# Patient Record
Sex: MALE | Race: BLACK OR AFRICAN AMERICAN | NOT HISPANIC OR LATINO | Employment: OTHER | URBAN - METROPOLITAN AREA
[De-identification: names, ages, dates, MRNs, and addresses within clinical notes are randomized per-mention and may not be internally consistent; named-entity substitution may affect disease eponyms.]

---

## 2017-01-03 DIAGNOSIS — N39.0 URINARY TRACT INFECTION, SITE UNSPECIFIED: Primary | ICD-10-CM

## 2017-01-03 RX ORDER — CIPROFLOXACIN 500 MG/1
500 TABLET ORAL 2 TIMES DAILY
Qty: 28 TABLET | Refills: 0 | Status: SHIPPED | OUTPATIENT
Start: 2017-01-03 | End: 2017-01-17

## 2017-01-16 RX ORDER — ALFUZOSIN HYDROCHLORIDE 10 MG/1
TABLET, EXTENDED RELEASE ORAL
Qty: 30 TABLET | Refills: 0 | Status: SHIPPED | OUTPATIENT
Start: 2017-01-16 | End: 2017-02-13 | Stop reason: SDUPTHER

## 2017-02-13 RX ORDER — ALFUZOSIN HYDROCHLORIDE 10 MG/1
TABLET, EXTENDED RELEASE ORAL
Qty: 30 TABLET | Refills: 0 | Status: SHIPPED | OUTPATIENT
Start: 2017-02-13 | End: 2017-03-14 | Stop reason: SDUPTHER

## 2017-03-14 RX ORDER — ALFUZOSIN HYDROCHLORIDE 10 MG/1
TABLET, EXTENDED RELEASE ORAL
Qty: 30 TABLET | Refills: 0 | Status: SHIPPED | OUTPATIENT
Start: 2017-03-14 | End: 2017-04-17 | Stop reason: SDUPTHER

## 2017-04-17 RX ORDER — ALFUZOSIN HYDROCHLORIDE 10 MG/1
TABLET, EXTENDED RELEASE ORAL
Qty: 30 TABLET | Refills: 0 | Status: SHIPPED | OUTPATIENT
Start: 2017-04-17 | End: 2017-05-17 | Stop reason: SDUPTHER

## 2017-05-17 RX ORDER — ALFUZOSIN HYDROCHLORIDE 10 MG/1
TABLET, EXTENDED RELEASE ORAL
Qty: 30 TABLET | Refills: 0 | Status: SHIPPED | OUTPATIENT
Start: 2017-05-17 | End: 2017-06-18 | Stop reason: SDUPTHER

## 2017-05-22 RX ORDER — HYDROXYCHLOROQUINE SULFATE 200 MG/1
TABLET, FILM COATED ORAL
Qty: 60 TABLET | Refills: 0 | Status: SHIPPED | OUTPATIENT
Start: 2017-05-22 | End: 2017-06-19 | Stop reason: SDUPTHER

## 2017-06-19 RX ORDER — HYDROXYCHLOROQUINE SULFATE 200 MG/1
TABLET, FILM COATED ORAL
Qty: 60 TABLET | Refills: 0 | Status: SHIPPED | OUTPATIENT
Start: 2017-06-19 | End: 2017-07-20 | Stop reason: SDUPTHER

## 2017-06-19 RX ORDER — ALFUZOSIN HYDROCHLORIDE 10 MG/1
TABLET, EXTENDED RELEASE ORAL
Qty: 30 TABLET | Refills: 0 | Status: SHIPPED | OUTPATIENT
Start: 2017-06-19 | End: 2017-07-20 | Stop reason: SDUPTHER

## 2017-07-20 ENCOUNTER — OFFICE VISIT (OUTPATIENT)
Dept: UROLOGY | Facility: CLINIC | Age: 82
End: 2017-07-20
Payer: MEDICARE

## 2017-07-20 ENCOUNTER — TELEPHONE (OUTPATIENT)
Dept: UROLOGY | Facility: CLINIC | Age: 82
End: 2017-07-20

## 2017-07-20 VITALS
TEMPERATURE: 99 F | SYSTOLIC BLOOD PRESSURE: 141 MMHG | HEART RATE: 64 BPM | RESPIRATION RATE: 18 BRPM | WEIGHT: 143.94 LBS | BODY MASS INDEX: 22.59 KG/M2 | HEIGHT: 67 IN | DIASTOLIC BLOOD PRESSURE: 63 MMHG

## 2017-07-20 DIAGNOSIS — R35.0 URINARY FREQUENCY: ICD-10-CM

## 2017-07-20 DIAGNOSIS — N41.9 PROSTATITIS, UNSPECIFIED PROSTATITIS TYPE: ICD-10-CM

## 2017-07-20 DIAGNOSIS — N39.0 URINARY TRACT INFECTION WITHOUT HEMATURIA, SITE UNSPECIFIED: Primary | ICD-10-CM

## 2017-07-20 DIAGNOSIS — R35.1 NOCTURIA: ICD-10-CM

## 2017-07-20 LAB
BILIRUB SERPL-MCNC: ABNORMAL MG/DL
BLOOD URINE, POC: ABNORMAL
COLOR, POC UA: YELLOW
GLUCOSE UR QL STRIP: ABNORMAL
KETONES UR QL STRIP: ABNORMAL
LEUKOCYTE ESTERASE URINE, POC: ABNORMAL
NITRITE, POC UA: ABNORMAL
PH, POC UA: 5
PROTEIN, POC: ABNORMAL
SPECIFIC GRAVITY, POC UA: 1.01
UROBILINOGEN, POC UA: ABNORMAL

## 2017-07-20 PROCEDURE — 99214 OFFICE O/P EST MOD 30 MIN: CPT | Mod: 25,S$GLB,, | Performed by: NURSE PRACTITIONER

## 2017-07-20 PROCEDURE — 87088 URINE BACTERIA CULTURE: CPT

## 2017-07-20 PROCEDURE — 1126F AMNT PAIN NOTED NONE PRSNT: CPT | Mod: S$GLB,,, | Performed by: NURSE PRACTITIONER

## 2017-07-20 PROCEDURE — 87086 URINE CULTURE/COLONY COUNT: CPT

## 2017-07-20 PROCEDURE — 81001 URINALYSIS AUTO W/SCOPE: CPT | Mod: S$GLB,,, | Performed by: NURSE PRACTITIONER

## 2017-07-20 PROCEDURE — 1159F MED LIST DOCD IN RCRD: CPT | Mod: S$GLB,,, | Performed by: NURSE PRACTITIONER

## 2017-07-20 PROCEDURE — 99999 PR PBB SHADOW E&M-EST. PATIENT-LVL IV: CPT | Mod: PBBFAC,,, | Performed by: NURSE PRACTITIONER

## 2017-07-20 RX ORDER — ALFUZOSIN HYDROCHLORIDE 10 MG/1
10 TABLET, EXTENDED RELEASE ORAL DAILY
Qty: 30 TABLET | Refills: 11 | Status: SHIPPED | OUTPATIENT
Start: 2017-07-20 | End: 2018-08-02 | Stop reason: SDUPTHER

## 2017-07-20 RX ORDER — ALFUZOSIN HYDROCHLORIDE 10 MG/1
TABLET, EXTENDED RELEASE ORAL
Qty: 30 TABLET | Refills: 0 | Status: SHIPPED | OUTPATIENT
Start: 2017-07-20 | End: 2017-07-20 | Stop reason: SDUPTHER

## 2017-07-20 RX ORDER — DOXYCYCLINE HYCLATE 100 MG
100 TABLET ORAL 2 TIMES DAILY
Qty: 42 TABLET | Refills: 0 | Status: SHIPPED | OUTPATIENT
Start: 2017-07-20 | End: 2017-08-10

## 2017-07-20 RX ORDER — HYDROXYCHLOROQUINE SULFATE 200 MG/1
TABLET, FILM COATED ORAL
Qty: 60 TABLET | Refills: 0 | Status: SHIPPED | OUTPATIENT
Start: 2017-07-20 | End: 2017-08-28 | Stop reason: SDUPTHER

## 2017-07-20 NOTE — PATIENT INSTRUCTIONS
Bladder Infection, Male (Adult)    You have a bladder infection.  Urine is normally free of bacteria. But bacteria can get into the urinary tract from the skin around the rectum or it may travel in the blood from elsewhere in the body.  This is called a urinary tract infection (UTI). An infection can occur anywhere in the urinary tract. It could be in a kidney (pyelonephritis)or in the bladder (cystitis) and urethra (urethritis). The urethra is the tube that drains the urine from the bladder through the tip of the penis.  The most common place for a UTI is in the bladder. This is called a bladder infection. Most bladder infections are easily treated. They are not serious unless the infection spreads up to the kidney.  The terms bladder infection, UTI, and cystitis are often used to describe the same thing, but they arent always the same. Cystitis is an inflammation of the bladder. The most common cause of cystitis is an infection.   Keep in mind:  · Infections in the urine are called UTIs.  · Cystitis is usually caused by a UTI.  · Not all UTIs and cases of cystitis are bladder infections.  · Bladder infections are the most common type of cystitis.  Symptoms of a bladder infection  The infection causes inflammation in the urethra and bladder. This inflammation causes many of the symptoms. The most common symptoms of a bladder infection are:  · Pain or burning when urinating  · Having to go more often than usual  · Feeling like you need to go right away  · Only a small amount comes out  · Blood in urine  · Discomfort in your belly (abdomen), usually in the lower abdomen, above the pubic bone  · Cloudy, strong, or bad smelling urine  · Unable to urinate (retention)  · Urinary incontinence  · Fever  · Loss of appetite  Older adults may also feel confused.  Causes of a bladder infection  Bladder infections are not contagious. You can't get one from someone else, from a toilet seat, or from sharing a bath.  The most  common cause of bladder infections is bacteria from the bowels. The bacteria get onto the skin around the opening of the urethra. From there they can get into the urine and travel up to the bladder. This causes inflammation and an infection. This usually happens because of:  · An enlarged prostate  · Poor cleaning of the genitals  · Procedures that put a tube in your bladder, like a Neumann catheter  · Bowel incontinence  · Older age  · Not emptying your bladder (The urine stays there, giving the bacteria a chance to grow.)  · Dehydration (This allows urine to stay in the bladder longer.)  · Constipation (This can cause the bowels to push on the bladder or urethra and keep the bladder from emptying.)  Treatment  Bladder infections are treated with antibiotics. They usually clear up quickly without complications. Treatment helps prevent a more serious kidney infection.  Medicines  Medicines can help in the treatment of a bladder infection:  · You may have been given phenazopyridine to ease burning when you urinate. It will cause your urine to be bright orange. It can stain clothing.  · You may have been prescribed antibiotics. Take this medicine until you have finished it, even if you feel better. Taking all of the medicine will make sure the infection has cleared.  You can use acetaminophen or ibuprofen for pain, fever, or discomfort, unless another medicine was prescribed. You can also alternate them, or use both together. They work differently and are a different class of medicines, so taking them together is not an overdose. If you have chronic liver or kidney disease, talk with your healthcare provider before using these medicines. Also talk with your provider if youve had a stomach ulcer or GI bleeding or are taking blood thinner medicines.  Home care  Here are some guidelines to help you care for yourself at home:  · Drink plenty of fluids, unless your healthcare provider told you not to. Fluids will prevent  dehydration and flush out your bladder.  · Use good personal hygiene. Wipe from front to back after using the toilet, and clean your penis regularly. If you arent circumcised, retract the foreskin when cleaning.  · Urinate more frequently, and dont try to hold it in for long periods of time, if possible.  · Wear loose-fitting clothes and cotton underwear. Avoid tight-fitting pants. This helps keep you clean and dry.  · Change your diet to prevent constipation. This means eating more fresh foods and more fiber, and less junk and fatty foods.  · Avoid sex until your symptoms are gone.  · Avoid caffeine, alcohol, and spicy foods. These can irritate the bladder.  Follow-up care  Follow up with your healthcare provider, or as advised if all symptoms have not cleared up within 5 days. It is important to keep your follow-up appointment. You can talk with your provider to see if you need more tests of the urinary tract. This is especially important if you have infections that keep coming back.  If a culture was done, you will be told if your treatment needs to be changed. If directed, you can call to find out the results.  If X-rays were taken, you will be told of any findings that may affect your care.  Call 911  Call 911 if any of these occur:  · Trouble breathing  · Difficulty waking up  · Feeling confused  · Fainting or loss of consciousness  · Rapid heart rate  When to seek medical advice  Call your healthcare provider right away if any of these occur:  · Fever of 100.4ºF (38ºC) or higher, or as directed by your healthcare provider  · Your symptoms dont improve after 2 days of treatment  · Back or abdominal pain that gets worse  · Repeated vomiting, or you arent able to keep medicine down  · Weakness or dizziness  Date Last Reviewed: 10/1/2016  © 6726-1611 [employer]. [address] [place] [address], [place] [address]. All rights reserved. This information is not intended as a substitute for professional  medical care. Always follow your healthcare professional's instructions.

## 2017-07-20 NOTE — TELEPHONE ENCOUNTER
----- Message from Julia Pineda sent at 7/20/2017  8:40 AM CDT -----  Contact: daughter  Daughter - Lakia Yuan - [phone] is requesting an appt with Dr Holloway as she feels patient has another urinary tract infection/please advise

## 2017-07-20 NOTE — PROGRESS NOTES
[facility] Note  Staff: CHRISTY Arceo      Chief Complaint: Increased urinary frequency and nocturia    Subjective:        HPI: Troy Yuan Sr. is a 88 y.o. male presents with complaints of increased urinary frequency and nocturia for several months.  Pt denies dysuria, hematuria, or problems with urination at this time.  He currently is taking Uroxatral 10 mg 1 po daily and is doing well with this medication.  Today the pt is accompanied by his daughter-Lakia which states that her father does drink multiple sodas on a daily basis and it is difficult for him to drink water.  He lives with his wife and daughter and he is fully capable of doing daily activities at this time.    The patient was last seen by Dr. Holloway on 12/01/2016 and has a hx of adenocarcinoma of the prostate for which he underwent external   beam radiation therapy over 20 years ago, and has showed no evidence of   recurrence since then.    Last PSA Screening:   Lab Results   Component Value Date    PSADIAG 0.70 03/24/2016    PSADIAG 0.77 03/17/2015    PSADIAG 0.65 09/26/2014     REVIEW OF SYSTEMS:  Review of Systems   Constitutional: Negative for chills, diaphoresis, fever and weight loss.   HENT: Negative for congestion, hearing loss, nosebleeds and sore throat.    Eyes: Negative for blurred vision and pain.   Respiratory: Negative for cough and wheezing.    Cardiovascular: Negative for chest pain, palpitations and leg swelling.   Gastrointestinal: Negative for abdominal pain, heartburn, nausea and vomiting.   Genitourinary: Positive for frequency. Negative for dysuria, flank pain, hematuria and urgency.        +Increased nocturia   Musculoskeletal: Negative for back pain, joint pain, myalgias and neck pain.   Skin: Negative for itching and rash.   Neurological: Negative for dizziness, tremors, sensory change, seizures, loss of consciousness, weakness and headaches.   Endo/Heme/Allergies: Does not bruise/bleed  easily.   Psychiatric/Behavioral: Negative for depression and suicidal ideas. The patient is not nervous/anxious.      Physical Exam    PMHx:  Past Medical History:   Diagnosis Date    Bladder stones     Cancer     Encounter for blood transfusion     Hypertension     Kidney stone     Prostate cancer 2004    Radiation 2005    prostate     PSHx:  Past Surgical History:   Procedure Laterality Date    CYSTOSCOPY      KIDNEY STONE SURGERY  May 2014     Allergies:  Soma [carisoprodol]; Aspirin; and Sulfa (sulfonamide antibiotics)    Medications: reviewed   Anticoagulation: No    Objective:     Vitals:    07/20/17 1327   BP: (!) 141/63   Pulse: 64   Resp: 18   Temp: 98.6 °F (37 °C)     General:WDWN in NAD  Eyes: PERRLA, normal conjunctiva  Respiratory: no increased work on breathing, clear to auscultation  Cardiovascular: regular rate and rhythm. No obvious extremity edema.  GI: palpation of masses. No tenderness. No hepatosplenomegaly to palpation.  Musculoskeletal: normal range of motion of bilateral upper extremities. Normal muscle strength and tone.  Skin: no obvious rashes or lesions. No tightening of skin noted.  Neurologic: CN grossly normal. Normal sensation.   Psychiatric: awake, alert and oriented x 3. Mood and affect normal. Cooperative.    LABS REVIEW:  UA today:  Color, UA yellow   Spec Grav UA 1.015   pH, UA 5.0   WBC, UA trace   Nitrite, UA neg   Protein trace   Glucose, UA norm   Ketones, UA neg   Urobilinogen, UA neg   Bilirubin neg   Blood, UA trace     Cr:   Lab Results   Component Value Date    CREATININE 1.0 09/28/2015     Assessment:       1. Urinary tract infection without hematuria, site unspecified    2. Prostatitis, unspecified prostatitis type    3. Urinary frequency    4. Nocturia          Plan:     1.  We will send urine for culture testing.  2.  In the meantime, I will prescribe Doxycycline 100 mg 1 po BID x 3 weeks.  3.  Uroxatral refilled for the patient today.    F/u with me in 4-6  weeks to recheck urine and do pvr.    [facility]: Active    Silvia Owens, FRANCESCAP-C

## 2017-07-22 LAB — BACTERIA UR CULT: NORMAL

## 2017-08-24 ENCOUNTER — OFFICE VISIT (OUTPATIENT)
Dept: UROLOGY | Facility: CLINIC | Age: 82
End: 2017-08-24
Payer: MEDICARE

## 2017-08-24 VITALS
HEIGHT: 67 IN | WEIGHT: 147.5 LBS | SYSTOLIC BLOOD PRESSURE: 160 MMHG | HEART RATE: 63 BPM | BODY MASS INDEX: 23.15 KG/M2 | DIASTOLIC BLOOD PRESSURE: 62 MMHG | TEMPERATURE: 98 F

## 2017-08-24 DIAGNOSIS — N40.1 BENIGN PROSTATIC HYPERPLASIA WITH NOCTURIA: ICD-10-CM

## 2017-08-24 DIAGNOSIS — N39.0 URINARY TRACT INFECTION WITHOUT HEMATURIA, SITE UNSPECIFIED: Primary | ICD-10-CM

## 2017-08-24 DIAGNOSIS — R35.1 BENIGN PROSTATIC HYPERPLASIA WITH NOCTURIA: ICD-10-CM

## 2017-08-24 PROCEDURE — 81001 URINALYSIS AUTO W/SCOPE: CPT | Mod: S$GLB,,, | Performed by: NURSE PRACTITIONER

## 2017-08-24 PROCEDURE — 1126F AMNT PAIN NOTED NONE PRSNT: CPT | Mod: S$GLB,,, | Performed by: NURSE PRACTITIONER

## 2017-08-24 PROCEDURE — 99213 OFFICE O/P EST LOW 20 MIN: CPT | Mod: 25,S$GLB,, | Performed by: NURSE PRACTITIONER

## 2017-08-24 PROCEDURE — 1159F MED LIST DOCD IN RCRD: CPT | Mod: S$GLB,,, | Performed by: NURSE PRACTITIONER

## 2017-08-24 PROCEDURE — 99999 PR PBB SHADOW E&M-EST. PATIENT-LVL III: CPT | Mod: PBBFAC,,, | Performed by: NURSE PRACTITIONER

## 2017-08-24 PROCEDURE — 3008F BODY MASS INDEX DOCD: CPT | Mod: S$GLB,,, | Performed by: NURSE PRACTITIONER

## 2017-08-24 PROCEDURE — 51798 US URINE CAPACITY MEASURE: CPT | Mod: S$GLB,,, | Performed by: NURSE PRACTITIONER

## 2017-08-24 NOTE — PROGRESS NOTES
[facility] Note  Staff: CHRISTY Arceo      Chief Complaint: Routine Recheck-Urinary frequency and nocturia symptoms.    Subjective:        HPI: Troy Yuan Sr. is a 88 y.o. male presents today for routine recheck.  I last saw this patient on 07/20/17 with c/o complaints of increased urinary frequency and nocturia for several months.  Pt denies dysuria, hematuria, or problems with urination at this time.  He currently is taking Uroxatral 10 mg 1 po daily and is doing well with this medication.     The patient was last seen by Dr. Holloway on 12/01/2016 and has a hx of adenocarcinoma of the prostate for which he underwent external   beam radiation therapy over 20 years ago, and has showed no evidence of   recurrence since then.    Last PSA Screening:   Lab Results   Component Value Date    PSADIAG 0.70 03/24/2016    PSADIAG 0.77 03/17/2015    PSADIAG 0.65 09/26/2014     REVIEW OF SYSTEMS:  Review of Systems   Constitutional: Negative for chills, diaphoresis, fever and weight loss.   HENT: Negative for congestion, hearing loss, nosebleeds and sore throat.    Eyes: Negative for blurred vision and pain.   Respiratory: Negative for cough and wheezing.    Cardiovascular: Negative for chest pain, palpitations and leg swelling.   Gastrointestinal: Negative for abdominal pain, heartburn, nausea and vomiting.   Genitourinary: Positive for frequency. Negative for dysuria, flank pain, hematuria and urgency.   Musculoskeletal: Negative for back pain, joint pain, myalgias and neck pain.   Skin: Negative for itching and rash.   Neurological: Negative for dizziness, tremors, sensory change, seizures, loss of consciousness, weakness and headaches.   Endo/Heme/Allergies: Does not bruise/bleed easily.   Psychiatric/Behavioral: Negative for depression and suicidal ideas. The patient is not nervous/anxious.      Physical Exam    PMHx:  Past Medical History:   Diagnosis Date    Bladder stones      Cancer     Encounter for blood transfusion     Hypertension     Kidney stone     Prostate cancer 2004    Radiation 2005    prostate     PSHx:  Past Surgical History:   Procedure Laterality Date    CYSTOSCOPY      KIDNEY STONE SURGERY  May 2014     Allergies:  Soma [carisoprodol]; Aspirin; and Sulfa (sulfonamide antibiotics)    Medications: reviewed   Objective:     Vitals:    08/24/17 1331   BP: (!) 160/62   Pulse: 63   Temp: 97.9 °F (36.6 °C)     General:WDWN in NAD  Eyes: PERRLA, normal conjunctiva  Respiratory: no increased work on breathing, clear to auscultation  Cardiovascular: regular rate and rhythm. No obvious extremity edema.  GI: palpation of masses. No tenderness. No hepatosplenomegaly to palpation.  Musculoskeletal: normal range of motion of bilateral upper extremities. Normal muscle strength and tone.  Skin: no obvious rashes or lesions. No tightening of skin noted.  Neurologic: CN grossly normal. Normal sensation.   Psychiatric: awake, alert and oriented x 3. Mood and affect normal. Cooperative.    PVR by bladder scan performed by me today: 14 mL*    LABS REVIEW:  UA today:  Color, UA Yellow   Spec Grav UA 1.015   pH, UA 5   WBC, UA Neg   Nitrite, UA Neg   Protein Trace   Glucose, UA Neg   Ketones, UA Neg   Urobilinogen, UA Neg   Bilirubin Neg   Blood, UA Neg     Cr:   Lab Results   Component Value Date    CREATININE 1.0 09/28/2015     Assessment:       1. Urinary tract infection without hematuria, site unspecified    2. Benign prostatic hyperplasia with nocturia          Plan:     Continue Uroxatral.  Decrease the soda intake-daughter states today he drinks several a day.    F/u with Dr. Holloway in two months for recheck on his hx of prostate cancer and nocturia symptoms with pvr.    [facility]: Active    Silvia Owens, FRANCESCAP-C

## 2017-08-28 RX ORDER — HYDROXYCHLOROQUINE SULFATE 200 MG/1
TABLET, FILM COATED ORAL
Qty: 60 TABLET | Refills: 0 | Status: SHIPPED | OUTPATIENT
Start: 2017-08-28 | End: 2017-08-29 | Stop reason: SDUPTHER

## 2017-08-29 ENCOUNTER — OFFICE VISIT (OUTPATIENT)
Dept: RHEUMATOLOGY | Facility: CLINIC | Age: 82
End: 2017-08-29
Payer: MEDICARE

## 2017-08-29 VITALS
BODY MASS INDEX: 22.9 KG/M2 | WEIGHT: 145.88 LBS | DIASTOLIC BLOOD PRESSURE: 64 MMHG | SYSTOLIC BLOOD PRESSURE: 156 MMHG | HEIGHT: 67 IN

## 2017-08-29 DIAGNOSIS — M05.79 RHEUMATOID ARTHRITIS INVOLVING MULTIPLE SITES WITH POSITIVE RHEUMATOID FACTOR: Primary | ICD-10-CM

## 2017-08-29 PROCEDURE — 1126F AMNT PAIN NOTED NONE PRSNT: CPT | Mod: ,,, | Performed by: INTERNAL MEDICINE

## 2017-08-29 PROCEDURE — 3008F BODY MASS INDEX DOCD: CPT | Mod: ,,, | Performed by: INTERNAL MEDICINE

## 2017-08-29 PROCEDURE — 99213 OFFICE O/P EST LOW 20 MIN: CPT | Mod: ,,, | Performed by: INTERNAL MEDICINE

## 2017-08-29 PROCEDURE — 1159F MED LIST DOCD IN RCRD: CPT | Mod: ,,, | Performed by: INTERNAL MEDICINE

## 2017-08-29 RX ORDER — HYDROXYCHLOROQUINE SULFATE 200 MG/1
TABLET, FILM COATED ORAL
Qty: 60 TABLET | Refills: 3 | Status: SHIPPED | OUTPATIENT
Start: 2017-08-29 | End: 2017-11-01 | Stop reason: SDUPTHER

## 2017-08-29 NOTE — PROGRESS NOTES
[facility] RHEUMATOLOGY            PROGRESS NOTE      Subjective:       Patient ID:   NAME: Troy Yuan Sr. : 1929     88 y.o. male    Referring Doc: No ref. provider found  Other Physicians:    Chief Complaint:  Rheumatoid Arthritis      History of Present Illness:     Patient returns today for a regularly scheduled follow-up visit for   Rheumatoid arthritis    The patient is doing well. No fatigue no prolonged morning stiffness or joint swelling. No significant arthralgias. No chest pains cough or shortness of breath.            ROS:   GEN:  No  fever, night sweats . weight is stable   No fatigue  SKIN: no rashes, no bruising, no ulcerations, no Raynaud's  HEENT: no HA's, No visual changes, no mucosal ulcers, no sicca symptoms,  CV:   no CP, SOB, PND, [place], no orthopnea, no palpitations  PULM: normal with no SOB, cough, hemoptysis, sputum or pleuritic pain  GI:  no abdominal pain, nausea, vomiting, constipation, diarrhea, melanotic stools, BRBPR, hematemesis, no dysphagia  :   no dysuria  NEURO: no paresthesias, headaches, visual disturbances, muscle weakness  MUSCULOSKELETAL:no joint swelling, prolonged AM stiffness, no back pain, no muscle pain  Allergies:  Review of patient's allergies indicates:   Allergen Reactions    Soma [carisoprodol] Rash    Aspirin     Sulfa (sulfonamide antibiotics) Rash       Medications:    Current Outpatient Prescriptions:     alfuzosin (UROXATRAL) 10 mg Tb24, Take 1 tablet (10 mg total) by mouth once daily., Disp: 30 tablet, Rfl: 11    ferrous sulfate 325 mg (65 mg iron) Tab tablet, Take 325 mg by mouth once daily., Disp: , Rfl:     hydroxychloroquine (PLAQUENIL) 200 mg tablet, TAKE 1 TABLET BY MOUTH TWICE DAILY( EVERY TWELVE HOURS), Disp: 60 tablet, Rfl: 3    metoprolol tartrate (LOPRESSOR) 25 MG tablet, Take 1 tablet (25 mg total) by mouth every 12 (twelve) hours as needed (SBP > 160)., Disp: 60 tablet, Rfl: 0    amlodipine (NORVASC) 10 MG tablet, Take 10 mg  "by mouth once daily.  , Disp: , Rfl:     PMHx/PSHx Updates:          Last Eye Exam 2016      Objective:     Vitals:  Blood pressure (!) 156/64, height 5' 7" (1.702 m), weight 66.2 kg (145 lb 14.4 oz).    Physical Examination:   GEN: no apparent distress, comfortable; AAOx3  SKIN: no rashes,no ulceration, no Raynaud's, no petechiae, no SQ nodules,  HEAD: normal  EYES: no pallor, no icterus,  ENT:  ,no mucosal dryness or ulcerations  NECK: no masses, thyroid normal, trachea midline, no LAD/LN's, supple  CV: RRR with no murmur; l S1 and S2 reg. ,no gallop no rubs,   CHEST: Normal respiratory effort; CTAB; normal breath sounds; no wheeze or crackles  ABDOM: nontender and nondistended; soft; no masses; no rebound/guarding  MUSC/Skeletal: ROM normal; no crepitus; joints without synovitis,  no deformities  No joint swelling or tenderness of PIP, MCP, wrist, elbow, shoulder, or knee joints  EXTREM: no clubbing, cyanosis, no edema,normal  pulses   NEURO: grossly intact; motor WNL; AAOx3; ,  PSYCH: normal mood, affect and behavior  LYMPH: normal cervical, supraclavicular          Labs:   Lab Results   Component Value Date    WBC 5.90 09/28/2015    HGB 8.9 (L) 09/28/2015    HCT 29.2 (L) 09/28/2015    MCV 87 09/28/2015     09/28/2015    CMP  @LASTLAB(NA,K,CL,CO2,GLU,BUN,Creatinine,Calcium,PROT,Albumin,Bilitot,Alkphos,AST,ALT,CRP,ESR,RF,CCP,IRIS,SSA,CPK,uric acid) )@  I have reviewed all available lab results and radiology reports.    Radiology/Diagnostic Studies:        Assessment/Plan:   (1) 88 y.o. male with diagnosis of Rheumatoid arthritis. Patient is doing well without any complaints.  PLAN: CBC CMP CRP. Eye exam                Discussion:     I have explained all of the above in detail and the patient understands all of the current recommendation(s). I have answered all questions to the best of my ability and to their complete satisfaction.       The patient is to continue with the current management plan "         RTC in four months        Electronically signed by Ignacia Seay MD

## 2017-10-26 ENCOUNTER — LAB VISIT (OUTPATIENT)
Dept: LAB | Facility: HOSPITAL | Age: 82
End: 2017-10-26
Attending: UROLOGY
Payer: MEDICARE

## 2017-10-26 ENCOUNTER — OFFICE VISIT (OUTPATIENT)
Dept: UROLOGY | Facility: CLINIC | Age: 82
End: 2017-10-26
Payer: MEDICARE

## 2017-10-26 VITALS
HEART RATE: 69 BPM | DIASTOLIC BLOOD PRESSURE: 66 MMHG | TEMPERATURE: 98 F | SYSTOLIC BLOOD PRESSURE: 145 MMHG | WEIGHT: 140 LBS | HEIGHT: 67 IN | RESPIRATION RATE: 18 BRPM | BODY MASS INDEX: 21.97 KG/M2

## 2017-10-26 DIAGNOSIS — R35.1 NOCTURIA: ICD-10-CM

## 2017-10-26 DIAGNOSIS — N40.1 BENIGN LOCALIZED PROSTATIC HYPERPLASIA WITH LOWER URINARY TRACT SYMPTOMS (LUTS): ICD-10-CM

## 2017-10-26 DIAGNOSIS — C61 MALIGNANT NEOPLASM OF PROSTATE: Primary | ICD-10-CM

## 2017-10-26 DIAGNOSIS — R35.1 BPH ASSOCIATED WITH NOCTURIA: ICD-10-CM

## 2017-10-26 DIAGNOSIS — N40.1 BPH ASSOCIATED WITH NOCTURIA: ICD-10-CM

## 2017-10-26 DIAGNOSIS — C61 MALIGNANT NEOPLASM OF PROSTATE: ICD-10-CM

## 2017-10-26 LAB — COMPLEXED PSA SERPL-MCNC: 0.84 NG/ML

## 2017-10-26 PROCEDURE — 99214 OFFICE O/P EST MOD 30 MIN: CPT | Mod: 25,S$GLB,, | Performed by: UROLOGY

## 2017-10-26 PROCEDURE — 51798 US URINE CAPACITY MEASURE: CPT | Mod: S$GLB,,, | Performed by: UROLOGY

## 2017-10-26 PROCEDURE — 84153 ASSAY OF PSA TOTAL: CPT

## 2017-10-26 PROCEDURE — 99999 PR PBB SHADOW E&M-EST. PATIENT-LVL III: CPT | Mod: PBBFAC,,, | Performed by: UROLOGY

## 2017-10-26 PROCEDURE — 36415 COLL VENOUS BLD VENIPUNCTURE: CPT

## 2017-10-26 NOTE — PROGRESS NOTES
CHIEF COMPLAINT:  Adenocarcinoma of the prostate, BPH with lower urinary tract   symptoms, and nocturia.    HISTORY OF PRESENT ILLNESS:  This 88-year-old male returns for routine recheck.    He has a history of adenocarcinoma of the prostate for which he underwent   external beam radiation therapy over 20 years ago and has shown no evidence of   recurrence since then.  His last PSA was 0.70 on 03/24/2016.  The patient also   has BPH with lower urinary tract symptoms for which he continues to take   Uroxatral 10 mg p.o. daily and overall his daytime voiding is quite   satisfactory.  He does have problems with nocturia up to x4 to 5.  This has   persisted in spite of reducing his p.m. fluid intake.    MEDICAL HISTORY UPDATE:  Reveals no other change in general health since his   last visit.    PHYSICAL EXAMINATION:  ABDOMEN:  Soft, benign, and nontender.  No masses.  No hernias or organomegaly.  EXTERNAL GENITAL:  Normal phallus with adequate meatus.  Testes descended and   feel normal.  No scrotal masses.  RECTAL:  Reveals a firm, flattened prostatic area.  No nodules.  Normal   sphincter tone.    Bladder scan revealed 22 mL of residual urine.  The patient was unable give a   urine specimen beforehand since he had voided earlier.    His last PSA was 0.70 on 03/24/2016.    FINAL IMPRESSION:  Adenocarcinoma of prostate, BPH with lower urinary tract   symptoms, nocturia.    RECOMMENDATIONS:  Continue with Uroxatral 10 mg p.o. daily.  We will also add   Myrbetriq 25 mg p.o. daily.  The patient is instructed to decrease his p.m.   fluid intake and we will also obtain a PSA for which he is due a recheck with   his history of prostate cancer.  We will contact him with the results.      TIFFANIE  dd: 10/26/2017 12:58:59 (CDT)  td: 10/26/2017 23:39:20 (CDT)  Doc ID   #[id number]  Job ID #[id number]    CC:

## 2017-11-01 RX ORDER — HYDROXYCHLOROQUINE SULFATE 200 MG/1
TABLET, FILM COATED ORAL
Qty: 60 TABLET | Refills: 0 | Status: SHIPPED | OUTPATIENT
Start: 2017-11-01 | End: 2017-12-18 | Stop reason: SDUPTHER

## 2017-11-07 ENCOUNTER — TELEPHONE (OUTPATIENT)
Dept: UROLOGY | Facility: CLINIC | Age: 82
End: 2017-11-07

## 2017-11-07 NOTE — TELEPHONE ENCOUNTER
----- Message from Elida Waite sent at 11/7/2017 11:22 AM CST -----  Contact: daughter Lakia  Pt daughter Lakia states returning a phone call from the office..[phone] (home)

## 2017-12-18 ENCOUNTER — OFFICE VISIT (OUTPATIENT)
Dept: RHEUMATOLOGY | Facility: CLINIC | Age: 82
End: 2017-12-18
Payer: MEDICARE

## 2017-12-18 VITALS
SYSTOLIC BLOOD PRESSURE: 172 MMHG | BODY MASS INDEX: 22.6 KG/M2 | WEIGHT: 144 LBS | HEIGHT: 67 IN | DIASTOLIC BLOOD PRESSURE: 60 MMHG

## 2017-12-18 DIAGNOSIS — M05.79 RHEUMATOID ARTHRITIS INVOLVING MULTIPLE SITES WITH POSITIVE RHEUMATOID FACTOR: Primary | ICD-10-CM

## 2017-12-18 PROCEDURE — 99212 OFFICE O/P EST SF 10 MIN: CPT | Mod: ,,, | Performed by: INTERNAL MEDICINE

## 2017-12-18 RX ORDER — ZINC GLUCONATE 13.3 MG
200 LOZENGE ORAL 2 TIMES DAILY
COMMUNITY
End: 2020-10-16 | Stop reason: ALTCHOICE

## 2017-12-18 RX ORDER — HYDROXYCHLOROQUINE SULFATE 200 MG/1
200 TABLET, FILM COATED ORAL 2 TIMES DAILY
Qty: 60 TABLET | Refills: 3 | Status: SHIPPED | OUTPATIENT
Start: 2017-12-18 | End: 2018-04-23 | Stop reason: SDUPTHER

## 2017-12-18 NOTE — PROGRESS NOTES
[facility] RHEUMATOLOGY            PROGRESS NOTE      Subjective:       Patient ID:   NAME: Troy Yuan Sr. : 1929     88 y.o. male    Referring Doc: No ref. provider found  Other Physicians:    Chief Complaint:  No chief complaint on file.      History of Present Illness:     Patient returns today for a regularly scheduled follow-up visit for   Rheumatoid arthritis    The patient is doing well. No prolonged morning stiffness joint pains or swelling. No chest pains cough or shortness of breath. No gastrointestinal complaints            ROS:   GEN:  No  fever, night sweats . weight is stable   Some fatigue  SKIN: no rashes, no bruising, no ulcerations, no Raynaud's  HEENT: no HA's, No visual changes, no mucosal ulcers, no sicca symptoms,  CV:   no CP, SOB, PND, MCLAUGHLIN, no orthopnea, no palpitations  PULM: normal with no SOB, cough, hemoptysis, sputum or pleuritic pain  GI:  no abdominal pain, nausea, vomiting, constipation, diarrhea, melanotic stools, BRBPR, hematemesis, no dysphagia  :   no dysuria  NEURO: no paresthesias, headaches, visual disturbances, muscle weakness  MUSCULOSKELETAL:no joint swelling, prolonged AM stiffness, no back pain, no muscle pain  Allergies:  Review of patient's allergies indicates:   Allergen Reactions    Soma [carisoprodol] Rash    Aspirin     Sulfa (sulfonamide antibiotics) Rash       Medications:    Current Outpatient Prescriptions:     alfuzosin (UROXATRAL) 10 mg Tb24, Take 1 tablet (10 mg total) by mouth once daily., Disp: 30 tablet, Rfl: 11    amlodipine (NORVASC) 10 MG tablet, Take 10 mg by mouth once daily.  , Disp: , Rfl:     cimetidine (TAGAMET) 200 MG tablet, Take 200 mg by mouth., Disp: , Rfl:     dextromethorphan-guaifenesin  mg Cap, Take by mouth., Disp: , Rfl:     ferrous sulfate 325 mg (65 mg iron) Tab tablet, Take 325 mg by mouth once daily., Disp: , Rfl:     hydroxychloroquine (PLAQUENIL) 200 mg tablet, TAKE 1 TABLET BY MOUTH TWICE DAILY, Disp:  "60 tablet, Rfl: 0    mirabegron (MYRBETRIQ) 25 mg Tb24 ER tablet, Take 25 mg by mouth., Disp: , Rfl:     metoprolol tartrate (LOPRESSOR) 25 MG tablet, Take 1 tablet (25 mg total) by mouth every 12 (twelve) hours as needed (SBP > 160)., Disp: 60 tablet, Rfl: 0    PMHx/PSHx Updates    Objective:     Vitals:  Blood pressure (!) 172/60, height 5' 7" (1.702 m), weight 65.3 kg (144 lb).    Physical Examination:   GEN: no apparent distress, comfortable; AAOx3  SKIN: no rashes,no ulceration, no Raynaud's, no petechiae, no SQ nodules,  HEAD: normal  EYES: no pallor, no icterus,  NECK: no masses, thyroid normal, trachea midline, no LAD/LN's, supple  CV: RRR with no murmur; l S1 and S2 reg. ,no gallop no rubs,   CHEST: Normal respiratory effort; CTAB; normal breath sounds; no wheeze or crackles  ABDOM: nontender and nondistended; soft; no masses; no rebound/guarding  MUSC/Skeletal: ROM normal; no crepitus; joints without synovitis,  no deformities  No joint swelling or tenderness of PIP, MCP, wrist, elbow, shoulder, or knee joints  EXTREM: no clubbing, cyanosis, no edema,normal  pulses   NEURO: grossly intact; motor WNL; AAOx3;   PSYCH: normal mood, affect and behavior  LYMPH: normal cervical, supraclavicular          Labs:   Lab Results   Component Value Date    WBC 5.90 09/28/2015    HGB 8.9 (L) 09/28/2015    HCT 29.2 (L) 09/28/2015    MCV 87 09/28/2015     09/28/2015    CMP  @LASTLAB(NA,K,CL,CO2,GLU,BUN,Creatinine,Calcium,PROT,Albumin,Bilitot,Alkphos,AST,ALT,CRP,ESR,RF,CCP,IRIS,SSA,CPK,uric acid) )@  I have reviewed all available lab results and radiology reports.    Radiology/Diagnostic Studies:  Eye exam is up to date      Assessment/Plan:   (1) 88 y.o. male with diagnosis of Rheumatoid arthritis. He is stable.    CBC CMP CRP            Discussion:     I have explained all of the above in detail and the patient understands all of the current recommendation(s). I have answered all questions to the best of my " ability and to their complete satisfaction.       The patient is to continue with the current management plan         RTC in  Or months or before when necessary       Electronically signed by Ignacia Seay MD

## 2018-02-08 RX ORDER — HYDROXYCHLOROQUINE SULFATE 200 MG/1
TABLET, FILM COATED ORAL
Qty: 60 TABLET | Refills: 0 | Status: SHIPPED | OUTPATIENT
Start: 2018-02-08 | End: 2018-03-16 | Stop reason: SDUPTHER

## 2018-03-16 RX ORDER — HYDROXYCHLOROQUINE SULFATE 200 MG/1
TABLET, FILM COATED ORAL
Qty: 60 TABLET | Refills: 0 | Status: SHIPPED | OUTPATIENT
Start: 2018-03-16 | End: 2018-07-23 | Stop reason: SDUPTHER

## 2018-04-23 ENCOUNTER — OFFICE VISIT (OUTPATIENT)
Dept: RHEUMATOLOGY | Facility: CLINIC | Age: 83
End: 2018-04-23
Payer: MEDICARE

## 2018-04-23 VITALS — BODY MASS INDEX: 23.42 KG/M2 | SYSTOLIC BLOOD PRESSURE: 158 MMHG | DIASTOLIC BLOOD PRESSURE: 72 MMHG | WEIGHT: 149.5 LBS

## 2018-04-23 DIAGNOSIS — M05.79 RHEUMATOID ARTHRITIS INVOLVING MULTIPLE SITES WITH POSITIVE RHEUMATOID FACTOR: Primary | ICD-10-CM

## 2018-04-23 PROCEDURE — 99213 OFFICE O/P EST LOW 20 MIN: CPT | Mod: ,,, | Performed by: INTERNAL MEDICINE

## 2018-04-23 RX ORDER — HYDROXYCHLOROQUINE SULFATE 200 MG/1
200 TABLET, FILM COATED ORAL 2 TIMES DAILY
Qty: 60 TABLET | Refills: 3 | Status: SHIPPED | OUTPATIENT
Start: 2018-04-23 | End: 2019-05-06 | Stop reason: SDUPTHER

## 2018-04-23 NOTE — PROGRESS NOTES
[facility] RHEUMATOLOGY            PROGRESS NOTE      Subjective:       Patient ID:   NAME: Troy Yuan Sr. : 1929     89 y.o. male    Referring Doc: No ref. provider found  Other Physicians:    Chief Complaint:  Rheumatoid Arthritis      History of Present Illness:     Patient returns today for a regularly scheduled follow-up visit for  Rheumatoid arthritis.     The patient is doing well. No prolonged morning stiffness or joint swelling. No significant arthralgias. No chest pains, cough or shortness of breath. No gastrointestinal complaints            ROS:   GEN:  No  fever, night sweats . weight is stable   + fatigue  SKIN: no rashes, no bruising, no ulcerations, no Raynaud's  HEENT: no HA's, No visual changes, no mucosal ulcers, no sicca symptoms,  CV:   no CP, SOB, PND, MCLAUGHLIN, no orthopnea, no palpitations  PULM: normal with no SOB, cough, hemoptysis, sputum or pleuritic pain  GI:  no abdominal pain, nausea, vomiting, constipation, diarrhea, melanotic stools, BRBPR, hematemesis, no dysphagia  :   no dysuria  NEURO: no paresthesias, headaches, visual disturbances, muscle weakness  MUSCULOSKELETAL:no joint swelling, prolonged AM stiffness, no back pain, no muscle pain  Allergies:  Review of patient's allergies indicates:   Allergen Reactions    Soma [carisoprodol] Rash    Aspirin     Sulfa (sulfonamide antibiotics) Rash       Medications:    Current Outpatient Prescriptions:     alfuzosin (UROXATRAL) 10 mg Tb24, Take 1 tablet (10 mg total) by mouth once daily., Disp: 30 tablet, Rfl: 11    amlodipine (NORVASC) 10 MG tablet, Take 10 mg by mouth once daily.  , Disp: , Rfl:     cimetidine (TAGAMET) 200 MG tablet, Take 200 mg by mouth., Disp: , Rfl:     dextromethorphan-guaifenesin  mg Cap, Take by mouth., Disp: , Rfl:     ferrous sulfate 325 mg (65 mg iron) Tab tablet, Take 325 mg by mouth once daily., Disp: , Rfl:     hydroxychloroquine (PLAQUENIL) 200 mg tablet, TAKE 1 TABLET BY MOUTH  EVERY 12 HOURS, Disp: 60 tablet, Rfl: 0    hydroxychloroquine (PLAQUENIL) 200 mg tablet, Take 1 tablet (200 mg total) by mouth 2 (two) times daily., Disp: 60 tablet, Rfl: 3    mirabegron (MYRBETRIQ) 25 mg Tb24 ER tablet, Take 25 mg by mouth., Disp: , Rfl:     metoprolol tartrate (LOPRESSOR) 25 MG tablet, Take 1 tablet (25 mg total) by mouth every 12 (twelve) hours as needed (SBP > 160)., Disp: 60 tablet, Rfl: 0    PMHx/PSHx Updates:        Objective:     Vitals:  Blood pressure (!) 158/72, weight 67.8 kg (149 lb 8 oz).    Physical Examination:   GEN: no apparent distress, comfortable; AAOx3  SKIN: no rashes,no ulceration, no Raynaud's, no petechiae, no SQ nodules,  HEAD: normal  EYES: no pallor, no icterus,  NECK: no masses, thyroid normal, trachea midline, no LAD/LN's, supple  CV: RRR with no murmur; l S1 and S2 reg. ,no gallop no rubs,   CHEST: Normal respiratory effort; CTAB; normal breath sounds; no wheeze or crackles  MUSC/Skeletal: ROM normal; no crepitus; joints without synovitis,  no deformities  No joint swelling or tenderness of PIP, MCP, wrist, elbow, shoulder, or knee joints  EXTREM: no clubbing, cyanosis, no edema,normal  pulses   NEURO: grossly intact; motor WNL; AAOx3; ,   PSYCH: normal mood, affect and behavior  LYMPH: normal cervical, supraclavicular          Labs:   Lab Results   Component Value Date    WBC 5.90 09/28/2015    HGB 8.9 (L) 09/28/2015    HCT 29.2 (L) 09/28/2015    MCV 87 09/28/2015     09/28/2015    CMP  @LASTLAB(NA,K,CL,CO2,GLU,BUN,Creatinine,Calcium,PROT,Albumin,Bilitot,Alkphos,AST,ALT,CRP,ESR,RF,CCP,IRIS,SSA,CPK,uric acid) )@  I have reviewed all available lab results and radiology reports.    Radiology/Diagnostic Studies:        Assessment/Plan:   (1) 89 y.o. male with diagnosis of Rheumatoid  arthritis. This appears stable.  Last eye exam was more than a year ago. He is to hold plaquenil  until his eye exam.Restart if normal eye exam                Discussion:     I have  explained all of the above in detail and the patient understands all of the current recommendation(s). I have answered all questions to the best of my ability and to their complete satisfaction.       The patient is to continue with the current management plan         RTC in   4 months or before if need      Electronically signed by Ignacia Seay MD

## 2018-04-26 LAB
ALBUMIN SERPL-MCNC: 3.9 G/DL (ref 3.6–5.1)
ALBUMIN/GLOB SERPL: 1.3 (CALC) (ref 1–2.5)
ALP SERPL-CCNC: 73 U/L (ref 40–115)
ALT SERPL-CCNC: 9 U/L (ref 9–46)
AST SERPL-CCNC: 17 U/L (ref 10–35)
BASOPHILS # BLD AUTO: 19 CELLS/UL (ref 0–200)
BASOPHILS NFR BLD AUTO: 0.4 %
BILIRUB SERPL-MCNC: 0.2 MG/DL (ref 0.2–1.2)
BUN SERPL-MCNC: 26 MG/DL (ref 7–25)
BUN/CREAT SERPL: 14 (CALC) (ref 6–22)
CALCIUM SERPL-MCNC: 9.1 MG/DL (ref 8.6–10.3)
CHLORIDE SERPL-SCNC: 105 MMOL/L (ref 98–110)
CO2 SERPL-SCNC: 28 MMOL/L (ref 20–31)
CREAT SERPL-MCNC: 1.87 MG/DL (ref 0.7–1.11)
CRP SERPL-MCNC: 15 MG/L
EOSINOPHIL # BLD AUTO: 282 CELLS/UL (ref 15–500)
EOSINOPHIL NFR BLD AUTO: 6 %
ERYTHROCYTE [DISTWIDTH] IN BLOOD BY AUTOMATED COUNT: 13.4 % (ref 11–15)
GFR SERPL CREATININE-BSD FRML MDRD: 31 ML/MIN/1.73M2
GLOBULIN SER CALC-MCNC: 2.9 G/DL (CALC) (ref 1.9–3.7)
GLUCOSE SERPL-MCNC: 72 MG/DL (ref 65–99)
HCT VFR BLD AUTO: 28.7 % (ref 38.5–50)
HGB BLD-MCNC: 9.2 G/DL (ref 13.2–17.1)
LYMPHOCYTES # BLD AUTO: 1081 CELLS/UL (ref 850–3900)
LYMPHOCYTES NFR BLD AUTO: 23 %
MCH RBC QN AUTO: 28.7 PG (ref 27–33)
MCHC RBC AUTO-ENTMCNC: 32.1 G/DL (ref 32–36)
MCV RBC AUTO: 89.4 FL (ref 80–100)
MONOCYTES # BLD AUTO: 526 CELLS/UL (ref 200–950)
MONOCYTES NFR BLD AUTO: 11.2 %
NEUTROPHILS # BLD AUTO: 2792 CELLS/UL (ref 1500–7800)
NEUTROPHILS NFR BLD AUTO: 59.4 %
PLATELET # BLD AUTO: 237 THOUSAND/UL (ref 140–400)
PMV BLD REES-ECKER: 10 FL (ref 7.5–12.5)
POTASSIUM SERPL-SCNC: 4.2 MMOL/L (ref 3.5–5.3)
PROT SERPL-MCNC: 6.8 G/DL (ref 6.1–8.1)
RBC # BLD AUTO: 3.21 MILLION/UL (ref 4.2–5.8)
SODIUM SERPL-SCNC: 143 MMOL/L (ref 135–146)
WBC # BLD AUTO: 4.7 THOUSAND/UL (ref 3.8–10.8)

## 2018-04-26 NOTE — PROGRESS NOTES
Pts daughter notified of results. Instructed to see nephrologist and he is not to take any Nsaids. Patient referred to [facility]. Results faxed to Dr. Encinas

## 2018-05-08 ENCOUNTER — OFFICE VISIT (OUTPATIENT)
Dept: UROLOGY | Facility: CLINIC | Age: 83
End: 2018-05-08
Payer: MEDICARE

## 2018-05-08 ENCOUNTER — APPOINTMENT (OUTPATIENT)
Dept: LAB | Facility: HOSPITAL | Age: 83
End: 2018-05-08
Attending: UROLOGY
Payer: MEDICARE

## 2018-05-08 DIAGNOSIS — C61 PROSTATE CANCER: ICD-10-CM

## 2018-05-08 DIAGNOSIS — R35.1 NOCTURIA: ICD-10-CM

## 2018-05-08 DIAGNOSIS — R31.29 MICROSCOPIC HEMATURIA: ICD-10-CM

## 2018-05-08 DIAGNOSIS — N40.1 BENIGN LOCALIZED PROSTATIC HYPERPLASIA WITH LOWER URINARY TRACT SYMPTOMS (LUTS): ICD-10-CM

## 2018-05-08 DIAGNOSIS — N28.9 RENAL INSUFFICIENCY: Primary | ICD-10-CM

## 2018-05-08 LAB
BILIRUB SERPL-MCNC: NEGATIVE MG/DL
BLOOD URINE, POC: ABNORMAL
COLOR, POC UA: YELLOW
GLUCOSE UR QL STRIP: NORMAL
KETONES UR QL STRIP: NEGATIVE
LEUKOCYTE ESTERASE URINE, POC: ABNORMAL
NITRITE, POC UA: NEGATIVE
PH, POC UA: 5
PROTEIN, POC: ABNORMAL
SPECIFIC GRAVITY, POC UA: 1.01
UROBILINOGEN, POC UA: NORMAL

## 2018-05-08 PROCEDURE — 99999 PR PBB SHADOW E&M-EST. PATIENT-LVL II: CPT | Mod: PBBFAC,,, | Performed by: UROLOGY

## 2018-05-08 PROCEDURE — 88112 CYTOPATH CELL ENHANCE TECH: CPT | Performed by: PATHOLOGY

## 2018-05-08 PROCEDURE — 99214 OFFICE O/P EST MOD 30 MIN: CPT | Mod: 25,S$GLB,, | Performed by: UROLOGY

## 2018-05-08 PROCEDURE — 88112 CYTOPATH CELL ENHANCE TECH: CPT | Mod: 26,,, | Performed by: PATHOLOGY

## 2018-05-08 PROCEDURE — 87088 URINE BACTERIA CULTURE: CPT

## 2018-05-08 PROCEDURE — 87086 URINE CULTURE/COLONY COUNT: CPT

## 2018-05-08 PROCEDURE — 81002 URINALYSIS NONAUTO W/O SCOPE: CPT | Mod: S$GLB,,, | Performed by: UROLOGY

## 2018-05-08 NOTE — PROGRESS NOTES
OFFICE NOTE     CHIEF COMPLAINT:  Adenocarcinoma of the prostate, BPH with lower urinary tract   symptoms, and nocturia.    HISTORY OF PRESENT ILLNESS:  This 89-year-old male returns for routine recheck.    He has a history of adenocarcinoma of the prostate for which he underwent   external beam radiation therapy over 20 years ago and so far has shown no   evidence of any recurrences.  His most recent PSA was 0.84 on 10/26/2017.  The   patient also has BPH with lower urinary tract symptoms, for which he is taking   Uroxatral, and he states he has a good flow and is very happy with his voiding   status.  He also has a history of nocturia for which he was placed on Myrbetriq   25 mg p.o. daily and he states the nocturia has significantly improved and there   are multiple nights when he does not have any, and he is very happy with his   voiding status at this time.    PAST MEDICAL HISTORY UPDATE:  Reveals that he is scheduled to see a nephrologist   as he was found to have a rise in his BUN and creatinine up to 26 and   1.87 recently on 04/25/2018.  He is yet to see the nephrologist.    PHYSICAL EXAMINATION:  ABDOMEN:  Soft, benign.  No masses, although there is a bulging in the left   inguinal area consistent with left inguinal hernia that is quite stable.  EXTERNAL GENITAL:  Normal phallus with adequate meatus.  Testes descended and   feel normal.  No scrotal masses.  RECTAL:  Reveals a firm, flattened prostatic area with no nodule.  Normal   sphincter tone.    UA did reveal 2+ blood, pH 5.0.  There were also many wbc's.    FINAL IMPRESSION:  Adenocarcinoma of prostate, microscopic hematuria, pyuria,   BPH, nocturia, elevated BUN and creatinine.    RECOMMENDATIONS:  Urine for C and S and cytology, PSA, renal ultrasound for   upper tract evaluation.  Otherwise, continue Uroxatral 10 mg p.o. daily and   Myrbetriq 25 mg p.o. daily and we will contact him with the test results.      MD/BUDDY  dd: 05/08/2018 17:47:05 (CDT)   td: 05/09/2018 08:42:21 (CDT)  Doc ID   #[id number]  Job ID #[id number]    CC:

## 2018-05-09 ENCOUNTER — HOSPITAL ENCOUNTER (OUTPATIENT)
Dept: RADIOLOGY | Facility: HOSPITAL | Age: 83
Discharge: HOME | End: 2018-05-09
Attending: UROLOGY
Payer: MEDICARE

## 2018-05-09 DIAGNOSIS — N28.9 RENAL INSUFFICIENCY: ICD-10-CM

## 2018-05-09 PROCEDURE — 76770 US EXAM ABDO BACK WALL COMP: CPT | Mod: 26,,, | Performed by: RADIOLOGY

## 2018-05-09 PROCEDURE — 76770 US EXAM ABDO BACK WALL COMP: CPT | Mod: TC

## 2018-05-11 LAB — BACTERIA UR CULT: NORMAL

## 2018-05-15 ENCOUNTER — TELEPHONE (OUTPATIENT)
Dept: UROLOGY | Facility: CLINIC | Age: 83
End: 2018-05-15

## 2018-05-15 NOTE — TELEPHONE ENCOUNTER
----- Message from Miko Holloway MD sent at 5/10/2018  9:24 PM CDT -----  Renal U/S - intrinsic renal disease, renal cysts, no hydronephrosis, no obstruction  C&S - neg  PSA - 0.65    Awaiting cytology

## 2018-05-15 NOTE — TELEPHONE ENCOUNTER
----- Message from Miko Holloway MD sent at 5/14/2018  9:16 PM CDT -----  Cytology - neg  C&S - neg  Renal U/S - cysts, otherwise neg  PSA - 0.65    Rec: Recheck U/A

## 2018-05-15 NOTE — TELEPHONE ENCOUNTER
Call placed, no answer, unable to leave a message as the mailbox is full, will give message on patient portal.

## 2018-07-20 DIAGNOSIS — M25.572 ACUTE LEFT ANKLE PAIN: Primary | ICD-10-CM

## 2018-07-23 ENCOUNTER — OFFICE VISIT (OUTPATIENT)
Dept: ORTHOPEDICS | Facility: CLINIC | Age: 83
End: 2018-07-23
Payer: MEDICARE

## 2018-07-23 ENCOUNTER — HOSPITAL ENCOUNTER (OUTPATIENT)
Dept: RADIOLOGY | Facility: HOSPITAL | Age: 83
Discharge: HOME | End: 2018-07-23
Attending: ORTHOPAEDIC SURGERY
Payer: MEDICARE

## 2018-07-23 VITALS — HEIGHT: 67 IN | WEIGHT: 145 LBS | BODY MASS INDEX: 22.76 KG/M2

## 2018-07-23 DIAGNOSIS — M25.572 ACUTE LEFT ANKLE PAIN: ICD-10-CM

## 2018-07-23 DIAGNOSIS — S93.492A SPRAIN OF ANTERIOR TALOFIBULAR LIGAMENT OF LEFT ANKLE, INITIAL ENCOUNTER: Primary | ICD-10-CM

## 2018-07-23 PROCEDURE — 73610 X-RAY EXAM OF ANKLE: CPT | Mod: 26,LT,, | Performed by: RADIOLOGY

## 2018-07-23 PROCEDURE — 73610 X-RAY EXAM OF ANKLE: CPT | Mod: TC,PO,LT

## 2018-07-23 PROCEDURE — 99203 OFFICE O/P NEW LOW 30 MIN: CPT | Mod: S$GLB,,, | Performed by: ORTHOPAEDIC SURGERY

## 2018-07-23 PROCEDURE — 99999 PR PBB SHADOW E&M-EST. PATIENT-LVL II: CPT | Mod: PBBFAC,,, | Performed by: ORTHOPAEDIC SURGERY

## 2018-07-23 NOTE — PROGRESS NOTES
DATE: 7/23/2018  PATIENT: Troy Yuan Sr.  REFERRING MD:  CHIEF COMPLAINT:   Chief Complaint   Patient presents with    Left Ankle - Pain       HISTORY:  Troy Yuan Sr. is a 89 y.o. male  who presents for initial evaluation of left ankle pain.  States she is well until 2-3 weeks ago when he twisted his ankle is a got caught on a [place].  He had moderate swelling.  He has been able to weight-bear.  He does note pain is 0/10 at rest but increases with weightbearing.  He denies any previous ankle injuries.  He now presents for evaluation      PAST MEDICAL/SURGICAL HISTORY:  Past Medical History:   Diagnosis Date    Bladder stones     Cancer     Encounter for blood transfusion     Hypertension     Kidney stone     Prostate cancer 2004    Radiation 2005    prostate     Past Surgical History:   Procedure Laterality Date    CYSTOSCOPY      KIDNEY STONE SURGERY  May 2014       Current Medications:   Current Outpatient Prescriptions:     alfuzosin (UROXATRAL) 10 mg Tb24, Take 1 tablet (10 mg total) by mouth once daily., Disp: 30 tablet, Rfl: 11    amlodipine (NORVASC) 10 MG tablet, Take 10 mg by mouth once daily.  , Disp: , Rfl:     cimetidine (TAGAMET) 200 MG tablet, Take 200 mg by mouth., Disp: , Rfl:     dextromethorphan-guaifenesin  mg Cap, Take by mouth., Disp: , Rfl:     ferrous sulfate 325 mg (65 mg iron) Tab tablet, Take 325 mg by mouth once daily., Disp: , Rfl:     mirabegron (MYRBETRIQ) 25 mg Tb24 ER tablet, Take 25 mg by mouth., Disp: , Rfl:     hydroxychloroquine (PLAQUENIL) 200 mg tablet, Take 1 tablet (200 mg total) by mouth 2 (two) times daily., Disp: 60 tablet, Rfl: 3    metoprolol tartrate (LOPRESSOR) 25 MG tablet, Take 1 tablet (25 mg total) by mouth every 12 (twelve) hours as needed (SBP > 160)., Disp: 60 tablet, Rfl: 0    Family History: family history was reviewed and is noncontributory  Social History:   Social History     Social History    Marital status:  "     Spouse name: N/A    Number of children: N/A    Years of education: N/A     Occupational History    Not on file.     Social History Main Topics    Smoking status: Former Smoker     Years: 40.00     Types: Cigarettes     Start date: 9/26/1985    Smokeless tobacco: Never Used    Alcohol use No    Drug use: No    Sexual activity: No     Other Topics Concern    Not on file     Social History Narrative    No narrative on file       ROS:  Constitution: Negative for chills, fever, and sweats. Negative for unexplained weight loss.  HENT: Negative for headaches and blurry vision.   Cardiovascular: Negative for chest pain, irregular heartbeat, leg swelling and palpitations.   Respiratory: Negative for cough and shortness of breath.   Gastrointestinal: Negative for abdominal pain, heartburn, nausea and vomiting.   Genitourinary: Negative for bladder incontinence and dysuria.   Musculoskeletal: Negative for systemic arthritis, muscle weakness and myalgias.   Neurological: Negative for numbness.   Psychiatric/Behavioral: Negative for depression.  Endocrine: Negative for polyuria.   Hematologic/Lymphatic: Negative for bleeding disorders.   Skin: Negative for poor wound healing.        PHYSICAL EXAM:  Ht 5' 7" (1.702 m)   Wt 65.8 kg (145 lb)   BMI 22.71 kg/m²   Troy Yuan Sr. is a well developed, well nourished male in no acute distress. Physical examination of the left foot and ankle evaluated the following:    Gait and Alignment  Inspection for ecchymosis, swelling, atrophy, or deformity  Inspection for intra-articular and/or bursal effusions  Tenderness to palpation over the bony and soft tissue structures around the ankle/foot  Range of Motion and presence of contractures  Sensation and motor strength  Anterior drawer and varus/valgus instability  Vascular exam to include skin temperature/color/capillary refill    Remarkable findings included:  Mild swelling laterally.  Tenderness over the distal " fibula.  Range of motion the ankle within normal limits.  No instability on exam.  Sensation intact to the foot.  No tenderness medially noted.          IMAGING:   X-rays a left ankle are performed and personally reviewed.  Osteopenia noted.  There is irregularity of the distal fibula although I do not see any cici fracture line.  Mild degenerative changes.  Mortise is reduced.     ASSESSMENT:   Left ankle contusion/sprain, DOI approximately 2-3 weeks ago    PLAN:  The nature of the diagnosis, using models and diagrams when appropriate, was explained to the patient in detail.  As he does have some discomfort, I recommended a lace up ankle brace for support.  He is able to weight-bear with only mild discomfort.  He'll monitor his activities over the next 2 weeks.  Should he still remain symptomatic in 3 weeks' time, I will see him back for reexam.      This note was dictated using voice recognition software. Please excuse any grammatical or typographical errors.

## 2018-08-02 RX ORDER — ALFUZOSIN HYDROCHLORIDE 10 MG/1
10 TABLET, EXTENDED RELEASE ORAL DAILY
Qty: 90 TABLET | Refills: 3 | Status: SHIPPED | OUTPATIENT
Start: 2018-08-02 | End: 2018-11-26 | Stop reason: SDUPTHER

## 2018-08-20 ENCOUNTER — OFFICE VISIT (OUTPATIENT)
Dept: RHEUMATOLOGY | Facility: CLINIC | Age: 83
End: 2018-08-20
Payer: MEDICARE

## 2018-08-20 VITALS — BODY MASS INDEX: 21.61 KG/M2 | SYSTOLIC BLOOD PRESSURE: 136 MMHG | WEIGHT: 138 LBS | DIASTOLIC BLOOD PRESSURE: 62 MMHG

## 2018-08-20 DIAGNOSIS — M06.9 RHEUMATOID ARTHRITIS INVOLVING MULTIPLE SITES, UNSPECIFIED RHEUMATOID FACTOR PRESENCE: Primary | ICD-10-CM

## 2018-08-20 PROCEDURE — 99213 OFFICE O/P EST LOW 20 MIN: CPT | Mod: ,,, | Performed by: INTERNAL MEDICINE

## 2018-08-20 RX ORDER — HYDROXYCHLOROQUINE SULFATE 200 MG/1
200 TABLET, FILM COATED ORAL 2 TIMES DAILY
Qty: 60 TABLET | Refills: 3 | Status: SHIPPED | OUTPATIENT
Start: 2018-08-20 | End: 2018-12-05 | Stop reason: SDUPTHER

## 2018-08-20 NOTE — PROGRESS NOTES
[facility] RHEUMATOLOGY            PROGRESS NOTE      Subjective:       Patient ID:   NAME: Troy Yuan Sr. : 1929     89 y.o. male    Referring Doc: No ref. provider found  Other Physicians:    Chief Complaint:  Rheumatoid Arthritis      History of Present Illness:     Patient returns today for a regularly scheduled follow-up visit for   Rheumatoid arthritis    The patient is doing well except for some pain in the left ankle Patient sustained a fall since his last visit. He was placed on a boot  For a  possible hairline fracture. He is doing better but still mild pain when he walks. He was off Plaquenil for a few months until he had eye exam.            ROS:   GEN:  No  fever, night sweats . weight is stable    mild fatigue  SKIN: no rashes, no bruising, no ulcerations, no Raynaud's  HEENT: no HA's, No visual changes, no mucosal ulcers, no sicca symptoms,  CV:   no CP, SOB, PND, MCLAUGHLIN, no orthopnea, no palpitations  PULM: normal with no SOB, cough, hemoptysis, sputum or pleuritic pain  GI:  no abdominal pain, nausea, vomiting, constipation, diarrhea, melanotic stools, BRBPR, hematemesis, no dysphagia  :   no dysuria  NEURO: no paresthesias, headaches, visual disturbances, muscle weakness  MUSCULOSKELETAL:no joint swelling, prolonged AM stiffness, no back pain, + occ muscle pain  Allergies:  Review of patient's allergies indicates:   Allergen Reactions    Soma [carisoprodol] Rash    Aspirin     Sulfa (sulfonamide antibiotics) Rash       Medications:    Current Outpatient Medications:     alfuzosin (UROXATRAL) 10 mg Tb24, Take 1 tablet (10 mg total) by mouth once daily., Disp: 90 tablet, Rfl: 3    amlodipine (NORVASC) 10 MG tablet, Take 10 mg by mouth once daily.  , Disp: , Rfl:     cimetidine (TAGAMET) 200 MG tablet, Take 200 mg by mouth., Disp: , Rfl:     ferrous sulfate 325 mg (65 mg iron) Tab tablet, Take 325 mg by mouth once daily., Disp: , Rfl:     hydroxychloroquine (PLAQUENIL) 200 mg  tablet, Take 1 tablet (200 mg total) by mouth 2 (two) times daily., Disp: 60 tablet, Rfl: 3    metoprolol tartrate (LOPRESSOR) 25 MG tablet, Take 1 tablet (25 mg total) by mouth every 12 (twelve) hours as needed (SBP > 160)., Disp: 60 tablet, Rfl: 0    mirabegron (MYRBETRIQ) 25 mg Tb24 ER tablet, Take 25 mg by mouth., Disp: , Rfl:     PMHx/PSHx Updates:        Last Eye Exam UTD      Objective:     Vitals:  Blood pressure 136/62, weight 62.6 kg (138 lb).    Physical Examination:   GEN: no apparent distress, comfortable; AAOx3  SKIN: no rashes,no ulceration, no Raynaud's, no petechiae, no SQ nodules,  HEAD: normal  EYES: no pallor, no icterus,  NECK: no masses, thyroid normal, trachea midline, no LAD/LN's, supple  CV: RRR with no murmur; l S1 and S2 reg. ,no gallop no rubs,   CHEST: Normal respiratory effort; CTAB; normal breath sounds; no wheeze or crackles  MUSC/Skeletal: ROM normal; no crepitus; joints without synovitis,  no deformities  No joint swelling or tenderness of PIP, MCP, wrist, elbow, shoulder, or knee joints.left ankle with slight swelling.  EXTREM: no clubbing, cyanosis, + trace  edema,normal  pulses   NEURO: grossly intact; motor WNL; AAOx3; ,   PSYCH: normal mood, affect and behavior  LYMPH: normal cervical, supraclavicular          Labs:   Lab Results   Component Value Date    WBC 4.7 04/25/2018    HGB 9.2 (L) 04/25/2018    HCT 28.7 (L) 04/25/2018    MCV 89.4 04/25/2018     04/25/2018    CMP  @LASTLAB(NA,K,CL,CO2,GLU,BUN,Creatinine,Calcium,PROT,Albumin,Bilitot,Alkphos,AST,ALT,CRP,ESR,RF,CCP,IRIS,SSA,CPK,uric acid) )@  I have reviewed all available lab results and radiology reports.    Radiology/Diagnostic Studies:        Assessment/Plan:   (1) 89 y.o. male with diagnosis of rheumatoid arthritis. He is stable.    CBC CMP and CRP        Discussion:     I have explained all of the above in detail and the patient understands all of the current recommendation(s). I have answered all questions to  the best of my ability and to their complete satisfaction.       The patient is to continue with the current management plan         RTC 4  Months or before if needed      Electronically signed by Ignacia Seay MD

## 2018-09-11 LAB
ALBUMIN SERPL-MCNC: 4.1 G/DL (ref 3.6–5.1)
ALBUMIN/GLOB SERPL: 1.7 (CALC) (ref 1–2.5)
ALP SERPL-CCNC: 61 U/L (ref 40–115)
ALT SERPL-CCNC: 5 U/L (ref 9–46)
AST SERPL-CCNC: 12 U/L (ref 10–35)
BASOPHILS # BLD AUTO: 20 CELLS/UL (ref 0–200)
BASOPHILS NFR BLD AUTO: 0.4 %
BILIRUB SERPL-MCNC: 0.4 MG/DL (ref 0.2–1.2)
BUN SERPL-MCNC: 31 MG/DL (ref 7–25)
BUN/CREAT SERPL: 11 (CALC) (ref 6–22)
CALCIUM SERPL-MCNC: 8.8 MG/DL (ref 8.6–10.3)
CHLORIDE SERPL-SCNC: 102 MMOL/L (ref 98–110)
CO2 SERPL-SCNC: 29 MMOL/L (ref 20–32)
CREAT SERPL-MCNC: 2.8 MG/DL (ref 0.7–1.11)
CRP SERPL-MCNC: 7 MG/L
EOSINOPHIL # BLD AUTO: 140 CELLS/UL (ref 15–500)
EOSINOPHIL NFR BLD AUTO: 2.8 %
ERYTHROCYTE [DISTWIDTH] IN BLOOD BY AUTOMATED COUNT: 13.9 % (ref 11–15)
GFR SERPL CREATININE-BSD FRML MDRD: 19 ML/MIN/1.73M2
GLOBULIN SER CALC-MCNC: 2.4 G/DL (CALC) (ref 1.9–3.7)
GLUCOSE SERPL-MCNC: 100 MG/DL (ref 65–139)
HCT VFR BLD AUTO: 28.4 % (ref 38.5–50)
HGB BLD-MCNC: 9.3 G/DL (ref 13.2–17.1)
LYMPHOCYTES # BLD AUTO: 1065 CELLS/UL (ref 850–3900)
LYMPHOCYTES NFR BLD AUTO: 21.3 %
MCH RBC QN AUTO: 29.5 PG (ref 27–33)
MCHC RBC AUTO-ENTMCNC: 32.7 G/DL (ref 32–36)
MCV RBC AUTO: 90.2 FL (ref 80–100)
MONOCYTES # BLD AUTO: 580 CELLS/UL (ref 200–950)
MONOCYTES NFR BLD AUTO: 11.6 %
NEUTROPHILS # BLD AUTO: 3195 CELLS/UL (ref 1500–7800)
NEUTROPHILS NFR BLD AUTO: 63.9 %
PLATELET # BLD AUTO: 219 THOUSAND/UL (ref 140–400)
PMV BLD REES-ECKER: 10.9 FL (ref 7.5–12.5)
POTASSIUM SERPL-SCNC: 4.2 MMOL/L (ref 3.5–5.3)
PROT SERPL-MCNC: 6.5 G/DL (ref 6.1–8.1)
RBC # BLD AUTO: 3.15 MILLION/UL (ref 4.2–5.8)
SODIUM SERPL-SCNC: 138 MMOL/L (ref 135–146)
WBC # BLD AUTO: 5 THOUSAND/UL (ref 3.8–10.8)

## 2018-09-12 NOTE — PROGRESS NOTES
Spoke to Mr. Wylie daughter Lakia.  Informed her patients kidney function is worse. He is not to take any NSAIDS otc or other wise and take only one plaquenil a day. Patient has an appointment on Friday with Dr. Mckenzie Nephrologist). Lab results faxed to his office.

## 2018-09-17 ENCOUNTER — TELEPHONE (OUTPATIENT)
Dept: UROLOGY | Facility: CLINIC | Age: 83
End: 2018-09-17

## 2018-09-17 ENCOUNTER — HOSPITAL ENCOUNTER (EMERGENCY)
Facility: HOSPITAL | Age: 83
Discharge: HOME | End: 2018-09-17
Attending: EMERGENCY MEDICINE
Payer: MEDICARE

## 2018-09-17 VITALS
TEMPERATURE: 98 F | HEART RATE: 50 BPM | OXYGEN SATURATION: 100 % | SYSTOLIC BLOOD PRESSURE: 224 MMHG | BODY MASS INDEX: 22.4 KG/M2 | DIASTOLIC BLOOD PRESSURE: 92 MMHG | RESPIRATION RATE: 16 BRPM | WEIGHT: 143 LBS

## 2018-09-17 DIAGNOSIS — N28.9 RENAL INSUFFICIENCY: Primary | ICD-10-CM

## 2018-09-17 DIAGNOSIS — N30.00 ACUTE CYSTITIS WITHOUT HEMATURIA: ICD-10-CM

## 2018-09-17 LAB
ANION GAP SERPL CALC-SCNC: 12 MMOL/L
BACTERIA #/AREA URNS HPF: ABNORMAL /HPF
BASOPHILS # BLD AUTO: 0 K/UL
BASOPHILS NFR BLD: 0.2 %
BILIRUB UR QL STRIP: NEGATIVE
BUN SERPL-MCNC: 36 MG/DL
CALCIUM SERPL-MCNC: 9.4 MG/DL
CHLORIDE SERPL-SCNC: 107 MMOL/L
CLARITY UR: CLEAR
CO2 SERPL-SCNC: 24 MMOL/L
COLOR UR: YELLOW
CREAT SERPL-MCNC: 3.2 MG/DL
DIFFERENTIAL METHOD: ABNORMAL
EOSINOPHIL # BLD AUTO: 0.1 K/UL
EOSINOPHIL NFR BLD: 3.1 %
ERYTHROCYTE [DISTWIDTH] IN BLOOD BY AUTOMATED COUNT: 15 %
EST. GFR  (AFRICAN AMERICAN): 19 ML/MIN/1.73 M^2
EST. GFR  (NON AFRICAN AMERICAN): 16 ML/MIN/1.73 M^2
GLUCOSE SERPL-MCNC: 98 MG/DL
GLUCOSE UR QL STRIP: NEGATIVE
HCT VFR BLD AUTO: 28.4 %
HGB BLD-MCNC: 9.3 G/DL
HGB UR QL STRIP: ABNORMAL
KETONES UR QL STRIP: NEGATIVE
LEUKOCYTE ESTERASE UR QL STRIP: ABNORMAL
LYMPHOCYTES # BLD AUTO: 0.8 K/UL
LYMPHOCYTES NFR BLD: 17.2 %
MCH RBC QN AUTO: 29.5 PG
MCHC RBC AUTO-ENTMCNC: 32.6 G/DL
MCV RBC AUTO: 91 FL
MICROSCOPIC COMMENT: ABNORMAL
MONOCYTES # BLD AUTO: 0.6 K/UL
MONOCYTES NFR BLD: 11.9 %
NEUTROPHILS # BLD AUTO: 3.2 K/UL
NEUTROPHILS NFR BLD: 67.6 %
NITRITE UR QL STRIP: NEGATIVE
PH UR STRIP: 6 [PH] (ref 5–8)
PLATELET # BLD AUTO: 214 K/UL
PMV BLD AUTO: 7.8 FL
POTASSIUM SERPL-SCNC: 4.4 MMOL/L
PROT UR QL STRIP: ABNORMAL
RBC # BLD AUTO: 3.13 M/UL
RBC #/AREA URNS HPF: 3 /HPF (ref 0–4)
SODIUM SERPL-SCNC: 143 MMOL/L
SP GR UR STRIP: >=1.03 (ref 1–1.03)
SQUAMOUS #/AREA URNS HPF: 1 /HPF
URN SPEC COLLECT METH UR: ABNORMAL
UROBILINOGEN UR STRIP-ACNC: NEGATIVE EU/DL
WBC # BLD AUTO: 4.7 K/UL
WBC #/AREA URNS HPF: 50 /HPF (ref 0–5)

## 2018-09-17 PROCEDURE — 99283 EMERGENCY DEPT VISIT LOW MDM: CPT

## 2018-09-17 PROCEDURE — 80048 BASIC METABOLIC PNL TOTAL CA: CPT

## 2018-09-17 PROCEDURE — 36415 COLL VENOUS BLD VENIPUNCTURE: CPT

## 2018-09-17 PROCEDURE — 87086 URINE CULTURE/COLONY COUNT: CPT

## 2018-09-17 PROCEDURE — 85025 COMPLETE CBC W/AUTO DIFF WBC: CPT

## 2018-09-17 PROCEDURE — 81000 URINALYSIS NONAUTO W/SCOPE: CPT

## 2018-09-17 PROCEDURE — 25000003 PHARM REV CODE 250: Performed by: EMERGENCY MEDICINE

## 2018-09-17 RX ORDER — CEPHALEXIN 250 MG/1
CAPSULE ORAL
Status: DISCONTINUED
Start: 2018-09-17 | End: 2018-09-17 | Stop reason: HOSPADM

## 2018-09-17 RX ORDER — CEPHALEXIN 250 MG/1
500 CAPSULE ORAL
Status: COMPLETED | OUTPATIENT
Start: 2018-09-17 | End: 2018-09-17

## 2018-09-17 RX ORDER — CEPHALEXIN 250 MG/1
500 CAPSULE ORAL 4 TIMES DAILY
Qty: 78 CAPSULE | Refills: 0 | Status: SHIPPED | OUTPATIENT
Start: 2018-09-17 | End: 2018-09-27

## 2018-09-17 RX ADMIN — CEPHALEXIN 500 MG: 250 CAPSULE ORAL at 05:09

## 2018-09-17 NOTE — ED NOTES
Ambulated to exam room in [facility] [employer], unaided, accompanied by family. Awaiting MD for eval

## 2018-09-17 NOTE — ED PROVIDER NOTES
"Encounter Date: 9/17/2018    SCRIBE #1 NOTE: IDelmi, am scribing for, and in the presence of, Sadi Chapa MD.       History     Chief Complaint   Patient presents with    Sent here for eval of elevated creatinine and abnormal U/A       Time seen by provider: 4:27 PM on 09/17/2018    Troy Yuan Sr. is a 89 y.o. male with pmhx of RA, HTN, & Prostate Cancer with prior radiation (2005) who presents to the ED for further evaluation of abnormal labs. This morning Dr. Mckenzie (Nephrology) called Dr. Holloway (Urology) reporting that the his Creatinine had increased from 2.90 to 4.12 along with an abnormal UA, so he decided to refer pt to the ED for further evaluation  Upon arrival to the ED, the patient reports that he feels "perfectly normal." He has not started any new meds or taken any NSAIDs recently.   The patient denies burning with urination, difficulty urinating, fever, vomiting, abdominal distention, back pain, flank pain, appetite change, urinary frequency, or any other symptoms at this time. SHx: Cystoscopy. Former Smoker Allergies: Soma, Aspirin, Sulfa Abx.       The history is provided by the patient and a relative.     Review of patient's allergies indicates:   Allergen Reactions    Soma [carisoprodol] Rash    Aspirin     Sulfa (sulfonamide antibiotics) Rash     Past Medical History:   Diagnosis Date    Bladder stones     Cancer     Encounter for blood transfusion     Hypertension     Kidney stone     Prostate cancer 2004    Radiation 2005    prostate     Past Surgical History:   Procedure Laterality Date    COLONOSCOPY N/A 7/4/2015    [facility] ENDO    CYSTOSCOPY      CYSTOSCOPY N/A 7/13/2014    [facility] by BG Ackerman II, [facility] at [facility] OR    CYSTOSCOPY WITH BLADDER STONE EXTRACTION N/A 7/14/2015    [facility] at [facility] OR    CYSTOSCOPY, WITH RETROGRADE PYELOGRAM AND URETERAL STENT INSERTION Left 3/18/2014    " [facility]    ESOPHAGOGASTRODUODENOSCOPY (EGD) N/A 7/3/2015    [facility] by [facility] at [facility] ENDO    EVACUATION-CLOT N/A 7/13/2014    [facility] BG [facility] II, [facility] at [facility] OR    FULGURATION-BLADDER N/A 7/13/2014    [facility], [facility] at [facility] OR    KIDNEY STONE SURGERY  May 2014    MANIPULATION, CALCULUS Left 3/18/2014    [facility] at [facility] OR     Family History   Problem Relation Age of Onset    Hypertension Daughter     Diabetes Daughter     Hypertension Son     Asthma Son      Social History     Tobacco Use    Smoking status: Former Smoker     Years: 40.00     Types: Cigarettes     Start date: 9/26/1985    Smokeless tobacco: Never Used   Substance Use Topics    Alcohol use: No    Drug use: No     Review of Systems   Constitutional: Negative for appetite change and fever.   HENT: Negative for sore throat.    Respiratory: Negative for shortness of breath.    Cardiovascular: Negative for chest pain.   Gastrointestinal: Negative for abdominal distention, nausea and vomiting.   Genitourinary: Negative for difficulty urinating, dysuria, flank pain and frequency.   Musculoskeletal: Negative for back pain.   Skin: Negative for rash.   Neurological: Negative for weakness.   Hematological: Does not bruise/bleed easily.       Physical Exam     Initial Vitals [09/17/18 1435]   BP Pulse Resp Temp SpO2   (!) 185/80 (!) 58 16 98.2 °F (36.8 °C) 98 %      MAP       --         Physical Exam    Nursing note and vitals reviewed.  Constitutional: He appears well-developed and well-nourished. He is not diaphoretic. No distress.   Emaciated appearing.    HENT:   Head: Normocephalic and atraumatic.   Mouth/Throat: Oropharynx is clear and moist.   Eyes: Conjunctivae are normal.   Neck: Neck supple.   Cardiovascular: Normal rate, regular rhythm, normal heart sounds and intact distal pulses. Exam reveals no gallop and no friction rub.    No  murmur heard.  Tracing pedal edema noted bilaterally.    Pulmonary/Chest: Breath sounds normal. He has no wheezes. He has no rhonchi. He has no rales.   Abdominal: Soft. He exhibits no distension and no mass. There is no tenderness. There is no rigidity and no guarding.   Genitourinary:   Genitourinary Comments: Denies flank pain or tenderness.   Musculoskeletal: Normal range of motion.   Neurological: He is alert and oriented to person, place, and time.   Skin: No rash noted. No erythema.         ED Course   Procedures  Labs Reviewed   CBC W/ AUTO DIFFERENTIAL - Abnormal; Notable for the following components:       Result Value    RBC 3.13 (*)     Hemoglobin 9.3 (*)     Hematocrit 28.4 (*)     RDW 15.0 (*)     MPV 7.8 (*)     Lymph # 0.8 (*)     Lymph% 17.2 (*)     All other components within normal limits   BASIC METABOLIC PANEL - Abnormal; Notable for the following components:    BUN, Bld 36 (*)     Creatinine 3.2 (*)     eGFR if  19 (*)     eGFR if non  16 (*)     All other components within normal limits   URINALYSIS - Abnormal; Notable for the following components:    Specific Gravity, UA >=1.030 (*)     Protein, UA Trace (*)     Occult Blood UA Trace (*)     Leukocytes, UA 2+ (*)     All other components within normal limits   URINALYSIS MICROSCOPIC - Abnormal; Notable for the following components:    WBC, UA 50 (*)     Bacteria, UA Many (*)     All other components within normal limits   CULTURE, URINE          Imaging Results    None          Medical Decision Making:   History:   Old Medical Records: I decided to obtain old medical records.  Clinical Tests:   Lab Tests: Ordered and Reviewed            Scribe Attestation:   Scribe #1: I performed the above scribed service and the documentation accurately describes the services I performed. I attest to the accuracy of the note.    I, Dr. Sadi Chapa, personally performed the services described in this documentation. All  medical record entries made by the scribe were at my direction and in my presence.  I have reviewed the chart and agree that the record reflects my personal performance and is accurate and complete. Sadi Chapa MD.  8:34 PM 09/17/2018    Troy Yuan Sr. is a 89 y.o. male presenting with recent known pyuria and renal insufficiency.  Patient was referred to the emergency department due to increase of renal insufficiency largely resolved on recheck today.  I did contact Nephrology who recommended continuing workup as outpatient but no necessity for inpatient admission.  I do agree.  Patient is otherwise asymptomatic.  He does have positive urinalysis with UTI considered and empiric treatment given with limitation of renal dysfunction and age as well as allergies.  Keflex initiated with urine culture sent.  Follow up with Urology and Nephrology as planned this week.  Very low suspicion for pyelonephritis or sepsis.  Detailed return precautions reviewed.        ED Course as of Sep 17 2035   Mon Sep 17, 2018   1719 Awaiting return call nephrology for [facility] for improved Cr to baseline at 3.2.  [MR]      ED Course User Index  [MR] Sadi Chapa MD     Clinical Impression:     1. Renal insufficiency    2. Acute cystitis without hematuria                                 Sadi Chapa MD  09/17/18 2035

## 2018-09-17 NOTE — TELEPHONE ENCOUNTER
----- Message from Alexismarnie Ivan sent at 9/17/2018 10:13 AM CDT -----  Contact: Roxanna with [facility]   Hope with [facility] called, she need to speak with a nurse regarding patient labs. Please call back at [phone]

## 2018-09-17 NOTE — TELEPHONE ENCOUNTER
Returned call, from Dr Mckenzie's office, nephrology, she stated that the patient's cr went from 2.90 to 4.12 and had abnormal UA, so the MD was sending the patient to the ER for evaluation and treatment. Wanted our office MD to be aware, informed that out MD is on call and can be contacted for the consult. She will give message to her MD, she verbally understood.

## 2018-09-18 ENCOUNTER — TELEPHONE (OUTPATIENT)
Dept: UROLOGY | Facility: CLINIC | Age: 83
End: 2018-09-18

## 2018-09-18 NOTE — TELEPHONE ENCOUNTER
Returned call and spoke to patient's daughter, she states the patient went to the ER for abnormal creatinine, and was also found to have a UTI. Was  Told to consult with us after ER visit for treatment of the UTI, was given Keflex in the meantime while awaiting the culture results. Informed I will monitor the urine culture results and once done will give to MD to advise as to the treatment, she verbally understood.

## 2018-09-18 NOTE — TELEPHONE ENCOUNTER
----- Message from Jerri Fisher sent at 9/18/2018  8:06 AM CDT -----  Contact: Lakia Yuan (Daughter)  Lakia Yuan (Daughter) calling to speak to the Nurse regarding patient. Please advise. This message is in regards to test and medication given. She was told to consult with the office.    Call back   Thanks!

## 2018-09-19 LAB
BACTERIA UR CULT: NORMAL
BACTERIA UR CULT: NORMAL

## 2018-09-20 ENCOUNTER — TELEPHONE (OUTPATIENT)
Dept: UROLOGY | Facility: CLINIC | Age: 83
End: 2018-09-20

## 2018-09-20 DIAGNOSIS — C61 MALIGNANT NEOPLASM OF PROSTATE: Primary | ICD-10-CM

## 2018-09-20 NOTE — TELEPHONE ENCOUNTER
Daughter returned call results given and informed the MD wants him to continue with the antibiotic and have a renal US done. US scheduled, she verbally understood.

## 2018-09-20 NOTE — TELEPHONE ENCOUNTER
Call placed to inform the MD reviewed the urine culture results and gave new orders, no answer, message left with call back number.

## 2018-09-24 ENCOUNTER — HOSPITAL ENCOUNTER (OUTPATIENT)
Dept: RADIOLOGY | Facility: HOSPITAL | Age: 83
Discharge: HOME | End: 2018-09-24
Attending: UROLOGY
Payer: MEDICARE

## 2018-09-24 DIAGNOSIS — C61 MALIGNANT NEOPLASM OF PROSTATE: ICD-10-CM

## 2018-09-24 PROCEDURE — 76770 US EXAM ABDO BACK WALL COMP: CPT | Mod: TC

## 2018-09-24 PROCEDURE — 76770 US EXAM ABDO BACK WALL COMP: CPT | Mod: 26,,, | Performed by: RADIOLOGY

## 2018-11-20 ENCOUNTER — TELEPHONE (OUTPATIENT)
Dept: UROLOGY | Facility: CLINIC | Age: 83
End: 2018-11-20

## 2018-11-20 NOTE — TELEPHONE ENCOUNTER
----- Message from Alana Bautista sent at 11/20/2018  9:20 AM CST -----  Type:  Sooner Apoointment Request    Caller is requesting a sooner appointment.  Caller declined first available appointment listed below.  Caller will not accept being placed on the waitlist and is requesting a message be sent to doctor.    Name of Caller: Daughter- Lakia Yuan  When is the first available appointment?  01/20/19  Symptoms:  Follow up  Best Call Back Number:  [phone] (home)     Additional Information: Missed appt yesterday and would like to reschedule sooner than available

## 2018-11-26 ENCOUNTER — OFFICE VISIT (OUTPATIENT)
Dept: UROLOGY | Facility: CLINIC | Age: 83
End: 2018-11-26
Payer: MEDICARE

## 2018-11-26 VITALS
TEMPERATURE: 99 F | WEIGHT: 145.31 LBS | HEART RATE: 58 BPM | BODY MASS INDEX: 22.81 KG/M2 | RESPIRATION RATE: 18 BRPM | HEIGHT: 67 IN | DIASTOLIC BLOOD PRESSURE: 60 MMHG | SYSTOLIC BLOOD PRESSURE: 152 MMHG

## 2018-11-26 DIAGNOSIS — N40.1 BENIGN PROSTATIC HYPERPLASIA WITH LOWER URINARY TRACT SYMPTOMS, SYMPTOM DETAILS UNSPECIFIED: Primary | ICD-10-CM

## 2018-11-26 DIAGNOSIS — C61 PROSTATE CANCER: ICD-10-CM

## 2018-11-26 LAB
BILIRUB SERPL-MCNC: ABNORMAL MG/DL
BLOOD URINE, POC: 250
COLOR, POC UA: YELLOW
GLUCOSE UR QL STRIP: ABNORMAL
KETONES UR QL STRIP: ABNORMAL
LEUKOCYTE ESTERASE URINE, POC: ABNORMAL
NITRITE, POC UA: ABNORMAL
PH, POC UA: 5
POC RESIDUAL URINE VOLUME: 39 ML (ref 0–100)
PROTEIN, POC: ABNORMAL
SPECIFIC GRAVITY, POC UA: 1.01
UROBILINOGEN, POC UA: ABNORMAL

## 2018-11-26 PROCEDURE — 99999 PR PBB SHADOW E&M-EST. PATIENT-LVL IV: CPT | Mod: PBBFAC,,, | Performed by: NURSE PRACTITIONER

## 2018-11-26 PROCEDURE — 51798 US URINE CAPACITY MEASURE: CPT | Mod: S$GLB,,, | Performed by: NURSE PRACTITIONER

## 2018-11-26 PROCEDURE — 87086 URINE CULTURE/COLONY COUNT: CPT

## 2018-11-26 PROCEDURE — 87088 URINE BACTERIA CULTURE: CPT

## 2018-11-26 PROCEDURE — 1101F PT FALLS ASSESS-DOCD LE1/YR: CPT | Mod: CPTII,S$GLB,, | Performed by: NURSE PRACTITIONER

## 2018-11-26 PROCEDURE — 99214 OFFICE O/P EST MOD 30 MIN: CPT | Mod: 25,S$GLB,, | Performed by: NURSE PRACTITIONER

## 2018-11-26 PROCEDURE — 81002 URINALYSIS NONAUTO W/O SCOPE: CPT | Mod: S$GLB,,, | Performed by: NURSE PRACTITIONER

## 2018-11-26 RX ORDER — ALFUZOSIN HYDROCHLORIDE 10 MG/1
10 TABLET, EXTENDED RELEASE ORAL DAILY
Qty: 90 TABLET | Refills: 3 | Status: SHIPPED | OUTPATIENT
Start: 2018-11-26 | End: 2019-12-01 | Stop reason: SDUPTHER

## 2018-11-26 NOTE — PROGRESS NOTES
[facility] Note  Staff: CHRISTY Arceo    PCP: Dr. Gentry Encinas    Chief Complaint: Adenocarcinoma of the prostate, BPH with lower urinary tract   symptoms, and nocturia.  Subjective:        HPI: Troy Yuan Sr. is a 89 y.o. male presents today for routine recheck.  Pt missed his previous appt. With Dr. Holloway and is now here today with his daughter for rescheduled appt.    HISTORY OF PRESENT ILLNESS:  This 89-year-old male returns for routine recheck.    He has a history of adenocarcinoma of the prostate for which he underwent   external beam radiation therapy over 20 years ago and so far has shown no   evidence of any recurrences.  His most recent PSA was 0.65 on 05/09/2018.  The   patient also has BPH with lower urinary tract symptoms, for which he is taking   Uroxatral, and he states he has a good flow and is very happy with his voiding   status.       PAST MEDICAL HISTORY UPDATE:  Since last ov with Dr. Hollwoay I do not see where pt has gotten established with a Nephrologist due to hx of found to have a rise in his BUN and creatinine up to 36 and 3.2 recently on 09/17/18 but pt was sent to ER on 09/17/18 for further workup.    RENAL US DONE 09/24/18:  IMPRESSION:  Features of medical renal disease.     Small bilateral renal cysts, including a mildly complex cyst of the right   kidney.  No complicating features were evident on prior examination.    Attention on 1 year follow-up suggested.     Distended bladder with diverticula formation, suggesting bladder outlet   obstruction or neurogenic bladder.  Prostate gland is not significantly   enlarged.  Moderate postvoid residual observed.    Last PSA Screening:   Lab Results   Component Value Date    PSADIAG 0.65 05/09/2018    PSADIAG 0.84 10/26/2017    PSADIAG 0.70 03/24/2016     REVIEW OF SYSTEMS:  A comprehensive 10 system review was performed and is negative except as noted above in HPI    PMHx:  Past Medical History:    Diagnosis Date    Bladder stones     Cancer     Encounter for blood transfusion     Hypertension     Kidney stone     Prostate cancer 2004    Radiation 2005    prostate     PSHx:  Past Surgical History:   Procedure Laterality Date    COLONOSCOPY N/A 7/4/2015    Performed by [facility] at [facility] ENDO    CYSTOSCOPY      CYSTOSCOPY N/A 7/13/2014    Performed by BG Ackerman II, MD at [facility] OR    CYSTOSCOPY WITH BLADDER STONE EXTRACTION N/A 7/14/2015    Performed by [facility]iko [facility] MD at [facility] OR    CYSTOSCOPY, WITH RETROGRADE PYELOGRAM AND URETERAL STENT INSERTION Left 3/18/2014    [facility]formed by [facility] at [facility] OR    ESOPHAGOGASTRODUODENOSCOPY (EGD) N/A 7/3/2015    Performed by [facility] Crain [facility] at [facility] ENDO    EVACUATION-CLOT N/A 7/13/2014    Performed by [facility]erman II, [facility] at [facility] OR    FULGURATION-BLADDER N/A 7/13/2014    Performed by BG [facility]erman II, MD at [facility] OR    KIDNEY STONE SURGERY  May 2014    MANIPULATION, CALCULUS Left 3/18/2014    Performed by Miko Holloway MD at [facility] OR     Allergies:  Soma [carisoprodol]; Aspirin; and Sulfa (sulfonamide antibiotics)    Medications: reviewed   Anticoagulation: No  Objective:     Vitals:    11/26/18 1404   BP: (!) 152/60   Pulse: (!) 58   Resp: 18   Temp: 98.7 °F (37.1 °C)     General:WDWN in NAD  Eyes: PERRLA, normal conjunctiva  Respiratory: no increased work on breathing, clear to auscultation  Cardiovascular: regular rate and rhythm. No obvious extremity edema.  GI: palpation of masses. No tenderness. No hepatosplenomegaly to palpation.  Musculoskeletal: normal range of motion of bilateral upper extremities. Normal muscle strength and tone.  Skin: no obvious rashes or lesions. No tightening of skin noted.  Neurologic: CN grossly normal. Normal sensation.   Psychiatric: awake, alert and oriented x 3. Mood and affect normal. Cooperative.    PVR by bladder scan performed by me today: 39 mL    LABS REVIEW:   today:   ABNORMAL    Cr:   Lab Results   Component Value Date    CREATININE 3.2 (H) 09/17/2018     Assessment:       1. Benign prostatic hyperplasia with lower urinary tract symptoms, symptom details unspecified    2. Prostate cancer          Plan:   BPH with LUTS, hx of prostate cancer:    1.  Urine culture to be performed.  2.  Repeat US in one year or sooner if symptoms do not improve.  3.  Uroxatral 10 mg one tablet daily refilled for pt.    F/u with Dr. Holloway in six months.    [facility]: ACTIVE    Silvia Owens, FRANCESCAP-C

## 2018-11-28 ENCOUNTER — TELEPHONE (OUTPATIENT)
Dept: UROLOGY | Facility: CLINIC | Age: 83
End: 2018-11-28

## 2018-11-28 LAB — BACTERIA UR CULT: NORMAL

## 2018-11-28 RX ORDER — AMOXICILLIN AND CLAVULANATE POTASSIUM 875; 125 MG/1; MG/1
1 TABLET, FILM COATED ORAL 2 TIMES DAILY
Qty: 28 TABLET | Refills: 0 | Status: SHIPPED | OUTPATIENT
Start: 2018-11-28 | End: 2018-12-12

## 2018-11-28 NOTE — TELEPHONE ENCOUNTER
Called patient's daughter, left a voice message of urine culture results and informed that augmentin was called into his pharmacy for treatment.

## 2018-11-28 NOTE — TELEPHONE ENCOUNTER
Called patient and left a voice message of urine culture results and informed that augmentin was called into his pharmacy for treatment.

## 2018-12-05 ENCOUNTER — OFFICE VISIT (OUTPATIENT)
Dept: RHEUMATOLOGY | Facility: CLINIC | Age: 83
End: 2018-12-05
Payer: MEDICARE

## 2018-12-05 VITALS
DIASTOLIC BLOOD PRESSURE: 66 MMHG | SYSTOLIC BLOOD PRESSURE: 165 MMHG | WEIGHT: 146.38 LBS | BODY MASS INDEX: 22.93 KG/M2

## 2018-12-05 DIAGNOSIS — M05.9 RHEUMATOID ARTHRITIS WITH POSITIVE RHEUMATOID FACTOR, INVOLVING UNSPECIFIED SITE: Primary | ICD-10-CM

## 2018-12-05 PROCEDURE — 1101F PT FALLS ASSESS-DOCD LE1/YR: CPT | Mod: ,,, | Performed by: INTERNAL MEDICINE

## 2018-12-05 PROCEDURE — 99213 OFFICE O/P EST LOW 20 MIN: CPT | Mod: ,,, | Performed by: INTERNAL MEDICINE

## 2018-12-05 RX ORDER — HYDROXYCHLOROQUINE SULFATE 200 MG/1
200 TABLET, FILM COATED ORAL 2 TIMES DAILY
Qty: 60 TABLET | Refills: 3 | Status: SHIPPED | OUTPATIENT
Start: 2018-12-05 | End: 2019-01-10

## 2018-12-05 NOTE — PROGRESS NOTES
[facility] RHEUMATOLOGY            PROGRESS NOTE      Subjective:       Patient ID:   NAME: Troy Yuan Sr. : 1929     89 y.o. male    Referring Doc: No ref. provider found  Other Physicians:    Chief Complaint:  Rheumatoid Arthritis      History of Present Illness:     Patient returns today for a regularly scheduled follow-up visit for  Rheumatoid arthritis     The patient office no complaints. No prolonged morning stiffness or joint swelling. No significant arthralgias. Some fatigue No chest pains, cough or  shortness of breath.            ROS:   GEN:  No  fever, night sweats . weight is stable   + fatigue  SKIN: no rashes, no bruising, no ulcerations, no Raynaud's  HEENT: no HA's, No visual changes, no mucosal ulcers, no sicca symptoms,  CV:   no CP, SOB, PND, [place], no orthopnea, no palpitations  PULM: normal with no SOB, cough, hemoptysis, sputum or pleuritic pain  GI:  no abdominal pain, nausea, vomiting, constipation, diarrhea, melanotic stools, BRBPR, hematemesis, no dysphagia  :   no dysuria  NEURO: no paresthesias, headaches, visual disturbances, muscle weakness  MUSCULOSKELETAL:no joint swelling, prolonged AM stiffness, no back pain, no muscle pain  Allergies:  Review of patient's allergies indicates:   Allergen Reactions    Soma [carisoprodol] Rash    Aspirin     Sulfa (sulfonamide antibiotics) Rash       Medications:    Current Outpatient Medications:     alfuzosin (UROXATRAL) 10 mg Tb24, Take 1 tablet (10 mg total) by mouth once daily., Disp: 90 tablet, Rfl: 3    amlodipine (NORVASC) 10 MG tablet, Take 10 mg by mouth once daily.  , Disp: , Rfl:     amoxicillin-clavulanate 875-125mg (AUGMENTIN) 875-125 mg per tablet, Take 1 tablet by mouth 2 (two) times daily. for 14 days, Disp: 28 tablet, Rfl: 0    cimetidine (TAGAMET) 200 MG tablet, Take 200 mg by mouth 4 (four) times daily. , Disp: , Rfl:     ferrous sulfate 325 mg (65 mg iron) Tab tablet, Take 325 mg by mouth once daily., Disp: ,  Rfl:     hydroxychloroquine (PLAQUENIL) 200 mg tablet, Take 1 tablet (200 mg total) by mouth 2 (two) times daily., Disp: 60 tablet, Rfl: 3    PMHx/PSHx Updates:      Last Eye Exam UTD      Objective:     Vitals:  Blood pressure (!) 165/66, weight 66.4 kg (146 lb 6.4 oz).    Physical Examination:   GEN: no apparent distress, comfortable; AAOx3  SKIN: no rashes,no ulceration, no Raynaud's, no petechiae, no SQ nodules,  HEAD: normal  EYES: no pallor, no icterus  NECK: no masses, thyroid normal, trachea midline, no LAD/LN's, supple  CV: RRR with no murmur; l S1 and S2 reg. ,no gallop no rubs,   CHEST: Normal respiratory effort; CTAB; normal breath sounds; no wheeze or crackles  MUSC/Skeletal: ROM normal; no crepitus; joints without synovitis,    No joint swelling or tenderness of PIP, MCP, wrist, elbow, shoulder, or knee joints  EXTREM: no clubbing, cyanosis, no edema,normal  pulses   NEURO: grossly intact; motor WNL; AAOx3; ,   PSYCH: normal mood, affect and behavior  LYMPH: normal cervical, supraclavicular          Labs:   Lab Results   Component Value Date    WBC 4.70 09/17/2018    HGB 9.3 (L) 09/17/2018    HCT 28.4 (L) 09/17/2018    MCV 91 09/17/2018     09/17/2018    CMP  @LASTLAB(NA,K,CL,CO2,GLU,BUN,Creatinine,Calcium,PROT,Albumin,Bilitot,Alkphos,AST,ALT,CRP,ESR,RF,CCP,IRIS,SSA,CPK,uric acid) )@  I have reviewed all available lab results and radiology reports.    Radiology/Diagnostic Studies:        Assessment/Plan:   (1) 89 y.o. male with diagnosis of rheumatoid arthritis. This is stable. He was instructed to lower Plaquenil to 1  once a day back in sept but not clear if he did or not  Reiterated to decrease dose due to renal insufficiency;he will have follow-up appointment with his nephrologist in a few weeks.  Hx Prostate CA  Elevated BP: will see nephro soon      CBC CMP        Discussion:     I have explained all of the above in detail and the patient understands all of the current recommendation(s). I  have answered all questions to the best of my ability and to their complete satisfaction.       The patient is to continue with the current management plan         RTC in   4 months or before if needed      Electronically signed by Ignacia Seay MD

## 2019-01-10 RX ORDER — HYDROXYCHLOROQUINE SULFATE 200 MG/1
200 TABLET, FILM COATED ORAL DAILY
Qty: 60 TABLET | Refills: 0 | Status: SHIPPED | OUTPATIENT
Start: 2019-01-10 | End: 2022-04-18

## 2019-01-11 ENCOUNTER — HOSPITAL ENCOUNTER (OUTPATIENT)
Facility: HOSPITAL | Age: 84
Discharge: HOME | End: 2019-01-13
Attending: EMERGENCY MEDICINE | Admitting: INTERNAL MEDICINE
Payer: MEDICARE

## 2019-01-11 DIAGNOSIS — E87.70 HYPERVOLEMIA, UNSPECIFIED HYPERVOLEMIA TYPE: Primary | ICD-10-CM

## 2019-01-11 DIAGNOSIS — R55 VASOVAGAL NEAR SYNCOPE: ICD-10-CM

## 2019-01-11 DIAGNOSIS — R07.9 CHEST PAIN: ICD-10-CM

## 2019-01-11 DIAGNOSIS — R55 SYNCOPE, UNSPECIFIED SYNCOPE TYPE: ICD-10-CM

## 2019-01-11 DIAGNOSIS — I63.9 STROKE: ICD-10-CM

## 2019-01-11 DIAGNOSIS — R55 SYNCOPE: ICD-10-CM

## 2019-01-11 PROBLEM — N18.4 CKD (CHRONIC KIDNEY DISEASE), STAGE IV: Status: ACTIVE | Noted: 2019-01-11

## 2019-01-11 PROBLEM — Z85.46 HISTORY OF PROSTATE CANCER: Status: ACTIVE | Noted: 2019-01-11

## 2019-01-11 PROBLEM — I10 ESSENTIAL HYPERTENSION: Status: ACTIVE | Noted: 2019-01-11

## 2019-01-11 LAB
ALBUMIN SERPL BCP-MCNC: 3.4 G/DL
ALP SERPL-CCNC: 79 U/L
ALT SERPL W/O P-5'-P-CCNC: 9 U/L
ANION GAP SERPL CALC-SCNC: 10 MMOL/L
AST SERPL-CCNC: 14 U/L
BASOPHILS # BLD AUTO: 0 K/UL
BASOPHILS NFR BLD: 0.2 %
BILIRUB SERPL-MCNC: 0.2 MG/DL
BNP SERPL-MCNC: 628 PG/ML
BUN SERPL-MCNC: 25 MG/DL
CALCIUM SERPL-MCNC: 9.2 MG/DL
CHLORIDE SERPL-SCNC: 107 MMOL/L
CHOLEST SERPL-MCNC: 200 MG/DL
CHOLEST/HDLC SERPL: 3.1 {RATIO}
CK MB SERPL-MCNC: 2.6 NG/ML
CK MB SERPL-MCNC: 2.9 NG/ML
CK MB SERPL-RTO: 2 %
CK MB SERPL-RTO: 2.1 %
CK SERPL-CCNC: 132 U/L
CK SERPL-CCNC: 140 U/L
CO2 SERPL-SCNC: 25 MMOL/L
CREAT SERPL-MCNC: 2.4 MG/DL
DIFFERENTIAL METHOD: ABNORMAL
EOSINOPHIL # BLD AUTO: 0.2 K/UL
EOSINOPHIL NFR BLD: 3.8 %
ERYTHROCYTE [DISTWIDTH] IN BLOOD BY AUTOMATED COUNT: 14.2 %
EST. GFR  (AFRICAN AMERICAN): 26 ML/MIN/1.73 M^2
EST. GFR  (NON AFRICAN AMERICAN): 23 ML/MIN/1.73 M^2
GLUCOSE SERPL-MCNC: 117 MG/DL
HCT VFR BLD AUTO: 29.3 %
HDLC SERPL-MCNC: 65 MG/DL
HDLC SERPL: 32.5 %
HGB BLD-MCNC: 9.6 G/DL
INR PPP: 1
LDLC SERPL CALC-MCNC: 122.2 MG/DL
LYMPHOCYTES # BLD AUTO: 0.6 K/UL
LYMPHOCYTES NFR BLD: 9.5 %
MCH RBC QN AUTO: 30.1 PG
MCHC RBC AUTO-ENTMCNC: 32.5 G/DL
MCV RBC AUTO: 93 FL
MONOCYTES # BLD AUTO: 0.4 K/UL
MONOCYTES NFR BLD: 5.7 %
NEUTROPHILS # BLD AUTO: 5.3 K/UL
NEUTROPHILS NFR BLD: 80.8 %
NONHDLC SERPL-MCNC: 135 MG/DL
PLATELET # BLD AUTO: 302 K/UL
PMV BLD AUTO: 7.5 FL
POCT GLUCOSE: 115 MG/DL (ref 70–110)
POTASSIUM SERPL-SCNC: 4.2 MMOL/L
PROT SERPL-MCNC: 6.8 G/DL
PROTHROMBIN TIME: 10.5 SEC
RBC # BLD AUTO: 3.17 M/UL
SODIUM SERPL-SCNC: 142 MMOL/L
TRIGL SERPL-MCNC: 64 MG/DL
TROPONIN I SERPL DL<=0.01 NG/ML-MCNC: 0.02 NG/ML
TROPONIN I SERPL DL<=0.01 NG/ML-MCNC: 0.03 NG/ML
TSH SERPL DL<=0.005 MIU/L-ACNC: 2.71 UIU/ML
WBC # BLD AUTO: 6.5 K/UL

## 2019-01-11 PROCEDURE — 96361 HYDRATE IV INFUSION ADD-ON: CPT

## 2019-01-11 PROCEDURE — 85610 PROTHROMBIN TIME: CPT

## 2019-01-11 PROCEDURE — 82962 GLUCOSE BLOOD TEST: CPT

## 2019-01-11 PROCEDURE — 84484 ASSAY OF TROPONIN QUANT: CPT | Mod: 91

## 2019-01-11 PROCEDURE — 85025 COMPLETE CBC W/AUTO DIFF WBC: CPT

## 2019-01-11 PROCEDURE — 36415 COLL VENOUS BLD VENIPUNCTURE: CPT

## 2019-01-11 PROCEDURE — 93005 ELECTROCARDIOGRAM TRACING: CPT

## 2019-01-11 PROCEDURE — 84443 ASSAY THYROID STIM HORMONE: CPT

## 2019-01-11 PROCEDURE — 25000003 PHARM REV CODE 250: Performed by: EMERGENCY MEDICINE

## 2019-01-11 PROCEDURE — 80061 LIPID PANEL: CPT

## 2019-01-11 PROCEDURE — G0378 HOSPITAL OBSERVATION PER HR: HCPCS

## 2019-01-11 PROCEDURE — 25000003 PHARM REV CODE 250: Performed by: INTERNAL MEDICINE

## 2019-01-11 PROCEDURE — 94761 N-INVAS EAR/PLS OXIMETRY MLT: CPT

## 2019-01-11 PROCEDURE — 83880 ASSAY OF NATRIURETIC PEPTIDE: CPT

## 2019-01-11 PROCEDURE — 96372 THER/PROPH/DIAG INJ SC/IM: CPT | Mod: 59 | Performed by: EMERGENCY MEDICINE

## 2019-01-11 PROCEDURE — 99285 EMERGENCY DEPT VISIT HI MDM: CPT | Mod: 25

## 2019-01-11 PROCEDURE — 96361 HYDRATE IV INFUSION ADD-ON: CPT | Performed by: EMERGENCY MEDICINE

## 2019-01-11 PROCEDURE — 80053 COMPREHEN METABOLIC PANEL: CPT

## 2019-01-11 PROCEDURE — 63600175 PHARM REV CODE 636 W HCPCS: Performed by: INTERNAL MEDICINE

## 2019-01-11 PROCEDURE — 96374 THER/PROPH/DIAG INJ IV PUSH: CPT

## 2019-01-11 PROCEDURE — 63600175 PHARM REV CODE 636 W HCPCS: Performed by: EMERGENCY MEDICINE

## 2019-01-11 PROCEDURE — 82553 CREATINE MB FRACTION: CPT | Mod: 91

## 2019-01-11 RX ORDER — ONDANSETRON 2 MG/ML
4 INJECTION INTRAMUSCULAR; INTRAVENOUS EVERY 8 HOURS PRN
Status: DISCONTINUED | OUTPATIENT
Start: 2019-01-11 | End: 2019-01-13 | Stop reason: HOSPADM

## 2019-01-11 RX ORDER — ENOXAPARIN SODIUM 100 MG/ML
30 INJECTION SUBCUTANEOUS EVERY 24 HOURS
Status: DISCONTINUED | OUTPATIENT
Start: 2019-01-11 | End: 2019-01-13 | Stop reason: HOSPADM

## 2019-01-11 RX ORDER — SODIUM CHLORIDE 0.9 % (FLUSH) 0.9 %
5 SYRINGE (ML) INJECTION
Status: DISCONTINUED | OUTPATIENT
Start: 2019-01-11 | End: 2019-01-13 | Stop reason: HOSPADM

## 2019-01-11 RX ORDER — ENOXAPARIN SODIUM 100 MG/ML
40 INJECTION SUBCUTANEOUS
Status: DISCONTINUED | OUTPATIENT
Start: 2019-01-11 | End: 2019-01-11 | Stop reason: DRUGHIGH

## 2019-01-11 RX ORDER — ALFUZOSIN HYDROCHLORIDE 10 MG/1
10 TABLET, EXTENDED RELEASE ORAL DAILY
Status: DISCONTINUED | OUTPATIENT
Start: 2019-01-12 | End: 2019-01-13 | Stop reason: HOSPADM

## 2019-01-11 RX ORDER — SODIUM CHLORIDE 9 MG/ML
INJECTION, SOLUTION INTRAVENOUS CONTINUOUS
Status: DISCONTINUED | OUTPATIENT
Start: 2019-01-11 | End: 2019-01-12

## 2019-01-11 RX ORDER — ONDANSETRON 2 MG/ML
4 INJECTION INTRAMUSCULAR; INTRAVENOUS
Status: COMPLETED | OUTPATIENT
Start: 2019-01-11 | End: 2019-01-11

## 2019-01-11 RX ORDER — ACETAMINOPHEN 325 MG/1
650 TABLET ORAL EVERY 8 HOURS PRN
Status: DISCONTINUED | OUTPATIENT
Start: 2019-01-11 | End: 2019-01-13 | Stop reason: HOSPADM

## 2019-01-11 RX ORDER — AMLODIPINE BESYLATE 5 MG/1
10 TABLET ORAL DAILY
Status: DISCONTINUED | OUTPATIENT
Start: 2019-01-12 | End: 2019-01-13 | Stop reason: HOSPADM

## 2019-01-11 RX ADMIN — SODIUM CHLORIDE 500 ML: 0.9 INJECTION, SOLUTION INTRAVENOUS at 03:01

## 2019-01-11 RX ADMIN — ENOXAPARIN SODIUM 30 MG: 100 INJECTION SUBCUTANEOUS at 06:01

## 2019-01-11 RX ADMIN — SODIUM CHLORIDE: 0.9 INJECTION, SOLUTION INTRAVENOUS at 05:01

## 2019-01-11 RX ADMIN — ONDANSETRON 4 MG: 2 INJECTION INTRAMUSCULAR; INTRAVENOUS at 03:01

## 2019-01-11 NOTE — PROGRESS NOTES
The enoxaparin dose has been adjusted for Troy Landor Sr. [id number] according to the pharmacy practice protocol.      Based on the patient's Estimated Creatinine Clearance: 19.1 mL/min (A) (based on SCr of 2.4 mg/dL (H))., Body mass index is 22.93 kg/m²., and weight= 66.4 kg (146 lb 6.2 oz), the enoxaparin dose has been adjusted 30 mg every 24 hours.    Thank you,  Rafael Salazar, PharmD

## 2019-01-11 NOTE — ED PROVIDER NOTES
"Encounter Date: 1/11/2019    SCRIBE #1 NOTE: IPorsha, am scribing for, and in the presence of, Dr. Oconnell.       History     Chief Complaint   Patient presents with    near syncope     and vomiting while working in yard today.       Time seen by provider: 2:03 PM on 01/11/2019    Troy Yuan Sr. is a 89 y.o. male with HTN, hx prostate CA, rheumatoid arthritis, who presents to the ED via EMS with an episode of vomiting/dizziness. Patient was mowing the lawn this morning when he had an episode of dizziness and vomiting x2. His daughter came outside and she called the ambulance; she states she saw him and he appeared to be "out of it" and slumped over on the steps. He denies any chest pain, numbness, weakness, rash, shortness of breath, diarrhea, abdominal pain, changes in vision, changes in speech, or injury/pain.       The history is provided by the patient, the EMS personnel and a relative. No  was used.     Review of patient's allergies indicates:   Allergen Reactions    Soma [carisoprodol] Rash    Aspirin     Sulfa (sulfonamide antibiotics) Rash     Past Medical History:   Diagnosis Date    Bladder stones     Cancer     Encounter for blood transfusion     Hypertension     Kidney stone     Prostate cancer 2004    Radiation 2005    prostate     Past Surgical History:   Procedure Laterality Date    COLONOSCOPY N/A 7/4/2015    [facility] ENDO    CYSTOSCOPY      CYSTOSCOPY N/A 7/13/2014    Performed by BG Ackerman II, MD at [facility] OR    CYSTOSCOPY WITH BLADDER STONE EXTRACTION N/A 7/14/2015    Performed by Miko Holloway [facility] at [facility] OR    CYSTOSCOPY, WITH RETROGRADE PYELOGRAM AND URETERAL STENT INSERTION Left 3/18/2014    Performed by [facility] at [facility] OR    ESOPHAGOGASTRODUODENOSCOPY (EGD) N/A 7/3/2015    Performed by [facility] at [facility] ENDO    EVACUATION-CLOT N/A 7/13/2014    Performed by [facility] A[facility]erman II, MD at " [facility] OR    FULGURATION-BLADDER N/A 7/13/2014    [facility] by [facility], [facility] at [facility] OR    KIDNEY STONE SURGERY  May 2014    MANIPULATION, CALCULUS Left 3/18/2014    Performed by [facility] at [facility] OR     Family History   Problem Relation Age of Onset    Hypertension Daughter     Diabetes Daughter     Hypertension Son     Asthma Son      Social History     Tobacco Use    Smoking status: Former Smoker     Years: 40.00     Types: Cigarettes     Start date: 9/26/1985    Smokeless tobacco: Never Used   Substance Use Topics    Alcohol use: No    Drug use: No     Review of Systems   Constitutional: Negative for activity change, appetite change, chills, diaphoresis, fatigue and fever.   HENT: Negative for congestion, drooling, rhinorrhea, sore throat, trouble swallowing and voice change.    Eyes: Negative for photophobia and visual disturbance.   Respiratory: Negative for cough, choking, chest tightness, shortness of breath, wheezing and stridor.    Cardiovascular: Negative for chest pain, palpitations and leg swelling.   Gastrointestinal: Positive for nausea and vomiting. Negative for abdominal distention, abdominal pain, blood in stool, constipation and diarrhea.   Genitourinary: Negative for difficulty urinating, dysuria, flank pain, frequency and hematuria.   Musculoskeletal: Negative for arthralgias, back pain, joint swelling, myalgias, neck pain and neck stiffness.   Skin: Negative for color change and rash.   Neurological: Positive for dizziness and syncope (possible). Negative for seizures, speech difficulty, weakness, numbness and headaches.   Hematological: Negative for adenopathy. Does not bruise/bleed easily.   Psychiatric/Behavioral: Negative for confusion.       Physical Exam     Initial Vitals [01/11/19 1415]   BP Pulse Resp Temp SpO2   (!) 194/81 65 14 98.4 °F (36.9 °C) 100 %      MAP       --         Physical Exam    Nursing note and vitals reviewed.  Constitutional: He appears  well-developed and well-nourished. He is not diaphoretic. No distress.   HENT:   Head: Normocephalic and atraumatic.   Mouth/Throat: Uvula is midline and oropharynx is clear and moist. Mucous membranes are dry (mildly).   Eyes: EOM are normal. Pupils are equal, round, and reactive to light.   Pupils 3mm and reactive to light.    Neck: Normal range of motion. Neck supple. No tracheal deviation present.   Cardiovascular: Normal rate, regular rhythm, normal heart sounds and intact distal pulses. Exam reveals no gallop and no friction rub.    No murmur heard.  Pulses:       Radial pulses are 2+ on the right side, and 2+ on the left side.        Dorsalis pedis pulses are 2+ on the right side, and 2+ on the left side.   Pulmonary/Chest: Breath sounds normal. No respiratory distress. He has no wheezes. He has no rhonchi. He has no rales.   Abdominal: Soft. Bowel sounds are normal. He exhibits no distension. There is no hepatosplenomegaly. There is no tenderness. There is no rebound, no guarding and no CVA tenderness.   Musculoskeletal: He exhibits no edema.   No step-off, deformity, or bony tenderness of the spine. Full ROM in LE's. Negative straight leg raise, bilaterally.    Lymphadenopathy:     He has no cervical adenopathy.   Neurological: He is alert and oriented to person, place, and time. He has normal strength. No cranial nerve deficit or sensory deficit. GCS score is 15. GCS eye subscore is 4. GCS verbal subscore is 5. GCS motor subscore is 6.   Normal neuro exam.    Skin: Skin is warm and dry. Capillary refill takes less than 2 seconds.         ED Course   Procedures  Labs Reviewed   CBC W/ AUTO DIFFERENTIAL - Abnormal; Notable for the following components:       Result Value    RBC 3.17 (*)     Hemoglobin 9.6 (*)     Hematocrit 29.3 (*)     MPV 7.5 (*)     Lymph # 0.6 (*)     Gran% 80.8 (*)     Lymph% 9.5 (*)     All other components within normal limits   COMPREHENSIVE METABOLIC PANEL - Abnormal; Notable for  the following components:    Glucose 117 (*)     BUN, Bld 25 (*)     Creatinine 2.4 (*)     Albumin 3.4 (*)     ALT 9 (*)     eGFR if  26 (*)     eGFR if non  23 (*)     All other components within normal limits   LIPID PANEL - Abnormal; Notable for the following components:    Cholesterol 200 (*)     All other components within normal limits   B-TYPE NATRIURETIC PEPTIDE - Abnormal; Notable for the following components:     (*)     All other components within normal limits   POCT GLUCOSE - Abnormal; Notable for the following components:    POCT Glucose 115 (*)     All other components within normal limits   PROTIME-INR   TSH   TROPONIN I   POCT GLUCOSE, HAND-HELD DEVICE     EKG Readings: (Independently Interpreted)   Heart Rate: 60 .   NSR. Normal axis, normal intervals, No acute abnormality.        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      Electronically signed by: Sarah Byers MD  Date:    01/11/2019  Time:    14:56             Narrative:    EXAMINATION:  CT HEAD WITHOUT CONTRAST    CLINICAL HISTORY:  Syncope/fainting;    TECHNIQUE:  Low dose axial images were obtained through the head.  Coronal and sagittal reformations were also performed. Contrast was not administered.    COMPARISON:  9/27/2015    FINDINGS:  there is mild to moderate dilatation of ventricles, sulci, fissures. There is decreased density in white matter consistent microvascular ischemic change. There are small foci of low density consistent with old lacunar infarcts left cerebellum small area of low-density right thalamus likely consistent old lacunar infarct better visualized today but also thought to be seen on the  prior exam..  There is no acute intracranial hemorrhage. There is no intracranial mass effect. There is no acute major vascular territory infarct. There is no acute intracranial hemorrhage. There is no intracranial mass effect. There is no acute major vascular territory infarct. Note is made that MRI is more sensitive than CT particularly for detection of small or early nonhemorrhagic infarcts. The calvarium appears intact, mastoids well pneumatized, visualized paranasal sinuses essentially clear. There are calcifications in parasellar portions of internal carotid arteries and in vertebral arteries.                               X-Ray Chest AP Portable (Final result)  Result time 01/11/19 14:33:53    Final result by Sarah Byers MD (01/11/19 14:33:53)                 Impression:      Appearance suggesting right-sided aortic knob as seen on multiple prior exams.  No acute cardiopulmonary disease.      Electronically signed by: Sarah Byers MD  Date:    01/11/2019  Time:    14:33             Narrative:    EXAMINATION:  XR CHEST AP PORTABLE    CLINICAL HISTORY:  Stroke;    TECHNIQUE:  Single frontal view of the chest was performed.    COMPARISON:  9/26/2015    FINDINGS:  The heart is not enlarged.  Aortic knob appears right-sided.  Lungs are clear of infiltrate.  There is no pleural effusion.  There is no significant change.                                 Medical Decision Making:   History:   Old Medical Records: I decided to obtain old medical records.  Initial Assessment:   Emergent evaluation of a 89 year old male presenting with nausea, vomiting, and possible syncopal episode.  Differential Diagnosis:   Dehydration, electrolyte abnormality, endocrine dysfunction, and ACS.   Clinical Tests:   Lab Tests: Ordered and Reviewed  Radiological Study: Ordered and Reviewed  Medical Tests: Ordered and Reviewed            Scribe Attestation:   Scribe #1: I performed the above scribed service and the documentation  accurately describes the services I performed. I attest to the accuracy of the note.    Attending Attestation:             Attending ED Notes:   Patient has findings consistent with chronic kidney disease and fluid overload given syncopal episode and nausea and vomiting and sudden onset of symptoms while cutting his grass patient will be admitted to Internal Medicine for further evaluation and management   I, Dr. Edin Oconnell, personally performed the services described in this documentation. All medical record entries made by the scribe were at my direction and in my presence.  I have reviewed the chart and agree that the record reflects my personal performance and is accurate and complete. Edin Mack MD.                 Clinical Impression:   The primary encounter diagnosis was Hypervolemia, unspecified hypervolemia type. Diagnoses of Stroke, Syncope, unspecified syncope type, Chest pain, and Syncope were also pertinent to this visit.      Disposition:   Disposition: Admitted  Condition: Stable                        Edin Oconnell MD  01/11/19 0798

## 2019-01-11 NOTE — NURSING
Pt is a/a/o x4.  Bedside swallow test done.  Able to swallow applesauce, jello, water and crackers without difficulty.   No coughing noted.

## 2019-01-11 NOTE — H&P
"PCP: Gentry Encinas MD    History & Physical    Chief Complaint: Near syncope    History of Present Illness:  Patient is a 89 y.o. male admitted to Hospitalist Service from [facility] Emergency Room with complaint of near syncope. Patient has PMH significant for hypertension, nephrolithiasis, CKD-4 and history of prostate cancer. Patient reported, he was mowing lawn this morning when he felt dizziness and then vomited twice. His daughter came outside and she called the ambulance; she states she saw him and he appeared to be "out of it" and slumped over on the steps. Patient denied chest pain, shortness of breath, abdominal pain, headache, vision changes, focal neuro-deficits, cough or fever. Patient denied any changes in medications.    Past Medical History:   Diagnosis Date    Bladder stones     Cancer     Encounter for blood transfusion     Hypertension     Kidney stone     Prostate cancer 2004    Radiation 2005    prostate     Past Surgical History:   Procedure Laterality Date    COLONOSCOPY N/A 7/4/2015    [facility]formed [facility] at [facility] ENDO    CYSTOSCOPY      CYSTOSCOPY N/A 7/13/2014    Performed by BG Ackerman II, MD at [facility] OR    CYSTOSCOPY WITH BLADDER STONE EXTRACTION N/A 7/14/2015    Performed by Miko Holloway MD at [facility] OR    CYSTOSCOPY, WITH RETROGRADE PYELOGRAM AND URETERAL STENT INSERTION Left 3/18/2014    Performed by Miko Holloway MD at [facility] OR    ESOPHAGOGASTRODUODENOSCOPY (EGD) N/A 7/3/2015    Performed by John Crain MD at [facility] ENDO    EVACUATION-CLOT N/A 7/13/2014    Performed by BG Ackerman II, MD at [facility] OR    FULGURATION-BLADDER N/A 7/13/2014    Performed by BG Ackerman II, MD at [facility] OR    KIDNEY STONE SURGERY  May 2014    MANIPULATION, CALCULUS Left 3/18/2014    Performed by Miko Holloway MD at [facility] OR     Family History   Problem Relation Age of Onset    Hypertension Daughter     Diabetes Daughter     Hypertension Son     " Asthma Son      Social History     Tobacco Use    Smoking status: Former Smoker     Years: 40.00     Types: Cigarettes     Start date: 9/26/1985    Smokeless tobacco: Never Used   Substance Use Topics    Alcohol use: No    Drug use: No      Review of patient's allergies indicates:   Allergen Reactions    Soma [carisoprodol] Rash    Aspirin     Sulfa (sulfonamide antibiotics) Rash       (Not in a hospital admission)  Review of Systems:  Constitutional: no fever or chills. + Dizziness and syncope  Eyes: no visual changes  Ears, nose, mouth, throat, and face: no nasal congestion or sore throat  Respiratory: no cough or shorness of breath  Cardiovascular: no chest pain or palpitations  Gastrointestinal: + nausea + vomiting, but no abdominal pain or change in bowel habits  Genitourinary: no hematuria or dysuria  Integument/breast: no rash or pruritis  Hematologic/lymphatic: no easy bruising or lymphadenopathy  Musculoskeletal: no arthralgias or myalgias  Neurological: no seizures or tremors.  Behavioral/Psych: no auditory or visual hallucinations  Endocrine: no heat or cold intolerance     OBJECTIVE:     Vital Signs (Most Recent)  Temp: 98.4 °F (36.9 °C) (01/11/19 1415)  Pulse: 60 (01/11/19 1503)  Resp: 14 (01/11/19 1415)  BP: (!) 198/86 (01/11/19 1503)  SpO2: 99 % (01/11/19 1503)    Physical Exam:  General appearance: well developed, appears stated age  Head: normocephalic, atraumatic  Eyes:  conjunctivae/corneas clear. PERRL.  Nose: Nares normal. Septum midline.  Throat: lips, dry mucosa, and tongue normal; teeth and gums normal, no throat erythema.  Neck: supple, symmetrical, trachea midline, no JVD and thyroid not enlarged, symmetric, no tenderness/mass/nodules  Lungs:  clear to auscultation bilaterally and normal respiratory effort  Chest wall: no tenderness  Heart: regular rate and rhythm, S1, S2 normal, no murmur, click, rub or gallop  Abdomen: soft, non-tender non-distented; bowel sounds normal; no masses,   no organomegaly  Extremities: no cyanosis, clubbing or edema.   Pulses: 2+ and symmetric  Skin: Skin color, texture, turgor normal. No rashes or lesions.  Lymph nodes: Cervical, supraclavicular, and axillary nodes normal.  Neurologic: Normal strength and tone. No focal numbness or weakness. CNII-XII intact.      Laboratory:   CBC:   Recent Labs   Lab 01/11/19  1437   WBC 6.50   RBC 3.17*   HGB 9.6*   HCT 29.3*      MCV 93   MCH 30.1   MCHC 32.5     CMP:   Recent Labs   Lab 01/11/19  1434   *   CALCIUM 9.2   ALBUMIN 3.4*   PROT 6.8      K 4.2   CO2 25      BUN 25*   CREATININE 2.4*   ALKPHOS 79   ALT 9*   AST 14   BILITOT 0.2     Coagulation:   Recent Labs   Lab 01/11/19  1434   LABPROT 10.5   INR 1.0     Cardiac markers:   Recent Labs   Lab 01/11/19  1434   TROPONINI 0.023     Diagnostic Results:  Chest X-Ray: Appearance suggesting right-sided aortic knob as seen on multiple prior exams.  No acute cardiopulmonary disease.    CT head: Mild involutional changes and findings consistent microvascular ischemic change and old lacunar infarcts with no acute intracranial findings.    Assessment/Plan:     Active Hospital Problems    Diagnosis  POA    *Vasovagal near syncope [R55]  Neuro-checks.  Tele-monitoring.   Check TSH.   Check Orthostatics.   2D Echocardiogram.   Carotid doppler U/S.   Fall precautions.     Yes    GUME  Dehydration  CKD (chronic kidney disease), stage IV [N18.4]  Start IV gentle fluid hydration.  Monitor BUN/SCr.  Monitor I/Os.  Monitor electrolytes.    Yes    Essential hypertension [I10]  Chronic problem. Will continue chronic medications and monitor for any changes, adjusting as needed.    Yes    History of prostate cancer [Z85.46]  Noted.  Not Applicable     DVT prophylaxis: Lovenox 40 mg Sq q day.    Discussed with family members.       Lisbeth Melchor MD  Department of Hospital Medicine   [facility]

## 2019-01-12 LAB
ALBUMIN SERPL BCP-MCNC: 3.3 G/DL
ALBUMIN SERPL BCP-MCNC: 3.3 G/DL
ALP SERPL-CCNC: 74 U/L
ALP SERPL-CCNC: 74 U/L
ALT SERPL W/O P-5'-P-CCNC: 7 U/L
ALT SERPL W/O P-5'-P-CCNC: 7 U/L
ANION GAP SERPL CALC-SCNC: 8 MMOL/L
ANION GAP SERPL CALC-SCNC: 8 MMOL/L
AST SERPL-CCNC: 14 U/L
AST SERPL-CCNC: 14 U/L
BACTERIA #/AREA URNS HPF: ABNORMAL /HPF
BASOPHILS # BLD AUTO: 0 K/UL
BASOPHILS # BLD AUTO: 0 K/UL
BASOPHILS NFR BLD: 0.1 %
BASOPHILS NFR BLD: 0.1 %
BILIRUB SERPL-MCNC: 0.2 MG/DL
BILIRUB SERPL-MCNC: 0.2 MG/DL
BILIRUB UR QL STRIP: NEGATIVE
BUN SERPL-MCNC: 22 MG/DL
BUN SERPL-MCNC: 22 MG/DL
CALCIUM SERPL-MCNC: 9.2 MG/DL
CALCIUM SERPL-MCNC: 9.2 MG/DL
CHLORIDE SERPL-SCNC: 109 MMOL/L
CHLORIDE SERPL-SCNC: 109 MMOL/L
CLARITY UR: CLEAR
CO2 SERPL-SCNC: 24 MMOL/L
CO2 SERPL-SCNC: 24 MMOL/L
COLOR UR: YELLOW
CREAT SERPL-MCNC: 2.1 MG/DL
CREAT SERPL-MCNC: 2.1 MG/DL
DIFFERENTIAL METHOD: ABNORMAL
DIFFERENTIAL METHOD: ABNORMAL
EOSINOPHIL # BLD AUTO: 0.2 K/UL
EOSINOPHIL # BLD AUTO: 0.2 K/UL
EOSINOPHIL NFR BLD: 3.5 %
EOSINOPHIL NFR BLD: 3.5 %
ERYTHROCYTE [DISTWIDTH] IN BLOOD BY AUTOMATED COUNT: 14.3 %
ERYTHROCYTE [DISTWIDTH] IN BLOOD BY AUTOMATED COUNT: 14.3 %
EST. GFR  (AFRICAN AMERICAN): 31 ML/MIN/1.73 M^2
EST. GFR  (AFRICAN AMERICAN): 31 ML/MIN/1.73 M^2
EST. GFR  (NON AFRICAN AMERICAN): 27 ML/MIN/1.73 M^2
EST. GFR  (NON AFRICAN AMERICAN): 27 ML/MIN/1.73 M^2
GLUCOSE SERPL-MCNC: 83 MG/DL
GLUCOSE SERPL-MCNC: 83 MG/DL
GLUCOSE UR QL STRIP: NEGATIVE
HCT VFR BLD AUTO: 28.7 %
HCT VFR BLD AUTO: 28.7 %
HGB BLD-MCNC: 9.4 G/DL
HGB BLD-MCNC: 9.4 G/DL
HGB UR QL STRIP: ABNORMAL
HYALINE CASTS #/AREA URNS LPF: 0 /LPF
KETONES UR QL STRIP: NEGATIVE
LEUKOCYTE ESTERASE UR QL STRIP: ABNORMAL
LYMPHOCYTES # BLD AUTO: 1.2 K/UL
LYMPHOCYTES # BLD AUTO: 1.2 K/UL
LYMPHOCYTES NFR BLD: 18.2 %
LYMPHOCYTES NFR BLD: 18.2 %
MAGNESIUM SERPL-MCNC: 2.1 MG/DL
MCH RBC QN AUTO: 30.2 PG
MCH RBC QN AUTO: 30.2 PG
MCHC RBC AUTO-ENTMCNC: 32.8 G/DL
MCHC RBC AUTO-ENTMCNC: 32.8 G/DL
MCV RBC AUTO: 92 FL
MCV RBC AUTO: 92 FL
MICROSCOPIC COMMENT: ABNORMAL
MONOCYTES # BLD AUTO: 0.6 K/UL
MONOCYTES # BLD AUTO: 0.6 K/UL
MONOCYTES NFR BLD: 9.5 %
MONOCYTES NFR BLD: 9.5 %
NEUTROPHILS # BLD AUTO: 4.4 K/UL
NEUTROPHILS # BLD AUTO: 4.4 K/UL
NEUTROPHILS NFR BLD: 68.7 %
NEUTROPHILS NFR BLD: 68.7 %
NITRITE UR QL STRIP: NEGATIVE
PH UR STRIP: 6 [PH] (ref 5–8)
PLATELET # BLD AUTO: 298 K/UL
PLATELET # BLD AUTO: 298 K/UL
PMV BLD AUTO: 8.6 FL
PMV BLD AUTO: 8.6 FL
POTASSIUM SERPL-SCNC: 4.3 MMOL/L
POTASSIUM SERPL-SCNC: 4.3 MMOL/L
PROT SERPL-MCNC: 6.5 G/DL
PROT SERPL-MCNC: 6.5 G/DL
PROT UR QL STRIP: ABNORMAL
RBC # BLD AUTO: 3.13 M/UL
RBC # BLD AUTO: 3.13 M/UL
RBC #/AREA URNS HPF: 75 /HPF (ref 0–4)
SODIUM SERPL-SCNC: 141 MMOL/L
SODIUM SERPL-SCNC: 141 MMOL/L
SP GR UR STRIP: 1.01 (ref 1–1.03)
TROPONIN I SERPL DL<=0.01 NG/ML-MCNC: 0.02 NG/ML
TROPONIN I SERPL DL<=0.01 NG/ML-MCNC: 0.03 NG/ML
URN SPEC COLLECT METH UR: ABNORMAL
UROBILINOGEN UR STRIP-ACNC: NEGATIVE EU/DL
WBC # BLD AUTO: 6.4 K/UL
WBC # BLD AUTO: 6.4 K/UL
WBC #/AREA URNS HPF: >100 /HPF (ref 0–5)
WBC CLUMPS URNS QL MICRO: ABNORMAL

## 2019-01-12 PROCEDURE — 93005 ELECTROCARDIOGRAM TRACING: CPT

## 2019-01-12 PROCEDURE — 63600175 PHARM REV CODE 636 W HCPCS: Performed by: INTERNAL MEDICINE

## 2019-01-12 PROCEDURE — 87086 URINE CULTURE/COLONY COUNT: CPT

## 2019-01-12 PROCEDURE — 80053 COMPREHEN METABOLIC PANEL: CPT

## 2019-01-12 PROCEDURE — 96361 HYDRATE IV INFUSION ADD-ON: CPT | Performed by: EMERGENCY MEDICINE

## 2019-01-12 PROCEDURE — 97116 GAIT TRAINING THERAPY: CPT

## 2019-01-12 PROCEDURE — G8980 MOBILITY D/C STATUS: HCPCS | Mod: CJ

## 2019-01-12 PROCEDURE — 36415 COLL VENOUS BLD VENIPUNCTURE: CPT

## 2019-01-12 PROCEDURE — 84484 ASSAY OF TROPONIN QUANT: CPT

## 2019-01-12 PROCEDURE — 25000003 PHARM REV CODE 250: Performed by: EMERGENCY MEDICINE

## 2019-01-12 PROCEDURE — G0378 HOSPITAL OBSERVATION PER HR: HCPCS

## 2019-01-12 PROCEDURE — 96375 TX/PRO/DX INJ NEW DRUG ADDON: CPT | Mod: 59 | Performed by: EMERGENCY MEDICINE

## 2019-01-12 PROCEDURE — 25000003 PHARM REV CODE 250: Performed by: INTERNAL MEDICINE

## 2019-01-12 PROCEDURE — G8979 MOBILITY GOAL STATUS: HCPCS | Mod: CJ

## 2019-01-12 PROCEDURE — 85025 COMPLETE CBC W/AUTO DIFF WBC: CPT

## 2019-01-12 PROCEDURE — G8978 MOBILITY CURRENT STATUS: HCPCS | Mod: CJ

## 2019-01-12 PROCEDURE — 94761 N-INVAS EAR/PLS OXIMETRY MLT: CPT

## 2019-01-12 PROCEDURE — 81000 URINALYSIS NONAUTO W/SCOPE: CPT

## 2019-01-12 PROCEDURE — 83735 ASSAY OF MAGNESIUM: CPT

## 2019-01-12 PROCEDURE — 96372 THER/PROPH/DIAG INJ SC/IM: CPT | Mod: 59 | Performed by: EMERGENCY MEDICINE

## 2019-01-12 PROCEDURE — 97161 PT EVAL LOW COMPLEX 20 MIN: CPT

## 2019-01-12 RX ORDER — ATORVASTATIN CALCIUM 20 MG/1
20 TABLET, FILM COATED ORAL DAILY
Status: DISCONTINUED | OUTPATIENT
Start: 2019-01-12 | End: 2019-01-13 | Stop reason: HOSPADM

## 2019-01-12 RX ORDER — HYDRALAZINE HYDROCHLORIDE 25 MG/1
25 TABLET, FILM COATED ORAL EVERY 8 HOURS
Status: DISCONTINUED | OUTPATIENT
Start: 2019-01-12 | End: 2019-01-13 | Stop reason: HOSPADM

## 2019-01-12 RX ADMIN — AMLODIPINE BESYLATE 10 MG: 5 TABLET ORAL at 08:01

## 2019-01-12 RX ADMIN — ALFUZOSIN HYDROCHLORIDE 10 MG: 10 TABLET ORAL at 08:01

## 2019-01-12 RX ADMIN — HYDRALAZINE HYDROCHLORIDE 25 MG: 25 TABLET, FILM COATED ORAL at 09:01

## 2019-01-12 RX ADMIN — ENOXAPARIN SODIUM 30 MG: 100 INJECTION SUBCUTANEOUS at 04:01

## 2019-01-12 RX ADMIN — ATORVASTATIN CALCIUM 20 MG: 20 TABLET, FILM COATED ORAL at 01:01

## 2019-01-12 RX ADMIN — CEFTRIAXONE 1 G: 1 INJECTION, SOLUTION INTRAVENOUS at 09:01

## 2019-01-12 RX ADMIN — HYDRALAZINE HYDROCHLORIDE 25 MG: 25 TABLET, FILM COATED ORAL at 04:01

## 2019-01-12 NOTE — CONSULTS
"PCP: Gentry Encinas MD    Cardiology Consult    Chief Complaint: Near syncope    History of Present Illness:  Patient is a 89 y.o. male admitted to Hospitalist Service from [facility] Emergency Room with complaint of near syncope. Patient has PMH significant for hypertension, nephrolithiasis, CKD-4 and history of prostate cancer. Patient reported, he was raking leaves for about 1 hour , 30 mins after breakfast 1/11/19 when he felt dizziness and then vomited twice. He sat in a chair in the yard till the ambulance arrived since he was profoundly weak.  His daughter came outside and she called the ambulance; she states she saw him and he appeared to be "out of it" and slumped over on the steps. Patient denied chest pain, shortness of breath, abdominal pain, headache, vision changes, focal neuro-deficits, cough or fever. Patient denied any changes in medications. He has never had a similar spell before. No prior syncope. Lowest HR on monitor in the low 50s.     Past Medical History:   Diagnosis Date    Bladder stones     Cancer     Encounter for blood transfusion     Hypertension     Kidney stone     Prostate cancer 2004    Radiation 2005    prostate     Past Surgical History:   Procedure Laterality Date    COLONOSCOPY N/A 7/4/2015    Performed by [facility]rain [facility] at [facility] ENDO    CYSTOSCOPY      CYSTOSCOPY N/A 7/13/2014    Performed by BG Ackerman II, MD at [facility] OR    CYSTOSCOPY WITH BLADDER STONE EXTRACTION N/A 7/14/2015    Performed by [facility]iko Holloway MD at [facility] OR    CYSTOSCOPY, WITH RETROGRADE PYELOGRAM AND URETERAL STENT INSERTION Left 3/18/2014    Performed by [facility] at [facility] OR    ESOPHAGOGASTRODUODENOSCOPY (EGD) N/A 7/3/2015    Performed by [facility]rain MD at [facility] ENDO    EVACUATION-CLOT N/A 7/13/2014    Performed by BG Ackerman II, MD at [facility] OR    FULGURATION-BLADDER N/A 7/13/2014    [facility]formed by BG [facility]erman II, MD at [facility] OR    KIDNEY STONE SURGERY  " May 2014    MANIPULATION, CALCULUS Left 3/18/2014    [facility] at 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  Review of patient's allergies indicates:   Allergen Reactions    Soma [carisoprodol] Rash    Aspirin     Sulfa (sulfonamide antibiotics) Rash     PTA Medications   Medication Sig    alfuzosin (UROXATRAL) 10 mg Tb24 Take 1 tablet (10 mg total) by mouth once daily.    amlodipine (NORVASC) 10 MG tablet Take 10 mg by mouth once daily.      cimetidine (TAGAMET) 200 MG tablet Take 200 mg by mouth 2 (two) times daily.     ferrous sulfate 325 mg (65 mg iron) Tab tablet Take 325 mg by mouth once daily.    hydroxychloroquine (PLAQUENIL) 200 mg tablet Take 1 tablet (200 mg total) by mouth once daily.     Review of Systems:  Constitutional: no fever or chills. + Dizziness and   no syncope  Eyes: no visual changes  Ears, nose, mouth, throat, and face: no nasal congestion or sore throat  Respiratory: no cough or shorness of breath  Cardiovascular: no chest pain or palpitations  Gastrointestinal: + nausea + vomiting, but no abdominal pain or change in bowel habits  Genitourinary: no hematuria or dysuria ; nocturia; h/o UTI   Integument/breast: no rash or pruritis  Hematologic/lymphatic: no easy bruising or lymphadenopathy  Musculoskeletal: no arthralgias or myalgias  Neurological: no seizures or tremors.  Behavioral/Psych: no auditory or visual hallucinations  Endocrine: no heat or cold intolerance     OBJECTIVE:     Vital Signs (Most Recent)  Temp: 98.5 °F (36.9 °C) (01/12/19 0756)  Pulse: 62 (01/12/19 0930)  Resp: 18  (01/12/19 4156)  BP: (!) 164/69 (01/12/19 0930)  SpO2: 98 % (01/12/19 0839)    Physical Exam:  General appearance: well developed, appears stated age  Head: normocephalic, atraumatic  Eyes:  conjunctivae/corneas clear. PERRL.  Nose: Nares normal. Septum midline.  Throat: lips, dry mucosa, and tongue normal; teeth and gums normal, no throat erythema.  Neck: supple, symmetrical, trachea midline, no JVD and thyroid not enlarged, symmetric, no tenderness/mass/nodules  Lungs:  clear to auscultation bilaterally and normal respiratory effort  Chest wall: no tenderness  Heart: regular rate and rhythm, S1, S2 normal, no murmur, click, rub or gallop  Abdomen: soft, non-tender non-distented; bowel sounds normal; no masses,  no organomegaly  Extremities: no cyanosis, clubbing or edema.   Pulses: 2+ and symmetric  Skin: Skin color, texture, turgor normal. No rashes or lesions.  Lymph nodes: Cervical, supraclavicular, and axillary nodes normal.  Neurologic: Normal strength and tone. No focal numbness or weakness. CNII-XII intact.      Laboratory:   CBC:   Recent Labs   Lab 01/12/19  0337   WBC 6.40  6.40   RBC 3.13*  3.13*   HGB 9.4*  9.4*   HCT 28.7*  28.7*     298   MCV 92  92   MCH 30.2  30.2   MCHC 32.8  32.8     CMP:   Recent Labs   Lab 01/12/19  0337   GLU 83  83   CALCIUM 9.2  9.2   ALBUMIN 3.3*  3.3*   PROT 6.5  6.5     141   K 4.3  4.3   CO2 24  24     109   BUN 22  22   CREATININE 2.1*  2.1*   ALKPHOS 74  74   ALT 7*  7*   AST 14  14   BILITOT 0.2  0.2     Coagulation:   Recent Labs   Lab 01/11/19  1434   LABPROT 10.5   INR 1.0     Cardiac markers:   Recent Labs   Lab 01/11/19  2302  01/12/19  1055   CPKMB 2.6  --   --    TROPONINI 0.034*  0.034*   < > 0.032*    < > = values in this interval not displayed.     Diagnostic Results:  Chest X-Ray: Appearance suggesting right-sided aortic knob as seen on multiple prior exams.  No acute cardiopulmonary disease.    CT head: Mild  involutional changes and findings consistent microvascular ischemic change and old lacunar infarcts with no acute intracranial findings.    Assessment/Plan:     Active Hospital Problems    Diagnosis  POA    *Vasovagal near syncope [R55]  Neuro-checks.  Tele-monitoring.   Check TSH.    Orthostatics. /80 lying; 198/85 standing. Patient a little unsteady standing.  2D Echocardiogram.   50 -69 % LICA stenosis.  Fall precautions.     Yes    GUME  Dehydration  CKD (chronic kidney disease), stage IV [N18.4]  DC IV FLUIDS -  / 80  Monitor BUN/SCr.  Monitor I/Os.  Monitor electrolytes.    Yes    Essential hypertension [I10]  Chronic problem. Will continue chronic medications and monitor for any changes, adjusting as needed.  Mild sinus bradycardia on tele.    Yes    History of prostate cancer [Z85.46]  Noted.  Not Applicable     DVT prophylaxis: Lovenox 40 mg Sq q day.    Discussed with wife.   Treat for possible UTI; F/U with Dr Holloway/ Ce  May be DC today.       Lloyd Grissom MD  Cardiology  [facility]

## 2019-01-12 NOTE — PROGRESS NOTES
"PCP: Gentry Encinas MD    Progress Note    Chief Complaint: Near syncope    History of Present Illness:  Patient is a 89 y.o. male admitted to Hospitalist Service from [facility] Emergency Room with complaint of near syncope. Patient has PMH significant for hypertension, nephrolithiasis, CKD-4 and history of prostate cancer. Patient reported, he was mowing lawn this morning when he felt dizziness and then vomited twice. His daughter came outside and she called the ambulance; she states she saw him and he appeared to be "out of it" and slumped over on the steps. Patient denied chest pain, shortness of breath, abdominal pain, headache, vision changes, focal neuro-deficits, cough or fever. Patient denied any changes in medications.    Interval History:  Patient seen and examined. No acute events overnight. Feeling better    Past Medical History:   Diagnosis Date    Bladder stones     Cancer     Encounter for blood transfusion     Hypertension     Kidney stone     Prostate cancer 2004    Radiation 2005    prostate     Past Surgical History:   Procedure Laterality Date    COLONOSCOPY N/A 7/4/2015    Performed by [facility]rain [facility] at [facility] ENDO    CYSTOSCOPY      CYSTOSCOPY N/A 7/13/2014    Performed by BG Ackerman II, MD at [facility] OR    CYSTOSCOPY WITH BLADDER STONE EXTRACTION N/A 7/14/2015    Performed by [facility]iko [facility]ay MD at [facility] OR    CYSTOSCOPY, WITH RETROGRADE PYELOGRAM AND URETERAL STENT INSERTION Left 3/18/2014    [facility] by [facility] at [facility] OR    ESOPHAGOGASTRODUODENOSCOPY (EGD) N/A 7/3/2015    Performed by [facility] Crain MD at [facility] ENDO    EVACUATION-CLOT N/A 7/13/2014    [facility]formed by BG A[facility]erman II, MD at [facility] OR    FULGURATION-BLADDER N/A 7/13/2014    [facility]formed by BG A[facility]erman II, MD at [facility] OR    KIDNEY STONE SURGERY  May 2014    MANIPULATION, CALCULUS Left 3/18/2014    Performed by [facility]iko Holloway MD at [facility] OR     Family History   Problem Relation Age " of Onset    Hypertension Daughter     Diabetes Daughter     Hypertension Son     Asthma Son      Social History     Tobacco Use    Smoking status: Former Smoker     Years: 40.00     Types: Cigarettes     Start date: 9/26/1985    Smokeless tobacco: Never Used   Substance Use Topics    Alcohol use: No    Drug use: No      Review of patient's allergies indicates:   Allergen Reactions    Soma [carisoprodol] Rash    Aspirin     Sulfa (sulfonamide antibiotics) Rash     PTA Medications   Medication Sig    alfuzosin (UROXATRAL) 10 mg Tb24 Take 1 tablet (10 mg total) by mouth once daily.    amlodipine (NORVASC) 10 MG tablet Take 10 mg by mouth once daily.      cimetidine (TAGAMET) 200 MG tablet Take 200 mg by mouth 2 (two) times daily.     ferrous sulfate 325 mg (65 mg iron) Tab tablet Take 325 mg by mouth once daily.    hydroxychloroquine (PLAQUENIL) 200 mg tablet Take 1 tablet (200 mg total) by mouth once daily.     Review of Systems:  Constitutional: no fever or chills. + Dizziness and syncope  Eyes: no visual changes  Ears, nose, mouth, throat, and face: no nasal congestion or sore throat  Respiratory: no cough or shorness of breath  Cardiovascular: no chest pain or palpitations  Gastrointestinal: + nausea + vomiting, but no abdominal pain or change in bowel habits  Genitourinary: no hematuria or dysuria  Integument/breast: no rash or pruritis  Hematologic/lymphatic: no easy bruising or lymphadenopathy  Musculoskeletal: no arthralgias or myalgias  Neurological: no seizures or tremors.  Behavioral/Psych: no auditory or visual hallucinations  Endocrine: no heat or cold intolerance     OBJECTIVE:     Vital Signs (Most Recent)  Temp: 98.5 °F (36.9 °C) (01/12/19 0756)  Pulse: 62 (01/12/19 0756)  Resp: 18 (01/12/19 0756)  BP: (!) 178/75 (01/12/19 0756)  SpO2: 96 % (01/12/19 0756)    Physical Exam:  General appearance: well developed, appears stated age  Head: normocephalic, atraumatic  Eyes:   conjunctivae/corneas clear. PERRL.  Nose: Nares normal. Septum midline.  Throat: lips, dry mucosa, and tongue normal; teeth and gums normal, no throat erythema.  Neck: supple, symmetrical, trachea midline, no JVD and thyroid not enlarged, symmetric, no tenderness/mass/nodules  Lungs:  clear to auscultation bilaterally and normal respiratory effort  Chest wall: no tenderness  Heart: regular rate and rhythm, S1, S2 normal, no murmur, click, rub or gallop  Abdomen: soft, non-tender non-distented; bowel sounds normal; no masses,  no organomegaly  Extremities: no cyanosis, clubbing or edema.   Pulses: 2+ and symmetric  Skin: Skin color, texture, turgor normal. No rashes or lesions.  Lymph nodes: Cervical, supraclavicular, and axillary nodes normal.  Neurologic: Normal strength and tone. No focal numbness or weakness. CNII-XII intact.      Laboratory:   CBC:   Recent Labs   Lab 01/12/19 0337   WBC 6.40  6.40   RBC 3.13*  3.13*   HGB 9.4*  9.4*   HCT 28.7*  28.7*     298   MCV 92  92   MCH 30.2  30.2   MCHC 32.8  32.8     CMP:   Recent Labs   Lab 01/12/19  0337   GLU 83  83   CALCIUM 9.2  9.2   ALBUMIN 3.3*  3.3*   PROT 6.5  6.5     141   K 4.3  4.3   CO2 24  24     109   BUN 22  22   CREATININE 2.1*  2.1*   ALKPHOS 74  74   ALT 7*  7*   AST 14  14   BILITOT 0.2  0.2     Coagulation:   Recent Labs   Lab 01/11/19  1434   LABPROT 10.5   INR 1.0     Cardiac markers:   Recent Labs   Lab 01/11/19  2302 01/12/19  0337   CPKMB 2.6  --    TROPONINI 0.034*  0.034* 0.022     Diagnostic Results:  Chest X-Ray: Appearance suggesting right-sided aortic knob as seen on multiple prior exams.  No acute cardiopulmonary disease.    CT head: Mild involutional changes and findings consistent microvascular ischemic change and old lacunar infarcts with no acute intracranial findings.    Assessment/Plan:     Active Hospital Problems    Diagnosis  POA    *Vasovagal near syncope [R55]  - Suspect secondary  to IVVD given UTI.  Improved with fluids    Elevated troponin  Mild elevation in troponin now resolved. Likely secondary to mild hypotension.  Cardiology consulted but intervention likely not necessary    Yes    GUME  Dehydration  CKD (chronic kidney disease), stage IV [N18.4]  Start IV gentle fluid hydration.  Improving    Yes    Essential hypertension [I10]  Chronic problem. Will continue chronic medications and monitor for any changes, adjusting as needed.    Yes    History of prostate cancer [Z85.46]  Noted.    UTI  On rocephin. Likely home today on Augementin  Not Applicable     DVT prophylaxis: Lovenox 40 mg Sq q day.    Discussed with family members.       Kris Woodward MD  Department of Hospital Medicine   [facility]

## 2019-01-12 NOTE — PLAN OF CARE
SW met w/ & spouse for assessment. Pt lives w/ spouse and adult dtr.  Pt denies HH, denies Advanced Directives.         01/12/19 1430   Discharge Assessment   Assessment Type Discharge Planning Assessment   Confirmed/corrected address and phone number on facesheet? Yes   Assessment information obtained from? Patient;Caregiver   Prior to hospitilization cognitive status: Alert/Oriented   Prior to hospitalization functional status: Independent;Assistive Equipment   Current cognitive status: Alert/Oriented   Current Functional Status: Independent;Assistive Equipment   Facility Arrived From: home   Lives With spouse;child(rian), adult;grandchild(rian)   Able to Return to Prior Arrangements yes   Is patient able to care for self after discharge? Yes   Who are your caregiver(s) and their phone number(s)? spouse:  Lakia Yuan  [phone]     dtr:  Lakia Yuan  [phone]   Patient's perception of discharge disposition home or selfcare   Readmission Within the Last 30 Days no previous admission in last 30 days   Patient currently being followed by outpatient case management? No   Patient currently receives any other outside agency services? No   Equipment Currently Used at Home walker, rolling;bedside commode   Do you have any problems affording any of your prescribed medications? No   Is the patient taking medications as prescribed? yes   Does the patient have transportation home? Yes   Transportation Anticipated family or friend will provide   Does the patient receive services at the [facility]? No   Discharge Plan A Home with family   Discharge Plan B Home with family   DME Needed Upon Discharge  none   Patient/Family in Agreement with Plan yes

## 2019-01-12 NOTE — PLAN OF CARE
01/11/19 1952   Patient Assessment/Suction   Level of Consciousness (AVPU) alert   Respiratory Effort Unlabored   PRE-TX-O2-ETCO2   O2 Device (Oxygen Therapy) room air   SpO2 98 %   Pulse Oximetry Type Intermittent   $ Pulse Oximetry - Multiple Charge Pulse Oximetry - Multiple

## 2019-01-12 NOTE — PLAN OF CARE
Problem: Adult Inpatient Plan of Care  Goal: Plan of Care Review  PT jose antonio completed. Gait 250ft with NIDIA. HARRY to chair, thera ex to VALARIE's

## 2019-01-12 NOTE — NURSING
Dr. Grissom ordered for Orthostatic BP once on pt lying and standing. Results as follows: 1231 Pt in lying position /80 HR 63. 1236- Pt in Standing position /85 HR 66. Notified Dr. Grissom and ordered to D/C pt's IV Fluids. Notified Dr. Woodward pf the above and also notified Dr. Woodward of the Carotid US results. Ordered to continue to monitor BP's

## 2019-01-12 NOTE — NURSING
Notified by wife this morning that pt urinated 10 times through the night but only 10-25 ml per time. Bladder scan results where >350 and on call NP was informed and order was obtained for straight cath. Straight cath done per protocol using standard red in and out straight cath. 250 ml total output. Pt tolerated well with small amount of bleeding and small clot noted to end of catheter when extracted. Urine sample collected by Maude NEGRON at BS.

## 2019-01-12 NOTE — PT/OT/SLP EVAL
Physical Therapy Evaluation and Discharge Note    Patient Name:  Troy Yuan Sr.   MRN:  [id number]    Recommendations:     Discharge Recommendations:  (home)   Discharge Equipment Recommendations: none   Barriers to discharge: None    Assessment:     Troy Yuan Sr. is a 89 y.o. male admitted with a medical diagnosis of Vasovagal near syncope. .  At this time, patient is functioning at their prior level of function and does not require further acute PT services.      Recent Surgery: * No surgery found *      Plan:     During this hospitalization, patient does not require further acute PT services.  Please re-consult if situation changes.      Subjective     Chief Complaint: pt stated that he feels much better  Spouse and daughter stated pt was doing yard work using blower  They stated pt has a RW but does not use it  Pt stated if he had shoes on he will be walking better  Stated R toe 4th hurts- awaiting podiatrist visit per spouse    Patient/Family Comments/goals: get home today      Patients cultural, spiritual, Caodaism conflicts given the current situation:      Living Environment:  Home with spouse in an elevated home with 8 steps with landing after 4 steps  Prior to admission, patients level of function was independent household, reaches to rails and walls to walk.  Equipment used at home: none.  DME owned (not currently used): rolling walker.  Upon discharge, patient will have assistance from family.    Objective:     Communicated with nurse Santoro prior to session.  Patient found supine upon PT entry to room found with:       General Precautions: Standard, fall   Orthopedic Precautions:N/A   Braces: N/A     Exams:  · RLE ROM: WFL  · RLE Strength: WFL  · LLE ROM: WFL  · LLE Strength: WFL    Functional Mobility:  · Bed Mobility:     · Scooting: stand by assistance  · Supine to Sit: stand by assistance  · Transfers:     · Sit to Stand:  contact guard assistance with hand-held assist  · Bed to Chair:  contact guard assistance with  hand-held assist  using  Stand Pivot  · Gait: 250ft with RW    AM-PAC 6 CLICK MOBILITY  Total Score:20       Therapeutic Activities and Exercises:   OOB to chair post PT  Encouraged use of RW at home- daughter and spouse aware  Ankle pumping exercises x 20    AM-PAC 6 CLICK MOBILITY  Total Score:20     Patient left up in chair with all lines intact and call button in reach.    GOALS:   Multidisciplinary Problems     Physical Therapy Goals     Not on file                History:     Past Medical History:   Diagnosis Date    Bladder stones     Cancer     Encounter for blood transfusion     Hypertension     Kidney stone     Prostate cancer 2004    Radiation 2005    prostate       Past Surgical History:   Procedure Laterality Date    COLONOSCOPY N/A 7/4/2015    [facility] ENDO    CYSTOSCOPY      CYSTOSCOPY N/A 7/13/2014    Performed by BG Ackerman II, MD at [facility] OR    CYSTOSCOPY WITH BLADDER STONE EXTRACTION N/A 7/14/2015    Performed by [facility]iko [facility]ay MD at [facility] OR    CYSTOSCOPY, WITH RETROGRADE PYELOGRAM AND URETERAL STENT INSERTION Left 3/18/2014    [facility]formed by [facility] at [facility] OR    ESOPHAGOGASTRODUODENOSCOPY (EGD) N/A 7/3/2015    Performed by [facility]rain MD at [facility] ENDO    EVACUATION-CLOT N/A 7/13/2014    Performed by BG Ackerman II, MD at [facility] OR    FULGURATION-BLADDER N/A 7/13/2014    Performed by BG [facility]erman II, MD at [facility] OR    KIDNEY STONE SURGERY  May 2014    MANIPULATION, CALCULUS Left 3/18/2014    Performed by Miko Holloway MD at [facility] OR       Clinical Decision Making:     History  Co-morbidities and personal factors that may impact the plan of care Examination  Body Structures and Functions, activity limitations and participation restrictions that may impact the plan of care Clinical Presentation   Decision Making/ Complexity Score   Co-morbidities:   [] Time since onset of injury / illness /  exacerbation  [] Status of current condition  []Patient's cognitive status and safety concerns    [] Multiple Medical Problems (see med hx)  Personal Factors:   [] Patient's age  [] Prior Level of function   [] Patient's home situation (environment and family support)  [] Patient's level of motivation  [] Expected progression of patient      HISTORY:(criteria)    [] 83411 - no personal factors/history    [] 86880 - has 1-2 personal factor/comorbidity     [] 59758 - has >3 personal factor/comorbidity     Body Regions:  [] Objective examination findings  [] Head     []  Neck  [] Trunk   [] Upper Extremity  [] Lower Extremity    Body Systems:  [] For communication ability, affect, cognition, language, and learning style: the assessment of the ability to make needs known, consciousness, orientation (person, place, and time), expected emotional /behavioral responses, and learning preferences (eg, learning barriers, education  needs)  [] For the neuromuscular system: a general assessment of gross coordinated movement (eg, balance, gait, locomotion, transfers, and transitions) and motor function  (motor control and motor learning)  [] For the musculoskeletal system: the assessment of gross symmetry, gross range of motion, gross strength, height, and weight  [] For the integumentary system: the assessment of pliability(texture), presence of scar formation, skin color, and skin integrity  [] For cardiovascular/pulmonary system: the assessment of heart rate, respiratory rate, blood pressure, and edema     Activity limitations:    [] Patient's cognitive status and saf ety concerns          [] Status of current condition      [] Weight bearing restriction  [] Cardiopulmunary Restriction    Participation Restrictions:   [] Goals and goal agreement with the patient     [] Rehab potential (prognosis) and probable outcome      Examination of Body System: (criteria)    [] 81790 - addressing 1-2 elements    [] 19352 - addressing a  total of 3 or more elements     [] 42750 -  Addressing a total of 4 or more elements         Clinical Presentation: (criteria)  Choose one     On examination of body system using standardized tests and measures patient presents with (CHOOSE ONE) elements from any of the following: body structures and functions, activity limitations, and/or participation restrictions.  Leading to a clinical presentation that is considered (CHOOSE ONE)                              Clinical Decision Making  (Eval Complexity):  Choose One     Time Tracking:     PT Received On: 01/12/19  PT Start Time: 0914     PT Stop Time: 0934  PT Total Time (min): 20 min     Billable Minutes: Evaluation 8 and Gait Training 12      Neema Braun, PT  01/12/2019

## 2019-01-12 NOTE — NURSING
Notified Dr. Woodward of /84 HR 69, and ordered to order Hydralazine 25mg PO TID, With the first dose to start now.

## 2019-01-13 VITALS
WEIGHT: 146 LBS | TEMPERATURE: 98 F | SYSTOLIC BLOOD PRESSURE: 149 MMHG | HEART RATE: 64 BPM | BODY MASS INDEX: 22.91 KG/M2 | HEIGHT: 67 IN | DIASTOLIC BLOOD PRESSURE: 73 MMHG | OXYGEN SATURATION: 99 % | RESPIRATION RATE: 18 BRPM

## 2019-01-13 LAB
ALBUMIN SERPL BCP-MCNC: 3.5 G/DL
ALBUMIN SERPL BCP-MCNC: 3.5 G/DL
ALP SERPL-CCNC: 77 U/L
ALP SERPL-CCNC: 77 U/L
ALT SERPL W/O P-5'-P-CCNC: 9 U/L
ALT SERPL W/O P-5'-P-CCNC: 9 U/L
ANION GAP SERPL CALC-SCNC: 10 MMOL/L
ANION GAP SERPL CALC-SCNC: 10 MMOL/L
AST SERPL-CCNC: 24 U/L
AST SERPL-CCNC: 24 U/L
BACTERIA UR CULT: NORMAL
BASOPHILS # BLD AUTO: 0 K/UL
BASOPHILS # BLD AUTO: 0 K/UL
BASOPHILS NFR BLD: 0.1 %
BASOPHILS NFR BLD: 0.1 %
BILIRUB SERPL-MCNC: 0.1 MG/DL
BILIRUB SERPL-MCNC: 0.1 MG/DL
BSA FOR ECHO PROCEDURE: 1.77 M2
BUN SERPL-MCNC: 28 MG/DL
BUN SERPL-MCNC: 28 MG/DL
CALCIUM SERPL-MCNC: 9.6 MG/DL
CALCIUM SERPL-MCNC: 9.6 MG/DL
CHLORIDE SERPL-SCNC: 106 MMOL/L
CHLORIDE SERPL-SCNC: 106 MMOL/L
CO2 SERPL-SCNC: 24 MMOL/L
CO2 SERPL-SCNC: 24 MMOL/L
CREAT SERPL-MCNC: 1.8 MG/DL
CREAT SERPL-MCNC: 1.8 MG/DL
DIFFERENTIAL METHOD: ABNORMAL
DIFFERENTIAL METHOD: ABNORMAL
EOSINOPHIL # BLD AUTO: 0.2 K/UL
EOSINOPHIL # BLD AUTO: 0.2 K/UL
EOSINOPHIL NFR BLD: 2.4 %
EOSINOPHIL NFR BLD: 2.4 %
ERYTHROCYTE [DISTWIDTH] IN BLOOD BY AUTOMATED COUNT: 14.3 %
ERYTHROCYTE [DISTWIDTH] IN BLOOD BY AUTOMATED COUNT: 14.3 %
EST. GFR  (AFRICAN AMERICAN): 37 ML/MIN/1.73 M^2
EST. GFR  (AFRICAN AMERICAN): 37 ML/MIN/1.73 M^2
EST. GFR  (NON AFRICAN AMERICAN): 32 ML/MIN/1.73 M^2
EST. GFR  (NON AFRICAN AMERICAN): 32 ML/MIN/1.73 M^2
GLUCOSE SERPL-MCNC: 102 MG/DL
GLUCOSE SERPL-MCNC: 102 MG/DL
HCT VFR BLD AUTO: 30.5 %
HCT VFR BLD AUTO: 30.5 %
HGB BLD-MCNC: 9.8 G/DL
HGB BLD-MCNC: 9.8 G/DL
LYMPHOCYTES # BLD AUTO: 0.8 K/UL
LYMPHOCYTES # BLD AUTO: 0.8 K/UL
LYMPHOCYTES NFR BLD: 9.8 %
LYMPHOCYTES NFR BLD: 9.8 %
MAGNESIUM SERPL-MCNC: 2.2 MG/DL
MCH RBC QN AUTO: 29.7 PG
MCH RBC QN AUTO: 29.7 PG
MCHC RBC AUTO-ENTMCNC: 32.3 G/DL
MCHC RBC AUTO-ENTMCNC: 32.3 G/DL
MCV RBC AUTO: 92 FL
MCV RBC AUTO: 92 FL
MONOCYTES # BLD AUTO: 0.6 K/UL
MONOCYTES # BLD AUTO: 0.6 K/UL
MONOCYTES NFR BLD: 8.2 %
MONOCYTES NFR BLD: 8.2 %
NEUTROPHILS # BLD AUTO: 6.3 K/UL
NEUTROPHILS # BLD AUTO: 6.3 K/UL
NEUTROPHILS NFR BLD: 79.5 %
NEUTROPHILS NFR BLD: 79.5 %
PLATELET # BLD AUTO: 303 K/UL
PLATELET # BLD AUTO: 303 K/UL
PMV BLD AUTO: 8.4 FL
PMV BLD AUTO: 8.4 FL
POTASSIUM SERPL-SCNC: 4.2 MMOL/L
POTASSIUM SERPL-SCNC: 4.2 MMOL/L
PROT SERPL-MCNC: 6.7 G/DL
PROT SERPL-MCNC: 6.7 G/DL
PULM VEIN A" WAVE DURATION": 120 MSEC
RA PRESSURE: 3 MMHG
RBC # BLD AUTO: 3.31 M/UL
RBC # BLD AUTO: 3.31 M/UL
SODIUM SERPL-SCNC: 140 MMOL/L
SODIUM SERPL-SCNC: 140 MMOL/L
TDI LATERAL: 0.05
TDI SEPTAL: 0.09
TDI: 0.07
WBC # BLD AUTO: 7.9 K/UL
WBC # BLD AUTO: 7.9 K/UL

## 2019-01-13 PROCEDURE — 83735 ASSAY OF MAGNESIUM: CPT

## 2019-01-13 PROCEDURE — 85025 COMPLETE CBC W/AUTO DIFF WBC: CPT

## 2019-01-13 PROCEDURE — 63600175 PHARM REV CODE 636 W HCPCS: Performed by: NURSE PRACTITIONER

## 2019-01-13 PROCEDURE — 96372 THER/PROPH/DIAG INJ SC/IM: CPT | Mod: 59 | Performed by: EMERGENCY MEDICINE

## 2019-01-13 PROCEDURE — G0378 HOSPITAL OBSERVATION PER HR: HCPCS

## 2019-01-13 PROCEDURE — 94760 N-INVAS EAR/PLS OXIMETRY 1: CPT

## 2019-01-13 PROCEDURE — 80053 COMPREHEN METABOLIC PANEL: CPT

## 2019-01-13 PROCEDURE — 94761 N-INVAS EAR/PLS OXIMETRY MLT: CPT

## 2019-01-13 PROCEDURE — 36415 COLL VENOUS BLD VENIPUNCTURE: CPT

## 2019-01-13 PROCEDURE — 25000003 PHARM REV CODE 250: Performed by: INTERNAL MEDICINE

## 2019-01-13 PROCEDURE — 25000003 PHARM REV CODE 250: Performed by: EMERGENCY MEDICINE

## 2019-01-13 PROCEDURE — 96375 TX/PRO/DX INJ NEW DRUG ADDON: CPT | Performed by: EMERGENCY MEDICINE

## 2019-01-13 RX ORDER — HYDRALAZINE HYDROCHLORIDE 20 MG/ML
10 INJECTION INTRAMUSCULAR; INTRAVENOUS EVERY 8 HOURS PRN
Status: DISCONTINUED | OUTPATIENT
Start: 2019-01-13 | End: 2019-01-13 | Stop reason: HOSPADM

## 2019-01-13 RX ORDER — AMOXICILLIN AND CLAVULANATE POTASSIUM 500; 125 MG/1; MG/1
1 TABLET, FILM COATED ORAL 2 TIMES DAILY
Qty: 14 TABLET | Refills: 0 | Status: SHIPPED | OUTPATIENT
Start: 2019-01-13 | End: 2019-01-20

## 2019-01-13 RX ORDER — HALOPERIDOL 5 MG/ML
5 INJECTION INTRAMUSCULAR EVERY 6 HOURS PRN
Status: DISCONTINUED | OUTPATIENT
Start: 2019-01-13 | End: 2019-01-13 | Stop reason: HOSPADM

## 2019-01-13 RX ORDER — ATORVASTATIN CALCIUM 20 MG/1
20 TABLET, FILM COATED ORAL DAILY
Qty: 30 TABLET | Refills: 1 | Status: SHIPPED | OUTPATIENT
Start: 2019-01-13 | End: 2023-04-18

## 2019-01-13 RX ORDER — HYDRALAZINE HYDROCHLORIDE 25 MG/1
25 TABLET, FILM COATED ORAL EVERY 8 HOURS
Qty: 90 TABLET | Refills: 1 | Status: ON HOLD | OUTPATIENT
Start: 2019-01-13 | End: 2019-12-17 | Stop reason: HOSPADM

## 2019-01-13 RX ADMIN — AMLODIPINE BESYLATE 10 MG: 5 TABLET ORAL at 07:01

## 2019-01-13 RX ADMIN — HYDRALAZINE HYDROCHLORIDE 25 MG: 25 TABLET, FILM COATED ORAL at 05:01

## 2019-01-13 RX ADMIN — HALOPERIDOL LACTATE 5 MG: 5 INJECTION, SOLUTION INTRAMUSCULAR at 01:01

## 2019-01-13 RX ADMIN — ALFUZOSIN HYDROCHLORIDE 10 MG: 10 TABLET ORAL at 07:01

## 2019-01-13 RX ADMIN — ATORVASTATIN CALCIUM 20 MG: 20 TABLET, FILM COATED ORAL at 07:01

## 2019-01-13 RX ADMIN — HYDRALAZINE HYDROCHLORIDE 10 MG: 20 INJECTION INTRAMUSCULAR; INTRAVENOUS at 12:01

## 2019-01-13 NOTE — DISCHARGE INSTRUCTIONS
Thank you for choosing [facility] for your medical care. The primary doctor who is taking care of you at the time of your discharge is Lisbeth Melchor MD.     You were admitted to the hospital with Vasovagal near syncope.     Please note your discharge instructions, including diet/activity restrictions, follow-up appointments, and medication changes.  If you have any questions about your medical issues, prescriptions, or any other questions, please feel free to contact the [facility] Medicine Dept at [phone] and we will help.    If you are previously with Home health, outpatient PT/OT or under a therapy program, you are cleared to return to those programs.    Please direct all long term medication refills and follow up to your primary care provider, Gentry Encinas MD. Thank you again for letting us take care of your health care needs.    Please note the following discharge instructions per your discharging physician-  1. Take all medications as directed  2. Important to follow up with primary care doctor in one week for blood pressure check

## 2019-01-13 NOTE — NURSING
Spoke w/ DOM Moore NP in regard of BP of 188/83 after receiving scheduled tid hydralazine this evening.   Received orders for Hydralazine 10mg IVP q8hr for SBP>180.

## 2019-01-13 NOTE — PLAN OF CARE
Problem: Adult Inpatient Plan of Care  Goal: Plan of Care Review  Outcome: Ongoing (interventions implemented as appropriate)  POC reviewed with pt with verbalized understanding. Free from falls, safety maintained, VSS. Scheduled and prn medicine given for HTN w/ reduction of BP achieved. Prn Haldol was given d/t increased confusion leading to attempts of exiting the bed and pt becoming aggressive to staff and family. Call light in reach, bed locked/lowest position. Srx2. Will continue to monitor.

## 2019-01-13 NOTE — PLAN OF CARE
Problem: Adult Inpatient Plan of Care  Goal: Plan of Care Review  Outcome: Ongoing (interventions implemented as appropriate)  Pt on room air with 99% sats.

## 2019-01-13 NOTE — PLAN OF CARE
01/13/19 1130   Final Note   Assessment Type Final Discharge Note   Anticipated Discharge Disposition Home

## 2019-01-13 NOTE — DISCHARGE SUMMARY
"[facility]  Hospital Medicine  Discharge Summary      Patient Name: Troy Yuan Sr.  MRN: [id number]  Admission Date: 1/11/2019  Hospital Length of Stay: 0 days  Discharge Date and Time:  01/13/2019 9:59 AM  Attending Physician: No att. providers found   Discharging Provider: Kris Woodward MD  Primary Care Provider: Gentry Encinas MD        HPI: Patient is a 89 y.o. male admitted to Hospitalist Service from [facility] Emergency Room with complaint of near syncope. Patient has PMH significant for hypertension, nephrolithiasis, CKD-4 and history of prostate cancer. Patient reported, he was mowing lawn this morning when he felt dizziness and then vomited twice. His daughter came outside and she called the ambulance; she states she saw him and he appeared to be "out of it" and slumped over on the steps. Patient denied chest pain, shortness of breath, abdominal pain, headache, vision changes, focal neuro-deficits, cough or fever. Patient denied any changes in medications.        * No surgery found *      Hospital Course: The patient was admitted and evaluated. No cardiac etiology determined for his syncopal episode and I suspect this was due to mild volume depletion 2/2 UTI.  He also was noted to have elevated BP's and his pressure regimen was adjusted with the addition of Hydralazine.  He was stable and will be sent home to complete a course of Augmentin. The importance of follow up with PCP for BP check within the week was stressed and his daughter voiced understanding.     Exam on day of discharge:  Physical Exam:  General appearance: well developed, appears stated age  Head: normocephalic, atraumatic  Eyes:  conjunctivae/corneas clear. PERRL.  Nose: Nares normal. Septum midline.  Throat: lips, dry mucosa, and tongue normal; teeth and gums normal, no throat erythema.  Neck: supple, symmetrical, trachea midline, no JVD and thyroid not enlarged, symmetric, no " tenderness/mass/nodules  Lungs:  clear to auscultation bilaterally and normal respiratory effort  Chest wall: no tenderness  Heart: regular rate and rhythm, S1, S2 normal, no murmur, click, rub or gallop  Abdomen: soft, non-tender non-distented; bowel sounds normal; no masses,  no organomegaly  Extremities: no cyanosis, clubbing or edema.   Pulses: 2+ and symmetric  Skin: Skin color, texture, turgor normal. No rashes or lesions.  Lymph nodes: Cervical, supraclavicular, and axillary nodes normal.  Neurologic: Normal strength and tone. No focal numbness or weakness. CNII-XII intact.          Consults:   Consults (From admission, onward)        Status Ordering Provider     Inpatient consult to Cardiology  Once     Provider:  MD Elvi Loco JESSICA M.          Final Active Diagnoses:    Diagnosis Date Noted POA    PRINCIPAL PROBLEM:  Vasovagal near syncope [R55] 01/11/2019 Yes    CKD (chronic kidney disease), stage IV [N18.4] 01/11/2019 Yes    Essential hypertension [I10] 01/11/2019 Yes    History of prostate cancer [Z85.46] 01/11/2019 Not Applicable    Hypervolemia [E87.70] 01/11/2019 Yes      Problems Resolved During this Admission:      Discharged Condition: stable    Disposition: Home or Self Care    Follow Up:  Follow-up Information     Gentry Encinas MD In 1 week.    Specialty:  Family Medicine  Contact information:  [address] [facility]  [place] [address]  [phone]             Miko Holloway MD In 2 weeks.    Specialty:  Urology  Contact information:  [address] [place] [address] [address]  [address]  [place] [address] [phone]                 Patient Instructions:      Diet Adult Regular     Notify your health care provider if you experience any of the following:  difficulty breathing or increased cough     Notify your health care provider if you experience any of the following:  increased confusion or weakness     Notify your health care provider if you experience any of the following:   "persistent dizziness, light-headedness, or visual disturbances     Activity as tolerated     Medications:  Reconciled Home Medications:      Medication List      START taking these medications    amoxicillin-clavulanate 500-125mg 500-125 mg Tab  Commonly known as:  AUGMENTIN  Take 1 tablet (500 mg total) by mouth 2 (two) times daily. for 7 days     atorvastatin 20 MG tablet  Commonly known as:  LIPITOR  Take 1 tablet (20 mg total) by mouth once daily.     hydrALAZINE 25 MG tablet  Commonly known as:  APRESOLINE  Take 1 tablet (25 mg total) by mouth every 8 (eight) hours.        CONTINUE taking these medications    alfuzosin 10 mg Tb24  Commonly known as:  UROXATRAL  Take 1 tablet (10 mg total) by mouth once daily.     amLODIPine 10 MG tablet  Commonly known as:  NORVASC  Take 10 mg by mouth once daily.     cimetidine 200 MG tablet  Commonly known as:  TAGAMET  Take 200 mg by mouth 2 (two) times daily.     ferrous sulfate 325 mg (65 mg iron) Tab tablet  Commonly known as:  FEOSOL  Take 325 mg by mouth once daily.     hydroxychloroquine 200 mg tablet  Commonly known as:  PLAQUENIL  Take 1 tablet (200 mg total) by mouth once daily.            Significant Diagnostic Studies: Labs:   CBC   Recent Labs   Lab 01/11/19  1437 01/12/19  0337 01/13/19  0452   WBC 6.50 6.40  6.40 7.90  7.90   HGB 9.6* 9.4*  9.4* 9.8*  9.8*   HCT 29.3* 28.7*  28.7* 30.5*  30.5*    298  298 303  303       Pending Diagnostic Studies:     Procedure Component Value Units Date/Time    EKG 12-lead [[id number]]     Order Status:  Sent Lab Status:  No result     Transthoracic echo (TTE) complete (Cupid Only) [[id number]] Resulted:  01/12/19 1555    Order Status:  Sent Lab Status:  In process Updated:  01/12/19 1556     BSA 1.77 m2      TDI SEPTAL 0.09     TDI LATERAL 0.05     Mean e' 0.07     Pulm vein "A" wave 120.00 msec         Indwelling Lines/Drains at time of discharge:   Lines/Drains/Airways          None          Time spent on the " discharge of patient: 25 minutes  Patient was seen and examined on the date of discharge and determined to be suitable for discharge.         Kris Woodward MD  Department of Hospital Medicine  [facility]

## 2019-01-13 NOTE — NURSING
Pt medication and discharge instructions given and reviewed, understanding verbalized.  IV and tele removed.  VSS, Pt in NAD, denies pain and discomfort at this time.  Discharged home with family.  Left unit via wheelchair.

## 2019-04-23 ENCOUNTER — TELEPHONE (OUTPATIENT)
Dept: RHEUMATOLOGY | Facility: CLINIC | Age: 84
End: 2019-04-23

## 2019-04-24 ENCOUNTER — TELEPHONE (OUTPATIENT)
Dept: RHEUMATOLOGY | Facility: CLINIC | Age: 84
End: 2019-04-24

## 2019-04-24 NOTE — TELEPHONE ENCOUNTER
"Called the patient to see if will reschedule "no sow" appointment.  Mailbox is full couldn't leave a message  "

## 2019-05-06 RX ORDER — HYDROXYCHLOROQUINE SULFATE 200 MG/1
TABLET, FILM COATED ORAL
Qty: 60 TABLET | Refills: 0 | Status: SHIPPED | OUTPATIENT
Start: 2019-05-06 | End: 2019-06-06 | Stop reason: SDUPTHER

## 2019-05-21 ENCOUNTER — APPOINTMENT (OUTPATIENT)
Dept: LAB | Facility: HOSPITAL | Age: 84
End: 2019-05-21
Attending: UROLOGY
Payer: MEDICARE

## 2019-05-21 ENCOUNTER — OFFICE VISIT (OUTPATIENT)
Dept: UROLOGY | Facility: CLINIC | Age: 84
End: 2019-05-21
Payer: MEDICARE

## 2019-05-21 VITALS
WEIGHT: 145.94 LBS | BODY MASS INDEX: 22.91 KG/M2 | HEIGHT: 67 IN | HEART RATE: 70 BPM | SYSTOLIC BLOOD PRESSURE: 158 MMHG | TEMPERATURE: 99 F | DIASTOLIC BLOOD PRESSURE: 62 MMHG | RESPIRATION RATE: 18 BRPM

## 2019-05-21 DIAGNOSIS — R35.1 BENIGN PROSTATIC HYPERPLASIA WITH NOCTURIA: ICD-10-CM

## 2019-05-21 DIAGNOSIS — R31.29 MICROSCOPIC HEMATURIA: Primary | ICD-10-CM

## 2019-05-21 DIAGNOSIS — C61 MALIGNANT NEOPLASM OF PROSTATE: ICD-10-CM

## 2019-05-21 DIAGNOSIS — N40.1 BENIGN PROSTATIC HYPERPLASIA WITH NOCTURIA: ICD-10-CM

## 2019-05-21 LAB
BILIRUB SERPL-MCNC: ABNORMAL MG/DL
BLOOD URINE, POC: ABNORMAL
COLOR, POC UA: YELLOW
GLUCOSE UR QL STRIP: ABNORMAL
KETONES UR QL STRIP: ABNORMAL
LEUKOCYTE ESTERASE URINE, POC: ABNORMAL
NITRITE, POC UA: ABNORMAL
PH, POC UA: 6.5
PROTEIN, POC: 100
SPECIFIC GRAVITY, POC UA: 1.03
UROBILINOGEN, POC UA: ABNORMAL

## 2019-05-21 PROCEDURE — 88112 CYTOPATH CELL ENHANCE TECH: CPT | Mod: 26,,, | Performed by: PATHOLOGY

## 2019-05-21 PROCEDURE — 87086 URINE CULTURE/COLONY COUNT: CPT

## 2019-05-21 PROCEDURE — 99999 PR PBB SHADOW E&M-EST. PATIENT-LVL III: ICD-10-PCS | Mod: PBBFAC,,, | Performed by: UROLOGY

## 2019-05-21 PROCEDURE — 88112 CYTOPATH CELL ENHANCE TECH: CPT | Performed by: PATHOLOGY

## 2019-05-21 PROCEDURE — 88112 CYTOLOGY SPECIMEN-URINE: ICD-10-PCS | Mod: 26,,, | Performed by: PATHOLOGY

## 2019-05-21 PROCEDURE — 81000 URINALYSIS NONAUTO W/SCOPE: CPT | Mod: S$GLB,,, | Performed by: UROLOGY

## 2019-05-21 PROCEDURE — 99214 PR OFFICE/OUTPT VISIT, EST, LEVL IV, 30-39 MIN: ICD-10-PCS | Mod: 25,S$GLB,, | Performed by: UROLOGY

## 2019-05-21 PROCEDURE — 1101F PR PT FALLS ASSESS DOC 0-1 FALLS W/OUT INJ PAST YR: ICD-10-PCS | Mod: CPTII,S$GLB,, | Performed by: UROLOGY

## 2019-05-21 PROCEDURE — 87088 URINE BACTERIA CULTURE: CPT

## 2019-05-21 PROCEDURE — 81000 CHG URINALYSIS, NONAUTO, W/SCOPE: ICD-10-PCS | Mod: S$GLB,,, | Performed by: UROLOGY

## 2019-05-21 PROCEDURE — 99999 PR PBB SHADOW E&M-EST. PATIENT-LVL III: CPT | Mod: PBBFAC,,, | Performed by: UROLOGY

## 2019-05-21 PROCEDURE — 1101F PT FALLS ASSESS-DOCD LE1/YR: CPT | Mod: CPTII,S$GLB,, | Performed by: UROLOGY

## 2019-05-21 PROCEDURE — 99214 OFFICE O/P EST MOD 30 MIN: CPT | Mod: 25,S$GLB,, | Performed by: UROLOGY

## 2019-05-21 NOTE — PROGRESS NOTES
OFFICE NOTE    CHIEF COMPLAINT:  Adenocarcinoma of the prostate, BPH with lower urinary tract   symptoms, and microscopic hematuria.    HISTORY OF PRESENT ILLNESS:  This 89-year-old male returns for routine recheck.    He has a history of adenocarcinoma of the prostate for which he underwent   external beam radiation therapy over 20 years ago and has shown no evidence of   recurrences since then.  His last PSA was 0.65 on 05/09/2018.  He also has a   history of BPH with lower urinary tract symptoms, for which he continues to take   Uroxatral 10 mg p.o. every day with which he continues to do well.  He also has   been noted to have microscopic hematuria on multiple occasions in the past and   he last underwent cystoscopic examination in 2015 when he was also found to have   some bladder calculi that were extracted.  He most recently underwent a renal   ultrasound on 09/24/2018, which just reveals small bilateral renal cysts,   otherwise no other significant findings.    MEDICAL HISTORY UPDATE:  Reveals no other change in general health since his   last visit.    PHYSICAL EXAMINATION:  ABDOMEN:  Soft.  A left inguinal hernia is still present as it has been on   multiple other prior occasions.  Nontender.  No masses.  EXTERNAL GENITALIA:  Normal phallus with adequate meatus.  Testes descended and   feel normal.  No scrotal masses.  RECTAL:  Reveals a firm, flattened prostatic area.  No nodule.  Normal sphincter   tone.    His last PSA was 0.65 on 05/09/2018.     Urinalysis reveals trace of blood, moderate wbc's, pH 8.5.    FINAL IMPRESSION:  Adenocarcinoma of prostate, BPH with lower urinary tract   symptoms, history of microscopic hematuria, and pyuria.    RECOMMENDATIONS:  Urine for C and S and cytology and PSA, otherwise continue on   Uroxatral 10 mg p.o. daily and we will contact the patient with the lab results.      TIFFANIE  dd: 05/21/2019 15:27:08 (CDT)  td: 05/22/2019 00:19:35 (CDT)  Doc ID   #[id number]  Job ID  #[id number]    CC:

## 2019-05-23 LAB — BACTERIA UR CULT: NORMAL

## 2019-05-26 RX ORDER — DOXYCYCLINE 100 MG/1
100 CAPSULE ORAL EVERY 12 HOURS
Qty: 30 CAPSULE | Refills: 0 | Status: ON HOLD | OUTPATIENT
Start: 2019-05-26 | End: 2019-12-12

## 2019-06-06 RX ORDER — HYDROXYCHLOROQUINE SULFATE 200 MG/1
TABLET, FILM COATED ORAL
Qty: 60 TABLET | Refills: 0 | Status: SHIPPED | OUTPATIENT
Start: 2019-06-06 | End: 2019-07-08 | Stop reason: SDUPTHER

## 2019-07-08 RX ORDER — HYDROXYCHLOROQUINE SULFATE 200 MG/1
TABLET, FILM COATED ORAL
Qty: 60 TABLET | Refills: 0 | Status: SHIPPED | OUTPATIENT
Start: 2019-07-08 | End: 2019-08-14 | Stop reason: SDUPTHER

## 2019-07-15 ENCOUNTER — LAB VISIT (OUTPATIENT)
Dept: LAB | Facility: HOSPITAL | Age: 84
End: 2019-07-15
Attending: UROLOGY
Payer: MEDICARE

## 2019-07-15 DIAGNOSIS — C61 MALIGNANT NEOPLASM OF PROSTATE: ICD-10-CM

## 2019-07-15 LAB — COMPLEXED PSA SERPL-MCNC: 0.92 NG/ML (ref 0–4)

## 2019-07-15 PROCEDURE — 36415 COLL VENOUS BLD VENIPUNCTURE: CPT

## 2019-07-15 PROCEDURE — 84153 ASSAY OF PSA TOTAL: CPT

## 2019-08-17 RX ORDER — HYDROXYCHLOROQUINE SULFATE 200 MG/1
200 TABLET, FILM COATED ORAL DAILY
Qty: 60 TABLET | Refills: 0 | Status: SHIPPED | OUTPATIENT
Start: 2019-08-17 | End: 2019-08-19 | Stop reason: SDUPTHER

## 2019-08-19 RX ORDER — HYDROXYCHLOROQUINE SULFATE 200 MG/1
TABLET, FILM COATED ORAL
Qty: 90 TABLET | Refills: 0 | Status: ON HOLD | OUTPATIENT
Start: 2019-08-19 | End: 2019-12-20 | Stop reason: HOSPADM

## 2019-10-17 RX ORDER — HYDROXYCHLOROQUINE SULFATE 200 MG/1
TABLET, FILM COATED ORAL
Qty: 60 TABLET | Refills: 0 | OUTPATIENT
Start: 2019-10-17

## 2019-11-06 RX ORDER — HYDROXYCHLOROQUINE SULFATE 200 MG/1
TABLET, FILM COATED ORAL
Qty: 60 TABLET | Refills: 0 | OUTPATIENT
Start: 2019-11-06

## 2019-12-01 RX ORDER — ALFUZOSIN HYDROCHLORIDE 10 MG/1
TABLET, EXTENDED RELEASE ORAL
Qty: 90 TABLET | Refills: 0 | Status: ON HOLD | OUTPATIENT
Start: 2019-12-01 | End: 2019-12-20 | Stop reason: HOSPADM

## 2019-12-12 ENCOUNTER — HOSPITAL ENCOUNTER (INPATIENT)
Facility: HOSPITAL | Age: 84
LOS: 5 days | Discharge: HOME HEALTH | DRG: 280 | End: 2019-12-17
Attending: EMERGENCY MEDICINE | Admitting: HOSPITALIST
Payer: MEDICARE

## 2019-12-12 DIAGNOSIS — I50.33 ACUTE ON CHRONIC DIASTOLIC HEART FAILURE: ICD-10-CM

## 2019-12-12 DIAGNOSIS — I50.9 CHF (CONGESTIVE HEART FAILURE): ICD-10-CM

## 2019-12-12 DIAGNOSIS — R06.02 SOB (SHORTNESS OF BREATH): ICD-10-CM

## 2019-12-12 DIAGNOSIS — J81.1 PULMONARY EDEMA: ICD-10-CM

## 2019-12-12 DIAGNOSIS — I50.9 ACUTE CONGESTIVE HEART FAILURE, UNSPECIFIED HEART FAILURE TYPE: ICD-10-CM

## 2019-12-12 DIAGNOSIS — J81.0 ACUTE PULMONARY EDEMA: Primary | ICD-10-CM

## 2019-12-12 PROBLEM — I16.0 HYPERTENSIVE URGENCY: Status: ACTIVE | Noted: 2019-12-12

## 2019-12-12 PROBLEM — M06.9 RHEUMATOID ARTHRITIS: Status: ACTIVE | Noted: 2019-12-12

## 2019-12-12 PROBLEM — I50.43 ACUTE ON CHRONIC COMBINED SYSTOLIC AND DIASTOLIC HEART FAILURE: Status: ACTIVE | Noted: 2019-12-12

## 2019-12-12 PROBLEM — J96.01 ACUTE RESPIRATORY FAILURE WITH HYPOXIA: Status: ACTIVE | Noted: 2019-12-12

## 2019-12-12 PROBLEM — N18.30 STAGE 3 CHRONIC KIDNEY DISEASE: Status: ACTIVE | Noted: 2019-01-11

## 2019-12-12 PROBLEM — E87.6 HYPOKALEMIA: Status: ACTIVE | Noted: 2019-12-12

## 2019-12-12 PROBLEM — E78.5 HYPERLIPIDEMIA: Status: ACTIVE | Noted: 2019-12-12

## 2019-12-12 PROBLEM — I21.4 NSTEMI (NON-ST ELEVATED MYOCARDIAL INFARCTION): Status: ACTIVE | Noted: 2019-12-12

## 2019-12-12 LAB
ALBUMIN SERPL BCP-MCNC: 3.5 G/DL (ref 3.5–5.2)
ALLENS TEST: ABNORMAL
ALLENS TEST: ABNORMAL
ALP SERPL-CCNC: 86 U/L (ref 55–135)
ALT SERPL W/O P-5'-P-CCNC: 15 U/L (ref 10–44)
ANION GAP SERPL CALC-SCNC: 14 MMOL/L (ref 8–16)
AORTIC ROOT ANNULUS: 2.47 CM
AORTIC VALVE CUSP SEPERATION: 1.61 CM
AST SERPL-CCNC: 22 U/L (ref 10–40)
AV INDEX (PROSTH): 0.57
AV MEAN GRADIENT: 9 MMHG
AV PEAK GRADIENT: 19 MMHG
AV VALVE AREA: 2.34 CM2
AV VELOCITY RATIO: 0.51
BASOPHILS # BLD AUTO: 0.02 K/UL (ref 0–0.2)
BASOPHILS NFR BLD: 0.3 % (ref 0–1.9)
BILIRUB SERPL-MCNC: 0.3 MG/DL (ref 0.1–1)
BILIRUB UR QL STRIP: NEGATIVE
BNP SERPL-MCNC: 3262 PG/ML (ref 0–99)
BSA FOR ECHO PROCEDURE: 1.81 M2
BUN SERPL-MCNC: 24 MG/DL (ref 8–23)
CALCIUM SERPL-MCNC: 9.3 MG/DL (ref 8.7–10.5)
CHLORIDE SERPL-SCNC: 108 MMOL/L (ref 95–110)
CHOLEST SERPL-MCNC: 157 MG/DL (ref 120–199)
CHOLEST/HDLC SERPL: 2.3 {RATIO} (ref 2–5)
CLARITY UR: CLEAR
CO2 SERPL-SCNC: 22 MMOL/L (ref 23–29)
COLOR UR: YELLOW
CREAT SERPL-MCNC: 1.6 MG/DL (ref 0.5–1.4)
CV ECHO LV RWT: 0.48 CM
DELSYS: ABNORMAL
DELSYS: ABNORMAL
DIFFERENTIAL METHOD: ABNORMAL
DOP CALC AO PEAK VEL: 2.16 M/S
DOP CALC AO VTI: 42.42 CM
DOP CALC LVOT AREA: 4.1 CM2
DOP CALC LVOT DIAMETER: 2.29 CM
DOP CALC LVOT PEAK VEL: 1.1 M/S
DOP CALC LVOT STROKE VOLUME: 99.17 CM3
DOP CALCLVOT PEAK VEL VTI: 24.09 CM
E WAVE DECELERATION TIME: 220.09 MSEC
E/A RATIO: 0.67
E/E' RATIO: 16.55 M/S
ECHO LV POSTERIOR WALL: 1.4 CM (ref 0.6–1.1)
EOSINOPHIL # BLD AUTO: 0.2 K/UL (ref 0–0.5)
EOSINOPHIL NFR BLD: 2.8 % (ref 0–8)
EP: 5
ERYTHROCYTE [DISTWIDTH] IN BLOOD BY AUTOMATED COUNT: 14.2 % (ref 11.5–14.5)
ERYTHROCYTE [SEDIMENTATION RATE] IN BLOOD BY WESTERGREN METHOD: 8 MM/H
EST. GFR  (AFRICAN AMERICAN): 43 ML/MIN/1.73 M^2
EST. GFR  (NON AFRICAN AMERICAN): 37 ML/MIN/1.73 M^2
ESTIMATED AVG GLUCOSE: 105 MG/DL (ref 68–131)
FIO2: 50
FRACTIONAL SHORTENING: 26 % (ref 28–44)
GLUCOSE SERPL-MCNC: 140 MG/DL (ref 70–110)
GLUCOSE UR QL STRIP: NEGATIVE
HBA1C MFR BLD HPLC: 5.3 % (ref 4–5.6)
HCO3 UR-SCNC: 24.8 MMOL/L (ref 24–28)
HCO3 UR-SCNC: 25.7 MMOL/L (ref 24–28)
HCT VFR BLD AUTO: 32.4 % (ref 40–54)
HDLC SERPL-MCNC: 69 MG/DL (ref 40–75)
HDLC SERPL: 43.9 % (ref 20–50)
HGB BLD-MCNC: 10 G/DL (ref 14–18)
HGB UR QL STRIP: ABNORMAL
IMM GRANULOCYTES # BLD AUTO: 0.01 K/UL (ref 0–0.04)
INR PPP: 1 (ref 0.8–1.2)
INTERVENTRICULAR SEPTUM: 1.36 CM (ref 0.6–1.1)
IP: 15
IVRT: 0.11 MSEC
KETONES UR QL STRIP: NEGATIVE
LDLC SERPL CALC-MCNC: 83 MG/DL (ref 63–159)
LEFT ATRIUM SIZE: 4.29 CM
LEFT INTERNAL DIMENSION IN SYSTOLE: 4.33 CM (ref 2.1–4)
LEFT VENTRICLE DIASTOLIC VOLUME INDEX: 94.23 ML/M2
LEFT VENTRICLE DIASTOLIC VOLUME: 170.03 ML
LEFT VENTRICLE MASS INDEX: 202 G/M2
LEFT VENTRICLE SYSTOLIC VOLUME INDEX: 46.7 ML/M2
LEFT VENTRICLE SYSTOLIC VOLUME: 84.33 ML
LEFT VENTRICULAR INTERNAL DIMENSION IN DIASTOLE: 5.85 CM (ref 3.5–6)
LEFT VENTRICULAR MASS: 365.08 G
LEUKOCYTE ESTERASE UR QL STRIP: ABNORMAL
LV LATERAL E/E' RATIO: 15.17 M/S
LV SEPTAL E/E' RATIO: 18.2 M/S
LYMPHOCYTES # BLD AUTO: 1.7 K/UL (ref 1–4.8)
LYMPHOCYTES NFR BLD: 21 % (ref 18–48)
MAGNESIUM SERPL-MCNC: 1.6 MG/DL (ref 1.6–2.6)
MCH RBC QN AUTO: 31 PG (ref 27–31)
MCHC RBC AUTO-ENTMCNC: 30.9 G/DL (ref 32–36)
MCV RBC AUTO: 100 FL (ref 82–98)
MICROSCOPIC COMMENT: NORMAL
MIN VOL: 15.3
MODE: ABNORMAL
MONOCYTES # BLD AUTO: 0.6 K/UL (ref 0.3–1)
MONOCYTES NFR BLD: 7.6 % (ref 4–15)
MV A" WAVE DURATION": 138 MSEC
MV MEAN GRADIENT: 2 MMHG
MV PEAK A VEL: 1.36 M/S
MV PEAK E VEL: 0.91 M/S
MV STENOSIS PRESSURE HALF TIME: 54 MS
MV VALVE AREA P 1/2 METHOD: 4.07 CM2
NEUTROPHILS # BLD AUTO: 5.4 K/UL (ref 1.8–7.7)
NEUTROPHILS NFR BLD: 68.2 % (ref 38–73)
NITRITE UR QL STRIP: NEGATIVE
NONHDLC SERPL-MCNC: 88 MG/DL
NRBC BLD-RTO: 0 /100 WBC
PCO2 BLDA: 33.5 MMHG (ref 35–45)
PCO2 BLDA: 38.7 MMHG (ref 35–45)
PH SMN: 7.43 [PH] (ref 7.35–7.45)
PH SMN: 7.48 [PH] (ref 7.35–7.45)
PH UR STRIP: 5 [PH] (ref 5–8)
PISA TR MAX VEL: 2.82 M/S
PLATELET # BLD AUTO: 240 K/UL (ref 150–350)
PMV BLD AUTO: 10 FL (ref 9.2–12.9)
PO2 BLDA: 158 MMHG (ref 80–100)
PO2 BLDA: 51 MMHG (ref 80–100)
POC BE: 1 MMOL/L
POC BE: 1 MMOL/L
POC SATURATED O2: 89 % (ref 95–100)
POC SATURATED O2: 99 % (ref 95–100)
POC TCO2: 26 MMOL/L (ref 23–27)
POC TCO2: 27 MMOL/L (ref 23–27)
POTASSIUM SERPL-SCNC: 3.4 MMOL/L (ref 3.5–5.1)
PROT SERPL-MCNC: 7 G/DL (ref 6–8.4)
PROT UR QL STRIP: ABNORMAL
PROTHROMBIN TIME: 10.7 SEC (ref 9–12.5)
PULM VEIN A" WAVE DURATION": 129 MSEC
PULM VEIN S/D RATIO: 1.93
PV PEAK D VEL: 0.46 M/S
PV PEAK S VEL: 0.89 M/S
PV PEAK VELOCITY: 1.49 CM/S
RA PRESSURE: 3 MMHG
RBC # BLD AUTO: 3.23 M/UL (ref 4.6–6.2)
RBC #/AREA URNS HPF: 0 /HPF (ref 0–4)
RIGHT VENTRICULAR END-DIASTOLIC DIMENSION: 3.34 CM
SAMPLE: ABNORMAL
SAMPLE: ABNORMAL
SITE: ABNORMAL
SITE: ABNORMAL
SODIUM SERPL-SCNC: 144 MMOL/L (ref 136–145)
SP GR UR STRIP: 1.01 (ref 1–1.03)
SP02: 100
SPONT RATE: 12
TDI LATERAL: 0.06 M/S
TDI SEPTAL: 0.05 M/S
TDI: 0.06 M/S
TR MAX PG: 32 MMHG
TRICUSPID ANNULAR PLANE SYSTOLIC EXCURSION: 3.01 CM
TRIGL SERPL-MCNC: 25 MG/DL (ref 30–150)
TROPONIN I SERPL DL<=0.01 NG/ML-MCNC: 0.07 NG/ML (ref 0–0.03)
TROPONIN I SERPL DL<=0.01 NG/ML-MCNC: 0.17 NG/ML (ref 0–0.03)
TV REST PULMONARY ARTERY PRESSURE: 35 MMHG
URN SPEC COLLECT METH UR: ABNORMAL
UROBILINOGEN UR STRIP-ACNC: NEGATIVE EU/DL
WBC # BLD AUTO: 7.94 K/UL (ref 3.9–12.7)
WBC #/AREA URNS HPF: 3 /HPF (ref 0–5)

## 2019-12-12 PROCEDURE — 82803 BLOOD GASES ANY COMBINATION: CPT

## 2019-12-12 PROCEDURE — 25000003 PHARM REV CODE 250: Performed by: EMERGENCY MEDICINE

## 2019-12-12 PROCEDURE — 84484 ASSAY OF TROPONIN QUANT: CPT

## 2019-12-12 PROCEDURE — 25000003 PHARM REV CODE 250: Performed by: HOSPITALIST

## 2019-12-12 PROCEDURE — 20000000 HC ICU ROOM

## 2019-12-12 PROCEDURE — 25000003 PHARM REV CODE 250: Performed by: NURSE PRACTITIONER

## 2019-12-12 PROCEDURE — 94761 N-INVAS EAR/PLS OXIMETRY MLT: CPT

## 2019-12-12 PROCEDURE — 36415 COLL VENOUS BLD VENIPUNCTURE: CPT

## 2019-12-12 PROCEDURE — 94660 CPAP INITIATION&MGMT: CPT

## 2019-12-12 PROCEDURE — 85025 COMPLETE CBC W/AUTO DIFF WBC: CPT

## 2019-12-12 PROCEDURE — 99291 CRITICAL CARE FIRST HOUR: CPT | Mod: 25

## 2019-12-12 PROCEDURE — 27000190 HC CPAP FULL FACE MASK W/VALVE

## 2019-12-12 PROCEDURE — 27000221 HC OXYGEN, UP TO 24 HOURS

## 2019-12-12 PROCEDURE — 83735 ASSAY OF MAGNESIUM: CPT

## 2019-12-12 PROCEDURE — 63600175 PHARM REV CODE 636 W HCPCS: Performed by: EMERGENCY MEDICINE

## 2019-12-12 PROCEDURE — 36600 WITHDRAWAL OF ARTERIAL BLOOD: CPT

## 2019-12-12 PROCEDURE — 97802 MEDICAL NUTRITION INDIV IN: CPT

## 2019-12-12 PROCEDURE — 80061 LIPID PANEL: CPT

## 2019-12-12 PROCEDURE — 63600175 PHARM REV CODE 636 W HCPCS: Performed by: HOSPITALIST

## 2019-12-12 PROCEDURE — 80053 COMPREHEN METABOLIC PANEL: CPT

## 2019-12-12 PROCEDURE — 83880 ASSAY OF NATRIURETIC PEPTIDE: CPT

## 2019-12-12 PROCEDURE — 84484 ASSAY OF TROPONIN QUANT: CPT | Mod: 91

## 2019-12-12 PROCEDURE — 51702 INSERT TEMP BLADDER CATH: CPT

## 2019-12-12 PROCEDURE — 81000 URINALYSIS NONAUTO W/SCOPE: CPT

## 2019-12-12 PROCEDURE — 93005 ELECTROCARDIOGRAM TRACING: CPT

## 2019-12-12 PROCEDURE — 63600175 PHARM REV CODE 636 W HCPCS: Performed by: SPECIALIST

## 2019-12-12 PROCEDURE — 99900035 HC TECH TIME PER 15 MIN (STAT)

## 2019-12-12 PROCEDURE — 83036 HEMOGLOBIN GLYCOSYLATED A1C: CPT

## 2019-12-12 PROCEDURE — 85610 PROTHROMBIN TIME: CPT

## 2019-12-12 RX ORDER — POTASSIUM CHLORIDE 20 MEQ/15ML
60 SOLUTION ORAL
Status: DISCONTINUED | OUTPATIENT
Start: 2019-12-12 | End: 2019-12-17 | Stop reason: HOSPADM

## 2019-12-12 RX ORDER — ONDANSETRON 2 MG/ML
4 INJECTION INTRAMUSCULAR; INTRAVENOUS EVERY 8 HOURS PRN
Status: DISCONTINUED | OUTPATIENT
Start: 2019-12-12 | End: 2019-12-17 | Stop reason: HOSPADM

## 2019-12-12 RX ORDER — LANOLIN ALCOHOL/MO/W.PET/CERES
800 CREAM (GRAM) TOPICAL
Status: DISCONTINUED | OUTPATIENT
Start: 2019-12-12 | End: 2019-12-17 | Stop reason: HOSPADM

## 2019-12-12 RX ORDER — SODIUM CHLORIDE 0.9 % (FLUSH) 0.9 %
10 SYRINGE (ML) INJECTION
Status: DISCONTINUED | OUTPATIENT
Start: 2019-12-12 | End: 2019-12-17 | Stop reason: HOSPADM

## 2019-12-12 RX ORDER — ONDANSETRON 4 MG/1
4 TABLET, ORALLY DISINTEGRATING ORAL EVERY 8 HOURS PRN
Status: DISCONTINUED | OUTPATIENT
Start: 2019-12-12 | End: 2019-12-17 | Stop reason: HOSPADM

## 2019-12-12 RX ORDER — SODIUM,POTASSIUM PHOSPHATES 280-250MG
2 POWDER IN PACKET (EA) ORAL
Status: DISCONTINUED | OUTPATIENT
Start: 2019-12-12 | End: 2019-12-17 | Stop reason: HOSPADM

## 2019-12-12 RX ORDER — AMOXICILLIN 250 MG
1 CAPSULE ORAL 2 TIMES DAILY
Status: DISCONTINUED | OUTPATIENT
Start: 2019-12-12 | End: 2019-12-17 | Stop reason: HOSPADM

## 2019-12-12 RX ORDER — ATORVASTATIN CALCIUM 20 MG/1
20 TABLET, FILM COATED ORAL DAILY
Status: DISCONTINUED | OUTPATIENT
Start: 2019-12-12 | End: 2019-12-17 | Stop reason: HOSPADM

## 2019-12-12 RX ORDER — HALOPERIDOL 5 MG/ML
5 INJECTION INTRAMUSCULAR EVERY 6 HOURS PRN
Status: DISCONTINUED | OUTPATIENT
Start: 2019-12-12 | End: 2019-12-15

## 2019-12-12 RX ORDER — HYDRALAZINE HYDROCHLORIDE 25 MG/1
25 TABLET, FILM COATED ORAL ONCE
Status: COMPLETED | OUTPATIENT
Start: 2019-12-12 | End: 2019-12-12

## 2019-12-12 RX ORDER — ALFUZOSIN HYDROCHLORIDE 10 MG/1
10 TABLET, EXTENDED RELEASE ORAL
Status: DISCONTINUED | OUTPATIENT
Start: 2019-12-12 | End: 2019-12-17 | Stop reason: HOSPADM

## 2019-12-12 RX ORDER — FAMOTIDINE 20 MG/1
20 TABLET, FILM COATED ORAL DAILY
Status: DISCONTINUED | OUTPATIENT
Start: 2019-12-13 | End: 2019-12-17 | Stop reason: HOSPADM

## 2019-12-12 RX ORDER — AMLODIPINE BESYLATE 5 MG/1
10 TABLET ORAL DAILY
Status: DISCONTINUED | OUTPATIENT
Start: 2019-12-12 | End: 2019-12-17 | Stop reason: HOSPADM

## 2019-12-12 RX ORDER — HYDRALAZINE HYDROCHLORIDE 25 MG/1
25 TABLET, FILM COATED ORAL EVERY 8 HOURS
Status: DISCONTINUED | OUTPATIENT
Start: 2019-12-12 | End: 2019-12-12

## 2019-12-12 RX ORDER — HYDRALAZINE HYDROCHLORIDE 25 MG/1
50 TABLET, FILM COATED ORAL EVERY 8 HOURS
Status: DISCONTINUED | OUTPATIENT
Start: 2019-12-12 | End: 2019-12-13

## 2019-12-12 RX ORDER — HYDROXYCHLOROQUINE SULFATE 200 MG/1
200 TABLET, FILM COATED ORAL DAILY
Status: DISCONTINUED | OUTPATIENT
Start: 2019-12-12 | End: 2019-12-17 | Stop reason: HOSPADM

## 2019-12-12 RX ORDER — FUROSEMIDE 10 MG/ML
40 INJECTION INTRAMUSCULAR; INTRAVENOUS
Status: COMPLETED | OUTPATIENT
Start: 2019-12-12 | End: 2019-12-12

## 2019-12-12 RX ORDER — POTASSIUM CHLORIDE 20 MEQ/15ML
40 SOLUTION ORAL
Status: DISCONTINUED | OUTPATIENT
Start: 2019-12-12 | End: 2019-12-17 | Stop reason: HOSPADM

## 2019-12-12 RX ORDER — FAMOTIDINE 20 MG/1
20 TABLET, FILM COATED ORAL 2 TIMES DAILY
Status: DISCONTINUED | OUTPATIENT
Start: 2019-12-12 | End: 2019-12-12

## 2019-12-12 RX ORDER — FUROSEMIDE 10 MG/ML
20 INJECTION INTRAMUSCULAR; INTRAVENOUS ONCE
Status: COMPLETED | OUTPATIENT
Start: 2019-12-13 | End: 2019-12-13

## 2019-12-12 RX ORDER — MAGNESIUM SULFATE 1 G/100ML
1 INJECTION INTRAVENOUS ONCE
Status: COMPLETED | OUTPATIENT
Start: 2019-12-12 | End: 2019-12-12

## 2019-12-12 RX ORDER — FUROSEMIDE 10 MG/ML
40 INJECTION INTRAMUSCULAR; INTRAVENOUS DAILY
Status: DISCONTINUED | OUTPATIENT
Start: 2019-12-13 | End: 2019-12-16

## 2019-12-12 RX ORDER — FERROUS SULFATE 325(65) MG
325 TABLET, DELAYED RELEASE (ENTERIC COATED) ORAL DAILY
Status: DISCONTINUED | OUTPATIENT
Start: 2019-12-12 | End: 2019-12-17 | Stop reason: HOSPADM

## 2019-12-12 RX ORDER — FUROSEMIDE 10 MG/ML
40 INJECTION INTRAMUSCULAR; INTRAVENOUS ONCE
Status: COMPLETED | OUTPATIENT
Start: 2019-12-12 | End: 2019-12-12

## 2019-12-12 RX ORDER — NITROGLYCERIN 20 MG/100ML
20 INJECTION INTRAVENOUS CONTINUOUS
Status: DISCONTINUED | OUTPATIENT
Start: 2019-12-12 | End: 2019-12-14

## 2019-12-12 RX ADMIN — HYDRALAZINE HYDROCHLORIDE 25 MG: 25 TABLET, FILM COATED ORAL at 01:12

## 2019-12-12 RX ADMIN — FUROSEMIDE 40 MG: 10 INJECTION, SOLUTION INTRAMUSCULAR; INTRAVENOUS at 04:12

## 2019-12-12 RX ADMIN — SENNOSIDES AND DOCUSATE SODIUM 1 TABLET: 8.6; 5 TABLET ORAL at 09:12

## 2019-12-12 RX ADMIN — HYDROXYCHLOROQUINE SULFATE 200 MG: 200 TABLET, FILM COATED ORAL at 09:12

## 2019-12-12 RX ADMIN — POTASSIUM CHLORIDE 40 MEQ: 20 SOLUTION ORAL at 03:12

## 2019-12-12 RX ADMIN — POTASSIUM CHLORIDE 40 MEQ: 20 SOLUTION ORAL at 01:12

## 2019-12-12 RX ADMIN — HALOPERIDOL LACTATE 5 MG: 5 INJECTION, SOLUTION INTRAMUSCULAR at 02:12

## 2019-12-12 RX ADMIN — HALOPERIDOL LACTATE 5 MG: 5 INJECTION, SOLUTION INTRAMUSCULAR at 08:12

## 2019-12-12 RX ADMIN — ATORVASTATIN CALCIUM 20 MG: 20 TABLET, FILM COATED ORAL at 09:12

## 2019-12-12 RX ADMIN — MAGNESIUM SULFATE HEPTAHYDRATE 1 G: 1 INJECTION, SOLUTION INTRAVENOUS at 08:12

## 2019-12-12 RX ADMIN — HYDRALAZINE HYDROCHLORIDE 25 MG: 25 TABLET, FILM COATED ORAL at 02:12

## 2019-12-12 RX ADMIN — AMLODIPINE BESYLATE 10 MG: 5 TABLET ORAL at 09:12

## 2019-12-12 RX ADMIN — FAMOTIDINE 20 MG: 20 TABLET ORAL at 09:12

## 2019-12-12 RX ADMIN — NITROGLYCERIN 20 MCG/MIN: 20 INJECTION INTRAVENOUS at 04:12

## 2019-12-12 RX ADMIN — FUROSEMIDE 40 MG: 10 INJECTION, SOLUTION INTRAMUSCULAR; INTRAVENOUS at 02:12

## 2019-12-12 RX ADMIN — HYDRALAZINE HYDROCHLORIDE 25 MG: 25 TABLET, FILM COATED ORAL at 09:12

## 2019-12-12 RX ADMIN — MAGNESIUM OXIDE TAB 400 MG (241.3 MG ELEMENTAL MG) 800 MG: 400 (241.3 MG) TAB at 03:12

## 2019-12-12 RX ADMIN — FERROUS SULFATE TAB EC 325 MG (65 MG FE EQUIVALENT) 325 MG: 325 (65 FE) TABLET DELAYED RESPONSE at 09:12

## 2019-12-12 RX ADMIN — NITROGLYCERIN 60 MCG/MIN: 20 INJECTION INTRAVENOUS at 02:12

## 2019-12-12 RX ADMIN — SENNOSIDES AND DOCUSATE SODIUM 1 TABLET: 8.6; 5 TABLET ORAL at 08:12

## 2019-12-12 RX ADMIN — MAGNESIUM OXIDE TAB 400 MG (241.3 MG ELEMENTAL MG) 800 MG: 400 (241.3 MG) TAB at 01:12

## 2019-12-12 NOTE — ASSESSMENT & PLAN NOTE
Echo noted from January 2019 revealing - Normal left ventricular systolic function. The estimated ejection fraction is 67% with Left ventricular diastolic dysfunction and Normal left atrial pressure. Mildly reduced right ventricular systolic function.    Patient denies having history of congestive heart failure.    CHF currently uncontrolled due to Pulmonary edema. Latest ECHO shows ejection fraction of 67. Continue Lasix and monitor clinical status closely. Monitor on telemetry. Patient is on CHF pathway.  Monitor strict Is&Os and daily weights.  NPO, please escalate diet as clinically indicated.  Continue to stress to patient importance of self efficacy and  on diet for CHF. Last BNP reviewed- and noted below.  Repeat chest x-ray in the morning.  Continuous telemetry monitoring.  Nitroglycerin infusion.  BiPAP.  Cardiology consult  Recent Labs   Lab 12/12/19  0219   BNP 3,262*   .

## 2019-12-12 NOTE — ASSESSMENT & PLAN NOTE
Lab Results   Component Value Date    CHOL 200 (H) 01/11/2019    CHOL 171 03/18/2014     Lab Results   Component Value Date    HDL 65 01/11/2019    HDL 55 03/18/2014     Lab Results   Component Value Date    LDLCALC 122.2 01/11/2019    LDLCALC 101.8 03/18/2014     Lab Results   Component Value Date    TRIG 64 01/11/2019    TRIG 71 03/18/2014     Lab Results   Component Value Date    CHOLHDL 32.5 01/11/2019    CHOLHDL 32.2 03/18/2014       Chronic, uncontrolled.  Will continue home medication.  Recheck lipid panel.

## 2019-12-12 NOTE — SUBJECTIVE & OBJECTIVE
Past Medical History:   Diagnosis Date    Bladder stones     Cancer     Encounter for blood transfusion     Hypertension     Kidney stone     Prostate cancer 2004    Radiation 2005    prostate       Past Surgical History:   Procedure Laterality Date    CYSTOSCOPY      KIDNEY STONE SURGERY  May 2014       Review of patient's allergies indicates:   Allergen Reactions    Soma [carisoprodol] Rash    Aspirin     Sulfa (sulfonamide antibiotics) Rash       No current facility-administered medications on file prior to encounter.      Current Outpatient Medications on File Prior to Encounter   Medication Sig    alfuzosin (UROXATRAL) 10 mg Tb24 TAKE 1 TABLET(10 MG) BY MOUTH EVERY DAY    amlodipine (NORVASC) 10 MG tablet Take 10 mg by mouth once daily.      atorvastatin (LIPITOR) 20 MG tablet Take 1 tablet (20 mg total) by mouth once daily.    cimetidine (TAGAMET) 200 MG tablet Take 200 mg by mouth 2 (two) times daily.     doxycycline (VIBRAMYCIN) 100 MG Cap Take 1 capsule (100 mg total) by mouth every 12 (twelve) hours.    ferrous sulfate 325 mg (65 mg iron) Tab tablet Take 325 mg by mouth once daily.    hydrALAZINE (APRESOLINE) 25 MG tablet Take 1 tablet (25 mg total) by mouth every 8 (eight) hours.    hydroxychloroquine (PLAQUENIL) 200 mg tablet Take 1 tablet (200 mg total) by mouth once daily.    hydroxychloroquine (PLAQUENIL) 200 mg tablet TAKE 1 TABLET(200 MG) BY MOUTH EVERY DAY     Family History     Problem Relation (Age of Onset)    Asthma Son    Diabetes Daughter    Hypertension Daughter, Son        Tobacco Use    Smoking status: Former Smoker     Years: 40.00     Types: Cigarettes     Start date: 9/26/1985    Smokeless tobacco: Never Used   Substance and Sexual Activity    Alcohol use: No    Drug use: No    Sexual activity: Never     Review of Systems   Constitutional: Negative for activity change, appetite change, chills, fatigue and fever.   HENT: Negative for congestion, sinus pressure,  sinus pain and sore throat.    Eyes: Negative for photophobia and visual disturbance.   Respiratory: Positive for shortness of breath and wheezing. Negative for cough, choking and chest tightness.    Cardiovascular: Positive for leg swelling. Negative for chest pain and palpitations.   Gastrointestinal: Negative for abdominal distention, abdominal pain, blood in stool, constipation, diarrhea, nausea and vomiting.   Genitourinary: Negative for difficulty urinating, dysuria, flank pain and hematuria.   Musculoskeletal: Negative for back pain, gait problem and joint swelling.   Skin: Negative for rash and wound.   Neurological: Negative for dizziness, syncope, weakness, light-headedness, numbness and headaches.   Psychiatric/Behavioral: Negative for confusion. The patient is not nervous/anxious.      Objective:     Vital Signs (Most Recent):  Temp: 98.5 °F (36.9 °C) (12/12/19 0245)  Pulse: 87 (12/12/19 0448)  Resp: (!) 26 (12/12/19 0217)  BP: (!) 187/79 (12/12/19 0448)  SpO2: 96 % (12/12/19 0448) Vital Signs (24h Range):  Temp:  [98.5 °F (36.9 °C)] 98.5 °F (36.9 °C)  Pulse:  [79-87] 87  Resp:  [26-28] 26  SpO2:  [84 %-100 %] 96 %  BP: (187-229)/() 187/79     Weight: 65.8 kg (145 lb)  Body mass index is 22.71 kg/m².    Physical Exam   Constitutional: He is oriented to person, place, and time. He appears well-developed. No distress.   Chronically ill-appearing   HENT:   Head: Normocephalic and atraumatic.   Eyes: Pupils are equal, round, and reactive to light. EOM are normal. Right eye exhibits no discharge. Left eye exhibits no discharge.   Neck: Normal range of motion. JVD present.   Cardiovascular: Normal rate, regular rhythm and intact distal pulses.   Murmur heard.  Muffled heart sounds, positive murmur   Pulmonary/Chest: No respiratory distress. He has wheezes. He has rales. He exhibits no tenderness.   Inspiratory and expiratory wheezing noted throughout all lung fields upon auscultation.  Patient on BiPAP  during initial physical exam.  Tachypnea noted with a respiratory rate of approximately 23 breaths per minute.  Coarse breath sounds noted to bilateral lower lung fields.  Patient in no acute respiratory distress.   Abdominal: Soft. Bowel sounds are normal. He exhibits no distension. There is no tenderness. There is no rebound and no guarding.   Genitourinary:   Genitourinary Comments: Exam Deferred      Musculoskeletal: Normal range of motion. He exhibits edema. He exhibits no tenderness or deformity.   +3 pitting edema noted to bilateral lower extremities.   Neurological: He is alert and oriented to person, place, and time. No cranial nerve deficit or sensory deficit.   Skin: Skin is warm and dry. Capillary refill takes 2 to 3 seconds. No rash noted. He is not diaphoretic. No erythema.   Dry skin   Psychiatric: He has a normal mood and affect. His behavior is normal. Judgment and thought content normal.   Nursing note and vitals reviewed.        CRANIAL NERVES     CN III, IV, VI   Pupils are equal, round, and reactive to light.  Extraocular motions are normal.        Significant Labs:   BMP:   Recent Labs   Lab 12/12/19 0219   *      K 3.4*      CO2 22*   BUN 24*   CREATININE 1.6*   CALCIUM 9.3     CBC:   Recent Labs   Lab 12/12/19 0219   WBC 7.94   HGB 10.0*   HCT 32.4*        CMP:   Recent Labs   Lab 12/12/19 0219      K 3.4*      CO2 22*   *   BUN 24*   CREATININE 1.6*   CALCIUM 9.3   PROT 7.0   ALBUMIN 3.5   BILITOT 0.3   ALKPHOS 86   AST 22   ALT 15   ANIONGAP 14   EGFRNONAA 37*     Cardiac Markers:   Recent Labs   Lab 12/12/19 0219   BNP 3,262*     Coagulation:   Recent Labs   Lab 12/12/19 0219   INR 1.0     Troponin  0.075    ABG  Recent Labs   Lab 12/12/19 0210   PH 7.477*   PO2 51*   PCO2 33.5*   HCO3 24.8   BE 1           Significant Imaging: I have reviewed all pertinent imaging results/findings within the past 24 hours.     Chest Xray:  Impression  -VRAD   Small left pleural effusion, pulmonary edema, and bilateral lower lung atelectasis.

## 2019-12-12 NOTE — NURSING
Diaper changed. Pt very confuse at this time trying to get up. Stated I need to see my mother. Stated that she's 36 years old and he is 21. Explained to him that he is 89 and reorient pt to time and place.  here for blood works unable to draw pt fighting. SCD removed feels like constricted. Place call to pt's daughter Lakia BERGERMckenzie Message left. Able to reach pt's wife Lakia REHMANMckenzie Informed pt looking for them. Wife stated will call daughter. TRidil at 50mcg/min. Had to calm pt down.

## 2019-12-12 NOTE — PLAN OF CARE
CM met with pt bedside to complete discharge assessment. Pt unable to verify information on facesheet. Assessment completed by family present in room. Family verified information on facesheet as correct. PCP is Dr. Encinas. Pharm is [facility] at [address]. Reports that pt lives at listed address with spouse and adult daughter. Reports that pt is normally independent with ADLs- has access to cane/walker when needed. denies hh/hd.  transportation is provided by family. DC plan is home. CM following to assist in any DC needs.        12/12/19 1220   Discharge Assessment   Assessment Type Discharge Planning Assessment   Confirmed/corrected address and phone number on facesheet? Yes   Assessment information obtained from? Caregiver   Communicated expected length of stay with patient/caregiver yes   Prior to hospitilization cognitive status: Not Oriented to Place;Not Oriented to Time   Prior to hospitalization functional status: Independent   Current cognitive status: Not Oriented to Place;Not Oriented to Time   Current Functional Status: Needs Assistance   Lives With spouse;child(rian), adult   Able to Return to Prior Arrangements yes   Is patient able to care for self after discharge? Yes   Patient's perception of discharge disposition home or selfcare   Readmission Within the Last 30 Days no previous admission in last 30 days   Patient currently being followed by outpatient case management? No   Patient currently receives any other outside agency services? No   Equipment Currently Used at Home cane, straight;walker, standard   Do you have any problems affording any of your prescribed medications? No   Is the patient taking medications as prescribed? yes   Does the patient have transportation home? Yes   Transportation Anticipated family or friend will provide   Does the patient receive services at the Coumadin Clinic? No   Discharge Plan A Home with family   DME Needed Upon Discharge  none   Patient/Family in Agreement with  Plan yes

## 2019-12-12 NOTE — ASSESSMENT & PLAN NOTE
Monitor and transfuse if patient becomes hemodynamically unstable or H/H < 7/21    Daily CBC, stable - no evidence of active or acute bleeding on initial physical exam.  Lab Results   Component Value Date    WBC 7.94 12/12/2019    HGB 10.0 (L) 12/12/2019    HCT 32.4 (L) 12/12/2019     (H) 12/12/2019     12/12/2019

## 2019-12-12 NOTE — CONSULTS
"Food & Nutrition  Education    Diet Education:heart failure  Time Spent:15 min  Learners:pt's son      Nutrition Education provided with handouts: "Heart Failure" nutrition      Comments: Pt with dementia. Spoke with his son. Reports his daughter cooks for him and watches his sodium. Education re low sodium, fluid restriction and need for daily weights.  Son became more engaged in instruction during the fluid restriction and daily weights.       All questions and concerns answered. Dietitian's contact information provided.       Follow-Up: yes    Please Re-consult as needed        Thanks!      "

## 2019-12-12 NOTE — H&P
"[facility] Medicine  History & Physical    Patient Name: Troy Yuan Sr.  MRN: [id number]  Admission Date: 12/12/2019  Attending Physician: Sonia Hall MD   Primary Care Provider: Gentry Encinas MD         Patient information was obtained from patient, relative(s), past medical records and ER records.     Subjective:     Principal Problem:Acute pulmonary edema    Chief Complaint:   Chief Complaint   Patient presents with    Shortness of Breath     with wheezing x 1 hour        HPI: Troy Yuan Sr. is a 89-year-old male with past medical history of hypertension, chronic kidney disease, hyperlipidemia, and rheumatoid arthritis who presented to the emergency room tonight with reports of shortness of breath.  Patient states "I could not breathe."  Patient reports onset of symptoms at approximately 12:30 a.m. Patient also reports leg swelling. Patient denies recent sick contacts or recent travel.  Patient denies chest pain, nausea, vomiting, or diarrhea.  Patient denies use supplemental oxygen at home.  Patient accompanied by his daughter, Lakia and grandson at bedside.  Emergency room patient noted to be in acute pulmonary edema with associated hypertensive urgency.  Patient started on nitroglycerin drip and given 40 mg of IV Lasix.  Patient requiring BiPAP therapy.  Patient admitted to ICU on 12/12/2019 at approximately 5:30 a.m..    Past Medical History:   Diagnosis Date    Bladder stones     Cancer     Encounter for blood transfusion     Hypertension     Kidney stone     Prostate cancer 2004    Radiation 2005    prostate       Past Surgical History:   Procedure Laterality Date    CYSTOSCOPY      KIDNEY STONE SURGERY  May 2014       Review of patient's allergies indicates:   Allergen Reactions    Soma [carisoprodol] Rash    Aspirin     Sulfa (sulfonamide antibiotics) Rash       No current facility-administered medications on file prior to encounter.      Current " Outpatient Medications on File Prior to Encounter   Medication Sig    alfuzosin (UROXATRAL) 10 mg Tb24 TAKE 1 TABLET(10 MG) BY MOUTH EVERY DAY    amlodipine (NORVASC) 10 MG tablet Take 10 mg by mouth once daily.      atorvastatin (LIPITOR) 20 MG tablet Take 1 tablet (20 mg total) by mouth once daily.    cimetidine (TAGAMET) 200 MG tablet Take 200 mg by mouth 2 (two) times daily.     doxycycline (VIBRAMYCIN) 100 MG Cap Take 1 capsule (100 mg total) by mouth every 12 (twelve) hours.    ferrous sulfate 325 mg (65 mg iron) Tab tablet Take 325 mg by mouth once daily.    hydrALAZINE (APRESOLINE) 25 MG tablet Take 1 tablet (25 mg total) by mouth every 8 (eight) hours.    hydroxychloroquine (PLAQUENIL) 200 mg tablet Take 1 tablet (200 mg total) by mouth once daily.    hydroxychloroquine (PLAQUENIL) 200 mg tablet TAKE 1 TABLET(200 MG) BY MOUTH EVERY DAY     Family History     Problem Relation (Age of Onset)    Asthma Son    Diabetes Daughter    Hypertension Daughter, Son        Tobacco Use    Smoking status: Former Smoker     Years: 40.00     Types: Cigarettes     Start date: 9/26/1985    Smokeless tobacco: Never Used   Substance and Sexual Activity    Alcohol use: No    Drug use: No    Sexual activity: Never     Review of Systems   Constitutional: Negative for activity change, appetite change, chills, fatigue and fever.   HENT: Negative for congestion, sinus pressure, sinus pain and sore throat.    Eyes: Negative for photophobia and visual disturbance.   Respiratory: Positive for shortness of breath and wheezing. Negative for cough, choking and chest tightness.    Cardiovascular: Positive for leg swelling. Negative for chest pain and palpitations.   Gastrointestinal: Negative for abdominal distention, abdominal pain, blood in stool, constipation, diarrhea, nausea and vomiting.   Genitourinary: Negative for difficulty urinating, dysuria, flank pain and hematuria.   Musculoskeletal: Negative for back pain, gait  problem and joint swelling.   Skin: Negative for rash and wound.   Neurological: Negative for dizziness, syncope, weakness, light-headedness, numbness and headaches.   Psychiatric/Behavioral: Negative for confusion. The patient is not nervous/anxious.      Objective:     Vital Signs (Most Recent):  Temp: 98.5 °F (36.9 °C) (12/12/19 0245)  Pulse: 87 (12/12/19 0448)  Resp: (!) 26 (12/12/19 0217)  BP: (!) 187/79 (12/12/19 0448)  SpO2: 96 % (12/12/19 0448) Vital Signs (24h Range):  Temp:  [98.5 °F (36.9 °C)] 98.5 °F (36.9 °C)  Pulse:  [79-87] 87  Resp:  [26-28] 26  SpO2:  [84 %-100 %] 96 %  BP: (187-229)/() 187/79     Weight: 65.8 kg (145 lb)  Body mass index is 22.71 kg/m².    Physical Exam   Constitutional: He is oriented to person, place, and time. He appears well-developed. No distress.   Chronically ill-appearing   HENT:   Head: Normocephalic and atraumatic.   Eyes: Pupils are equal, round, and reactive to light. EOM are normal. Right eye exhibits no discharge. Left eye exhibits no discharge.   Neck: Normal range of motion. JVD present.   Cardiovascular: Normal rate, regular rhythm and intact distal pulses.   Murmur heard.  Muffled heart sounds, positive murmur   Pulmonary/Chest: No respiratory distress. He has wheezes. He has rales. He exhibits no tenderness.   Inspiratory and expiratory wheezing noted throughout all lung fields upon auscultation.  Patient on BiPAP during initial physical exam.  Tachypnea noted with a respiratory rate of approximately 23 breaths per minute.  Coarse breath sounds noted to bilateral lower lung fields.  Patient in no acute respiratory distress.   Abdominal: Soft. Bowel sounds are normal. He exhibits no distension. There is no tenderness. There is no rebound and no guarding.   Genitourinary:   Genitourinary Comments: Exam Deferred      Musculoskeletal: Normal range of motion. He exhibits edema. He exhibits no tenderness or deformity.   +3 pitting edema noted to bilateral lower  extremities.   Neurological: He is alert and oriented to person, place, and time. No cranial nerve deficit or sensory deficit.   Skin: Skin is warm and dry. Capillary refill takes 2 to 3 seconds. No rash noted. He is not diaphoretic. No erythema.   Dry skin   Psychiatric: He has a normal mood and affect. His behavior is normal. Judgment and thought content normal.   Nursing note and vitals reviewed.        CRANIAL NERVES     CN III, IV, VI   Pupils are equal, round, and reactive to light.  Extraocular motions are normal.        Significant Labs:   BMP:   Recent Labs   Lab 12/12/19 0219   *      K 3.4*      CO2 22*   BUN 24*   CREATININE 1.6*   CALCIUM 9.3     CBC:   Recent Labs   Lab 12/12/19 0219   WBC 7.94   HGB 10.0*   HCT 32.4*        CMP:   Recent Labs   Lab 12/12/19 0219      K 3.4*      CO2 22*   *   BUN 24*   CREATININE 1.6*   CALCIUM 9.3   PROT 7.0   ALBUMIN 3.5   BILITOT 0.3   ALKPHOS 86   AST 22   ALT 15   ANIONGAP 14   EGFRNONAA 37*     Cardiac Markers:   Recent Labs   Lab 12/12/19 0219   BNP 3,262*     Coagulation:   Recent Labs   Lab 12/12/19 0219   INR 1.0     Troponin  0.075    ABG  Recent Labs   Lab 12/12/19 0210   PH 7.477*   PO2 51*   PCO2 33.5*   HCO3 24.8   BE 1           Significant Imaging: I have reviewed all pertinent imaging results/findings within the past 24 hours.     Chest Xray:  Impression -VRAD   Small left pleural effusion, pulmonary edema, and bilateral lower lung atelectasis.    Assessment/Plan:     * Acute pulmonary edema  Lasix IV - strict I&O -continuous telemetry monitoring - BiPAP therapy - p.r.n. ABG - cardiology consult      Rheumatoid arthritis  Chronic, controlled.  Will continue home medication.      Hyperlipidemia  Lab Results   Component Value Date    CHOL 200 (H) 01/11/2019    CHOL 171 03/18/2014     Lab Results   Component Value Date    HDL 65 01/11/2019    HDL 55 03/18/2014     Lab Results   Component Value Date     LDLCALC 122.2 01/11/2019    LDLCALC 101.8 03/18/2014     Lab Results   Component Value Date    TRIG 64 01/11/2019    TRIG 71 03/18/2014     Lab Results   Component Value Date    CHOLHDL 32.5 01/11/2019    CHOLHDL 32.2 03/18/2014       Chronic, uncontrolled.  Will continue home medication.  Recheck lipid panel.    NSTEMI (non-ST elevated myocardial infarction)  Initial troponin elevation, will trend.  Likely secondary to acute CHF exacerbation. Continuous telemetry monitoring.  Cardiology consult.  Echo pending.  Aspirin not initiated as is listed as patient allergy in chart.     Hypokalemia  Continuous telemetry monitoring - p.r.n. replacements      Acute respiratory failure with hypoxia  BiPAP - p.r.n. ABG      Hypertensive urgency  Continuous telemetry monitoring - nitroglycerin infusion      Acute on chronic diastolic heart failure  Echo noted from January 2019 revealing - Normal left ventricular systolic function. The estimated ejection fraction is 67% with Left ventricular diastolic dysfunction and Normal left atrial pressure. Mildly reduced right ventricular systolic function.    Patient denies having history of congestive heart failure.    CHF currently uncontrolled due to Pulmonary edema. Latest ECHO shows ejection fraction of 67. Continue Lasix and monitor clinical status closely. Monitor on telemetry. Patient is on CHF pathway.  Monitor strict Is&Os and daily weights.  NPO, please escalate diet as clinically indicated.  Continue to stress to patient importance of self efficacy and  on diet for CHF. Last BNP reviewed- and noted below.  Repeat chest x-ray in the morning.  Continuous telemetry monitoring.  Nitroglycerin infusion.  BiPAP.  Cardiology consult  Recent Labs   Lab 12/12/19  0219   BNP 3,262*   .              SOB (shortness of breath)  See plan of care for acute pulmonary edema.      Essential hypertension  Chronic, uncontrolled.  Patient noted to have hypertensive urgency upon arrival to  emergency room likely secondary to hypervolemia.  Nitroglycerin infusion initiated and continued upon admission.    BP Readings from Last 3 Encounters:   12/12/19 (!) 187/81   05/21/19 (!) 158/62   01/13/19 (!) 149/73   .        CKD (chronic kidney disease), stage IV  Creatine stable for now. BMP reviewed- noted Estimated Creatinine Clearance: 28.7 mL/min (A) (based on SCr of 1.6 mg/dL (H)). according to latest data. Monitor UOP and serial BMP and adjust therapy as needed. Renally dose meds.  Avoid nephrotoxin agents.            Macrocytic anemia  Monitor and transfuse if patient becomes hemodynamically unstable or H/H < 7/21    Daily CBC, stable - no evidence of active or acute bleeding on initial physical exam.  Lab Results   Component Value Date    WBC 7.94 12/12/2019    HGB 10.0 (L) 12/12/2019    HCT 32.4 (L) 12/12/2019     (H) 12/12/2019     12/12/2019           BPH (benign prostatic hypertrophy) with urinary retention  Chronic, controlled.  Will continue home medication.        VTE Risk Mitigation (From admission, onward)    None        Critical care time spent on the evaluation and treatment of severe organ dysfunction, review of pertinent labs and imaging studies, discussions with consulting providers and discussions with patient/family: 75 minutes.       Patient Goals of Care:  Advance Care Planning   Code Status- Full Code        Personally discussed this case via telephone with Vernon Steward - she is agreeable to plan of care.        Poly Duarte NP  Department of Hospital Medicine   [facility]

## 2019-12-12 NOTE — ASSESSMENT & PLAN NOTE
Lasix IV - strict I&O -continuous telemetry monitoring - BiPAP therapy - p.r.n. ABG - cardiology consult

## 2019-12-12 NOTE — HPI
"Troy Yuan Sr. is a 89-year-old male with past medical history of hypertension, chronic kidney disease, hyperlipidemia, and rheumatoid arthritis who presented to the emergency room tonight with reports of shortness of breath.  Patient states "I could not breathe."  Patient reports onset of symptoms at approximately 12:30 a.m. Patient also reports leg swelling. Patient denies recent sick contacts or recent travel.  Patient denies chest pain, nausea, vomiting, or diarrhea.  Patient denies use supplemental oxygen at home.  Patient accompanied by his daughter, Lakia and grandson at bedside.  Emergency room patient noted to be in acute pulmonary edema with associated hypertensive urgency.  Patient started on nitroglycerin drip and given 40 mg of IV Lasix.  Patient requiring BiPAP therapy.  Patient admitted to ICU on 12/12/2019 at approximately 5:30 a.m..  "

## 2019-12-12 NOTE — PLAN OF CARE
12/12/19 0832   Patient Assessment/Suction   Level of Consciousness (AVPU) alert   Respiratory Effort Unlabored   Rhythm/Pattern, Respiratory assisted mechanically   PRE-TX-O2   O2 Device (Oxygen Therapy) BiPAP   $ Is the patient on Low Flow Oxygen? Yes   Oxygen Concentration (%) 50   SpO2 100 %   Pulse Oximetry Type Continuous   $ Pulse Oximetry - Multiple Charge Pulse Oximetry - Multiple   Pulse 70   Resp 18   BP (!) 169/74   Wound Care   $ Wound Care Tech Time 15 min   Area of Concern Left;Right;Cheek;Bridge of nose;Chin   Skin Color/Characteristics without discoloration   Skin Temperature warm   Ready to Wean/Extubation Screen   FIO2<=50 (chart decimal) 0.5   Preset CPAP/BiPAP Settings   Mode Of Delivery BiPAP S/T   $ CPAP/BiPAP Daily Charge BiPAP/CPAP Daily   $ Is patient using? Yes   Size of Mask Medium/Large   Sized Appropriately? Yes   Equipment Type V60   Airway Device Type medium full face mask   Humidifier not applicable   Ipap 15   EPAP (cm H2O) 5   Pressure Support (cm H2O) 10   Set Rate (Breaths/Min) 8   ITime (sec) 1   Rise Time (sec) 2   Patient CPAP/BiPAP Settings   Timed Inspiration (Sec) 1   IPAP Rise Time (sec) 2   RR Total (Breaths/Min) 28   Tidal Volume (mL) 528   VE Minute Ventilation (L/min) 16.3 L/min   Peak Inspiratory Pressure (cm H2O) 18   TiTOT (%) 31   Total Leak (L/Min) 21   Patient Trigger - ST Mode Only (%) 99   CPAP/BiPAP Backup Settings   Backup Rate 8 breaths per minute (bpm)   CPAP/BiPAP Alarms   High Pressure (cm H2O) 20   Low Pressure (cm H2O) 10   Minute Ventilation (L/Min) 3   High RR (breaths/min) 40   Low RR (breaths/min) 10

## 2019-12-12 NOTE — ASSESSMENT & PLAN NOTE
Creatine stable for now. BMP reviewed- noted Estimated Creatinine Clearance: 28.7 mL/min (A) (based on SCr of 1.6 mg/dL (H)). according to latest data. Monitor UOP and serial BMP and adjust therapy as needed. Renally dose meds.  Avoid nephrotoxin agents.

## 2019-12-12 NOTE — NURSING
1135 Consult called to Dr. Grissom office.  Spoke with Neema.     1145 Spoke with Dr. Hall regarding K 3.4 and Mag 1.6 this am.  Electrolyte sliding scale order placed.

## 2019-12-12 NOTE — ASSESSMENT & PLAN NOTE
Initial troponin elevation, will trend.  Likely secondary to acute CHF exacerbation. Continuous telemetry monitoring.  Cardiology consult.  Echo pending.  Aspirin not initiated as is listed as patient allergy in chart.

## 2019-12-12 NOTE — NURSING
Pt more agitated at this time. Talked to KEVIN Huang NP. With order for restraint and yost. Soft wrist restraint bilateral applied. 0700 Yost #16 inserted without difficulty with clear yellow urine noted. 0710 Grand daughter Cecilia in. Informed of pt's condition , She stated that pt has Dementia. Had BM soft pasty dark brown stool. Was clean linens changed.  Pt a bit calmer now.

## 2019-12-12 NOTE — NURSING
Admitted from ER to 513. Pt still SOB with audible exp wheeze. Placed on BIPAP 50%  15/5 rate 14. Sat 100%. Was incontinent of urine. Was cleaned. AAOx4 Follows command.

## 2019-12-12 NOTE — PROGRESS NOTES
Famotidine therapy for Troy Yuan Sr. [id number] has been evaluated according to the pharmacy practice protocol.      Based on the patient's Estimated Creatinine Clearance: 28.7 mL/min (A) (based on SCr of 1.6 mg/dL (H))., famotidine therapy has been adjusted to 20 mg once daily.    Thank you,  Desiree Zee

## 2019-12-12 NOTE — ED PROVIDER NOTES
Encounter Date: 12/12/2019    SCRIBE #1 NOTE: ICat, am scribing for, and in the presence of, Dr. Ashish Laguna.       History     Chief Complaint   Patient presents with    Shortness of Breath     with wheezing x 1 hour       Time seen by provider: 1:54 AM on 12/12/2019    Troy Yuan Sr. is a 89 y.o. male who presents the ED with complaints of SOB and wheezing for the past x1 hour. Per daughter, the patient was normal all day reports the onset of symptoms as he was about to go to sleep. The patient does not have breathing treatments or oxygen at home. The patient denies chest pain, cough, fever, and swelling in extremities. He also denies history of COPD, asthma, tobacco use, or pneumonia. PMHx of HTN and cancer. Soma, aspirin, and sulfa allergy noted.    The history is provided by the patient and a relative.     Review of patient's allergies indicates:   Allergen Reactions    Soma [carisoprodol] Rash    Aspirin     Sulfa (sulfonamide antibiotics) Rash     Past Medical History:   Diagnosis Date    Bladder stones     Cancer     Encounter for blood transfusion     Hypertension     Kidney stone     Prostate cancer 2004    Radiation 2005    prostate     Past Surgical History:   Procedure Laterality Date    CYSTOSCOPY      KIDNEY STONE SURGERY  May 2014     Family History   Problem Relation Age of Onset    Hypertension Daughter     Diabetes Daughter     Hypertension Son     Asthma Son      Social History     Tobacco Use    Smoking status: Former Smoker     Years: 40.00     Types: Cigarettes     Start date: 9/26/1985    Smokeless tobacco: Never Used   Substance Use Topics    Alcohol use: No    Drug use: No     Review of Systems   Constitutional: Negative for fever.   HENT: Negative for congestion.    Eyes: Negative for visual disturbance.   Respiratory: Positive for shortness of breath and wheezing. Negative for cough.    Cardiovascular: Negative for chest pain and leg swelling.    Gastrointestinal: Negative for abdominal pain.   Genitourinary: Negative for dysuria.   Musculoskeletal: Negative for joint swelling.   Skin: Negative for rash.   Neurological: Negative for syncope.   Psychiatric/Behavioral: Negative for confusion.       Physical Exam     Initial Vitals [12/12/19 0150]   BP Pulse Resp Temp SpO2   (!) 229/105 85 (!) 28 -- (!) 84 %      MAP       --         Physical Exam    Nursing note and vitals reviewed.  Constitutional: He appears well-nourished.   HENT:   Head: Normocephalic and atraumatic.   Eyes: Conjunctivae and EOM are normal.   Neck: Normal range of motion. Neck supple. No thyroid mass present.   Cardiovascular: Normal rate, regular rhythm and normal heart sounds. Exam reveals no gallop and no friction rub.    No murmur heard.  Pulmonary/Chest: Tachypnea noted. He has no rhonchi. He has no rales.   Tied lung sounds. No cici wheezing.   Abdominal: Normal appearance.   Neurological: He is alert and oriented to person, place, and time. He has normal strength. No cranial nerve deficit or sensory deficit.   Skin: Skin is warm and dry. No rash noted. No erythema.   Psychiatric: He has a normal mood and affect. His speech is normal. Cognition and memory are normal.         ED Course   Procedures  Labs Reviewed   CBC W/ AUTO DIFFERENTIAL - Abnormal; Notable for the following components:       Result Value    RBC 3.23 (*)     Hemoglobin 10.0 (*)     Hematocrit 32.4 (*)     Mean Corpuscular Volume 100 (*)     Mean Corpuscular Hemoglobin Conc 30.9 (*)     All other components within normal limits   ISTAT PROCEDURE - Abnormal; Notable for the following components:    POC PH 7.477 (*)     POC PCO2 33.5 (*)     POC PO2 51 (*)     POC SATURATED O2 89 (*)     All other components within normal limits   COMPREHENSIVE METABOLIC PANEL   TROPONIN I   B-TYPE NATRIURETIC PEPTIDE   PROTIME-INR     EKG Readings: (Independently Interpreted)   Initial Reading: No STEMI. Rhythm: Normal Sinus  Rhythm. Heart Rate: 77 bpm.   Normal axis. Normal intervals. Left ventricular hypertrophy with repolarization abnormailty.        Imaging Results          X-Ray Chest AP Portable (In process)                  Medical Decision Making:   History:   Old Medical Records: I decided to obtain old medical records.  Independently Interpreted Test(s):   I have ordered and independently interpreted EKG Reading(s) - see prior notes  Clinical Tests:   Lab Tests: Ordered and Reviewed  Radiological Study: Reviewed and Ordered  Medical Tests: Reviewed and Ordered  ED Management:      Troy Yuan Sr. is a 89 y.o. male who presents to the Emergency Department with shortness of breath.  My overall impression is congestive heart failure exacerbation.  I do not think the patient has acute coronary syndrome, pulmonary embolism, significant symptomatic anemia, grave electrolyte abnormality, pneumonia, pneumothorax.  Patient is visibly short of breath with crackles on exam.  Chest x-ray concerning for CHF.  BNP is elevated at 3260. Expected mild troponin elevation at 0.075. Patient started on Lasix, BiPAP and nitroglycerin IV in the emergency department.  Case discussed with and accepted by the on-call nurse practitioner.  Family and patient updated on the plan of care and they are in agreement with this disposition.  He is transported to an ICU bed in guarded condition. Patient is a full code.            Scribe Attestation:   Scribe #1: I performed the above scribed service and the documentation accurately describes the services I performed. I attest to the accuracy of the note.    Attending Attestation:         Attending Critical Care:   Critical Care Times:   Direct Patient Care (initial evaluation, reassessments, and time considering the case)................................................................15 minutes.   Additional History from reviewing old medical records or taking additional history from the family, EMS, PCP,  etc.......................5 minutes.   Ordering, Reviewing, and Interpreting Diagnostic Studies...............................................................................................................5 minutes.   Documentation..................................................................................................................................................................................5 minutes.   Consultation with other Physicians. .................................................................................................................................................0 minutes.   Consultation with the patient's family directly relating to the patient's condition, care, and DNR status (when patient unable)......10 minutes.   Other..................................................................................................................................................................................................5 minutes.   ==============================================================  · Total Critical Care Time - exclusive of procedural time: 45 minutes.  ==============================================================  Critical care was necessary to treat or prevent imminent or life-threatening deterioration of the following conditions: congestive heart failure.   The following critical care procedures were done by me (see procedure notes): pulse oximetry.   Critical care was time spent personally by me on the following activities: obtaining history from patient or relative, examination of patient, ordering lab, x-rays, and/or EKG, review of old charts, development of treatment plan with patient or relative, ordering and performing treatments and interventions, evaluation of patient's response to treatment, discussions with primary provider, re-evaluation of patient's conition, interpretation of cardiac measurements and end of life discussions.   Critical Care Condition:  life-threatening       I, Dr. Ashish Laguna, personally performed the services described in this documentation. All medical record entries made by the scribe were at my direction and in my presence.  I have reviewed the chart and agree that the record reflects my personal performance and is accurate and complete. Ashish Laguna MD.  5:21 AM 12/12/2019                        Clinical Impression:       ICD-10-CM ICD-9-CM   1. Acute pulmonary edema J81.0 518.4   2. SOB (shortness of breath) R06.02 786.05   3. Acute congestive heart failure, unspecified heart failure type I50.9 428.0         Disposition:   Disposition: Admitted  Condition: Serious                     Ashish Laguna MD  12/12/19 0522

## 2019-12-12 NOTE — PLAN OF CARE
Oriented to person only. Restless most of the day. Pulling at lines and yost. Soft wrist restraints in use. Bipap off since early am and maintaining adequate O2 sats on 3L NC. Remains on Tridil infusion and titrating as tolerated to keep SBP < 180. Takes po meds without difficulty. Poor appetite. 1500 FR. Abdomen soft. Incontinent of stool x 1. Brief on. Safety maintained. Family took turns sitting with pt.

## 2019-12-13 PROBLEM — I63.81 MULTIPLE LACUNAR INFARCTS: Status: ACTIVE | Noted: 2019-12-13

## 2019-12-13 PROBLEM — G93.41 ENCEPHALOPATHY, METABOLIC: Status: ACTIVE | Noted: 2019-12-13

## 2019-12-13 LAB
ANION GAP SERPL CALC-SCNC: 14 MMOL/L (ref 8–16)
BASOPHILS # BLD AUTO: 0.01 K/UL (ref 0–0.2)
BASOPHILS NFR BLD: 0.1 % (ref 0–1.9)
BUN SERPL-MCNC: 21 MG/DL (ref 8–23)
CALCIUM SERPL-MCNC: 9.4 MG/DL (ref 8.7–10.5)
CHLORIDE SERPL-SCNC: 106 MMOL/L (ref 95–110)
CO2 SERPL-SCNC: 23 MMOL/L (ref 23–29)
CREAT SERPL-MCNC: 1.8 MG/DL (ref 0.5–1.4)
DIFFERENTIAL METHOD: ABNORMAL
EOSINOPHIL # BLD AUTO: 0.1 K/UL (ref 0–0.5)
EOSINOPHIL NFR BLD: 0.9 % (ref 0–8)
ERYTHROCYTE [DISTWIDTH] IN BLOOD BY AUTOMATED COUNT: 14.2 % (ref 11.5–14.5)
EST. GFR  (AFRICAN AMERICAN): 37 ML/MIN/1.73 M^2
EST. GFR  (NON AFRICAN AMERICAN): 32 ML/MIN/1.73 M^2
GLUCOSE SERPL-MCNC: 116 MG/DL (ref 70–110)
HCT VFR BLD AUTO: 28.8 % (ref 40–54)
HGB BLD-MCNC: 8.9 G/DL (ref 14–18)
IMM GRANULOCYTES # BLD AUTO: 0.02 K/UL (ref 0–0.04)
LYMPHOCYTES # BLD AUTO: 1 K/UL (ref 1–4.8)
LYMPHOCYTES NFR BLD: 10.3 % (ref 18–48)
MAGNESIUM SERPL-MCNC: 2 MG/DL (ref 1.6–2.6)
MCH RBC QN AUTO: 30.5 PG (ref 27–31)
MCHC RBC AUTO-ENTMCNC: 30.9 G/DL (ref 32–36)
MCV RBC AUTO: 99 FL (ref 82–98)
MONOCYTES # BLD AUTO: 0.9 K/UL (ref 0.3–1)
MONOCYTES NFR BLD: 10 % (ref 4–15)
NEUTROPHILS # BLD AUTO: 7.3 K/UL (ref 1.8–7.7)
NEUTROPHILS NFR BLD: 78.5 % (ref 38–73)
NRBC BLD-RTO: 0 /100 WBC
PLATELET # BLD AUTO: 225 K/UL (ref 150–350)
PMV BLD AUTO: 10.2 FL (ref 9.2–12.9)
POTASSIUM SERPL-SCNC: 3.8 MMOL/L (ref 3.5–5.1)
RBC # BLD AUTO: 2.92 M/UL (ref 4.6–6.2)
SODIUM SERPL-SCNC: 143 MMOL/L (ref 136–145)
TROPONIN I SERPL DL<=0.01 NG/ML-MCNC: 0.2 NG/ML (ref 0–0.03)
TSH SERPL DL<=0.005 MIU/L-ACNC: 2.24 UIU/ML (ref 0.4–4)
WBC # BLD AUTO: 9.3 K/UL (ref 3.9–12.7)

## 2019-12-13 PROCEDURE — G8978 MOBILITY CURRENT STATUS: HCPCS | Mod: CM

## 2019-12-13 PROCEDURE — 63600175 PHARM REV CODE 636 W HCPCS: Performed by: HOSPITALIST

## 2019-12-13 PROCEDURE — 25000003 PHARM REV CODE 250: Performed by: HOSPITALIST

## 2019-12-13 PROCEDURE — 83735 ASSAY OF MAGNESIUM: CPT

## 2019-12-13 PROCEDURE — 20000000 HC ICU ROOM

## 2019-12-13 PROCEDURE — 97161 PT EVAL LOW COMPLEX 20 MIN: CPT

## 2019-12-13 PROCEDURE — 84484 ASSAY OF TROPONIN QUANT: CPT

## 2019-12-13 PROCEDURE — 85025 COMPLETE CBC W/AUTO DIFF WBC: CPT

## 2019-12-13 PROCEDURE — 25000003 PHARM REV CODE 250: Performed by: NURSE PRACTITIONER

## 2019-12-13 PROCEDURE — 36415 COLL VENOUS BLD VENIPUNCTURE: CPT

## 2019-12-13 PROCEDURE — 94761 N-INVAS EAR/PLS OXIMETRY MLT: CPT

## 2019-12-13 PROCEDURE — 99900035 HC TECH TIME PER 15 MIN (STAT)

## 2019-12-13 PROCEDURE — 80048 BASIC METABOLIC PNL TOTAL CA: CPT

## 2019-12-13 PROCEDURE — 63600175 PHARM REV CODE 636 W HCPCS: Performed by: SPECIALIST

## 2019-12-13 PROCEDURE — 84443 ASSAY THYROID STIM HORMONE: CPT

## 2019-12-13 PROCEDURE — 27000221 HC OXYGEN, UP TO 24 HOURS

## 2019-12-13 PROCEDURE — 25000003 PHARM REV CODE 250: Performed by: SPECIALIST

## 2019-12-13 PROCEDURE — G8979 MOBILITY GOAL STATUS: HCPCS | Mod: CJ

## 2019-12-13 RX ORDER — HYDRALAZINE HYDROCHLORIDE 20 MG/ML
10 INJECTION INTRAMUSCULAR; INTRAVENOUS EVERY 8 HOURS PRN
Status: DISCONTINUED | OUTPATIENT
Start: 2019-12-13 | End: 2019-12-17 | Stop reason: HOSPADM

## 2019-12-13 RX ORDER — FUROSEMIDE 10 MG/ML
20 INJECTION INTRAMUSCULAR; INTRAVENOUS ONCE
Status: COMPLETED | OUTPATIENT
Start: 2019-12-13 | End: 2019-12-13

## 2019-12-13 RX ORDER — HYDRALAZINE HYDROCHLORIDE 25 MG/1
100 TABLET, FILM COATED ORAL EVERY 8 HOURS
Status: DISCONTINUED | OUTPATIENT
Start: 2019-12-13 | End: 2019-12-16

## 2019-12-13 RX ADMIN — ALFUZOSIN HYDROCHLORIDE 10 MG: 10 TABLET ORAL at 08:12

## 2019-12-13 RX ADMIN — FUROSEMIDE 40 MG: 10 INJECTION, SOLUTION INTRAMUSCULAR; INTRAVENOUS at 08:12

## 2019-12-13 RX ADMIN — HYDRALAZINE HYDROCHLORIDE 100 MG: 25 TABLET, FILM COATED ORAL at 05:12

## 2019-12-13 RX ADMIN — FUROSEMIDE 20 MG: 10 INJECTION, SOLUTION INTRAMUSCULAR; INTRAVENOUS at 02:12

## 2019-12-13 RX ADMIN — FAMOTIDINE 20 MG: 20 TABLET ORAL at 08:12

## 2019-12-13 RX ADMIN — FUROSEMIDE 20 MG: 10 INJECTION, SOLUTION INTRAMUSCULAR; INTRAVENOUS at 04:12

## 2019-12-13 RX ADMIN — SENNOSIDES AND DOCUSATE SODIUM 1 TABLET: 8.6; 5 TABLET ORAL at 10:12

## 2019-12-13 RX ADMIN — HALOPERIDOL LACTATE 5 MG: 5 INJECTION, SOLUTION INTRAMUSCULAR at 05:12

## 2019-12-13 RX ADMIN — HYDRALAZINE HYDROCHLORIDE 50 MG: 25 TABLET, FILM COATED ORAL at 10:12

## 2019-12-13 RX ADMIN — NITROGLYCERIN 20 MCG/MIN: 20 INJECTION INTRAVENOUS at 02:12

## 2019-12-13 RX ADMIN — FERROUS SULFATE TAB EC 325 MG (65 MG FE EQUIVALENT) 325 MG: 325 (65 FE) TABLET DELAYED RESPONSE at 08:12

## 2019-12-13 RX ADMIN — HYDRALAZINE HYDROCHLORIDE 50 MG: 25 TABLET, FILM COATED ORAL at 02:12

## 2019-12-13 RX ADMIN — ATORVASTATIN CALCIUM 20 MG: 20 TABLET, FILM COATED ORAL at 08:12

## 2019-12-13 RX ADMIN — AMLODIPINE BESYLATE 10 MG: 5 TABLET ORAL at 08:12

## 2019-12-13 RX ADMIN — HALOPERIDOL LACTATE 5 MG: 5 INJECTION, SOLUTION INTRAMUSCULAR at 06:12

## 2019-12-13 RX ADMIN — SENNOSIDES AND DOCUSATE SODIUM 1 TABLET: 8.6; 5 TABLET ORAL at 08:12

## 2019-12-13 RX ADMIN — HYDROXYCHLOROQUINE SULFATE 200 MG: 200 TABLET, FILM COATED ORAL at 08:12

## 2019-12-13 RX ADMIN — POTASSIUM CHLORIDE 40 MEQ: 20 SOLUTION ORAL at 04:12

## 2019-12-13 NOTE — PLAN OF CARE
POC reviewed with pt/ Pt remain confused. Need reinforcement. Family stayed at bedside yesterday. Left at midnight. Had slept better when family around. Hydralazine given at 0200. Tridil titrated PRN. Pt tends to wheeze when agitated. BP elevated when stimulated. On soft wrist restraint Fall precaution .Safety maintained.

## 2019-12-13 NOTE — CONSULTS
"                 [facility]  Cardiology consult     Patient Name: Troy Yuan Sr.  MRN: [id number]  Admission Date: 12/12/2019  Attending Physician: Sonia Hall MD   Primary Care Provider: Gentry Encinas MD           Patient information was obtained from patient, relative(s), past medical records and ER records.      Subjective:      Principal Problem:Acute pulmonary edema     Chief Complaint:        Chief Complaint   Patient presents with    Shortness of Breath       with wheezing x 1 hour         HPI: Troy Yuan Sr. is a 89-year-old male with past medical history of hypertension, chronic kidney disease, hyperlipidemia, and rheumatoid arthritis who presented to the emergency room tonight with reports of shortness of breath.  Patient states "I could not breathe."  Patient reports onset of symptoms at approximately 12:30 a.m. Patient also reports leg swelling. Patient denies recent sick contacts or recent travel.  Patient denies chest pain, nausea, vomiting, or diarrhea.  Patient denies use supplemental oxygen at home.  Patient accompanied by his daughter, Lakia and grandson at bedside.  Emergency room patient noted to be in acute pulmonary edema with associated hypertensive urgency.  Patient started on nitroglycerin drip and given 40 mg of IV Lasix.  Patient requiring BiPAP therapy.  Patient admitted to ICU on 12/12/2019 at approximately 5:30 a.m..Pt is confused  -  4 pt restrain and Haldol. He has received lasix 40 mg iv bid.          Past Medical History:   Diagnosis Date    Bladder stones      Cancer      Encounter for blood transfusion      Hypertension      Kidney stone      Prostate cancer 2004    Radiation 2005     prostate               Past Surgical History:   Procedure Laterality Date    CYSTOSCOPY        KIDNEY STONE SURGERY   May 2014              Review of patient's allergies indicates:   Allergen Reactions    Soma [carisoprodol] Rash    Aspirin      Sulfa " (sulfonamide antibiotics) Rash         No current facility-administered medications on file prior to encounter.            Current Outpatient Medications on File Prior to Encounter   Medication Sig    alfuzosin (UROXATRAL) 10 mg Tb24 TAKE 1 TABLET(10 MG) BY MOUTH EVERY DAY    amlodipine (NORVASC) 10 MG tablet Take 10 mg by mouth once daily.      atorvastatin (LIPITOR) 20 MG tablet Take 1 tablet (20 mg total) by mouth once daily.    cimetidine (TAGAMET) 200 MG tablet Take 200 mg by mouth 2 (two) times daily.     doxycycline (VIBRAMYCIN) 100 MG Cap Take 1 capsule (100 mg total) by mouth every 12 (twelve) hours.    ferrous sulfate 325 mg (65 mg iron) Tab tablet Take 325 mg by mouth once daily.    hydrALAZINE (APRESOLINE) 25 MG tablet Take 1 tablet (25 mg total) by mouth every 8 (eight) hours.    hydroxychloroquine (PLAQUENIL) 200 mg tablet Take 1 tablet (200 mg total) by mouth once daily.    hydroxychloroquine (PLAQUENIL) 200 mg tablet TAKE 1 TABLET(200 MG) BY MOUTH EVERY DAY           Family History      Problem Relation (Age of Onset)     Asthma Son     Diabetes Daughter     Hypertension Daughter, Son                Tobacco Use    Smoking status: Former Smoker       Years: 40.00       Types: Cigarettes       Start date: 9/26/1985    Smokeless tobacco: Never Used   Substance and Sexual Activity    Alcohol use: No    Drug use: No    Sexual activity: Never      Review of Systems   Constitutional: Negative for activity change, appetite change, chills, fatigue and fever.   HENT: Negative for congestion, sinus pressure, sinus pain and sore throat.    Eyes: Negative for photophobia and visual disturbance.   Respiratory: Positive for shortness of breath and wheezing. Negative for cough, choking and chest tightness.    Cardiovascular: Positive for leg swelling. Negative for chest pain and palpitations.   Gastrointestinal: Negative for abdominal distention, abdominal pain, blood in stool, constipation, diarrhea,  nausea and vomiting.   Genitourinary: Negative for difficulty urinating, dysuria, flank pain and hematuria.   Musculoskeletal: Negative for back pain, gait problem and joint swelling.   Skin: Negative for rash and wound.   Neurological: Negative for dizziness, syncope, weakness, light-headedness, numbness and headaches.   Psychiatric/Behavioral: Negative for confusion. The patient is not nervous/anxious.       Objective:      Vital Signs (Most Recent):  Temp: 98.5 °F (36.9 °C) (12/12/19 0245)  Pulse: 87 (12/12/19 0448)  Resp: (!) 26 (12/12/19 0217)  BP: (!) 187/79 (12/12/19 0448)  SpO2: 96 % (12/12/19 0448) Vital Signs (24h Range):  Temp:  [98.5 °F (36.9 °C)] 98.5 °F (36.9 °C)  Pulse:  [79-87] 87  Resp:  [26-28] 26  SpO2:  [84 %-100 %] 96 %  BP: (187-229)/() 187/79      Weight: 65.8 kg (145 lb)  Body mass index is 22.71 kg/m².     Physical Exam   Constitutional: He is oriented to person, place, and time. He appears well-developed. No distress.   Chronically ill-appearing   HENT: edentulous  Head: Normocephalic and atraumatic.   Eyes: Pupils are equal, round, and reactive to light. EOM are normal. Right eye exhibits no discharge. Left eye exhibits no discharge. Arcus.  Neck: Normal range of motion. JVD present.   Cardiovascular: Normal rate, regular rhythm and intact distal pulses.   Muffled heart sounds, positive murmur   Pulmonary/Chest: No respiratory distress. He has wheezes. He has rales. He exhibits no tenderness.  Patient in no acute respiratory distress.   Abdominal: Soft. Bowel sounds are normal. He exhibits no distension. There is no tenderness. There is no rebound and no guarding.   Genitourinary:   Genitourinary Comments: Exam Deferred      Musculoskeletal: Normal range of motion. He exhibits edema. He exhibits no tenderness or deformity.   +1 pitting edema noted to bilateral lower extremities.   Neurological: He is alert and oriented to person, place, and time. No cranial nerve deficit or sensory  deficit.   Skin: Skin is warm and dry. Capillary refill takes 2 to 3 seconds. No rash noted. He is not diaphoretic. No erythema.   Dry skin   Psychiatric: He has a normal mood and affect.              Significant Labs:   BMP:       Recent Labs   Lab 12/12/19 0219   *      K 3.4*      CO2 22*   BUN 24*   CREATININE 1.6*   CALCIUM 9.3      CBC:       Recent Labs   Lab 12/12/19 0219   WBC 7.94   HGB 10.0*   HCT 32.4*         CMP:       Recent Labs   Lab 12/12/19 0219      K 3.4*      CO2 22*   *   BUN 24*   CREATININE 1.6*   CALCIUM 9.3   PROT 7.0   ALBUMIN 3.5   BILITOT 0.3   ALKPHOS 86   AST 22   ALT 15   ANIONGAP 14   EGFRNONAA 37*      Cardiac Markers:       Recent Labs   Lab 12/12/19 0219   BNP 3,262*      Coagulation:       Recent Labs   Lab 12/12/19 0219   INR 1.0      Troponin  0.075     ABG      Recent Labs   Lab 12/12/19 0210   PH 7.477*   PO2 51*   PCO2 33.5*   HCO3 24.8   BE 1               Significant Imaging: I have reviewed all pertinent imaging results/findings within the past 24 hours.      Chest Xray:  Impression -VRAD   Small left pleural effusion, pulmonary edema, and bilateral lower lung atelectasis.     Assessment/Plan:      * Acute pulmonary edema  Lasix IV - strict I&O -continuous telemetry monitoring - BiPAP therapy - p.r.n.         Rheumatoid arthritis  Chronic, controlled.  Will continue home medication.        Hyperlipidemia        Lab Results   Component Value Date     CHOL 200 (H) 01/11/2019     CHOL 171 03/18/2014            Lab Results   Component Value Date     HDL 65 01/11/2019     HDL 55 03/18/2014            Lab Results   Component Value Date     LDLCALC 122.2 01/11/2019     LDLCALC 101.8 03/18/2014            Lab Results   Component Value Date     TRIG 64 01/11/2019     TRIG 71 03/18/2014            Lab Results   Component Value Date     CHOLHDL 32.5 01/11/2019     CHOLHDL 32.2 03/18/2014         Chronic, uncontrolled.  Will  continue home medication.  Recheck lipid panel.     NSTEMI (non-ST elevated myocardial infarction)  Initial troponin elevation, will trend.  Likely secondary to acute CHF exacerbation and decreased. Continuous telemetry monitoring.  Echo pending.  Aspirin not initiated as is listed as patient allergy in chart.      Hypokalemia  Continuous telemetry monitoring - p.r.n. replacements        Acute respiratory failure with hypoxia  BiPAP - p.r.n. ABG        Hypertensive urgency  Continuous telemetry monitoring - nitroglycerin infusion. Agree with increased hydralazine dose - may need 100 mg tid.        Acute on chronic diastolic heart failure  Echo noted from January 2019 revealing - Normal left ventricular systolic function. The estimated ejection fraction is 67% with Left ventricular diastolic dysfunction and Normal left atrial pressure. Mildly reduced right ventricular systolic function.     Patient denies having history of congestive heart failure.     CHF currently uncontrolled due to Pulmonary edema. Latest ECHO shows ejection fraction of 67. Continue Lasix and monitor clinical status closely. Monitor on telemetry. Patient is on CHF pathway.  Monitor strict Is&Os and daily weights.  NPO, please escalate diet as clinically indicated.  Continue to stress to patient importance of self efficacy and  on diet for CHF. Last BNP reviewed- and noted below.  Repeat chest x-ray in the morning.  Continuous telemetry monitoring.  Nitroglycerin infusion.  BiPAP.  Cardiology consult      Recent Labs   Lab 12/12/19  0219   BNP 3,262*   .                    SOB (shortness of breath)  See plan of care for acute pulmonary edema.        Essential hypertension  Chronic, uncontrolled.  Patient noted to have hypertensive urgency upon arrival to emergency room likely secondary to hypervolemia.  Nitroglycerin infusion initiated and continued upon admission.         BP Readings from Last 3 Encounters:   12/12/19 (!) 187/81   05/21/19  (!) 158/62   01/13/19 (!) 149/73   .           CKD (chronic kidney disease), stage IV  Creatine stable for now. BMP reviewed- noted Estimated Creatinine Clearance: 28.7 mL/min (A) (based on SCr of 1.6 mg/dL (H)). according to latest data. Monitor UOP and serial BMP and adjust therapy as needed. Renally dose meds.  Avoid nephrotoxin agents.                 Macrocytic anemia  Monitor and transfuse if patient becomes hemodynamically unstable or H/H < 7/21     Daily CBC, stable - no evidence of active or acute bleeding on initial physical exam.        Lab Results   Component Value Date     WBC 7.94 12/12/2019     HGB 10.0 (L) 12/12/2019     HCT 32.4 (L) 12/12/2019      (H) 12/12/2019      12/12/2019               BPH (benign prostatic hypertrophy) with urinary retention  Chronic, controlled.  Will continue home medication.               VTE Risk Mitigation (From admission, onward)     None                             SOREN Grissom MD [facility]  Cardiology   [facility]      Cosigned by: Sonia Hall MD at 12/12/2019  2:18 PM   Revision History

## 2019-12-13 NOTE — NURSING
Becoming more restless and agitated. Attempting to get OOB, throwing legs over rail.  Placed soft ankle restraints also. Orders received.   BP more elevated with agitation. Titrating Tridil.

## 2019-12-13 NOTE — CARE UPDATE
12/12/19 2030   Patient Assessment/Suction   Level of Consciousness (AVPU) alert   Respiratory Effort Unlabored   Expansion/Accessory Muscles/Retractions no use of accessory muscles;no retractions   All Lung Fields Breath Sounds diminished   LLL Breath Sounds crackles, fine   Rhythm/Pattern, Respiratory no shortness of breath reported   Cough Frequency no cough   PRE-TX-O2   O2 Device (Oxygen Therapy) nasal cannula   $ Is the patient on Low Flow Oxygen? Yes   Flow (L/min) 3   SpO2 98 %   Pulse Oximetry Type Continuous   $ Pulse Oximetry - Multiple Charge Pulse Oximetry - Multiple   Pulse 99   Resp (!) 37   BP (!) 197/89   Wound Care   $ Wound Care Tech Time 15 min   Area of Concern Left;Right;Behind ear;Bridge of nose;Cheek;Upper lip;Lower lip;Corner lip;Back of head   Skin Color/Characteristics without discoloration   Skin Temperature warm   Preset CPAP/BiPAP Settings   Mode Of Delivery Standby

## 2019-12-13 NOTE — PLAN OF CARE
12/13/19 0711   PRE-TX-O2   O2 Device (Oxygen Therapy) nasal cannula   $ Is the patient on Low Flow Oxygen? Yes   Oxygen Concentration (%) 3   SpO2 98 %   Pulse Oximetry Type Continuous   $ Pulse Oximetry - Multiple Charge Pulse Oximetry - Multiple   Pulse 80   Resp (!) 21   BP (!) 182/81   Wound Care   $ Wound Care Tech Time 15 min   Area of Concern Bridge of nose   Skin Color/Characteristics without discoloration   Skin Temperature warm   Preset CPAP/BiPAP Settings   Mode Of Delivery Standby

## 2019-12-13 NOTE — PT/OT/SLP EVAL
Physical Therapy Evaluation    Patient Name:  Troy Yuan Sr.   MRN:  [id number]    Recommendations:     Discharge Recommendations:  home, home with home health, home health PT   Discharge Equipment Recommendations: other (see comments)(TBD)   Barriers to discharge: None    Assessment:     Troy Yuan Sr. is a 89 y.o. male admitted with a medical diagnosis of Acute pulmonary edema.  He presents with the following impairments/functional limitations:  impaired endurance, impaired functional mobilty, decreased lower extremity function, impaired balance, weakness, gait instability  .    Rehab Prognosis: Fair; patient would benefit from acute skilled PT services to address these deficits and reach maximum level of function.    Recent Surgery: * No surgery found *      Plan:     During this hospitalization, patient to be seen 6 x/week to address the identified rehab impairments via gait training, therapeutic activities, therapeutic exercises and progress toward the following goals:    · Plan of Care Expires:  12/31/19    Subjective     Chief Complaint: none stated  Patient/Family Comments/goals: none stated, but agrees to work with PT  Pain/Comfort:  ·      Patients cultural, spiritual, Hoahaoism conflicts given the current situation:      Living Environment:  House with family, 10-12 steps to enter, with rail  Prior to admission, patients level of function was independent, also performed some yardwork.  Equipment used at home: none.  DME owned (not currently used): rolling walker.  Upon discharge, patient will have assistance from family.    Objective:     Communicated with RN prior to session.  Patient found HOB elevated with peripheral IV, oxygen, pulse ox (continuous), yost catheter  upon PT entry to room.    General Precautions: Standard, fall     Functional Mobility:  · Bed Mobility:     · Rolling Left:  minimum assistance  · Supine to Sit: minimum assistance  · Sit to Supine: minimum  assistance  · Transfers:     · Sit to Stand:  minimum assistance with rolling walker  · Gait: 60' with RW with min assist and cues (also assist for IV pole)    AM-PAC 6 CLICK MOBILITY  Total Score:16     Patient left HOB elevated with all lines intact, call button in reach, son present and O2 to wall via nc 3 L/min.    GOALS:   Multidisciplinary Problems     Physical Therapy Goals        Problem: Physical Therapy Goal    Goal Priority Disciplines Outcome Goal Variances Interventions   Physical Therapy Goal     PT, PT/OT Ongoing, Progressing     Description:  Goals to be met by: 2019     Patient will increase functional independence with mobility by performin). Supine to sit with Modified [place]  2). Sit to supine with Modified [place]  3). Sit to stand transfer with Stand-by Assistance  4). Bed to chair transfer with Stand-by Assistance using Rolling Walker  5). Gait  x  > 150 feet with Stand-by Assistance using Rolling Walker.                       History:     Past Medical History:   Diagnosis Date    Bladder stones     Cancer     Encounter for blood transfusion     Hypertension     Kidney stone     Prostate cancer 2004    Radiation 2005    prostate       Past Surgical History:   Procedure Laterality Date    CYSTOSCOPY      KIDNEY STONE SURGERY  May 2014       Time Tracking:     PT Received On: 19  PT Start Time: 1400     PT Stop Time: 1425  PT Total Time (min): 25 min     Billable Minutes: Evaluation 25      Raheem Kirkland, PT  2019

## 2019-12-13 NOTE — SUBJECTIVE & OBJECTIVE
Interval History:   Patient seen and examined. No acute events overnight.  Breathing improved as well as BP.  No other acute events.     Review of Systems   Unable to perform ROS: Other   Constitutional: Positive for fatigue. Negative for activity change, appetite change and fever.   HENT: Negative.    Eyes: Negative.    Respiratory: Positive for shortness of breath. Negative for chest tightness and wheezing.    Cardiovascular: Negative for chest pain, palpitations and leg swelling.   Gastrointestinal: Negative for abdominal distention, abdominal pain, blood in stool, diarrhea and vomiting.   Genitourinary: Negative for dysuria and hematuria.   Neurological: Negative for headaches.   Hematological: Negative for adenopathy.   Psychiatric/Behavioral: Negative for confusion.   confused -      Objective:     Vital Signs (Most Recent):  Temp: 97.5 °F (36.4 °C) (12/13/19 0315)  Pulse: 76 (12/13/19 0615)  Resp: (!) 25 (12/13/19 0615)  BP: (!) 181/77 (12/13/19 0615)  SpO2: 99 % (12/13/19 0615) Vital Signs (24h Range):  Temp:  [97.5 °F (36.4 °C)-99.5 °F (37.5 °C)] 97.5 °F (36.4 °C)  Pulse:  [] 76  Resp:  [14-40] 25  SpO2:  [91 %-100 %] 99 %  BP: (123-217)/() 181/77     Weight: 68.8 kg (151 lb 10.8 oz)  Body mass index is 23.76 kg/m².    Intake/Output Summary (Last 24 hours) at 12/13/2019 0822  Last data filed at 12/13/2019 0600  Gross per 24 hour   Intake 1211.18 ml   Output 2620 ml   Net -1408.82 ml      Physical Exam   Constitutional: He is oriented to person, place, and time. He appears well-developed and well-nourished. No distress.   Chronically ill-appearing   HENT:   Head: Normocephalic and atraumatic.   Eyes: Pupils are equal, round, and reactive to light. EOM are normal. Right eye exhibits no discharge. Left eye exhibits no discharge.   Neck: Normal range of motion. Neck supple. JVD present. No thyromegaly present.   Cardiovascular: Normal rate, regular rhythm and intact distal pulses. Exam reveals no  gallop and no friction rub.   Murmur heard.  Muffled heart sounds, positive murmur   Pulmonary/Chest: Effort normal and breath sounds normal. No respiratory distress. He has no wheezes. He exhibits no tenderness.   Inspiratory and expiratory wheezing noted throughout all lung fields upon auscultation. Mild crackles at the bases   Abdominal: Soft. Bowel sounds are normal. He exhibits no distension. There is no tenderness. There is no rebound and no guarding.   Genitourinary:   Genitourinary Comments: Exam Deferred      Musculoskeletal: Normal range of motion. He exhibits edema. He exhibits no tenderness or deformity.   +3 pitting edema noted to bilateral lower extremities.   Neurological: He is alert and oriented to person, place, and time. No cranial nerve deficit or sensory deficit.   Skin: Skin is warm and dry. Capillary refill takes 2 to 3 seconds. No rash noted. He is not diaphoretic. No erythema.   Dry skin   Psychiatric: He has a normal mood and affect. His behavior is normal. Judgment and thought content normal.   Nursing note and vitals reviewed.      Significant Labs:   Lab Results   Component Value Date    WBC 9.30 12/13/2019    HGB 8.9 (L) 12/13/2019    HCT 28.8 (L) 12/13/2019    MCV 99 (H) 12/13/2019     12/13/2019         Significant Imaging: I have reviewed and interpreted all pertinent imaging results/findings within the past 24 hours.

## 2019-12-13 NOTE — NURSING
Awake and alert. Remains confused but calm at present. Attempting to wean Tridil drip after increased po dose of hydralazine but SBP trending back up.  Family at bedside.

## 2019-12-13 NOTE — PROGRESS NOTES
Progress Note  Cardiology    Admit Date: 12/12/2019   LOS: 1 day     Follow-up For:  CHF; HTN; CKD.    Scheduled Meds:   alfuzosin  10 mg Oral Daily with breakfast    amLODIPine  10 mg Oral Daily    atorvastatin  20 mg Oral Daily    famotidine  20 mg Oral Daily    ferrous sulfate  325 mg Oral Daily    furosemide  40 mg Intravenous Daily    hydrALAZINE  100 mg Oral Q8H    hydroxychloroquine  200 mg Oral Daily    senna-docusate 8.6-50 mg  1 tablet Oral BID     Continuous Infusions:   nitroGLYCERIN 50 mcg/min (12/13/19 1215)     PRN Meds:haloperidol lactate, magnesium oxide, magnesium oxide, ondansetron, ondansetron, potassium chloride 10%, potassium chloride 10%, potassium chloride 10%, potassium, sodium phosphates, potassium, sodium phosphates, potassium, sodium phosphates, sodium chloride 0.9%    Review of patient's allergies indicates:   Allergen Reactions    Soma [carisoprodol] Rash    Aspirin     Sulfa (sulfonamide antibiotics) Rash       SUBJECTIVE:     Interval History: Patient has no complaint of  OF CP OR SOB.    Review of Systems  Respiratory: positive for cough, negative for dyspnea on exertion  Cardiovascular: negative for chest pressure/discomfort    OBJECTIVE:     Vital Signs (Most Recent)  Temp: 98.4 °F (36.9 °C) (12/13/19 1200)  Pulse: 87 (12/13/19 1300)  Resp: (!) 23 (12/13/19 1300)  BP: (!) 149/67 (12/13/19 1300)  SpO2: 98 % (12/13/19 1300)    Vital Signs Range (Last 24H):  Temp:  [97.5 °F (36.4 °C)-99.5 °F (37.5 °C)]   Pulse:  []   Resp:  [14-40]   BP: (123-217)/()   SpO2:  [96 %-100 %]       Physical Exam:  Neck: JVD - 1 cm above sternal notch, no carotid bruit and supple, symmetrical, trachea midline  Lungs: diminished breath sounds bibasilar  Heart: regular rate and rhythm, S1, S2 normal, no murmur, click, rub or gallop  Abdomen: soft, non-tender; bowel sounds normal; no masses,  no organomegaly  Extremities: Extremities normal, atraumatic, no cyanosis, clubbing, or  edema    Recent Results (from the past 24 hour(s))   Urinalysis, Reflex to Urine Culture Urine, Clean Catch    Collection Time: 12/12/19  8:42 PM   Result Value Ref Range    Specimen UA Urine, Catheterized     Color, UA Yellow Yellow, Straw, Melly    Appearance, UA Clear Clear    pH, UA 5.0 5.0 - 8.0    Specific Gravity, UA 1.015 1.005 - 1.030    Protein, UA Trace (A) Negative    Glucose, UA Negative Negative    Ketones, UA Negative Negative    Bilirubin (UA) Negative Negative    Occult Blood UA Trace (A) Negative    Nitrite, UA Negative Negative    Urobilinogen, UA Negative <2.0 EU/dL    Leukocytes, UA 1+ (A) Negative   Urinalysis Microscopic    Collection Time: 12/12/19  8:42 PM   Result Value Ref Range    RBC, UA 0 0 - 4 /hpf    WBC, UA 3 0 - 5 /hpf    Microscopic Comment SEE COMMENT    Basic metabolic panel    Collection Time: 12/13/19  3:48 AM   Result Value Ref Range    Sodium 143 136 - 145 mmol/L    Potassium 3.8 3.5 - 5.1 mmol/L    Chloride 106 95 - 110 mmol/L    CO2 23 23 - 29 mmol/L    Glucose 116 (H) 70 - 110 mg/dL    BUN, Bld 21 8 - 23 mg/dL    Creatinine 1.8 (H) 0.5 - 1.4 mg/dL    Calcium 9.4 8.7 - 10.5 mg/dL    Anion Gap 14 8 - 16 mmol/L    eGFR if African American 37 (A) >60 mL/min/1.73 m^2    eGFR if non African American 32 (A) >60 mL/min/1.73 m^2   Magnesium    Collection Time: 12/13/19  3:48 AM   Result Value Ref Range    Magnesium 2.0 1.6 - 2.6 mg/dL   Troponin I    Collection Time: 12/13/19  3:48 AM   Result Value Ref Range    Troponin I 0.195 (H) 0.000 - 0.026 ng/mL   CBC auto differential    Collection Time: 12/13/19  3:48 AM   Result Value Ref Range    WBC 9.30 3.90 - 12.70 K/uL    RBC 2.92 (L) 4.60 - 6.20 M/uL    Hemoglobin 8.9 (L) 14.0 - 18.0 g/dL    Hematocrit 28.8 (L) 40.0 - 54.0 %    Mean Corpuscular Volume 99 (H) 82 - 98 fL    Mean Corpuscular Hemoglobin 30.5 27.0 - 31.0 pg    Mean Corpuscular Hemoglobin Conc 30.9 (L) 32.0 - 36.0 g/dL    RDW 14.2 11.5 - 14.5 %    Platelets 225 150 - 350  K/uL    MPV 10.2 9.2 - 12.9 fL    Gran # (ANC) 7.3 1.8 - 7.7 K/uL    Immature Grans (Abs) 0.02 0.00 - 0.04 K/uL    Lymph # 1.0 1.0 - 4.8 K/uL    Mono # 0.9 0.3 - 1.0 K/uL    Eos # 0.1 0.0 - 0.5 K/uL    Baso # 0.01 0.00 - 0.20 K/uL    nRBC 0 0 /100 WBC    Gran% 78.5 (H) 38.0 - 73.0 %    Lymph% 10.3 (L) 18.0 - 48.0 %    Mono% 10.0 4.0 - 15.0 %    Eosinophil% 0.9 0.0 - 8.0 %    Basophil% 0.1 0.0 - 1.9 %    Differential Method Automated    TSH    Collection Time: 12/13/19  3:48 AM   Result Value Ref Range    TSH 2.241 0.400 - 4.000 uIU/mL       Diagnostic Results:  Labs: Reviewed  ECG: Reviewed  .    ASSESSMENT/PLAN:     CXR unchanged. Wean off IV NTG. May use prn hydralazine  IV 5-10 mg q 4 h prn for HTN. Got hydralazine 100 mg today.

## 2019-12-13 NOTE — PLAN OF CARE
Problem: Physical Therapy Goal  Goal: Physical Therapy Goal  Description  Goals to be met by: 2019     Patient will increase functional independence with mobility by performin). Supine to sit with Modified [place]  2). Sit to supine with Modified [place]  3). Sit to stand transfer with Stand-by Assistance  4). Bed to chair transfer with Stand-by Assistance using Rolling Walker  5). Gait  x  > 150 feet with Stand-by Assistance using Rolling Walker.      Outcome: Ongoing, Progressing

## 2019-12-14 LAB
ANION GAP SERPL CALC-SCNC: 10 MMOL/L (ref 8–16)
BUN SERPL-MCNC: 23 MG/DL (ref 8–23)
CALCIUM SERPL-MCNC: 8.8 MG/DL (ref 8.7–10.5)
CHLORIDE SERPL-SCNC: 107 MMOL/L (ref 95–110)
CO2 SERPL-SCNC: 22 MMOL/L (ref 23–29)
CREAT SERPL-MCNC: 2.1 MG/DL (ref 0.5–1.4)
EST. GFR  (AFRICAN AMERICAN): 31 ML/MIN/1.73 M^2
EST. GFR  (NON AFRICAN AMERICAN): 27 ML/MIN/1.73 M^2
GLUCOSE SERPL-MCNC: 107 MG/DL (ref 70–110)
POTASSIUM SERPL-SCNC: 4.8 MMOL/L (ref 3.5–5.1)
SODIUM SERPL-SCNC: 139 MMOL/L (ref 136–145)

## 2019-12-14 PROCEDURE — 97530 THERAPEUTIC ACTIVITIES: CPT

## 2019-12-14 PROCEDURE — 25000003 PHARM REV CODE 250: Performed by: HOSPITALIST

## 2019-12-14 PROCEDURE — 97165 OT EVAL LOW COMPLEX 30 MIN: CPT

## 2019-12-14 PROCEDURE — 12000002 HC ACUTE/MED SURGE SEMI-PRIVATE ROOM

## 2019-12-14 PROCEDURE — 80048 BASIC METABOLIC PNL TOTAL CA: CPT

## 2019-12-14 PROCEDURE — 94761 N-INVAS EAR/PLS OXIMETRY MLT: CPT

## 2019-12-14 PROCEDURE — 63600175 PHARM REV CODE 636 W HCPCS: Performed by: HOSPITALIST

## 2019-12-14 PROCEDURE — 25000003 PHARM REV CODE 250: Performed by: SPECIALIST

## 2019-12-14 PROCEDURE — 25000003 PHARM REV CODE 250: Performed by: INTERNAL MEDICINE

## 2019-12-14 PROCEDURE — 63600175 PHARM REV CODE 636 W HCPCS: Performed by: SPECIALIST

## 2019-12-14 PROCEDURE — 97535 SELF CARE MNGMENT TRAINING: CPT

## 2019-12-14 PROCEDURE — 99900035 HC TECH TIME PER 15 MIN (STAT)

## 2019-12-14 PROCEDURE — G8979 MOBILITY GOAL STATUS: HCPCS | Mod: CJ

## 2019-12-14 PROCEDURE — 97110 THERAPEUTIC EXERCISES: CPT

## 2019-12-14 PROCEDURE — 36415 COLL VENOUS BLD VENIPUNCTURE: CPT

## 2019-12-14 PROCEDURE — 27000221 HC OXYGEN, UP TO 24 HOURS

## 2019-12-14 PROCEDURE — 25000003 PHARM REV CODE 250: Performed by: NURSE PRACTITIONER

## 2019-12-14 PROCEDURE — G8978 MOBILITY CURRENT STATUS: HCPCS | Mod: CM

## 2019-12-14 RX ORDER — CARVEDILOL 6.25 MG/1
6.25 TABLET ORAL 2 TIMES DAILY
Status: DISCONTINUED | OUTPATIENT
Start: 2019-12-14 | End: 2019-12-15

## 2019-12-14 RX ADMIN — SENNOSIDES AND DOCUSATE SODIUM 1 TABLET: 8.6; 5 TABLET ORAL at 09:12

## 2019-12-14 RX ADMIN — HYDROXYCHLOROQUINE SULFATE 200 MG: 200 TABLET, FILM COATED ORAL at 09:12

## 2019-12-14 RX ADMIN — FAMOTIDINE 20 MG: 20 TABLET ORAL at 09:12

## 2019-12-14 RX ADMIN — HYDRALAZINE HYDROCHLORIDE 100 MG: 25 TABLET, FILM COATED ORAL at 09:12

## 2019-12-14 RX ADMIN — HYDRALAZINE HYDROCHLORIDE 10 MG: 20 INJECTION INTRAMUSCULAR; INTRAVENOUS at 07:12

## 2019-12-14 RX ADMIN — FERROUS SULFATE TAB EC 325 MG (65 MG FE EQUIVALENT) 325 MG: 325 (65 FE) TABLET DELAYED RESPONSE at 09:12

## 2019-12-14 RX ADMIN — HYDRALAZINE HYDROCHLORIDE 100 MG: 25 TABLET, FILM COATED ORAL at 05:12

## 2019-12-14 RX ADMIN — CARVEDILOL 6.25 MG: 6.25 TABLET, FILM COATED ORAL at 09:12

## 2019-12-14 RX ADMIN — HALOPERIDOL LACTATE 5 MG: 5 INJECTION, SOLUTION INTRAMUSCULAR at 09:12

## 2019-12-14 RX ADMIN — ALFUZOSIN HYDROCHLORIDE 10 MG: 10 TABLET ORAL at 09:12

## 2019-12-14 RX ADMIN — FUROSEMIDE 40 MG: 10 INJECTION, SOLUTION INTRAMUSCULAR; INTRAVENOUS at 09:12

## 2019-12-14 RX ADMIN — HYDRALAZINE HYDROCHLORIDE 100 MG: 25 TABLET, FILM COATED ORAL at 01:12

## 2019-12-14 RX ADMIN — AMLODIPINE BESYLATE 10 MG: 5 TABLET ORAL at 09:12

## 2019-12-14 RX ADMIN — ATORVASTATIN CALCIUM 20 MG: 20 TABLET, FILM COATED ORAL at 09:12

## 2019-12-14 NOTE — ASSESSMENT & PLAN NOTE
Echo noted from January 2019 revealing - Normal left ventricular systolic function. The estimated ejection fraction is 67% with Left ventricular diastolic dysfunction and Normal left atrial pressure. Mildly reduced right ventricular systolic function.    Patient denies having history of congestive heart failure.    CHF currently uncontrolled due to Pulmonary edema. This is improved after nitro drip and diuresis.  Continue lasix and on BB  Recent Labs   Lab 12/12/19  0219   BNP 3,262*   .

## 2019-12-14 NOTE — PT/OT/SLP PROGRESS
Physical Therapy Treatment    Patient Name:  Troy Yuan Sr.   MRN:  [id number]    Recommendations:     Discharge Recommendations:  home, home with home health, home health PT   Discharge Equipment Recommendations: none   Barriers to discharge: None    Assessment:     Troy Yuan Sr. is a 89 y.o. male admitted with a medical diagnosis of Acute pulmonary edema.  He presents with the following impairments/functional limitations:  impaired endurance, impaired functional mobilty, gait instability, impaired balance, weakness, decreased lower extremity function  .    Rehab Prognosis: Good; patient would benefit from acute skilled PT services to address these deficits and reach maximum level of function.    Recent Surgery: * No surgery found *      Plan:     During this hospitalization, patient to be seen 6 x/week to address the identified rehab impairments via gait training, therapeutic activities, therapeutic exercises and progress toward the following goals:    · Plan of Care Expires:  12/31/19    Subjective     Chief Complaint: none stated  Patient/Family Comments/goals: agrees to work with PT  Pain/Comfort:  ·        Objective:     Communicated with RN (Stacy) prior to session.  Patient found HOB elevated with oxygen, bed alarm, yost catheter, telemetry, SCD, blood pressure cuff upon PT entry to room.     General Precautions: Standard, fall   Orthopedic Precautions:N/A   Braces: N/A     Functional Mobility:  · Bed Mobility:     · Rolling Left:  minimum assistance  · Supine to Sit: minimum assistance  · Transfers:     · Sit to Stand:  minimum assistance with rolling walker and  x 2 reps  · Gait: 100' with RW with CGA (assist for O2 tank)      AM-PAC 6 CLICK MOBILITY          Therapeutic Activities and Exercises:   SUPINE: SLR, ankle DF, x 20 reps each    Patient left HOB elevated with all lines intact, call button in reach, RN notified, family present and O2 to wall 2 L/min via nc..    GOALS:   Multidisciplinary  Problems     Physical Therapy Goals        Problem: Physical Therapy Goal    Goal Priority Disciplines Outcome Goal Variances Interventions   Physical Therapy Goal     PT, PT/OT Ongoing, Progressing     Description:  Goals to be met by: 2019     Patient will increase functional independence with mobility by performin). Supine to sit with Modified [place]  2). Sit to supine with Modified [place]  3). Sit to stand transfer with Stand-by Assistance  4). Bed to chair transfer with Stand-by Assistance using Rolling Walker  5). Gait  x  > 150 feet with Stand-by Assistance using Rolling Walker.                       Time Tracking:     PT Received On: 19  PT Start Time: 917     PT Stop Time: 942  PT Total Time (min): 25 min     Billable Minutes: Therapeutic Activity 18 and Therapeutic Exercise 7    Treatment Type: Treatment  PT/PTA: PT     PTA Visit Number: 0     Raheem Kirkland, PT  2019

## 2019-12-14 NOTE — PT/OT/SLP EVAL
Occupational Therapy   Evaluation    Name: Troy Yuan Sr.  MRN: [id number]  Admitting Diagnosis:  Acute pulmonary edema      Recommendations:     Discharge Recommendations: home health OT, home with home health  Discharge Equipment Recommendations:  walker, rolling  Barriers to discharge:  None    Assessment:     Troy Yuan Sr. is a 89 y.o. male with a medical diagnosis of Acute pulmonary edema.  Pt was very cooperative with participation in OT evaluation. Pt required intermittent Mod A with bed mobility, mostly with sit>supine but CGA with supine>sit. Pt was doff sock, but could not don 2/2 decreased AROM LE's. Pt required frequent verbal cues with proper hand placement on walker as pt seemed confused momentarily but able to eventually correctly follow instructions. Pt's Spo2 remained in the mid 90's when ambulating and during ADL participation. Performance deficits affecting function: weakness, impaired endurance, impaired self care skills, impaired functional mobilty, gait instability, decreased lower extremity function, decreased ROM, decreased safety awareness, impaired balance.      Rehab Prognosis: Good; patient would benefit from acute skilled OT services to address these deficits and reach maximum level of function.       Plan:     Patient to be seen 3 x/week to address the above listed problems via self-care/home management, therapeutic activities, therapeutic exercises  · Plan of Care Expires: 12/28/19  · Plan of Care Reviewed with: patient, grandchild(rian)(adult grandson)    Subjective     Chief Complaint: R foot pain  Patient/Family Comments/goals: none stated    Occupational Profile:  Living Environment: Pt lives with family and has 10-12 SUDHIR to front entrance of home.  Previous level of function: Pt was independent with I/ADLs and mobility.   Equipment Used at Home:  none  Assistance upon Discharge: Pt will receive assistance from family.     Pain/Comfort:  · Pain Rating 1: (did not  rate)  · Location - Side 1: Right  · Location - Orientation 1: generalized  · Location 1: foot  · Pain Addressed 1: Distraction, Reposition  · Pain Rating Post-Intervention 1: (did not rate)    Patients cultural, spiritual, Lutheran conflicts given the current situation:      Objective:     Communicated with: nurse Stacy prior to session.  Patient found HOB elevated with oxygen, telemetry, yost catheter, pulse ox (continuous), SCD, blood pressure cuff upon OT entry to room.    General Precautions: Standard, fall   Orthopedic Precautions:N/A   Braces: N/A     Occupational Performance:    Bed Mobility:    · Patient completed Scooting/Bridging with contact guard assistance  · Patient completed Supine to Sit with contact guard assistance  · Patient completed Sit to Supine with moderate assistance with B LE and trunk    Functional Mobility/Transfers:  · Patient completed Sit <> Stand Transfer with minimum assistance  with  rolling walker   · Patient completed Toilet Transfer Stand Pivot technique with contact guard assistance with  rolling walker.  · Functional Mobility: Pt ambulated in room with CGA and RW from bed>sink>bathroom>bed again. No significant LOB.    Activities of Daily Living:  · Grooming: contact guard assistance with facial and hand hygiene while standing at sink. OT provided set up of items prior to task participation.  · Lower Body Dressing: moderate assistance to don/doff sock at EOB. Pt was able to doff R sock but could not don it. Pt given verbal cues to avoid excessive bending forward while don/doffing socks while seated as to reduce fall risk.    Cognitive/Visual Perceptual:  Cognitive/Psychosocial Skills:     -       Oriented to: Person and Place   -       Follows Commands/attention:Follows two-step commands  -       Communication: clear/fluent  -       Safety awareness/insight to disability: impaired   -       Mood/Affect/Coping skills/emotional control: Appropriate to situation and  Cooperative  Visual/Perceptual:      -Intact     Physical Exam:  Postural examination/scapula alignment:    -       Rounded shoulders  -       Forward head  Upper Extremity Range of Motion:     -       Right Upper Extremity: WFL  -       Left Upper Extremity: WFL  Upper Extremity Strength:    -       Right Upper Extremity: WFL  -       Left Upper Extremity: WFL   Strength:    -       Right Upper Extremity: WFL  -       Left Upper Extremity: WFL  Fine Motor Coordination:    -       Intact  Gross motor coordination:   WFL in B UE    AMPAC 6 Click ADL:  AMPAC Total Score: 19    Treatment & Education:  OT ed patient on safety with walker use for functional mobility with cues for hand placement & sequencing.   OT ed pt on OT role & POC as well as discharge recommendations.    Education:    Patient left HOB elevated with call button in reach, nurse notified and adult grandson present    GOALS:   Multidisciplinary Problems     Occupational Therapy Goals        Problem: Occupational Therapy Goal    Goal Priority Disciplines Outcome Interventions   Occupational Therapy Goal     OT, PT/OT Ongoing, Progressing    Description:  Goals to be met by: 12/28/2019     Patient will increase functional independence with ADLs by performing:    LE Dressing with Stand-by Assistance and Assistive Devices as needed.  Grooming while standing at sink with Supervision.  Toileting from toilet with Supervision for hygiene and clothing management.   Supine to sit with Supervision.  Toilet transfer to toilet with Supervision.  Upper extremity exercise program x10 reps per handout, with supervision.                      History:     Past Medical History:   Diagnosis Date    Bladder stones     Cancer     Encounter for blood transfusion     Hypertension     Kidney stone     Prostate cancer 2004    Radiation 2005    prostate       Past Surgical History:   Procedure Laterality Date    CYSTOSCOPY      KIDNEY STONE SURGERY  May 2014        Time Tracking:     OT Date of Treatment: 12/14/19  OT Start Time: 1039  OT Stop Time: 1108  OT Total Time (min): 29 min    Billable Minutes:Evaluation 10  Self Care/Home Management 10  Therapeutic Activity 9    Augustus Ramos OT  12/14/2019

## 2019-12-14 NOTE — PLAN OF CARE
12/14/19 0751   PRE-TX-O2   O2 Device (Oxygen Therapy) nasal cannula   $ Is the patient on Low Flow Oxygen? Yes   Flow (L/min) 2  (decreased flow from 3L to 2L for sats 100%)   SpO2 100 %   Pulse Oximetry Type Continuous   $ Pulse Oximetry - Multiple Charge Pulse Oximetry - Multiple   Pulse 86   Resp (!) 28   Wound Care   $ Wound Care Tech Time 15 min   Area of Concern Bridge of nose   Skin Color/Characteristics without discoloration   Skin Temperature warm   Preset CPAP/BiPAP Settings   Mode Of Delivery Standby

## 2019-12-14 NOTE — PLAN OF CARE
Alert and oriented X3. Reoriented to time. POC reviewed with pt. Pt verbalized understanding. Hourly/Q2 rounding completed for safety. Tele maintained. Pt voiding to urinal without difficulty. IV intact and patent. No redness or swelling. Bed in low position and lock. Call light within reach. Will continue to monitor.

## 2019-12-14 NOTE — ASSESSMENT & PLAN NOTE
Creatine stable for now. BMP reviewed- noted Estimated Creatinine Clearance: 21.9 mL/min (A) (based on SCr of 2.1 mg/dL (H)). according to latest data. Monitor UOP and serial BMP and adjust therapy as needed. Renally dose meds.  Avoid nephrotoxin agents.

## 2019-12-14 NOTE — ASSESSMENT & PLAN NOTE
Improved with PO meds. Add 6.25 Coreg BID    BP Readings from Last 3 Encounters:   12/14/19 (!) 144/66   05/21/19 (!) 158/62   01/13/19 (!) 149/73   .

## 2019-12-14 NOTE — PLAN OF CARE
Pt alert oriented to self only. Follows simple commands. Fidgety with covers, tubing. Sits up in bed and scoots down. Reaches for yost cath numerous times. Bilateral wrist restraints maintained for safety. Tridil drip weaned off. Tolerates taking po meds without problems. Safety measures in place.

## 2019-12-14 NOTE — PLAN OF CARE
Problem: Occupational Therapy Goal  Goal: Occupational Therapy Goal  Description  Goals to be met by: 12/28/2019     Patient will increase functional independence with ADLs by performing:    LE Dressing with Stand-by Assistance and Assistive Devices as needed.  Grooming while standing at sink with Supervision.  Toileting from toilet with Supervision for hygiene and clothing management.   Supine to sit with Supervision.  Toilet transfer to toilet with Supervision.  Upper extremity exercise program x10 reps per handout, with supervision.     Outcome: Ongoing, Progressing

## 2019-12-14 NOTE — PLAN OF CARE
Pt transferred to [facility].  Primary nurse Stephanie vale.  Neumann cath removed prior to transfer as well as 1 IV cath.  1 cath remains.  Tele in use [id number].    Pt improving.  BP improved with scheduled and PRN medication.  Does elevate with movement.  Participated with PT and OT.  Confused but pleasant aware of self.  Son at bedside.  Needs in reach.

## 2019-12-14 NOTE — PLAN OF CARE
Calmer today but restless and picks at lines and covers intermittently. Oriented to person only. Respirations unlabored. 3L NC in use. Appetite good. Neumann intact with good urine output. Diuresing with lasix. Brief on . No BM this shift.   Remains on Tridil, wean as tolerated. Safety maintained. Ambulated in hallway with pt assist.

## 2019-12-14 NOTE — ASSESSMENT & PLAN NOTE
Lab Results   Component Value Date    CHOL 157 12/12/2019    CHOL 200 (H) 01/11/2019    CHOL 171 03/18/2014     Lab Results   Component Value Date    HDL 69 12/12/2019    HDL 65 01/11/2019    HDL 55 03/18/2014     Lab Results   Component Value Date    LDLCALC 83.0 12/12/2019    LDLCALC 122.2 01/11/2019    LDLCALC 101.8 03/18/2014     Lab Results   Component Value Date    TRIG 25 (L) 12/12/2019    TRIG 64 01/11/2019    TRIG 71 03/18/2014     Lab Results   Component Value Date    CHOLHDL 43.9 12/12/2019    CHOLHDL 32.5 01/11/2019    CHOLHDL 32.2 03/18/2014       Chronic, uncontrolled.  Will continue home medication.  Recheck lipid panel.

## 2019-12-14 NOTE — PROGRESS NOTES
"[facility] Medicine  Progress Note    Patient Name: Troy Yuan Sr.  MRN: [id number]  Patient Class: IP- Inpatient   Admission Date: 12/12/2019  Length of Stay: 2 days  Attending Physician: Sonia Hall MD  Primary Care Provider: Gentry Encinas MD        Subjective:     Principal Problem:Acute pulmonary edema        HPI:  Troy Yuan Sr. is a 89-year-old male with past medical history of hypertension, chronic kidney disease, hyperlipidemia, and rheumatoid arthritis who presented to the emergency room tonight with reports of shortness of breath.  Patient states "I could not breathe."  Patient reports onset of symptoms at approximately 12:30 a.m. Patient also reports leg swelling. Patient denies recent sick contacts or recent travel.  Patient denies chest pain, nausea, vomiting, or diarrhea.  Patient denies use supplemental oxygen at home.  Patient accompanied by his daughter, Lakia and grandson at bedside.  Emergency room patient noted to be in acute pulmonary edema with associated hypertensive urgency.  Patient started on nitroglycerin drip and given 40 mg of IV Lasix.  Patient requiring BiPAP therapy.  Patient admitted to ICU on 12/12/2019 at approximately 5:30 a.m..    Overview/Hospital Course:  No notes on file    Interval History:   Patient seen and examined. No acute events overnight.  Breathing improved as well as BP.  No other acute events.     Review of Systems   Unable to perform ROS: Other   Constitutional: Positive for fatigue. Negative for activity change, appetite change and fever.   HENT: Negative.    Eyes: Negative.    Respiratory: Positive for shortness of breath. Negative for chest tightness and wheezing.    Cardiovascular: Negative for chest pain, palpitations and leg swelling.   Gastrointestinal: Negative for abdominal distention, abdominal pain, blood in stool, diarrhea and vomiting.   Genitourinary: Negative for dysuria and hematuria.   Neurological: " Negative for headaches.   Hematological: Negative for adenopathy.   Psychiatric/Behavioral: Negative for confusion.   confused -      Objective:     Vital Signs (Most Recent):  Temp: 97.5 °F (36.4 °C) (12/13/19 0315)  Pulse: 76 (12/13/19 0615)  Resp: (!) 25 (12/13/19 0615)  BP: (!) 181/77 (12/13/19 0615)  SpO2: 99 % (12/13/19 0615) Vital Signs (24h Range):  Temp:  [97.5 °F (36.4 °C)-99.5 °F (37.5 °C)] 97.5 °F (36.4 °C)  Pulse:  [] 76  Resp:  [14-40] 25  SpO2:  [91 %-100 %] 99 %  BP: (123-217)/() 181/77     Weight: 68.8 kg (151 lb 10.8 oz)  Body mass index is 23.76 kg/m².    Intake/Output Summary (Last 24 hours) at 12/13/2019 0822  Last data filed at 12/13/2019 0600  Gross per 24 hour   Intake 1211.18 ml   Output 2620 ml   Net -1408.82 ml      Physical Exam   Constitutional: He is oriented to person, place, and time. He appears well-developed and well-nourished. No distress.   Chronically ill-appearing   HENT:   Head: Normocephalic and atraumatic.   Eyes: Pupils are equal, round, and reactive to light. EOM are normal. Right eye exhibits no discharge. Left eye exhibits no discharge.   Neck: Normal range of motion. Neck supple. JVD present. No thyromegaly present.   Cardiovascular: Normal rate, regular rhythm and intact distal pulses. Exam reveals no gallop and no friction rub.   Murmur heard.  Muffled heart sounds, positive murmur   Pulmonary/Chest: Effort normal and breath sounds normal. No respiratory distress. He has no wheezes. He exhibits no tenderness.   Inspiratory and expiratory wheezing noted throughout all lung fields upon auscultation. Mild crackles at the bases   Abdominal: Soft. Bowel sounds are normal. He exhibits no distension. There is no tenderness. There is no rebound and no guarding.   Genitourinary:   Genitourinary Comments: Exam Deferred      Musculoskeletal: Normal range of motion. He exhibits edema. He exhibits no tenderness or deformity.   +3 pitting edema noted to bilateral lower  extremities.   Neurological: He is alert and oriented to person, place, and time. No cranial nerve deficit or sensory deficit.   Skin: Skin is warm and dry. Capillary refill takes 2 to 3 seconds. No rash noted. He is not diaphoretic. No erythema.   Dry skin   Psychiatric: He has a normal mood and affect. His behavior is normal. Judgment and thought content normal.   Nursing note and vitals reviewed.      Significant Labs:   Lab Results   Component Value Date    WBC 9.30 12/13/2019    HGB 8.9 (L) 12/13/2019    HCT 28.8 (L) 12/13/2019    MCV 99 (H) 12/13/2019     12/13/2019         Significant Imaging: I have reviewed and interpreted all pertinent imaging results/findings within the past 24 hours.      Assessment/Plan:      * Acute pulmonary edema  Lasix IV - strict I&O -continuous telemetry monitoring - BiPAP therapy - p.r.n.   - Monitor Cr    Rheumatoid arthritis  Chronic, controlled.  Will continue home medication.      Hyperlipidemia  Lab Results   Component Value Date    CHOL 157 12/12/2019    CHOL 200 (H) 01/11/2019    CHOL 171 03/18/2014     Lab Results   Component Value Date    HDL 69 12/12/2019    HDL 65 01/11/2019    HDL 55 03/18/2014     Lab Results   Component Value Date    LDLCALC 83.0 12/12/2019    LDLCALC 122.2 01/11/2019    LDLCALC 101.8 03/18/2014     Lab Results   Component Value Date    TRIG 25 (L) 12/12/2019    TRIG 64 01/11/2019    TRIG 71 03/18/2014     Lab Results   Component Value Date    CHOLHDL 43.9 12/12/2019    CHOLHDL 32.5 01/11/2019    CHOLHDL 32.2 03/18/2014       Chronic, uncontrolled.  Will continue home medication.  Recheck lipid panel.    NSTEMI (non-ST elevated myocardial infarction)  - monitor troponins  - cardiology on board    Hypokalemia  Continuous telemetry monitoring - p.r.n. replacements      Acute respiratory failure with hypoxia  PRN BIPAP but suspect stable for transfer to floor      Hypertensive urgency  Resolved      Acute on chronic diastolic heart failure  Echo  noted from January 2019 revealing - Normal left ventricular systolic function. The estimated ejection fraction is 67% with Left ventricular diastolic dysfunction and Normal left atrial pressure. Mildly reduced right ventricular systolic function.    Patient denies having history of congestive heart failure.    CHF currently uncontrolled due to Pulmonary edema. This is improved after nitro drip and diuresis.  Continue lasix and on BB  Recent Labs   Lab 12/12/19  0219   BNP 3,262*   .              SOB (shortness of breath)  Improved. Continue lasix mindful of rising creatinine       Essential hypertension  Improved with PO meds. Add 6.25 Coreg BID    BP Readings from Last 3 Encounters:   12/14/19 (!) 144/66   05/21/19 (!) 158/62   01/13/19 (!) 149/73   .        Stage 3 chronic kidney disease  Creatine stable for now. BMP reviewed- noted Estimated Creatinine Clearance: 21.9 mL/min (A) (based on SCr of 2.1 mg/dL (H)). according to latest data. Monitor UOP and serial BMP and adjust therapy as needed. Renally dose meds.  Avoid nephrotoxin agents.            Macrocytic anemia  Monitor and transfuse if patient becomes hemodynamically unstable or H/H < 7/21    Daily CBC, stable - no evidence of active or acute bleeding on initial physical exam.  Lab Results   Component Value Date    WBC 7.94 12/12/2019    HGB 10.0 (L) 12/12/2019    HCT 32.4 (L) 12/12/2019     (H) 12/12/2019     12/12/2019           BPH (benign prostatic hypertrophy) with urinary retention  Chronic, controlled.  Will continue home medication.        VTE Risk Mitigation (From admission, onward)         Ordered     IP VTE HIGH RISK PATIENT  Once      12/12/19 0600     Place JACKI hose  Until discontinued      12/12/19 0600     Place sequential compression device  Until discontinued      12/12/19 0600                Critical care time spent on the evaluation and treatment of severe organ dysfunction, review of pertinent labs and imaging studies,  discussions with consulting providers and discussions with patient/family: 25 minutes.      Kris Woodward MD  Department of Hospital Medicine   [facility]

## 2019-12-15 PROBLEM — E87.6 HYPOKALEMIA: Status: RESOLVED | Noted: 2019-12-12 | Resolved: 2019-12-15

## 2019-12-15 PROBLEM — G93.41 ENCEPHALOPATHY, METABOLIC: Status: RESOLVED | Noted: 2019-12-13 | Resolved: 2019-12-15

## 2019-12-15 LAB
ANION GAP SERPL CALC-SCNC: 15 MMOL/L (ref 8–16)
BUN SERPL-MCNC: 28 MG/DL (ref 8–23)
CALCIUM SERPL-MCNC: 9.6 MG/DL (ref 8.7–10.5)
CHLORIDE SERPL-SCNC: 102 MMOL/L (ref 95–110)
CO2 SERPL-SCNC: 25 MMOL/L (ref 23–29)
CREAT SERPL-MCNC: 2.2 MG/DL (ref 0.5–1.4)
EST. GFR  (AFRICAN AMERICAN): 29 ML/MIN/1.73 M^2
EST. GFR  (NON AFRICAN AMERICAN): 25 ML/MIN/1.73 M^2
GLUCOSE SERPL-MCNC: 112 MG/DL (ref 70–110)
POTASSIUM SERPL-SCNC: 3.9 MMOL/L (ref 3.5–5.1)
SODIUM SERPL-SCNC: 142 MMOL/L (ref 136–145)

## 2019-12-15 PROCEDURE — 80048 BASIC METABOLIC PNL TOTAL CA: CPT

## 2019-12-15 PROCEDURE — 25000003 PHARM REV CODE 250: Performed by: NURSE PRACTITIONER

## 2019-12-15 PROCEDURE — 25000003 PHARM REV CODE 250: Performed by: INTERNAL MEDICINE

## 2019-12-15 PROCEDURE — 63600175 PHARM REV CODE 636 W HCPCS: Performed by: INTERNAL MEDICINE

## 2019-12-15 PROCEDURE — 94761 N-INVAS EAR/PLS OXIMETRY MLT: CPT

## 2019-12-15 PROCEDURE — 63600175 PHARM REV CODE 636 W HCPCS: Performed by: HOSPITALIST

## 2019-12-15 PROCEDURE — 36415 COLL VENOUS BLD VENIPUNCTURE: CPT

## 2019-12-15 PROCEDURE — 25000003 PHARM REV CODE 250: Performed by: SPECIALIST

## 2019-12-15 PROCEDURE — 12000002 HC ACUTE/MED SURGE SEMI-PRIVATE ROOM

## 2019-12-15 PROCEDURE — 25000003 PHARM REV CODE 250: Performed by: HOSPITALIST

## 2019-12-15 RX ORDER — CARVEDILOL 6.25 MG/1
12.5 TABLET ORAL 2 TIMES DAILY
Status: DISCONTINUED | OUTPATIENT
Start: 2019-12-15 | End: 2019-12-16

## 2019-12-15 RX ORDER — HALOPERIDOL 5 MG/ML
5 INJECTION INTRAMUSCULAR EVERY 4 HOURS PRN
Status: DISCONTINUED | OUTPATIENT
Start: 2019-12-15 | End: 2019-12-16

## 2019-12-15 RX ADMIN — CARVEDILOL 12.5 MG: 6.25 TABLET, FILM COATED ORAL at 09:12

## 2019-12-15 RX ADMIN — SENNOSIDES AND DOCUSATE SODIUM 1 TABLET: 8.6; 5 TABLET ORAL at 09:12

## 2019-12-15 RX ADMIN — HYDRALAZINE HYDROCHLORIDE 100 MG: 25 TABLET, FILM COATED ORAL at 09:12

## 2019-12-15 RX ADMIN — AMLODIPINE BESYLATE 10 MG: 5 TABLET ORAL at 09:12

## 2019-12-15 RX ADMIN — HALOPERIDOL LACTATE 5 MG: 5 INJECTION, SOLUTION INTRAMUSCULAR at 11:12

## 2019-12-15 RX ADMIN — CARVEDILOL 6.25 MG: 6.25 TABLET, FILM COATED ORAL at 09:12

## 2019-12-15 RX ADMIN — FERROUS SULFATE TAB EC 325 MG (65 MG FE EQUIVALENT) 325 MG: 325 (65 FE) TABLET DELAYED RESPONSE at 09:12

## 2019-12-15 RX ADMIN — FUROSEMIDE 40 MG: 10 INJECTION, SOLUTION INTRAMUSCULAR; INTRAVENOUS at 09:12

## 2019-12-15 RX ADMIN — ATORVASTATIN CALCIUM 20 MG: 20 TABLET, FILM COATED ORAL at 09:12

## 2019-12-15 RX ADMIN — ALFUZOSIN HYDROCHLORIDE 10 MG: 10 TABLET ORAL at 09:12

## 2019-12-15 RX ADMIN — HYDROXYCHLOROQUINE SULFATE 200 MG: 200 TABLET, FILM COATED ORAL at 09:12

## 2019-12-15 RX ADMIN — FAMOTIDINE 20 MG: 20 TABLET ORAL at 09:12

## 2019-12-15 RX ADMIN — HYDRALAZINE HYDROCHLORIDE 100 MG: 25 TABLET, FILM COATED ORAL at 02:12

## 2019-12-15 RX ADMIN — HYDRALAZINE HYDROCHLORIDE 100 MG: 25 TABLET, FILM COATED ORAL at 06:12

## 2019-12-15 RX ADMIN — HALOPERIDOL LACTATE 5 MG: 5 INJECTION, SOLUTION INTRAMUSCULAR at 06:12

## 2019-12-15 RX ADMIN — HALOPERIDOL LACTATE 5 MG: 5 INJECTION, SOLUTION INTRAMUSCULAR at 02:12

## 2019-12-15 NOTE — PROGRESS NOTES
Progress Note  Cardiology    Admit Date: 12/12/2019   LOS: 3 days     Follow-up For:  CHF    Scheduled Meds:   alfuzosin  10 mg Oral Daily with breakfast    amLODIPine  10 mg Oral Daily    atorvastatin  20 mg Oral Daily    carvedilol  12.5 mg Oral BID    famotidine  20 mg Oral Daily    ferrous sulfate  325 mg Oral Daily    furosemide  40 mg Intravenous Daily    hydrALAZINE  100 mg Oral Q8H    hydroxychloroquine  200 mg Oral Daily    senna-docusate 8.6-50 mg  1 tablet Oral BID     Continuous Infusions:  PRN Meds:haloperidol lactate, hydrALAZINE, magnesium oxide, magnesium oxide, ondansetron, ondansetron, potassium chloride 10%, potassium chloride 10%, potassium chloride 10%, potassium, sodium phosphates, potassium, sodium phosphates, potassium, sodium phosphates, sodium chloride 0.9%    Review of patient's allergies indicates:   Allergen Reactions    Soma [carisoprodol] Rash    Aspirin     Sulfa (sulfonamide antibiotics) Rash       SUBJECTIVE:     Interval History: Patient has no complaint of SOB.    Review of Systems  Respiratory: positive for cough, negative for hemoptysis, sputum and wheezing  Cardiovascular: negative for chest pressure/discomfort, dyspnea, orthopnea, palpitations and paroxysmal nocturnal dyspnea    OBJECTIVE:     Vital Signs (Most Recent)  Temp: 98.4 °F (36.9 °C) (12/15/19 1611)  Pulse: 70 (12/15/19 1611)  Resp: 18 (12/15/19 1611)  BP: (!) 178/78 (12/15/19 1611)  SpO2: (!) 93 % (12/15/19 1611)    Vital Signs Range (Last 24H):  Temp:  [96.9 °F (36.1 °C)-98.8 °F (37.1 °C)]   Pulse:  [64-90]   Resp:  [18-20]   BP: (145-179)/(64-81)   SpO2:  [92 %-97 %]       Physical Exam:  Neck: no carotid bruit, no JVD and supple, symmetrical, trachea midline  Lungs: clear to auscultation bilaterally, normal respiratory effort  Heart: regular rate and rhythm, S1, S2 normal, no murmur, click, rub or gallop  Abdomen: soft, non-tender; bowel sounds normal; no masses,  no organomegaly  Extremities:  Negative for: edema    Recent Results (from the past 24 hour(s))   Basic metabolic panel    Collection Time: 12/15/19  5:06 AM   Result Value Ref Range    Sodium 142 136 - 145 mmol/L    Potassium 3.9 3.5 - 5.1 mmol/L    Chloride 102 95 - 110 mmol/L    CO2 25 23 - 29 mmol/L    Glucose 112 (H) 70 - 110 mg/dL    BUN, Bld 28 (H) 8 - 23 mg/dL    Creatinine 2.2 (H) 0.5 - 1.4 mg/dL    Calcium 9.6 8.7 - 10.5 mg/dL    Anion Gap 15 8 - 16 mmol/L    eGFR if African American 29 (A) >60 mL/min/1.73 m^2    eGFR if non African American 25 (A) >60 mL/min/1.73 m^2       Diagnostic Results:  Labs: Reviewed    ASSESSMENT/PLAN:   Doing better. SystolIc HTN. creat 2.2. May need nifedipine ER if SBP stays elevated. May be 2/2 non compliant arteries.

## 2019-12-15 NOTE — SUBJECTIVE & OBJECTIVE
Interval History: Patient seen and examined. Breathing much improved. No acute events overnight.    Review of Systems   Constitutional: Positive for fatigue. Negative for activity change, appetite change and fever.   HENT: Negative.    Eyes: Negative.    Respiratory: Negative for chest tightness, shortness of breath and wheezing.    Cardiovascular: Negative for chest pain, palpitations and leg swelling.   Gastrointestinal: Negative for abdominal distention, abdominal pain, blood in stool, diarrhea and vomiting.   Genitourinary: Negative for dysuria and hematuria.   Neurological: Negative for headaches.   Hematological: Negative for adenopathy.   Psychiatric/Behavioral: Negative for confusion.     Objective:     Vital Signs (Most Recent):  Temp: 98.8 °F (37.1 °C) (12/15/19 0930)  Pulse: 72 (12/15/19 0930)  Resp: 20 (12/15/19 0930)  BP: (!) 145/65 (12/15/19 0930)  SpO2: 97 % (12/15/19 0930) Vital Signs (24h Range):  Temp:  [96.9 °F (36.1 °C)-98.8 °F (37.1 °C)] 98.8 °F (37.1 °C)  Pulse:  [64-90] 72  Resp:  [18-21] 20  SpO2:  [92 %-100 %] 97 %  BP: (145-179)/(64-81) 145/65     Weight: 67.9 kg (149 lb 11.1 oz)  Body mass index is 23.45 kg/m².    Intake/Output Summary (Last 24 hours) at 12/15/2019 0947  Last data filed at 12/15/2019 0432  Gross per 24 hour   Intake 420 ml   Output 250 ml   Net 170 ml      Physical Exam   Constitutional: He is oriented to person, place, and time. He appears well-developed and well-nourished. No distress.   HENT:   Head: Normocephalic and atraumatic.   Eyes: Pupils are equal, round, and reactive to light.   Neck: Neck supple. No thyromegaly present.   Cardiovascular: Normal rate and regular rhythm. Exam reveals no gallop and no friction rub.   No murmur heard.  Pulmonary/Chest: Effort normal and breath sounds normal. No respiratory distress. He has no wheezes.   Abdominal: Soft. Bowel sounds are normal. He exhibits no distension. There is no tenderness. There is no guarding.    Musculoskeletal: Normal range of motion. He exhibits no edema.   Neurological: He is alert and oriented to person, place, and time.   Skin: Skin is warm and dry. No erythema.   Psychiatric: He has a normal mood and affect.       Significant Labs: CBC: No results for input(s): WBC, HGB, HCT, PLT in the last 48 hours.    Significant Imaging: I have reviewed all pertinent imaging results/findings within the past 24 hours.

## 2019-12-15 NOTE — ASSESSMENT & PLAN NOTE
Creatinine relatively stable and suspect near baseline.   Given daily Lasix today- if increase may consider holding tomorrow

## 2019-12-15 NOTE — PLAN OF CARE
Alert and oriented X3. Reoriented to time. POC reviewed with pt. Pt verbalized understanding. Hourly/Q2 rounding completed for safety. Tele maintained. Condom catheter in place. Pt attempting to pull out IV, tearing brief apart, attempting to climb out of bed.  Prn haldol given for agitation.  IV intact and patent. No redness or swelling. Bed in low position and lock. Call light within reach. Will continue to monitor.

## 2019-12-15 NOTE — ASSESSMENT & PLAN NOTE
Improved with PO meds. Add 6.25 Coreg BID    BP Readings from Last 3 Encounters:   12/15/19 (!) 145/65   05/21/19 (!) 158/62   01/13/19 (!) 149/73   .

## 2019-12-15 NOTE — PROGRESS NOTES
"[facility] Medicine  Progress Note    Patient Name: Troy Yuan Sr.  MRN: [id number]  Patient Class: IP- Inpatient   Admission Date: 12/12/2019  Length of Stay: 3 days  Attending Physician: Sonia Hall MD  Primary Care Provider: Gentry Encinas MD        Subjective:     Principal Problem:Acute pulmonary edema        HPI:  Troy Yuan Sr. is a 89-year-old male with past medical history of hypertension, chronic kidney disease, hyperlipidemia, and rheumatoid arthritis who presented to the emergency room tonight with reports of shortness of breath.  Patient states "I could not breathe."  Patient reports onset of symptoms at approximately 12:30 a.m. Patient also reports leg swelling. Patient denies recent sick contacts or recent travel.  Patient denies chest pain, nausea, vomiting, or diarrhea.  Patient denies use supplemental oxygen at home.  Patient accompanied by his daughter, Lakia and grandson at bedside.  Emergency room patient noted to be in acute pulmonary edema with associated hypertensive urgency.  Patient started on nitroglycerin drip and given 40 mg of IV Lasix.  Patient requiring BiPAP therapy.  Patient admitted to ICU on 12/12/2019 at approximately 5:30 a.m..    Overview/Hospital Course:  No notes on file    Interval History: Patient seen and examined. Breathing much improved. No acute events overnight.    Review of Systems   Constitutional: Positive for fatigue. Negative for activity change, appetite change and fever.   HENT: Negative.    Eyes: Negative.    Respiratory: Negative for chest tightness, shortness of breath and wheezing.    Cardiovascular: Negative for chest pain, palpitations and leg swelling.   Gastrointestinal: Negative for abdominal distention, abdominal pain, blood in stool, diarrhea and vomiting.   Genitourinary: Negative for dysuria and hematuria.   Neurological: Negative for headaches.   Hematological: Negative for adenopathy. "   Psychiatric/Behavioral: Negative for confusion.     Objective:     Vital Signs (Most Recent):  Temp: 98.8 °F (37.1 °C) (12/15/19 0930)  Pulse: 72 (12/15/19 0930)  Resp: 20 (12/15/19 0930)  BP: (!) 145/65 (12/15/19 0930)  SpO2: 97 % (12/15/19 0930) Vital Signs (24h Range):  Temp:  [96.9 °F (36.1 °C)-98.8 °F (37.1 °C)] 98.8 °F (37.1 °C)  Pulse:  [64-90] 72  Resp:  [18-21] 20  SpO2:  [92 %-100 %] 97 %  BP: (145-179)/(64-81) 145/65     Weight: 67.9 kg (149 lb 11.1 oz)  Body mass index is 23.45 kg/m².    Intake/Output Summary (Last 24 hours) at 12/15/2019 0947  Last data filed at 12/15/2019 0432  Gross per 24 hour   Intake 420 ml   Output 250 ml   Net 170 ml      Physical Exam   Constitutional: He is oriented to person, place, and time. He appears well-developed and well-nourished. No distress.   HENT:   Head: Normocephalic and atraumatic.   Eyes: Pupils are equal, round, and reactive to light.   Neck: Neck supple. No thyromegaly present.   Cardiovascular: Normal rate and regular rhythm. Exam reveals no gallop and no friction rub.   No murmur heard.  Pulmonary/Chest: Effort normal and breath sounds normal. No respiratory distress. He has no wheezes.   Abdominal: Soft. Bowel sounds are normal. He exhibits no distension. There is no tenderness. There is no guarding.   Musculoskeletal: Normal range of motion. He exhibits no edema.   Neurological: He is alert and oriented to person, place, and time.   Skin: Skin is warm and dry. No erythema.   Psychiatric: He has a normal mood and affect.       Significant Labs: CBC: No results for input(s): WBC, HGB, HCT, PLT in the last 48 hours.    Significant Imaging: I have reviewed all pertinent imaging results/findings within the past 24 hours.      Assessment/Plan:      * Acute pulmonary edema  Lasix IV - strict I&O -continuous telemetry monitoring - BiPAP therapy - p.r.n.   - Monitor Cr    Rheumatoid arthritis  Chronic, controlled.  Will continue home  medication.      Hyperlipidemia  Lab Results   Component Value Date    CHOL 157 12/12/2019    CHOL 200 (H) 01/11/2019    CHOL 171 03/18/2014     Lab Results   Component Value Date    HDL 69 12/12/2019    HDL 65 01/11/2019    HDL 55 03/18/2014     Lab Results   Component Value Date    LDLCALC 83.0 12/12/2019    LDLCALC 122.2 01/11/2019    LDLCALC 101.8 03/18/2014     Lab Results   Component Value Date    TRIG 25 (L) 12/12/2019    TRIG 64 01/11/2019    TRIG 71 03/18/2014     Lab Results   Component Value Date    CHOLHDL 43.9 12/12/2019    CHOLHDL 32.5 01/11/2019    CHOLHDL 32.2 03/18/2014       Chronic, uncontrolled.  Will continue home medication.  Recheck lipid panel.    NSTEMI (non-ST elevated myocardial infarction)  - monitor troponins  - cardiology on board    Acute respiratory failure with hypoxia  PRN BIPAP but suspect stable for transfer to floor      Hypertensive urgency  Resolved      Acute on chronic diastolic heart failure  Echo noted from January 2019 revealing - Normal left ventricular systolic function. The estimated ejection fraction is 67% with Left ventricular diastolic dysfunction and Normal left atrial pressure. Mildly reduced right ventricular systolic function.    Patient denies having history of congestive heart failure.    CHF currently uncontrolled due to Pulmonary edema. This is improved after nitro drip and diuresis.  Continue lasix and on BB  Recent Labs   Lab 12/12/19  0219   BNP 3,262*   .              SOB (shortness of breath)  Improved. Continue lasix mindful of creatinine      Essential hypertension  Improved with PO meds. Add 6.25 Coreg BID    BP Readings from Last 3 Encounters:   12/15/19 (!) 145/65   05/21/19 (!) 158/62   01/13/19 (!) 149/73   .        Stage 3 chronic kidney disease  Creatinine relatively stable and suspect near baseline.   Given daily Lasix today- if increase may consider holding tomorrow          Macrocytic anemia  Monitor and transfuse if patient becomes  hemodynamically unstable or H/H < 7/21    Daily CBC, stable - no evidence of active or acute bleeding on initial physical exam.  Lab Results   Component Value Date    WBC 7.94 12/12/2019    HGB 10.0 (L) 12/12/2019    HCT 32.4 (L) 12/12/2019     (H) 12/12/2019     12/12/2019           BPH (benign prostatic hypertrophy) with urinary retention  Chronic, controlled.  Will continue home medication.        VTE Risk Mitigation (From admission, onward)         Ordered     IP VTE HIGH RISK PATIENT  Once      12/12/19 0600     Place JACKI hose  Until discontinued      12/12/19 0600     Place sequential compression device  Until discontinued      12/12/19 0600                      Kris Woodward MD  Department of Hospital Medicine   [facility]

## 2019-12-15 NOTE — PLAN OF CARE
Patient oriented to self only and not easily redirectable.  Continues to remove telemetry and IV's.  Prn Haldol given.  Granddaughter remains at bedside.

## 2019-12-16 PROBLEM — R06.02 SOB (SHORTNESS OF BREATH): Status: RESOLVED | Noted: 2019-12-12 | Resolved: 2019-12-16

## 2019-12-16 PROBLEM — J81.0 ACUTE PULMONARY EDEMA: Status: RESOLVED | Noted: 2019-12-12 | Resolved: 2019-12-16

## 2019-12-16 PROBLEM — I21.4 NSTEMI (NON-ST ELEVATED MYOCARDIAL INFARCTION): Status: RESOLVED | Noted: 2019-12-12 | Resolved: 2019-12-16

## 2019-12-16 PROBLEM — J96.01 ACUTE RESPIRATORY FAILURE WITH HYPOXIA: Status: RESOLVED | Noted: 2019-12-12 | Resolved: 2019-12-16

## 2019-12-16 PROBLEM — N17.9 ACUTE RENAL FAILURE SUPERIMPOSED ON STAGE 4 CHRONIC KIDNEY DISEASE: Status: ACTIVE | Noted: 2019-12-16

## 2019-12-16 PROBLEM — N18.30 STAGE 3 CHRONIC KIDNEY DISEASE: Status: RESOLVED | Noted: 2019-01-11 | Resolved: 2019-12-16

## 2019-12-16 PROBLEM — N18.4 ACUTE RENAL FAILURE SUPERIMPOSED ON STAGE 4 CHRONIC KIDNEY DISEASE: Status: ACTIVE | Noted: 2019-12-16

## 2019-12-16 LAB
ANION GAP SERPL CALC-SCNC: 14 MMOL/L (ref 8–16)
BNP SERPL-MCNC: 2281 PG/ML (ref 0–99)
BUN SERPL-MCNC: 32 MG/DL (ref 8–23)
CALCIUM SERPL-MCNC: 9 MG/DL (ref 8.7–10.5)
CHLORIDE SERPL-SCNC: 102 MMOL/L (ref 95–110)
CO2 SERPL-SCNC: 25 MMOL/L (ref 23–29)
CREAT SERPL-MCNC: 2.5 MG/DL (ref 0.5–1.4)
EST. GFR  (AFRICAN AMERICAN): 25 ML/MIN/1.73 M^2
EST. GFR  (NON AFRICAN AMERICAN): 22 ML/MIN/1.73 M^2
GLUCOSE SERPL-MCNC: 103 MG/DL (ref 70–110)
POTASSIUM SERPL-SCNC: 3.6 MMOL/L (ref 3.5–5.1)
SODIUM SERPL-SCNC: 141 MMOL/L (ref 136–145)

## 2019-12-16 PROCEDURE — 63600175 PHARM REV CODE 636 W HCPCS: Performed by: INTERNAL MEDICINE

## 2019-12-16 PROCEDURE — 80048 BASIC METABOLIC PNL TOTAL CA: CPT

## 2019-12-16 PROCEDURE — 25000003 PHARM REV CODE 250: Performed by: INTERNAL MEDICINE

## 2019-12-16 PROCEDURE — 12000002 HC ACUTE/MED SURGE SEMI-PRIVATE ROOM

## 2019-12-16 PROCEDURE — 97116 GAIT TRAINING THERAPY: CPT

## 2019-12-16 PROCEDURE — 25000003 PHARM REV CODE 250: Performed by: HOSPITALIST

## 2019-12-16 PROCEDURE — 36415 COLL VENOUS BLD VENIPUNCTURE: CPT

## 2019-12-16 PROCEDURE — 63600175 PHARM REV CODE 636 W HCPCS: Performed by: SPECIALIST

## 2019-12-16 PROCEDURE — 63600175 PHARM REV CODE 636 W HCPCS: Performed by: HOSPITALIST

## 2019-12-16 PROCEDURE — 83880 ASSAY OF NATRIURETIC PEPTIDE: CPT

## 2019-12-16 PROCEDURE — 25000003 PHARM REV CODE 250: Performed by: NURSE PRACTITIONER

## 2019-12-16 PROCEDURE — 25000003 PHARM REV CODE 250: Performed by: SPECIALIST

## 2019-12-16 PROCEDURE — 94761 N-INVAS EAR/PLS OXIMETRY MLT: CPT

## 2019-12-16 RX ORDER — ZIPRASIDONE MESYLATE 20 MG/ML
10 INJECTION, POWDER, LYOPHILIZED, FOR SOLUTION INTRAMUSCULAR EVERY 6 HOURS PRN
Status: DISCONTINUED | OUTPATIENT
Start: 2019-12-16 | End: 2019-12-17 | Stop reason: HOSPADM

## 2019-12-16 RX ORDER — HALOPERIDOL 1 MG/1
2 TABLET ORAL
Status: DISCONTINUED | OUTPATIENT
Start: 2019-12-16 | End: 2019-12-17 | Stop reason: HOSPADM

## 2019-12-16 RX ORDER — FUROSEMIDE 40 MG/1
40 TABLET ORAL DAILY
Status: DISCONTINUED | OUTPATIENT
Start: 2019-12-16 | End: 2019-12-17 | Stop reason: HOSPADM

## 2019-12-16 RX ORDER — METOPROLOL SUCCINATE 50 MG/1
50 TABLET, EXTENDED RELEASE ORAL DAILY
Status: DISCONTINUED | OUTPATIENT
Start: 2019-12-17 | End: 2019-12-17 | Stop reason: HOSPADM

## 2019-12-16 RX ORDER — ISOSORBIDE MONONITRATE 30 MG/1
60 TABLET, EXTENDED RELEASE ORAL DAILY
Status: DISCONTINUED | OUTPATIENT
Start: 2019-12-16 | End: 2019-12-17 | Stop reason: HOSPADM

## 2019-12-16 RX ADMIN — FAMOTIDINE 20 MG: 20 TABLET ORAL at 09:12

## 2019-12-16 RX ADMIN — SENNOSIDES AND DOCUSATE SODIUM 1 TABLET: 8.6; 5 TABLET ORAL at 09:12

## 2019-12-16 RX ADMIN — FERROUS SULFATE TAB EC 325 MG (65 MG FE EQUIVALENT) 325 MG: 325 (65 FE) TABLET DELAYED RESPONSE at 09:12

## 2019-12-16 RX ADMIN — HALOPERIDOL LACTATE 5 MG: 5 INJECTION, SOLUTION INTRAMUSCULAR at 06:12

## 2019-12-16 RX ADMIN — CARVEDILOL 12.5 MG: 6.25 TABLET, FILM COATED ORAL at 09:12

## 2019-12-16 RX ADMIN — ISOSORBIDE MONONITRATE 60 MG: 30 TABLET, EXTENDED RELEASE ORAL at 12:12

## 2019-12-16 RX ADMIN — SENNOSIDES AND DOCUSATE SODIUM 1 TABLET: 8.6; 5 TABLET ORAL at 08:12

## 2019-12-16 RX ADMIN — HYDRALAZINE HYDROCHLORIDE 100 MG: 25 TABLET, FILM COATED ORAL at 09:12

## 2019-12-16 RX ADMIN — FUROSEMIDE 40 MG: 40 TABLET ORAL at 12:12

## 2019-12-16 RX ADMIN — ZIPRASIDONE MESYLATE 10 MG: 20 INJECTION, POWDER, LYOPHILIZED, FOR SOLUTION INTRAMUSCULAR at 08:12

## 2019-12-16 RX ADMIN — HYDRALAZINE HYDROCHLORIDE 100 MG: 25 TABLET, FILM COATED ORAL at 02:12

## 2019-12-16 RX ADMIN — AMLODIPINE BESYLATE 10 MG: 5 TABLET ORAL at 09:12

## 2019-12-16 RX ADMIN — FUROSEMIDE 40 MG: 10 INJECTION, SOLUTION INTRAMUSCULAR; INTRAVENOUS at 09:12

## 2019-12-16 RX ADMIN — ATORVASTATIN CALCIUM 20 MG: 20 TABLET, FILM COATED ORAL at 09:12

## 2019-12-16 RX ADMIN — HYDRALAZINE HYDROCHLORIDE 10 MG: 20 INJECTION INTRAMUSCULAR; INTRAVENOUS at 10:12

## 2019-12-16 RX ADMIN — HYDROXYCHLOROQUINE SULFATE 200 MG: 200 TABLET, FILM COATED ORAL at 09:12

## 2019-12-16 NOTE — PT/OT/SLP PROGRESS
Physical Therapy Treatment    Patient Name:  Troy Yuan Sr.   MRN:  [id number]    Recommendations:     Discharge Recommendations:  home, home with home health, home health PT   Discharge Equipment Recommendations: none   Barriers to discharge: None    Assessment:     Troy Yuan Sr. is a 89 y.o. male admitted with a medical diagnosis of Acute pulmonary edema.  He presents with the following impairments/functional limitations:  weakness, impaired endurance, impaired self care skills, impaired functional mobilty, gait instability, decreased lower extremity function, decreased safety awareness, impaired cognition, impaired balance . Tolerated treatment well. Requires assistance for safety with mobility and due to weakness and decreased safety awareness.    Rehab Prognosis: Fair; patient would benefit from acute skilled PT services to address these deficits and reach maximum level of function.    Recent Surgery: * No surgery found *      Plan:     During this hospitalization, patient to be seen 6 x/week to address the identified rehab impairments via gait training, therapeutic activities, therapeutic exercises and progress toward the following goals:    · Plan of Care Expires:  12/31/19    Subjective     Chief Complaint: none stated  Patient/Family Comments/goals: none stated  Pain/Comfort:  · Pain Rating 1: 0/10      Objective:     Communicated with nurse Miguel prior to session.  Patient found supine with bed alarm, SCD(Tara sys at bedside, telemetry removed by patient, nurse aware.) upon PT entry to room.     General Precautions: Standard, fall   Orthopedic Precautions:N/A   Braces: N/A     Functional Mobility:  · Bed Mobility:     · Rolling Left:  contact guard assistance  · Rolling Right: contact guard assistance  · Supine to Sit: minimum assistance  · Sit to Supine: minimum assistance  · Transfers:     · Sit to Stand:  minimum assistance with rolling walker  · Gait: 80' with rw and Min A with verbal cues for  safe technique and A during turns.      AM-PAC 6 CLICK MOBILITY          Therapeutic Activities and Exercises:   Transferred to sitting EOB with Min A.   Stood with rw and Min A.   Ambulated 80' with rw and Min A and verbal cues to stay within walker and assistance with rw during turns.   Returned to bed with Min A.    Patient left supine with all lines intact, call button in reach, bed alarm on, nurse Mgiuel notified and Tara Whitt present..    GOALS:   Multidisciplinary Problems     Physical Therapy Goals        Problem: Physical Therapy Goal    Goal Priority Disciplines Outcome Goal Variances Interventions   Physical Therapy Goal     PT, PT/OT Ongoing, Progressing     Description:  Goals to be met by: 2019     Patient will increase functional independence with mobility by performin). Supine to sit with Modified [place]  2). Sit to supine with Modified [place]  3). Sit to stand transfer with Stand-by Assistance  4). Bed to chair transfer with Stand-by Assistance using Rolling Walker  5). Gait  x  > 150 feet with Stand-by Assistance using Rolling Walker.                       Time Tracking:     PT Received On: 19  PT Start Time: 40     PT Stop Time: 55  PT Total Time (min): 15 min     Billable Minutes: Gait Training 15min    Treatment Type: Treatment  PT/PTA: PTA     PTA Visit Number: 1     Debora Cummings, PTA  2019

## 2019-12-16 NOTE — PLAN OF CARE
Patient Disoriented to situation., VSS.  PIV CDI/ saline locked. Avasys in place. Telemetry monitoring in place. Due to void, ambulated to BSC. Pt verbalized understanding of POC. Purposeful hourly/q2hr rounding done during shift to promote patient safety. Patient free from falls and injury during shift.  Bed in lowest position, brakes locked, and call light within reach.  Will continue to monitor.

## 2019-12-16 NOTE — CARE UPDATE
12/16/19 0708   PRE-TX-O2   O2 Device (Oxygen Therapy) room air   SpO2 (!) 93 %   Pulse Oximetry Type Intermittent   $ Pulse Oximetry - Multiple Charge Pulse Oximetry - Multiple   Pulse 65   Resp 18

## 2019-12-16 NOTE — PLAN OF CARE
Patient is oriented to self only. Hourly rounds completed throughout this shift. Patient denies any pain this shift. Patient is in brief , uses urinal intermittently. AVASYS in use, bed alarm set. Telemetry monitoring maintained. Patient has remained free from fall/injury. Patient ambulates with 1 person assist. Bed in lowest , locked position, side rails up X3, needs attended to , call light kept within reach , will continue to monitor.

## 2019-12-16 NOTE — PLAN OF CARE
Problem: Physical Therapy Goal  Goal: Physical Therapy Goal  Description  Goals to be met by: 2019     Patient will increase functional independence with mobility by performin). Supine to sit with Modified [place]  2). Sit to supine with Modified [place]  3). Sit to stand transfer with Stand-by Assistance  4). Bed to chair transfer with Stand-by Assistance using Rolling Walker  5). Gait  x  > 150 feet with Stand-by Assistance using Rolling Walker.      Outcome: Ongoing, Progressing   Therapeutic activity : bed mobility, transfers, gait with rw and Min A.

## 2019-12-17 ENCOUNTER — HOSPITAL ENCOUNTER (INPATIENT)
Facility: HOSPITAL | Age: 84
LOS: 3 days | Discharge: HOME HEALTH | DRG: 682 | End: 2019-12-20
Attending: EMERGENCY MEDICINE | Admitting: STUDENT IN AN ORGANIZED HEALTH CARE EDUCATION/TRAINING PROGRAM
Payer: MEDICARE

## 2019-12-17 VITALS
RESPIRATION RATE: 18 BRPM | DIASTOLIC BLOOD PRESSURE: 54 MMHG | WEIGHT: 149.69 LBS | BODY MASS INDEX: 23.49 KG/M2 | OXYGEN SATURATION: 95 % | SYSTOLIC BLOOD PRESSURE: 104 MMHG | HEIGHT: 67 IN | TEMPERATURE: 98 F | HEART RATE: 60 BPM

## 2019-12-17 DIAGNOSIS — R79.89 TROPONIN I ABOVE REFERENCE RANGE: ICD-10-CM

## 2019-12-17 DIAGNOSIS — R07.9 CHEST PAIN: ICD-10-CM

## 2019-12-17 DIAGNOSIS — R55 SYNCOPE AND COLLAPSE: Primary | ICD-10-CM

## 2019-12-17 DIAGNOSIS — N17.0 ACUTE RENAL FAILURE WITH ACUTE TUBULAR NECROSIS SUPERIMPOSED ON STAGE 4 CHRONIC KIDNEY DISEASE: ICD-10-CM

## 2019-12-17 DIAGNOSIS — G30.8 ALZHEIMER'S DISEASE OF OTHER ONSET WITHOUT BEHAVIORAL DISTURBANCE: ICD-10-CM

## 2019-12-17 DIAGNOSIS — F02.80 ALZHEIMER'S DISEASE OF OTHER ONSET WITHOUT BEHAVIORAL DISTURBANCE: ICD-10-CM

## 2019-12-17 DIAGNOSIS — N17.9 ACUTE RENAL FAILURE SUPERIMPOSED ON CHRONIC KIDNEY DISEASE, UNSPECIFIED CKD STAGE, UNSPECIFIED ACUTE RENAL FAILURE TYPE: ICD-10-CM

## 2019-12-17 DIAGNOSIS — N18.9 ACUTE RENAL FAILURE SUPERIMPOSED ON CHRONIC KIDNEY DISEASE, UNSPECIFIED CKD STAGE, UNSPECIFIED ACUTE RENAL FAILURE TYPE: ICD-10-CM

## 2019-12-17 DIAGNOSIS — N18.4 ACUTE RENAL FAILURE WITH ACUTE TUBULAR NECROSIS SUPERIMPOSED ON STAGE 4 CHRONIC KIDNEY DISEASE: ICD-10-CM

## 2019-12-17 PROBLEM — I50.32 CHRONIC DIASTOLIC HEART FAILURE: Status: ACTIVE | Noted: 2019-12-12

## 2019-12-17 PROBLEM — R00.1 BRADYCARDIA: Status: ACTIVE | Noted: 2019-12-17

## 2019-12-17 PROBLEM — F03.90 DEMENTIA WITHOUT BEHAVIORAL DISTURBANCE: Status: ACTIVE | Noted: 2019-12-17

## 2019-12-17 LAB
ALBUMIN SERPL BCP-MCNC: 3.4 G/DL (ref 3.5–5.2)
ALP SERPL-CCNC: 54 U/L (ref 55–135)
ALT SERPL W/O P-5'-P-CCNC: 17 U/L (ref 10–44)
ANION GAP SERPL CALC-SCNC: 13 MMOL/L (ref 8–16)
ANION GAP SERPL CALC-SCNC: 13 MMOL/L (ref 8–16)
AST SERPL-CCNC: 21 U/L (ref 10–40)
BACTERIA #/AREA URNS HPF: ABNORMAL /HPF
BASOPHILS # BLD AUTO: 0.01 K/UL (ref 0–0.2)
BASOPHILS NFR BLD: 0.2 % (ref 0–1.9)
BILIRUB SERPL-MCNC: 0.7 MG/DL (ref 0.1–1)
BILIRUB UR QL STRIP: NEGATIVE
BNP SERPL-MCNC: 1719 PG/ML (ref 0–99)
BUN SERPL-MCNC: 34 MG/DL (ref 8–23)
BUN SERPL-MCNC: 40 MG/DL (ref 8–23)
CALCIUM SERPL-MCNC: 8.6 MG/DL (ref 8.7–10.5)
CALCIUM SERPL-MCNC: 9.3 MG/DL (ref 8.7–10.5)
CHLORIDE SERPL-SCNC: 103 MMOL/L (ref 95–110)
CHLORIDE SERPL-SCNC: 98 MMOL/L (ref 95–110)
CLARITY UR: ABNORMAL
CO2 SERPL-SCNC: 26 MMOL/L (ref 23–29)
CO2 SERPL-SCNC: 27 MMOL/L (ref 23–29)
COLOR UR: YELLOW
CREAT SERPL-MCNC: 2.4 MG/DL (ref 0.5–1.4)
CREAT SERPL-MCNC: 2.9 MG/DL (ref 0.5–1.4)
DIFFERENTIAL METHOD: ABNORMAL
EOSINOPHIL # BLD AUTO: 0.1 K/UL (ref 0–0.5)
EOSINOPHIL NFR BLD: 1.7 % (ref 0–8)
ERYTHROCYTE [DISTWIDTH] IN BLOOD BY AUTOMATED COUNT: 13.9 % (ref 11.5–14.5)
EST. GFR  (AFRICAN AMERICAN): 21.1 ML/MIN/1.73 M^2
EST. GFR  (AFRICAN AMERICAN): 26 ML/MIN/1.73 M^2
EST. GFR  (NON AFRICAN AMERICAN): 18.2 ML/MIN/1.73 M^2
EST. GFR  (NON AFRICAN AMERICAN): 23 ML/MIN/1.73 M^2
GLUCOSE SERPL-MCNC: 110 MG/DL (ref 70–110)
GLUCOSE SERPL-MCNC: 111 MG/DL (ref 70–110)
GLUCOSE SERPL-MCNC: 155 MG/DL (ref 70–110)
GLUCOSE UR QL STRIP: NEGATIVE
HCT VFR BLD AUTO: 34.5 % (ref 40–54)
HGB BLD-MCNC: 10.9 G/DL (ref 14–18)
HGB UR QL STRIP: NEGATIVE
HYALINE CASTS #/AREA URNS LPF: 4 /LPF
IMM GRANULOCYTES # BLD AUTO: 0.02 K/UL (ref 0–0.04)
IMM GRANULOCYTES NFR BLD AUTO: 0.3 % (ref 0–0.5)
KETONES UR QL STRIP: ABNORMAL
LEUKOCYTE ESTERASE UR QL STRIP: ABNORMAL
LYMPHOCYTES # BLD AUTO: 0.7 K/UL (ref 1–4.8)
LYMPHOCYTES NFR BLD: 11.7 % (ref 18–48)
MCH RBC QN AUTO: 30.4 PG (ref 27–31)
MCHC RBC AUTO-ENTMCNC: 31.6 G/DL (ref 32–36)
MCV RBC AUTO: 96 FL (ref 82–98)
MICROSCOPIC COMMENT: ABNORMAL
MONOCYTES # BLD AUTO: 0.6 K/UL (ref 0.3–1)
MONOCYTES NFR BLD: 9.9 % (ref 4–15)
NEUTROPHILS # BLD AUTO: 4.8 K/UL (ref 1.8–7.7)
NEUTROPHILS NFR BLD: 76.2 % (ref 38–73)
NITRITE UR QL STRIP: NEGATIVE
NRBC BLD-RTO: 0 /100 WBC
PH UR STRIP: 6 [PH] (ref 5–8)
PLATELET # BLD AUTO: 293 K/UL (ref 150–350)
PMV BLD AUTO: 10.5 FL (ref 9.2–12.9)
POTASSIUM SERPL-SCNC: 3.4 MMOL/L (ref 3.5–5.1)
POTASSIUM SERPL-SCNC: 3.6 MMOL/L (ref 3.5–5.1)
PROT SERPL-MCNC: 6.6 G/DL (ref 6–8.4)
PROT UR QL STRIP: ABNORMAL
RBC # BLD AUTO: 3.59 M/UL (ref 4.6–6.2)
RBC #/AREA URNS HPF: 3 /HPF (ref 0–4)
SODIUM SERPL-SCNC: 138 MMOL/L (ref 136–145)
SODIUM SERPL-SCNC: 142 MMOL/L (ref 136–145)
SP GR UR STRIP: 1.01 (ref 1–1.03)
SQUAMOUS #/AREA URNS HPF: 1 /HPF
TROPONIN I SERPL DL<=0.01 NG/ML-MCNC: 0.07 NG/ML
URN SPEC COLLECT METH UR: ABNORMAL
UROBILINOGEN UR STRIP-ACNC: ABNORMAL EU/DL
WBC # BLD AUTO: 6.34 K/UL (ref 3.9–12.7)
WBC #/AREA URNS HPF: >100 /HPF (ref 0–5)

## 2019-12-17 PROCEDURE — 97530 THERAPEUTIC ACTIVITIES: CPT

## 2019-12-17 PROCEDURE — 94761 N-INVAS EAR/PLS OXIMETRY MLT: CPT

## 2019-12-17 PROCEDURE — 93005 ELECTROCARDIOGRAM TRACING: CPT

## 2019-12-17 PROCEDURE — 25000003 PHARM REV CODE 250: Performed by: NURSE PRACTITIONER

## 2019-12-17 PROCEDURE — 63600175 PHARM REV CODE 636 W HCPCS: Performed by: STUDENT IN AN ORGANIZED HEALTH CARE EDUCATION/TRAINING PROGRAM

## 2019-12-17 PROCEDURE — 97116 GAIT TRAINING THERAPY: CPT

## 2019-12-17 PROCEDURE — 85025 COMPLETE CBC W/AUTO DIFF WBC: CPT

## 2019-12-17 PROCEDURE — 83880 ASSAY OF NATRIURETIC PEPTIDE: CPT

## 2019-12-17 PROCEDURE — 99285 EMERGENCY DEPT VISIT HI MDM: CPT | Mod: 25

## 2019-12-17 PROCEDURE — 81001 URINALYSIS AUTO W/SCOPE: CPT

## 2019-12-17 PROCEDURE — 84484 ASSAY OF TROPONIN QUANT: CPT

## 2019-12-17 PROCEDURE — 80048 BASIC METABOLIC PNL TOTAL CA: CPT

## 2019-12-17 PROCEDURE — 36415 COLL VENOUS BLD VENIPUNCTURE: CPT

## 2019-12-17 PROCEDURE — 12000002 HC ACUTE/MED SURGE SEMI-PRIVATE ROOM

## 2019-12-17 PROCEDURE — 25000003 PHARM REV CODE 250: Performed by: HOSPITALIST

## 2019-12-17 PROCEDURE — 80053 COMPREHEN METABOLIC PANEL: CPT

## 2019-12-17 RX ORDER — LANOLIN ALCOHOL/MO/W.PET/CERES
800 CREAM (GRAM) TOPICAL
Status: DISCONTINUED | OUTPATIENT
Start: 2019-12-17 | End: 2019-12-20 | Stop reason: HOSPADM

## 2019-12-17 RX ORDER — ATORVASTATIN CALCIUM 20 MG/1
20 TABLET, FILM COATED ORAL DAILY
Status: DISCONTINUED | OUTPATIENT
Start: 2019-12-18 | End: 2019-12-20 | Stop reason: HOSPADM

## 2019-12-17 RX ORDER — FUROSEMIDE 40 MG/1
40 TABLET ORAL DAILY
Qty: 30 TABLET | Refills: 11 | Status: ON HOLD | OUTPATIENT
Start: 2019-12-18 | End: 2019-12-20 | Stop reason: HOSPADM

## 2019-12-17 RX ORDER — ACETAMINOPHEN 325 MG/1
650 TABLET ORAL EVERY 8 HOURS PRN
Status: DISCONTINUED | OUTPATIENT
Start: 2019-12-17 | End: 2019-12-20 | Stop reason: HOSPADM

## 2019-12-17 RX ORDER — SODIUM,POTASSIUM PHOSPHATES 280-250MG
2 POWDER IN PACKET (EA) ORAL
Status: DISCONTINUED | OUTPATIENT
Start: 2019-12-17 | End: 2019-12-20 | Stop reason: HOSPADM

## 2019-12-17 RX ORDER — ISOSORBIDE MONONITRATE 60 MG/1
60 TABLET, EXTENDED RELEASE ORAL DAILY
Qty: 30 TABLET | Refills: 11 | Status: ON HOLD | OUTPATIENT
Start: 2019-12-18 | End: 2021-10-06 | Stop reason: HOSPADM

## 2019-12-17 RX ORDER — FAMOTIDINE 20 MG/1
20 TABLET, FILM COATED ORAL DAILY
Status: DISCONTINUED | OUTPATIENT
Start: 2019-12-18 | End: 2019-12-20 | Stop reason: HOSPADM

## 2019-12-17 RX ORDER — TALC
6 POWDER (GRAM) TOPICAL NIGHTLY PRN
Status: DISCONTINUED | OUTPATIENT
Start: 2019-12-17 | End: 2019-12-20 | Stop reason: HOSPADM

## 2019-12-17 RX ORDER — POTASSIUM CHLORIDE 20 MEQ/15ML
40 SOLUTION ORAL
Status: DISCONTINUED | OUTPATIENT
Start: 2019-12-17 | End: 2019-12-20 | Stop reason: HOSPADM

## 2019-12-17 RX ORDER — METOPROLOL SUCCINATE 25 MG/1
25 TABLET, EXTENDED RELEASE ORAL DAILY
Status: DISCONTINUED | OUTPATIENT
Start: 2019-12-18 | End: 2019-12-17

## 2019-12-17 RX ORDER — ONDANSETRON 4 MG/1
8 TABLET, ORALLY DISINTEGRATING ORAL EVERY 8 HOURS PRN
Status: DISCONTINUED | OUTPATIENT
Start: 2019-12-17 | End: 2019-12-20 | Stop reason: HOSPADM

## 2019-12-17 RX ORDER — ISOSORBIDE MONONITRATE 60 MG/1
60 TABLET, EXTENDED RELEASE ORAL DAILY
Status: DISCONTINUED | OUTPATIENT
Start: 2019-12-18 | End: 2019-12-20 | Stop reason: HOSPADM

## 2019-12-17 RX ORDER — HYDROXYCHLOROQUINE SULFATE 200 MG/1
200 TABLET, FILM COATED ORAL DAILY
Status: DISCONTINUED | OUTPATIENT
Start: 2019-12-18 | End: 2019-12-20 | Stop reason: HOSPADM

## 2019-12-17 RX ORDER — SODIUM CHLORIDE 0.9 % (FLUSH) 0.9 %
10 SYRINGE (ML) INJECTION
Status: DISCONTINUED | OUTPATIENT
Start: 2019-12-17 | End: 2019-12-20 | Stop reason: HOSPADM

## 2019-12-17 RX ORDER — SODIUM CHLORIDE 9 MG/ML
INJECTION, SOLUTION INTRAVENOUS CONTINUOUS
Status: DISCONTINUED | OUTPATIENT
Start: 2019-12-17 | End: 2019-12-18

## 2019-12-17 RX ORDER — METOPROLOL SUCCINATE 50 MG/1
50 TABLET, EXTENDED RELEASE ORAL DAILY
Qty: 30 TABLET | Refills: 11 | Status: ON HOLD | OUTPATIENT
Start: 2019-12-18 | End: 2019-12-20 | Stop reason: HOSPADM

## 2019-12-17 RX ORDER — FERROUS SULFATE 325(65) MG
325 TABLET ORAL DAILY
Status: DISCONTINUED | OUTPATIENT
Start: 2019-12-18 | End: 2019-12-20 | Stop reason: HOSPADM

## 2019-12-17 RX ORDER — AMLODIPINE BESYLATE 5 MG/1
10 TABLET ORAL DAILY
Status: DISCONTINUED | OUTPATIENT
Start: 2019-12-18 | End: 2019-12-20 | Stop reason: HOSPADM

## 2019-12-17 RX ADMIN — FERROUS SULFATE TAB EC 325 MG (65 MG FE EQUIVALENT) 325 MG: 325 (65 FE) TABLET DELAYED RESPONSE at 08:12

## 2019-12-17 RX ADMIN — ATORVASTATIN CALCIUM 20 MG: 20 TABLET, FILM COATED ORAL at 08:12

## 2019-12-17 RX ADMIN — METOPROLOL SUCCINATE 50 MG: 50 TABLET, EXTENDED RELEASE ORAL at 08:12

## 2019-12-17 RX ADMIN — HYDROXYCHLOROQUINE SULFATE 200 MG: 200 TABLET, FILM COATED ORAL at 08:12

## 2019-12-17 RX ADMIN — ALFUZOSIN HYDROCHLORIDE 10 MG: 10 TABLET ORAL at 08:12

## 2019-12-17 RX ADMIN — AMLODIPINE BESYLATE 10 MG: 5 TABLET ORAL at 08:12

## 2019-12-17 RX ADMIN — FUROSEMIDE 40 MG: 40 TABLET ORAL at 08:12

## 2019-12-17 RX ADMIN — SODIUM CHLORIDE: 900 INJECTION INTRAVENOUS at 07:12

## 2019-12-17 RX ADMIN — FAMOTIDINE 20 MG: 20 TABLET ORAL at 08:12

## 2019-12-17 RX ADMIN — SENNOSIDES AND DOCUSATE SODIUM 1 TABLET: 8.6; 5 TABLET ORAL at 08:12

## 2019-12-17 RX ADMIN — ISOSORBIDE MONONITRATE 60 MG: 30 TABLET, EXTENDED RELEASE ORAL at 08:12

## 2019-12-17 NOTE — PLAN OF CARE
12/16/19 1930   Patient Assessment/Suction   Level of Consciousness (AVPU) alert   Respiratory Effort Unlabored   Expansion/Accessory Muscles/Retractions no use of accessory muscles   All Lung Fields Breath Sounds diminished;clear   Rhythm/Pattern, Respiratory unlabored   Cough Frequency no cough   PRE-TX-O2   O2 Device (Oxygen Therapy) room air   SpO2 97 %   Pulse Oximetry Type Intermittent   $ Pulse Oximetry - Multiple Charge Pulse Oximetry - Multiple

## 2019-12-17 NOTE — ED NOTES
Patient identifiers for Troy Yuan Sr. checked and correct.  LOC: Patient is awake, alert, and aware of environment . Patient is oriented x 3 and speaking appropriately.  APPEARANCE: Patient resting comfortably and in no acute distress. Patient is clean and well groomed, patient's clothing is properly fastened.  SKIN: The skin is warm and dry. Patient has poor skin turgor and moist mucus membrances. Skin is intact; no bruising or breakdown noted.  MUSKULOSKELETAL: Patient is moving all extremities well, no obvious deformities noted. Pulses intact.  Complains of weakness  RESPIRATORY: Airway is open and patent. Respirations are spontaneous and non-labored with normal effort and rate.  CARDIAC: Patient has a normal rate and rhythm. No peripheral edema noted. Capillary refill < 3 seconds.  ABDOMEN: No distention noted. Bowel sounds active in all 4 quadrants. Soft and non-tender upon palpation.  NEUROLOGICAL: PERRL. Facial expression is symmetrical. Hand grasps are equal bilaterally. Normal sensation in all extremities when touched with finger. Family states pt has dementia , became weak in the bathroom today  Allergies reported:   Review of patient's allergies indicates:   Allergen Reactions    Soma [carisoprodol] Rash    Aspirin     Sulfa (sulfonamide antibiotics) Rash

## 2019-12-17 NOTE — ASSESSMENT & PLAN NOTE
Creatine stable for now. BMP reviewed- noted Estimated Creatinine Clearance: 18.4 mL/min (A) (based on SCr of 2.5 mg/dL (H)). according to latest data. Monitor UOP and serial BMP and adjust therapy as needed. Renally dose meds.

## 2019-12-17 NOTE — PLAN OF CARE
No acute events overnight. Alert and oriented to self when awake, slept between care. Grand-daughter at bedside; understands and agrees with plan of care. Vitals stable and within patient's baseline since admission on room air. Diligent coughing and deep breathing encouraged. No pain or nausea reported overnight. Urine output adequate and no BM's overnight. Midline abdominal incision open to air with dermabond; clean, dry and intact. TEDs/SCDs refused, francia pulled off by patient. Instructed to call for help as needed, call light in reach. Bed in lowest position and brake set. Frequent rounds made for patient's safety. See flowsheets for detailed assessment. White board in patient's room updated accordingly. Continuing to monitor closely.

## 2019-12-17 NOTE — PLAN OF CARE
Problem: Physical Therapy Goal  Goal: Physical Therapy Goal  Description  Goals to be met by: 2019     Patient will increase functional independence with mobility by performin). Supine to sit with Modified [place]  2). Sit to supine with Modified [place]  3). Sit to stand transfer with Stand-by Assistance  4). Bed to chair transfer with Stand-by Assistance using Rolling Walker  5). Gait  x  > 150 feet with Stand-by Assistance using Rolling Walker.      Outcome: Ongoing, Progressing   Therapeutic activity : bed mobility, transfers, gait with rw and Min A.

## 2019-12-17 NOTE — SUBJECTIVE & OBJECTIVE
Interval History:  Patient seen and examined.  Plan of care discussed with patient and son at bedside in detail.  Patient blood pressures still remained uncontrolled despite high-dose medication.  Diuresing well with Lasix.    Review of Systems   Constitutional: Negative for chills, fatigue and fever.   Respiratory: Positive for shortness of breath. Negative for cough.    Cardiovascular: Negative for chest pain and leg swelling.   Gastrointestinal: Negative for abdominal pain, nausea and vomiting.   Musculoskeletal: Negative for back pain.   Neurological: Negative for weakness.   Psychiatric/Behavioral: Negative for confusion. The patient is not nervous/anxious.    All other systems reviewed and are negative.    Objective:     Vital Signs (Most Recent):  Temp: 98.5 °F (36.9 °C) (12/16/19 1957)  Pulse: 66 (12/16/19 1957)  Resp: 14 (12/16/19 1957)  BP: (!) 165/73 (12/16/19 1957)  SpO2: 96 % (12/16/19 1957) Vital Signs (24h Range):  Temp:  [98.4 °F (36.9 °C)-99.2 °F (37.3 °C)] 98.5 °F (36.9 °C)  Pulse:  [55-75] 66  Resp:  [14-18] 14  SpO2:  [93 %-97 %] 96 %  BP: (141-187)/(65-95) 165/73     Weight: 67.9 kg (149 lb 11.1 oz)  Body mass index is 23.45 kg/m².    Intake/Output Summary (Last 24 hours) at 12/16/2019 2135  Last data filed at 12/16/2019 1759  Gross per 24 hour   Intake 450 ml   Output --   Net 450 ml      Physical Exam   Constitutional: He is oriented to person, place, and time.   Elderly, demented  male   Eyes: Pupils are equal, round, and reactive to light. EOM are normal.   Neck: No JVD present.   Cardiovascular: Normal rate, regular rhythm, normal heart sounds and intact distal pulses.   Pulmonary/Chest: Effort normal and breath sounds normal.   Mild bibasilar crackles noted.   Abdominal: Soft. Bowel sounds are normal. He exhibits no distension. There is no tenderness.   Musculoskeletal: He exhibits no edema or deformity.   Lymphadenopathy:     He has no cervical adenopathy.   Neurological:  He is alert and oriented to person, place, and time.   Mild dementia/confusion noted   Skin: No rash noted. No pallor.   Nursing note and vitals reviewed.      Significant Labs: All pertinent labs within the past 24 hours have been reviewed.    Significant Imaging: I have reviewed all pertinent imaging results/findings within the past 24 hours.

## 2019-12-17 NOTE — PT/OT/SLP PROGRESS
Occupational Therapy      Patient Name:  Troy Yuan .   MRN:  [id number]    Patient not seen today secondary to pt verbalizes w/ moans and head nods  That he will agree to therapy, but when tactile cues Provided to move LE toward EOB  Pt resists with his body weight, keeping eyes closed, and UE folded  In his lap with HOB raised. Pt resisted reaching to bed rail when prompted by QUIROZ.  . Will follow-up 12/18/19.    NORIS Solano  12/17/2019

## 2019-12-17 NOTE — HPI
"89 yo AAM with PMH of HTN, CKD 4, HLD, RA, chronic combined systolic/diastolic heart failure, dementia, AOCD presents to ED following pre-syncopal episode. Patient was discharged earlier today from [facility] (12/12-12/17) where he was admitted for acute on chronic CHF. He was initially admitted to ICU placed on bipap, nitro gtt and diuresed with IV lasix. He was stepped to floor and transitioned to PO lasix with recommendations for discharge to Rehab, however family preferred to take patient home.   Once at home, he attempted to ambulate to bathroom and became fatigued and "wobbly on his feet" per patient's son. Family was able to get patient to chair. Family denies patient hitting head, LOC or complaints of pain.  History is limited 2/2 patient's dementia.      In Ed, he was noted to have elevated bun/cr. He also had elevated bnp and trop, however these were both improved from initial admission on 12/12.  Ct head and CT spine negative.   "

## 2019-12-17 NOTE — ASSESSMENT & PLAN NOTE
Patient is chronically on statin.will continue for now. Monitor clinically. Last LDL was   Lab Results   Component Value Date    LDLCALC 83.0 12/12/2019

## 2019-12-17 NOTE — PLAN OF CARE
Great-Grand -son-Jorge A Emery -( [phone]) signed the Pt's choice disclosure form.  Pt lives with Great Grand son.  Jorge A was okay with first available hh.  Jair spoke with Buddy with [facility] and they have set pt up with [facility].       12/17/19 1239   Post-Acute Status   Post-Acute Authorization Home Health/Hospice   Home Health/Hospice Status Set-up Complete   Patient choice form signed by patient/caregiver List with quality metrics by geographic area provided

## 2019-12-17 NOTE — ASSESSMENT & PLAN NOTE
Chronic, uncontrolled.  Latest blood pressure and vitals reviewed-   Temp:  [98.4 °F (36.9 °C)-99.2 °F (37.3 °C)]   Pulse:  [55-75]   Resp:  [14-18]   BP: (141-187)/(65-95)   SpO2:  [93 %-97 %] .   Home meds for hypertension were reviewed and noted below. Hospital anti-hypertensive changes were made as shown below.  Hypertension Medications             amlodipine (NORVASC) 10 MG tablet Take 10 mg by mouth once daily.      hydrALAZINE (APRESOLINE) 25 MG tablet Take 1 tablet (25 mg total) by mouth every 8 (eight) hours.      Hospital Medications             amLODIPine tablet 10 mg 10 mg, Oral, Daily    furosemide tablet 40 mg 40 mg, Oral, Daily    hydrALAZINE injection 10 mg 10 mg, Intravenous, Every 8 hours PRN, 1. Consider placing patient on continuous cardiac monitor (obtain order if needed).<BR>2. Monitor BP and HR pre-administration, post-administration, then every 30 minutes x2, then every hour x2.<BR>3. Administer at a rate no faster than 5mg over 1 minute.<BR>    isosorbide mononitrate 24 hr tablet 60 mg 60 mg, Oral, Daily, DO NOT CRUSH OR CHEW; SWALLOW WHOLE.    metoprolol succinate (TOPROL-XL) 24 hr tablet 50 mg Starting on 12/17/2019. 50 mg, Oral, Daily, DO NOT CRUSH OR CHEW; SWALLOW WHOLE.        Will utilize p.r.n. blood pressure medication only if patient's blood pressure greater than  180/110 and he develops symptoms such as worsening chest pain or shortness of breath.

## 2019-12-17 NOTE — ASSESSMENT & PLAN NOTE
Patient is identified as having Combined Systolic and Diastolic heart failure that is Acute on Chronic. CHF is currently uncontrolled due to Dyspnea not returned to baseline after Multiple doses of IV diuretic. Latest ECHO shows ejection fraction of 45%. Continue Nitrate, BB and Lasix and monitor clinical status closely. Monitor on telemetry. Patient is on CHF pathway.  Monitor strict Is&Os and daily weights.  Place on fluid restriction of 1.5 L. Continue to stress to patient importance of self efficacy and  on diet for CHF. Last BNP reviewed- and noted below   Recent Labs   Lab 12/16/19  0555   BNP 2,281*   .  Decreased Lasix IV to oral Lasix, as patient appears to be adequately diuresed and creatinine has marginally increased

## 2019-12-17 NOTE — PROGRESS NOTES
"[facility] Hospital Medicine  Progress Note    Patient Name: Troy Yuan Sr.  MRN: [id number]  Patient Class: IP- Inpatient   Admission Date: 12/12/2019  Length of Stay: 4 days  Attending Physician: Arcadio Velazquez MD  Primary Care Provider: Gentry Encinas MD        Subjective:     Principal Problem:Acute on chronic diastolic heart failure        HPI:  Troy Yuan Sr. is a 89-year-old male with past medical history of hypertension, chronic kidney disease, hyperlipidemia, and rheumatoid arthritis who presented to the emergency room tonight with reports of shortness of breath.  Patient states "I could not breathe."  Patient reports onset of symptoms at approximately 12:30 a.m. Patient also reports leg swelling. Patient denies recent sick contacts or recent travel.  Patient denies chest pain, nausea, vomiting, or diarrhea.  Patient denies use supplemental oxygen at home.  Patient accompanied by his daughter, Lakia and grandson at bedside.  Emergency room patient noted to be in acute pulmonary edema with associated hypertensive urgency.  Patient started on nitroglycerin drip and given 40 mg of IV Lasix.  Patient requiring BiPAP therapy.  Patient admitted to ICU on 12/12/2019 at approximately 5:30 a.m..    Overview/Hospital Course:  No notes on file    Interval History:  Patient seen and examined.  Plan of care discussed with patient and son at bedside in detail.  Patient blood pressures still remained uncontrolled despite high-dose medication.  Diuresing well with Lasix.    Review of Systems   Constitutional: Negative for chills, fatigue and fever.   Respiratory: Positive for shortness of breath. Negative for cough.    Cardiovascular: Negative for chest pain and leg swelling.   Gastrointestinal: Negative for abdominal pain, nausea and vomiting.   Musculoskeletal: Negative for back pain.   Neurological: Negative for weakness.   Psychiatric/Behavioral: Negative for confusion. The patient is not " nervous/anxious.    All other systems reviewed and are negative.    Objective:     Vital Signs (Most Recent):  Temp: 98.5 °F (36.9 °C) (12/16/19 1957)  Pulse: 66 (12/16/19 1957)  Resp: 14 (12/16/19 1957)  BP: (!) 165/73 (12/16/19 1957)  SpO2: 96 % (12/16/19 1957) Vital Signs (24h Range):  Temp:  [98.4 °F (36.9 °C)-99.2 °F (37.3 °C)] 98.5 °F (36.9 °C)  Pulse:  [55-75] 66  Resp:  [14-18] 14  SpO2:  [93 %-97 %] 96 %  BP: (141-187)/(65-95) 165/73     Weight: 67.9 kg (149 lb 11.1 oz)  Body mass index is 23.45 kg/m².    Intake/Output Summary (Last 24 hours) at 12/16/2019 2135  Last data filed at 12/16/2019 1759  Gross per 24 hour   Intake 450 ml   Output --   Net 450 ml      Physical Exam   Constitutional: He is oriented to person, place, and time.   Elderly, demented  male   Eyes: Pupils are equal, round, and reactive to light. EOM are normal.   Neck: No JVD present.   Cardiovascular: Normal rate, regular rhythm, normal heart sounds and intact distal pulses.   Pulmonary/Chest: Effort normal and breath sounds normal.   Mild bibasilar crackles noted.   Abdominal: Soft. Bowel sounds are normal. He exhibits no distension. There is no tenderness.   Musculoskeletal: He exhibits no edema or deformity.   Lymphadenopathy:     He has no cervical adenopathy.   Neurological: He is alert and oriented to person, place, and time.   Mild dementia/confusion noted   Skin: No rash noted. No pallor.   Nursing note and vitals reviewed.      Significant Labs: All pertinent labs within the past 24 hours have been reviewed.    Significant Imaging: I have reviewed all pertinent imaging results/findings within the past 24 hours.      Assessment/Plan:      * Acute on chronic diastolic heart failure  Patient is identified as having Combined Systolic and Diastolic heart failure that is Acute on Chronic. CHF is currently uncontrolled due to Dyspnea not returned to baseline after Multiple doses of IV diuretic. Latest ECHO shows  ejection fraction of 45%. Continue Nitrate, BB and Lasix and monitor clinical status closely. Monitor on telemetry. Patient is on CHF pathway.  Monitor strict Is&Os and daily weights.  Place on fluid restriction of 1.5 L. Continue to stress to patient importance of self efficacy and  on diet for CHF. Last BNP reviewed- and noted below   Recent Labs   Lab 12/16/19  0555   BNP 2,281*   .  Decreased Lasix IV to oral Lasix, as patient appears to be adequately diuresed and creatinine has marginally increased        Essential hypertension  Chronic, uncontrolled.  Latest blood pressure and vitals reviewed-   Temp:  [98.4 °F (36.9 °C)-99.2 °F (37.3 °C)]   Pulse:  [55-75]   Resp:  [14-18]   BP: (141-187)/(65-95)   SpO2:  [93 %-97 %] .   Home meds for hypertension were reviewed and noted below. Hospital anti-hypertensive changes were made as shown below.  Hypertension Medications             amlodipine (NORVASC) 10 MG tablet Take 10 mg by mouth once daily.      hydrALAZINE (APRESOLINE) 25 MG tablet Take 1 tablet (25 mg total) by mouth every 8 (eight) hours.      Hospital Medications             amLODIPine tablet 10 mg 10 mg, Oral, Daily    furosemide tablet 40 mg 40 mg, Oral, Daily    hydrALAZINE injection 10 mg 10 mg, Intravenous, Every 8 hours PRN, 1. Consider placing patient on continuous cardiac monitor (obtain order if needed).<BR>2. Monitor BP and HR pre-administration, post-administration, then every 30 minutes x2, then every hour x2.<BR>3. Administer at a rate no faster than 5mg over 1 minute.<BR>    isosorbide mononitrate 24 hr tablet 60 mg 60 mg, Oral, Daily, DO NOT CRUSH OR CHEW; SWALLOW WHOLE.    metoprolol succinate (TOPROL-XL) 24 hr tablet 50 mg Starting on 12/17/2019. 50 mg, Oral, Daily, DO NOT CRUSH OR CHEW; SWALLOW WHOLE.        Will utilize p.r.n. blood pressure medication only if patient's blood pressure greater than  180/110 and he develops symptoms such as worsening chest pain or shortness of  breath.      Hypertensive urgency  Management for essential hypertension above.      Multiple lacunar infarcts  Likely secondary to cerebrovascular disease and uncontrolled hypertension.  Continue statin.  Continue to control blood pressure as above.      Acute renal failure superimposed on stage 4 chronic kidney disease  Patient with GUME likely d/t IVVD  Which is currently worsening. Labs reviewed- BMP with Estimated Creatinine Clearance: 18.4 mL/min (A) (based on SCr of 2.5 mg/dL (H)). according to latest data. Monitor UOP and serial BMP and adjust therapy as needed. Avoid nephrotoxins and renally dose meds for GFR listed above.        Rheumatoid arthritis  Chronic, controlled.  Will continue home medication.      Hyperlipidemia   Patient is chronically on statin.will continue for now. Monitor clinically. Last LDL was   Lab Results   Component Value Date    LDLCALC 83.0 12/12/2019          Macrocytic anemia  Patient's anemia is currently controlled. S/p 0 units of PRBCs. Etiology likely d/t chronic disease-congestive heart failure  Current CBC reviewed-   Lab Results   Component Value Date    HGB 8.9 (L) 12/13/2019    HCT 28.8 (L) 12/13/2019     Monitor serial CBC and transfuse if patient becomes hemodynamically unstable, symptomatic or H/H drops below 7/21.       BPH (benign prostatic hypertrophy) with urinary retention  Chronic, controlled.  Will continue home medication.        VTE Risk Mitigation (From admission, onward)         Ordered     IP VTE HIGH RISK PATIENT  Once      12/12/19 0600     Place JACKI hose  Until discontinued      12/12/19 0600     Place sequential compression device  Until discontinued      12/12/19 0600                      Arcadio Velazquez MD  Department of Hospital Medicine   [facility]

## 2019-12-17 NOTE — NURSING
Discharge instructions provided to patient's great grandson, patient and grandson verbalized understanding. PIV removed intact, bleeding controlled, tolerated well. Tele box removed and returned. Personal belongings packed per family. Transported off floor via wheelchair to personal vehicle home.

## 2019-12-17 NOTE — ASSESSMENT & PLAN NOTE
Patient's anemia is currently controlled. S/p 0 units of PRBCs. Etiology likely d/t chronic disease-congestive heart failure  Current CBC reviewed-   Lab Results   Component Value Date    HGB 8.9 (L) 12/13/2019    HCT 28.8 (L) 12/13/2019     Monitor serial CBC and transfuse if patient becomes hemodynamically unstable, symptomatic or H/H drops below 7/21.

## 2019-12-17 NOTE — ED PROVIDER NOTES
Encounter Date: 12/17/2019       History     Chief Complaint   Patient presents with    Weakness     DIscharged today from [facility],   presents feeling weak     HPI   89-year-old male with history of prostate cancer, CKD 3, hypertension, CHF, dementia who presents with syncopal episode.  He was discharged today from [facility] where he was being treated for CHF exacerbation.  He had presented there on 12/12 for shortness of breath. There, he required BiPAP in ICU admission.  He did undergo diuresis with IV Lasix and was transitioned to p.o. Lasix and transferred to the floor.  Today he was discharged.  Per the patient's granddaughter, hospital team had recommended discharge to outpatient rehab but she preferred to take care of the patient at home.  Today, the patient was overall doing well until he had a syncopal fall in the bathroom.  He was not complaining of anything prior to using the restroom and had no complaints following. He seems generally weak since his hospital admission, per family. He was not down for long as family was present, but no one saw him pass out.  He denies any chest pain, shortness of breath, leg swelling, leg erythema, leg tenderness.    Review of patient's allergies indicates:   Allergen Reactions    Soma [carisoprodol] Rash    Aspirin     Sulfa (sulfonamide antibiotics) Rash     Past Medical History:   Diagnosis Date    Bladder stones     Cancer     Encounter for blood transfusion     Hypertension     Kidney stone     Prostate cancer 2004    Radiation 2005    prostate    Stage 3 chronic kidney disease 1/11/2019     Past Surgical History:   Procedure Laterality Date    CYSTOSCOPY      KIDNEY STONE SURGERY  May 2014     Family History   Problem Relation Age of Onset    Hypertension Daughter     Diabetes Daughter     Hypertension Son     Asthma Son      Social History     Tobacco Use    Smoking status: Former Smoker     Years: 40.00     Types:  Cigarettes     Start date: 9/26/1985    Smokeless tobacco: Never Used   Substance Use Topics    Alcohol use: No    Drug use: No     Review of Systems   Unable to perform ROS: Dementia       Physical Exam     Initial Vitals [12/17/19 1516]   BP Pulse Resp Temp SpO2   (!) 142/65 (!) 50 20 98 °F (36.7 °C) (!) 94 %      MAP       --         Physical Exam    Constitutional: He appears well-developed and well-nourished. He is not diaphoretic. No distress.   HENT:   Head: Normocephalic and atraumatic.   Eyes: Conjunctivae and EOM are normal. Pupils are equal, round, and reactive to light.   Neck: Normal range of motion. Neck supple.   Cardiovascular: Regular rhythm and normal heart sounds. Exam reveals no gallop and no friction rub.    No murmur heard.  bradycardia   Pulmonary/Chest: Breath sounds normal. No respiratory distress. He has no wheezes. He has no rhonchi. He has no rales.   Abdominal: Soft. He exhibits no distension. There is no tenderness. There is no rebound and no guarding.   Musculoskeletal: He exhibits no edema or tenderness.   Neurological: He is alert.   Oriented to name. Incorrect year and not sure where he is. Moves all 4 extremities to command with no focal deficits.   Skin: Skin is warm and dry.   Psychiatric: He has a normal mood and affect.         ED Course   Procedures  Labs Reviewed   CBC W/ AUTO DIFFERENTIAL - Abnormal; Notable for the following components:       Result Value    RBC 3.59 (*)     Hemoglobin 10.9 (*)     Hematocrit 34.5 (*)     Mean Corpuscular Hemoglobin Conc 31.6 (*)     Lymph # 0.7 (*)     Gran% 76.2 (*)     Lymph% 11.7 (*)     All other components within normal limits   COMPREHENSIVE METABOLIC PANEL - Abnormal; Notable for the following components:    Glucose 155 (*)     Calcium 8.6 (*)     All other components within normal limits   TROPONIN I   URINALYSIS   B-TYPE NATRIURETIC PEPTIDE          Imaging Results          X-Ray Chest AP Portable (In process)  Result time  12/17/19 16:21:55   Procedure changed from X-Ray Chest 1 View                CT Cervical Spine Without Contrast (Final result)  Result time 12/17/19 16:12:10    Final result by Tierra Barraza MD (12/17/19 16:12:10)                 Impression:      Degenerative changes of the cervical spine without evidence of fracture or subluxation.      Electronically signed by: Tierra Barraza MD  Date:    12/17/2019  Time:    16:12             Narrative:      CMS MANDATED QUALITY DATA - CT RADIATION - 436    All CT scans at this facility utilize dose modulation, iterative reconstruction, and/or weight based dosing when appropriate to reduce radiation dose to as low as reasonably achievable.    EXAMINATION:  CT CERVICAL SPINE WITHOUT CONTRAST    CLINICAL HISTORY:  fall;    TECHNIQUE:  Cervical spine CT without IV contrast obtained with coronal and sagittal reformations.    COMPARISON:  None    FINDINGS:  The cervical spine is in satisfactory alignment.  The vertebral bodies are of normal height.  There is anterior bony fusion and bony fusion of the left facets at C3-4.  There is disc space narrowing anterior spurring at C4-5, C5-6 and C6-7.    The facet joints are aligned.  The odontoid process is intact and the lateral masses of C1 are symmetrical.  The cranial cervical junction is normal.    There is no fracture or subluxation.    There is multilevel foraminal narrowing secondary to facet hypertrophy.  This is most significant on the left at C3-4, on the right at C4-5 and bilaterally at C5-6 and C6-7.    The paraspinous soft tissues are normal.  There is vascular calcification at the carotid bifurcations.  There is no epidural fluid collection.  The lung apices are clear.                               CT Head Without Contrast (Final result)  Result time 12/17/19 16:13:16    Final result by Joel Hammond MD (12/17/19 16:13:16)                 Impression:      1. No acute intracranial abnormality.  2. Multiple chronic  changes as described above.      Electronically signed by: Joel Hammond MD  Date:    12/17/2019  Time:    16:13             Narrative:      CMS MANDATED QUALITY DATA - CT RADIATION - 436    All CT scans at this facility utilize dose modulation, iterative reconstruction, and/or weight based dosing when appropriate to reduce radiation dose to as low as reasonably achievable.    EXAMINATION:  CT HEAD WITHOUT CONTRAST    CLINICAL HISTORY:  syncope, possible head trauma;    TECHNIQUE:  Head CT without IV contrast.    COMPARISON:  CT head 01/11/2019.    FINDINGS:  Gray-white differentiation is maintained without hemorrhage, midline shift, or mass effect.  Involutional changes of the brain parenchyma noted with associated ex vacuo dilatation of the ventricles.  Periventricular and deep white matter hypoattenuation noted.  Right thalamus chronic lacunar infarct noted.  The ventricles and cisterns are maintained.Calvarium is intact. Left maxillary sinus mucous retention cyst versus polyp, otherwise the paranasal sinuses and mastoid air cells are patent.  A subcutaneous lipoma is noted in the left mastoid region.                                 Medical Decision Making:   Initial Assessment:   89-year-old male with history of CHF, hypertension, recent hospital admission for CHF exacerbation who presents with syncopal episode and weakness.  My suspicion is that the patient is severely deconditioned from his recent hospital stay.  Vital signs are overall unremarkable.  His labs demonstrate a hemoglobin of 10.9 which is around his baseline.  His creatinine has been up trending since his admission to the hospital last week.  Today he has an acute on chronic kidney injury with creatinine of 2.9.  Baseline seems to be close to 1.6.  On chart review, the patient's creatinine has been rising since his last admission.  CT head and CT C-spine demonstrates no acute abnormalities.  Chest x-ray is overall unremarkable. EKG demonstrates  normal sinus rhythm with prolonged QT.  There is LVH present.  T-wave inversion present in lateral leads.  There is no ST elevation. Pt has a troponemia present, although on chart review he did have troponemia present on his recent admission. I did contact Dr. Velazquez at [facility] as well. The patient's family was offered transport back to [facility] but they would prefer to stay here now that he has already arrived. Given his troponemia, GUME, and generalized deconditioning, I have consulted hosp med.    Kris Lockwood MD  Resident, PGY-3  12/17/2019 5:42 PM                                   Clinical Impression:       ICD-10-CM ICD-9-CM   1. Syncope and collapse R55 780.2   2. Troponin I above reference range R79.89 790.6   3. Acute renal failure superimposed on chronic kidney disease, unspecified CKD stage, unspecified acute renal failure type N17.9 584.9    N18.9 585.9                             Kris Lockwood MD  Resident  12/17/19 3161

## 2019-12-17 NOTE — PT/OT/SLP PROGRESS
Physical Therapy Treatment    Patient Name:  Troy Yuan Sr.   MRN:  [id number]    Recommendations:     Discharge Recommendations:  home, home with home health, home health PT   Discharge Equipment Recommendations: none   Barriers to discharge: None    Assessment:     Troy Yuan Sr. is a 89 y.o. male admitted with a medical diagnosis of Acute on chronic diastolic heart failure.  He presents with the following impairments/functional limitations:  weakness, impaired endurance, impaired self care skills, impaired functional mobilty, gait instability, decreased lower extremity function, decreased safety awareness . Tolerated treatment. Requires assistance for safety with mobility due to weakness and some confusion.     Rehab Prognosis: Fair; patient would benefit from acute skilled PT services to address these deficits and reach maximum level of function.    Recent Surgery: * No surgery found *      Plan:     During this hospitalization, patient to be seen 6 x/week to address the identified rehab impairments via gait training, therapeutic activities, therapeutic exercises and progress toward the following goals:    · Plan of Care Expires:  12/31/19    Subjective     Chief Complaint: none stated  Patient/Family Comments/goals: none stated  Pain/Comfort:  · Pain Rating 1: 0/10      Objective:     Communicated with nurse Jarrett prior to session.  Patient found supine with bed alarm, telemetry([facility]) upon PT entry to room.     General Precautions: Standard, fall   Orthopedic Precautions:N/A   Braces:       Functional Mobility:  · Bed Mobility:     · Rolling Left:  contact guard assistance  · Rolling Right: contact guard assistance  · Supine to Sit: minimum assistance  · Sit to Supine: minimum assistance  · Transfers:     · Sit to Stand:  minimum assistance with rolling walker  · Gait: 100' with rw and min A.      AM-PAC 6 CLICK MOBILITY          Therapeutic Activities and Exercises:   Transferred to sitting EOB with  Min A with assistance to scoot forward to feet flat on floor for sitting balance.   Stood with rw and Min A with extra time for standing balance,leaning posterior initially.   Ambulated slowly in hallway with 2 standing rests taken, patient eager to proceed.   Returned to room in chair. SPT chair to bed with Min A. Sit to supine with Min A.    Patient left supine with all lines intact, call button in reach, bed alarm on, nurse Kolby notified and Tara arvizu present..    GOALS:   Multidisciplinary Problems     Physical Therapy Goals        Problem: Physical Therapy Goal    Goal Priority Disciplines Outcome Goal Variances Interventions   Physical Therapy Goal     PT, PT/OT Ongoing, Progressing     Description:  Goals to be met by: 2019     Patient will increase functional independence with mobility by performin). Supine to sit with Modified [place]  2). Sit to supine with Modified [place]  3). Sit to stand transfer with Stand-by Assistance  4). Bed to chair transfer with Stand-by Assistance using Rolling Walker  5). Gait  x  > 150 feet with Stand-by Assistance using Rolling Walker.                       Time Tracking:     PT Received On: 19  PT Start Time: 922     PT Stop Time: 942  PT Total Time (min): 20 min     Billable Minutes: Gait Training 12min and Therapeutic Activity 8min    Treatment Type: Treatment  PT/PTA: PTA     PTA Visit Number: 2     Debora Cummings, JOCY  2019

## 2019-12-17 NOTE — PLAN OF CARE
Cm sent the referral to [facility] for HH.  Pending HH.       12/17/19 6706   Post-Acute Status   Post-Acute Authorization Home Health/Hospice   Home Health/Hospice Status Referrals Sent

## 2019-12-17 NOTE — ASSESSMENT & PLAN NOTE
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 Labs reviewed- BMP with Estimated Creatinine Clearance: 18.4 mL/min (A) (based on SCr of 2.5 mg/dL (H)). according to latest data. Monitor UOP and serial BMP and adjust therapy as needed. Avoid nephrotoxins and renally dose meds for GFR listed above.

## 2019-12-17 NOTE — ASSESSMENT & PLAN NOTE
Likely secondary to cerebrovascular disease and uncontrolled hypertension.  Continue statin.  Continue to control blood pressure as above.

## 2019-12-17 NOTE — CARE UPDATE
12/17/19 0640   PRE-TX-O2   O2 Device (Oxygen Therapy) room air   SpO2 97 %   Pulse Oximetry Type Intermittent   $ Pulse Oximetry - Multiple Charge Pulse Oximetry - Multiple   Pulse 64   Resp 16

## 2019-12-18 ENCOUNTER — TELEPHONE (OUTPATIENT)
Dept: MEDSURG UNIT | Facility: HOSPITAL | Age: 84
End: 2019-12-18

## 2019-12-18 PROBLEM — E87.6 HYPOKALEMIA: Status: ACTIVE | Noted: 2019-12-18

## 2019-12-18 PROBLEM — E83.39 HYPERPHOSPHATEMIA: Status: ACTIVE | Noted: 2019-12-18

## 2019-12-18 PROBLEM — N39.0 UTI (URINARY TRACT INFECTION): Status: ACTIVE | Noted: 2019-12-18

## 2019-12-18 LAB
ANION GAP SERPL CALC-SCNC: 11 MMOL/L (ref 8–16)
BASOPHILS # BLD AUTO: 0.01 K/UL (ref 0–0.2)
BASOPHILS NFR BLD: 0.2 % (ref 0–1.9)
BUN SERPL-MCNC: 46 MG/DL (ref 8–23)
CALCIUM SERPL-MCNC: 8.5 MG/DL (ref 8.7–10.5)
CHLORIDE SERPL-SCNC: 104 MMOL/L (ref 95–110)
CO2 SERPL-SCNC: 28 MMOL/L (ref 23–29)
CREAT SERPL-MCNC: 3.2 MG/DL (ref 0.5–1.4)
DIFFERENTIAL METHOD: ABNORMAL
EOSINOPHIL # BLD AUTO: 0.1 K/UL (ref 0–0.5)
EOSINOPHIL NFR BLD: 1.8 % (ref 0–8)
ERYTHROCYTE [DISTWIDTH] IN BLOOD BY AUTOMATED COUNT: 13.9 % (ref 11.5–14.5)
EST. GFR  (AFRICAN AMERICAN): 18.7 ML/MIN/1.73 M^2
EST. GFR  (NON AFRICAN AMERICAN): 16.2 ML/MIN/1.73 M^2
GLUCOSE SERPL-MCNC: 108 MG/DL (ref 70–110)
GLUCOSE SERPL-MCNC: 112 MG/DL (ref 70–110)
GLUCOSE SERPL-MCNC: 130 MG/DL (ref 70–110)
GLUCOSE SERPL-MCNC: 93 MG/DL (ref 70–110)
HCT VFR BLD AUTO: 31.8 % (ref 40–54)
HGB BLD-MCNC: 10.1 G/DL (ref 14–18)
IMM GRANULOCYTES # BLD AUTO: 0.02 K/UL (ref 0–0.04)
IMM GRANULOCYTES NFR BLD AUTO: 0.3 % (ref 0–0.5)
LYMPHOCYTES # BLD AUTO: 1 K/UL (ref 1–4.8)
LYMPHOCYTES NFR BLD: 16.5 % (ref 18–48)
MAGNESIUM SERPL-MCNC: 2.2 MG/DL (ref 1.6–2.6)
MCH RBC QN AUTO: 30.2 PG (ref 27–31)
MCHC RBC AUTO-ENTMCNC: 31.8 G/DL (ref 32–36)
MCV RBC AUTO: 95 FL (ref 82–98)
MONOCYTES # BLD AUTO: 0.8 K/UL (ref 0.3–1)
MONOCYTES NFR BLD: 12.9 % (ref 4–15)
NEUTROPHILS # BLD AUTO: 4.2 K/UL (ref 1.8–7.7)
NEUTROPHILS NFR BLD: 68.3 % (ref 38–73)
NRBC BLD-RTO: 0 /100 WBC
PHOSPHATE SERPL-MCNC: 5.2 MG/DL (ref 2.7–4.5)
PLATELET # BLD AUTO: 255 K/UL (ref 150–350)
PMV BLD AUTO: 10.8 FL (ref 9.2–12.9)
POTASSIUM SERPL-SCNC: 3.4 MMOL/L (ref 3.5–5.1)
RBC # BLD AUTO: 3.34 M/UL (ref 4.6–6.2)
SODIUM SERPL-SCNC: 143 MMOL/L (ref 136–145)
TROPONIN I SERPL DL<=0.01 NG/ML-MCNC: 0.07 NG/ML
WBC # BLD AUTO: 6.18 K/UL (ref 3.9–12.7)

## 2019-12-18 PROCEDURE — 83735 ASSAY OF MAGNESIUM: CPT

## 2019-12-18 PROCEDURE — 80048 BASIC METABOLIC PNL TOTAL CA: CPT

## 2019-12-18 PROCEDURE — 36415 COLL VENOUS BLD VENIPUNCTURE: CPT

## 2019-12-18 PROCEDURE — 82962 GLUCOSE BLOOD TEST: CPT

## 2019-12-18 PROCEDURE — 84100 ASSAY OF PHOSPHORUS: CPT

## 2019-12-18 PROCEDURE — 84484 ASSAY OF TROPONIN QUANT: CPT

## 2019-12-18 PROCEDURE — 97116 GAIT TRAINING THERAPY: CPT

## 2019-12-18 PROCEDURE — 94761 N-INVAS EAR/PLS OXIMETRY MLT: CPT

## 2019-12-18 PROCEDURE — 97161 PT EVAL LOW COMPLEX 20 MIN: CPT

## 2019-12-18 PROCEDURE — 87086 URINE CULTURE/COLONY COUNT: CPT

## 2019-12-18 PROCEDURE — 63600175 PHARM REV CODE 636 W HCPCS: Performed by: INTERNAL MEDICINE

## 2019-12-18 PROCEDURE — 94760 N-INVAS EAR/PLS OXIMETRY 1: CPT

## 2019-12-18 PROCEDURE — 85025 COMPLETE CBC W/AUTO DIFF WBC: CPT

## 2019-12-18 PROCEDURE — 97535 SELF CARE MNGMENT TRAINING: CPT

## 2019-12-18 PROCEDURE — 12000002 HC ACUTE/MED SURGE SEMI-PRIVATE ROOM

## 2019-12-18 PROCEDURE — 25000003 PHARM REV CODE 250: Performed by: STUDENT IN AN ORGANIZED HEALTH CARE EDUCATION/TRAINING PROGRAM

## 2019-12-18 PROCEDURE — 97165 OT EVAL LOW COMPLEX 30 MIN: CPT

## 2019-12-18 RX ORDER — HYDRALAZINE HYDROCHLORIDE 20 MG/ML
10 INJECTION INTRAMUSCULAR; INTRAVENOUS EVERY 8 HOURS PRN
Status: DISCONTINUED | OUTPATIENT
Start: 2019-12-18 | End: 2019-12-20 | Stop reason: HOSPADM

## 2019-12-18 RX ADMIN — FAMOTIDINE 20 MG: 20 TABLET ORAL at 11:12

## 2019-12-18 RX ADMIN — ATORVASTATIN CALCIUM 20 MG: 20 TABLET, FILM COATED ORAL at 11:12

## 2019-12-18 RX ADMIN — CEFTRIAXONE 1 G: 1 INJECTION, SOLUTION INTRAVENOUS at 10:12

## 2019-12-18 RX ADMIN — HYDROXYCHLOROQUINE SULFATE 200 MG: 200 TABLET, FILM COATED ORAL at 11:12

## 2019-12-18 RX ADMIN — FERROUS SULFATE TAB 325 MG (65 MG ELEMENTAL FE) 325 MG: 325 (65 FE) TAB at 11:12

## 2019-12-18 RX ADMIN — AMLODIPINE BESYLATE 10 MG: 5 TABLET ORAL at 11:12

## 2019-12-18 RX ADMIN — ISOSORBIDE MONONITRATE 60 MG: 60 TABLET, EXTENDED RELEASE ORAL at 11:12

## 2019-12-18 NOTE — PLAN OF CARE
12/18/19 0941   Final Note   Assessment Type Final Discharge Note   Anticipated Discharge Disposition Home-Health

## 2019-12-18 NOTE — ASSESSMENT & PLAN NOTE
Soft bp on admission   Chronic medical condition  Will continue amlopidine 10 mg   Holding toprol -xl 50 mg for now given low HR

## 2019-12-18 NOTE — ASSESSMENT & PLAN NOTE
Recent admission for AoC CHF  Most recent ECHO EF 45%  Holding lasix for now given GUME  Holding BB for now given low HR  C/w isosorbid mononitrate   BNP elevated on admission but downtrending  No sob, lungs clears, no edema

## 2019-12-18 NOTE — ASSESSMENT & PLAN NOTE
Admit to Med/surg: remote Tele  Recent admission for acute on chronic chf with discharge from [facility] earlier today  Deconditioning with recommendation for discharge to Rehab, however family declined  Medication change prior to discharge, increase in bb  Over diuresis as seen with elevated BUN creatinine  Gentle hydration with NS @ 50cc/hr  Hold bb for now  PT/OT

## 2019-12-18 NOTE — HOSPITAL COURSE
Patient is a 89-year-old  male with a past medical history significant for chronic diastolic heart failure who was admitted the hospital with worsening heart failure.  He underwent Lasix administration echocardiogram which confirmed his diagnosis and he improved symptomatically.  His blood pressure remained difficult to control throughout his admission and eventually blood pressure medication was titrated accordingly.

## 2019-12-18 NOTE — H&P
"[facility] Medicine  History & Physical    Patient Name: Troy Yuan Sr.  MRN: [id number]  Admission Date: 12/17/2019  Attending Physician: Pamela Hammond DO   Primary Care Provider: Gentry Encinas MD         Patient information was obtained from patient, relative(s), past medical records and ER records.     Subjective:     Principal Problem:Syncope and collapse    Chief Complaint:   Chief Complaint   Patient presents with    Weakness     DIscharged today from [facility],   presents feeling weak        HPI: 89 yo AAM with PMH of HTN, CKD 4, HLD, RA, chronic combined systolic/diastolic heart failure, dementia, AOCD presents to ED following pre-syncopal episode. Patient was discharged earlier today from [facility] (12/12-12/17) where he was admitted for acute on chronic CHF. He was initially admitted to ICU placed on bipap, nitro gtt and diuresed with IV lasix. He was stepped to floor and transitioned to PO lasix with recommendations for discharge to Rehab, however family preferred to take patient home.   Once at home, he attempted to ambulate to bathroom and became fatigued and "wobbly on his feet" per patient's son. Family was able to get patient to chair. Family denies patient hitting head, LOC or complaints of pain.  History is limited 2/2 patient's dementia.      In Ed, he was noted to have elevated bun/cr. He also had elevated bnp and trop, however these were both improved from initial admission on 12/12.  Ct head and CT spine negative.           Past Medical History:   Diagnosis Date    Bladder stones     Cancer     Encounter for blood transfusion     Hypertension     Kidney stone     Prostate cancer 2004    Radiation 2005    prostate    Stage 3 chronic kidney disease 1/11/2019       Past Surgical History:   Procedure Laterality Date    CYSTOSCOPY      KIDNEY STONE SURGERY  May 2014       Review of patient's allergies indicates:   Allergen Reactions    " Soma [carisoprodol] Rash    Aspirin     Sulfa (sulfonamide antibiotics) Rash       Current Facility-Administered Medications on File Prior to Encounter   Medication    [DISCONTINUED] alfuzosin 24 hr tablet 10 mg    [DISCONTINUED] amLODIPine tablet 10 mg    [DISCONTINUED] atorvastatin tablet 20 mg    [DISCONTINUED] famotidine tablet 20 mg    [DISCONTINUED] ferrous sulfate EC tablet 325 mg    [DISCONTINUED] furosemide tablet 40 mg    [DISCONTINUED] haloperidol tablet 2 mg    [DISCONTINUED] hydrALAZINE injection 10 mg    [DISCONTINUED] hydroxychloroquine tablet 200 mg    [DISCONTINUED] isosorbide mononitrate 24 hr tablet 60 mg    [DISCONTINUED] magnesium oxide tablet 800 mg    [DISCONTINUED] magnesium oxide tablet 800 mg    [DISCONTINUED] metoprolol succinate (TOPROL-XL) 24 hr tablet 50 mg    [DISCONTINUED] ondansetron disintegrating tablet 4 mg    [DISCONTINUED] ondansetron injection 4 mg    [DISCONTINUED] potassium chloride 10% oral solution 40 mEq    [DISCONTINUED] potassium chloride 10% oral solution 40 mEq    [DISCONTINUED] potassium chloride 10% oral solution 60 mEq    [DISCONTINUED] potassium, sodium phosphates 280-160-250 mg packet 2 packet    [DISCONTINUED] potassium, sodium phosphates 280-160-250 mg packet 2 packet    [DISCONTINUED] potassium, sodium phosphates 280-160-250 mg packet 2 packet    [DISCONTINUED] senna-docusate 8.6-50 mg per tablet 1 tablet    [DISCONTINUED] sodium chloride 0.9% flush 10 mL    [DISCONTINUED] ziprasidone injection 10 mg     Current Outpatient Medications on File Prior to Encounter   Medication Sig    alfuzosin (UROXATRAL) 10 mg Tb24 TAKE 1 TABLET(10 MG) BY MOUTH EVERY DAY    amlodipine (NORVASC) 10 MG tablet Take 10 mg by mouth once daily.      atorvastatin (LIPITOR) 20 MG tablet Take 1 tablet (20 mg total) by mouth once daily.    cimetidine (TAGAMET) 200 MG tablet Take 200 mg by mouth 2 (two) times daily.     ferrous sulfate 325 mg (65 mg iron) Tab  tablet Take 325 mg by mouth once daily.    [START ON 12/18/2019] furosemide (LASIX) 40 MG tablet Take 1 tablet (40 mg total) by mouth once daily.    hydroxychloroquine (PLAQUENIL) 200 mg tablet Take 1 tablet (200 mg total) by mouth once daily.    hydroxychloroquine (PLAQUENIL) 200 mg tablet TAKE 1 TABLET(200 MG) BY MOUTH EVERY DAY    [START ON 12/18/2019] isosorbide mononitrate (IMDUR) 60 MG 24 hr tablet Take 1 tablet (60 mg total) by mouth once daily.    [START ON 12/18/2019] metoprolol succinate (TOPROL-XL) 50 MG 24 hr tablet Take 1 tablet (50 mg total) by mouth once daily.    [DISCONTINUED] hydrALAZINE (APRESOLINE) 25 MG tablet Take 1 tablet (25 mg total) by mouth every 8 (eight) hours.     Family History     Problem Relation (Age of Onset)    Asthma Son    Diabetes Daughter    Hypertension Daughter, Son        Tobacco Use    Smoking status: Former Smoker     Years: 40.00     Types: Cigarettes     Start date: 9/26/1985    Smokeless tobacco: Never Used   Substance and Sexual Activity    Alcohol use: No    Drug use: No    Sexual activity: Never     Review of Systems   Unable to perform ROS: Dementia     Objective:     Vital Signs (Most Recent):  Temp: 98 °F (36.7 °C) (12/17/19 1516)  Pulse: (!) 52 (12/17/19 1800)  Resp: 16 (12/17/19 1923)  BP: (!) 117/56 (12/17/19 1800)  SpO2: 95 % (12/17/19 1800) Vital Signs (24h Range):  Temp:  [97.5 °F (36.4 °C)-98.5 °F (36.9 °C)] 98 °F (36.7 °C)  Pulse:  [50-77] 52  Resp:  [14-20] 16  SpO2:  [94 %-97 %] 95 %  BP: (104-175)/(51-90) 117/56     Weight: 71.2 kg (157 lb)  Body mass index is 25.34 kg/m².    Physical Exam   Constitutional: No distress.   Eldery, frail   HENT:   Head: Normocephalic and atraumatic.   Mouth/Throat: Oropharynx is clear and moist.   Eyes: Pupils are equal, round, and reactive to light. Conjunctivae and EOM are normal.   Neck: Normal range of motion. Neck supple.   Cardiovascular: Regular rhythm, normal heart sounds and intact distal pulses.    bradycardia   Pulmonary/Chest: Effort normal and breath sounds normal.   Abdominal: Soft. Bowel sounds are normal. He exhibits no distension. There is no tenderness.   Musculoskeletal: Normal range of motion. He exhibits no edema, tenderness or deformity.   Neurological: He has normal reflexes.   Sleeping but arousable  Oriented to name   Skin: Skin is warm and dry. He is not diaphoretic.   Psychiatric:   Unable to obtain 2/2 dementia   Nursing note and vitals reviewed.       Significant Labs:   CBC:   Recent Labs   Lab 12/17/19  1540   WBC 6.34   HGB 10.9*   HCT 34.5*        CMP:   Recent Labs   Lab 12/16/19  0555 12/17/19  0541 12/17/19  1540    142 138   K 3.6 3.4* 3.6    103 98   CO2 25 26 27    111* 155*   BUN 32* 34* 40*   CREATININE 2.5* 2.4* 2.9*   CALCIUM 9.0 9.3 8.6*   PROT  --   --  6.6   ALBUMIN  --   --  3.4*   BILITOT  --   --  0.7   ALKPHOS  --   --  54*   AST  --   --  21   ALT  --   --  17   ANIONGAP 14 13 13   EGFRNONAA 22* 23* 18.2*     Cardiac Markers:   Recent Labs   Lab 12/12/19  0903 12/13/19  0348 12/16/19  0555 12/17/19  1540   TROPONINI 0.167* 0.195*  --  0.071*   BNP  --   --  2,281* 1,719*        All pertinent labs within the past 24 hours have been reviewed.    Significant Imaging: I have reviewed all pertinent imaging results/findings within the past 24 hours.   Imaging Results          X-Ray Chest AP Portable (Final result)  Result time 12/17/19 16:23:46   Procedure changed from X-Ray Chest 1 View     Final result by Tierra Barraza MD (12/17/19 16:23:46)                 Impression:      Small bilateral pleural effusions with improvement of the airspace disease within the left lung base      Electronically signed by: Tierra Barraza MD  Date:    12/17/2019  Time:    16:23             Narrative:    EXAMINATION:  XR CHEST AP PORTABLE    CLINICAL HISTORY:  Pain; Syncope and collapse    FINDINGS:  Portable chest at 15:58 hours is compared to 12/16/2019 shows  normal cardiomediastinal silhouette.    The lungs are hyperexpanded.  There are small pleural effusions.  There is improvement of the airspace disease in the left lung base.  The remainder of the lungs are clear.    No acute osseous abnormality.                               CT Cervical Spine Without Contrast (Final result)  Result time 12/17/19 16:12:10    Final result by Tierra Barraza MD (12/17/19 16:12:10)                 Impression:      Degenerative changes of the cervical spine without evidence of fracture or subluxation.      Electronically signed by: Tierra Barraza MD  Date:    12/17/2019  Time:    16:12             Narrative:      CMS MANDATED QUALITY DATA - CT RADIATION - 436    All CT scans at this facility utilize dose modulation, iterative reconstruction, and/or weight based dosing when appropriate to reduce radiation dose to as low as reasonably achievable.    EXAMINATION:  CT CERVICAL SPINE WITHOUT CONTRAST    CLINICAL HISTORY:  fall;    TECHNIQUE:  Cervical spine CT without IV contrast obtained with coronal and sagittal reformations.    COMPARISON:  None    FINDINGS:  The cervical spine is in satisfactory alignment.  The vertebral bodies are of normal height.  There is anterior bony fusion and bony fusion of the left facets at C3-4.  There is disc space narrowing anterior spurring at C4-5, C5-6 and C6-7.    The facet joints are aligned.  The odontoid process is intact and the lateral masses of C1 are symmetrical.  The cranial cervical junction is normal.    There is no fracture or subluxation.    There is multilevel foraminal narrowing secondary to facet hypertrophy.  This is most significant on the left at C3-4, on the right at C4-5 and bilaterally at C5-6 and C6-7.    The paraspinous soft tissues are normal.  There is vascular calcification at the carotid bifurcations.  There is no epidural fluid collection.  The lung apices are clear.                               CT Head Without Contrast  (Final result)  Result time 12/17/19 16:13:16    Final result by Joel Hammond MD (12/17/19 16:13:16)                 Impression:      1. No acute intracranial abnormality.  2. Multiple chronic changes as described above.      Electronically signed by: Joel Hammond MD  Date:    12/17/2019  Time:    16:13             Narrative:      CMS MANDATED QUALITY DATA - CT RADIATION - 436    All CT scans at this facility utilize dose modulation, iterative reconstruction, and/or weight based dosing when appropriate to reduce radiation dose to as low as reasonably achievable.    EXAMINATION:  CT HEAD WITHOUT CONTRAST    CLINICAL HISTORY:  syncope, possible head trauma;    TECHNIQUE:  Head CT without IV contrast.    COMPARISON:  CT head 01/11/2019.    FINDINGS:  Gray-white differentiation is maintained without hemorrhage, midline shift, or mass effect.  Involutional changes of the brain parenchyma noted with associated ex vacuo dilatation of the ventricles.  Periventricular and deep white matter hypoattenuation noted.  Right thalamus chronic lacunar infarct noted.  The ventricles and cisterns are maintained.Calvarium is intact. Left maxillary sinus mucous retention cyst versus polyp, otherwise the paranasal sinuses and mastoid air cells are patent.  A subcutaneous lipoma is noted in the left mastoid region.                              12/12/19 ECHO  · Concentric left ventricular hypertrophy.  · Global hypokinetic wall motion.  · Decreased left ventricular systolic function. The estimated ejection fraction is 45%  · Grade I (mild) left ventricular diastolic dysfunction consistent with impaired relaxation.  · Normal right ventricular systolic function.  · Mild left atrial enlargement.  · Mild mitral regurgitation.  · Normal central venous pressure (3 mm Hg).  · The estimated PA systolic pressure is 35 mm Hg  · Mild tricuspid regurgitation.    Assessment/Plan:     * Syncope and collapse  Admit to Med/surg: remote Tele  Recent  admission for acute on chronic chf with discharge from [facility] earlier today  Deconditioning with recommendation for discharge to Rehab, however family declined  Medication change prior to discharge, increase in bb  Over diuresis as seen with elevated BUN creatinine  Gentle hydration with NS @ 50cc/hr  Hold bb for now  PT/OT         Acute renal failure superimposed on stage 4 chronic kidney disease  - likely secondary to over-diuresis  -baseline cr 1.6-1.8  -On admission Bun/cr 40/ 2.9  -IVF :  Gentle hydration given recent acute on chronic CHF exacerbation  -Will continue to monitor labs  - avoid nephrotoxic agents when possible           Chronic diastolic heart failure  Recent admission for [facility] CHF  Most recent ECHO EF 45%  Holding lasix for now given GUME  Holding BB for now given low HR  C/w isosorbid mononitrate   BNP elevated on admission but downtrending  No sob, lungs clears, no edema      Essential hypertension  Soft bp on admission   Chronic medical condition  Will continue amlopidine 10 mg   Holding toprol -xl 50 mg for now given low HR          Bradycardia  HR 50's on admission  Holding BB for now  Per chart review, patient was re-started on Toprol-xl 50 mg today.   Consider restart at lower dose in AM      Dementia without behavioral disturbance  Stable  Chronic medical condition  .          Rheumatoid arthritis  Stable  Chronic medical condition  Continuing home medications.            VTE Risk Mitigation (From admission, onward)         Ordered     Place JACKI hose  Until discontinued      12/17/19 1811     Place sequential compression device  Until discontinued      12/17/19 1811     IP VTE HIGH RISK PATIENT  Once      12/17/19 1811                   Pamela Hammond DO  Department of Hospital Medicine   [facility]

## 2019-12-18 NOTE — SUBJECTIVE & OBJECTIVE
Past Medical History:   Diagnosis Date    Bladder stones     Cancer     Encounter for blood transfusion     Hypertension     Kidney stone     Prostate cancer 2004    Radiation 2005    prostate    Stage 3 chronic kidney disease 1/11/2019       Past Surgical History:   Procedure Laterality Date    CYSTOSCOPY      KIDNEY STONE SURGERY  May 2014       Review of patient's allergies indicates:   Allergen Reactions    Soma [carisoprodol] Rash    Aspirin     Sulfa (sulfonamide antibiotics) Rash       Current Facility-Administered Medications on File Prior to Encounter   Medication    [DISCONTINUED] alfuzosin 24 hr tablet 10 mg    [DISCONTINUED] amLODIPine tablet 10 mg    [DISCONTINUED] atorvastatin tablet 20 mg    [DISCONTINUED] famotidine tablet 20 mg    [DISCONTINUED] ferrous sulfate EC tablet 325 mg    [DISCONTINUED] furosemide tablet 40 mg    [DISCONTINUED] haloperidol tablet 2 mg    [DISCONTINUED] hydrALAZINE injection 10 mg    [DISCONTINUED] hydroxychloroquine tablet 200 mg    [DISCONTINUED] isosorbide mononitrate 24 hr tablet 60 mg    [DISCONTINUED] magnesium oxide tablet 800 mg    [DISCONTINUED] magnesium oxide tablet 800 mg    [DISCONTINUED] metoprolol succinate (TOPROL-XL) 24 hr tablet 50 mg    [DISCONTINUED] ondansetron disintegrating tablet 4 mg    [DISCONTINUED] ondansetron injection 4 mg    [DISCONTINUED] potassium chloride 10% oral solution 40 mEq    [DISCONTINUED] potassium chloride 10% oral solution 40 mEq    [DISCONTINUED] potassium chloride 10% oral solution 60 mEq    [DISCONTINUED] potassium, sodium phosphates 280-160-250 mg packet 2 packet    [DISCONTINUED] potassium, sodium phosphates 280-160-250 mg packet 2 packet    [DISCONTINUED] potassium, sodium phosphates 280-160-250 mg packet 2 packet    [DISCONTINUED] senna-docusate 8.6-50 mg per tablet 1 tablet    [DISCONTINUED] sodium chloride 0.9% flush 10 mL    [DISCONTINUED] ziprasidone injection 10 mg     Current  Outpatient Medications on File Prior to Encounter   Medication Sig    alfuzosin (UROXATRAL) 10 mg Tb24 TAKE 1 TABLET(10 MG) BY MOUTH EVERY DAY    amlodipine (NORVASC) 10 MG tablet Take 10 mg by mouth once daily.      atorvastatin (LIPITOR) 20 MG tablet Take 1 tablet (20 mg total) by mouth once daily.    cimetidine (TAGAMET) 200 MG tablet Take 200 mg by mouth 2 (two) times daily.     ferrous sulfate 325 mg (65 mg iron) Tab tablet Take 325 mg by mouth once daily.    [START ON 12/18/2019] furosemide (LASIX) 40 MG tablet Take 1 tablet (40 mg total) by mouth once daily.    hydroxychloroquine (PLAQUENIL) 200 mg tablet Take 1 tablet (200 mg total) by mouth once daily.    hydroxychloroquine (PLAQUENIL) 200 mg tablet TAKE 1 TABLET(200 MG) BY MOUTH EVERY DAY    [START ON 12/18/2019] isosorbide mononitrate (IMDUR) 60 MG 24 hr tablet Take 1 tablet (60 mg total) by mouth once daily.    [START ON 12/18/2019] metoprolol succinate (TOPROL-XL) 50 MG 24 hr tablet Take 1 tablet (50 mg total) by mouth once daily.    [DISCONTINUED] hydrALAZINE (APRESOLINE) 25 MG tablet Take 1 tablet (25 mg total) by mouth every 8 (eight) hours.     Family History     Problem Relation (Age of Onset)    Asthma Son    Diabetes Daughter    Hypertension Daughter, Son        Tobacco Use    Smoking status: Former Smoker     Years: 40.00     Types: Cigarettes     Start date: 9/26/1985    Smokeless tobacco: Never Used   Substance and Sexual Activity    Alcohol use: No    Drug use: No    Sexual activity: Never     Review of Systems   Unable to perform ROS: Dementia     Objective:     Vital Signs (Most Recent):  Temp: 98 °F (36.7 °C) (12/17/19 1516)  Pulse: (!) 52 (12/17/19 1800)  Resp: 16 (12/17/19 1923)  BP: (!) 117/56 (12/17/19 1800)  SpO2: 95 % (12/17/19 1800) Vital Signs (24h Range):  Temp:  [97.5 °F (36.4 °C)-98.5 °F (36.9 °C)] 98 °F (36.7 °C)  Pulse:  [50-77] 52  Resp:  [14-20] 16  SpO2:  [94 %-97 %] 95 %  BP: (104-175)/(51-90) 117/56      Weight: 71.2 kg (157 lb)  Body mass index is 25.34 kg/m².    Physical Exam   Constitutional: No distress.   Eldery, frail   HENT:   Head: Normocephalic and atraumatic.   Mouth/Throat: Oropharynx is clear and moist.   Eyes: Pupils are equal, round, and reactive to light. Conjunctivae and EOM are normal.   Neck: Normal range of motion. Neck supple.   Cardiovascular: Regular rhythm, normal heart sounds and intact distal pulses.   bradycardia   Pulmonary/Chest: Effort normal and breath sounds normal.   Abdominal: Soft. Bowel sounds are normal. He exhibits no distension. There is no tenderness.   Musculoskeletal: Normal range of motion. He exhibits no edema, tenderness or deformity.   Neurological: He has normal reflexes.   Sleeping but arousable  Oriented to name   Skin: Skin is warm and dry. He is not diaphoretic.   Psychiatric:   Unable to obtain 2/2 dementia   Nursing note and vitals reviewed.       Significant Labs:   CBC:   Recent Labs   Lab 12/17/19  1540   WBC 6.34   HGB 10.9*   HCT 34.5*        CMP:   Recent Labs   Lab 12/16/19  0555 12/17/19  0541 12/17/19  1540    142 138   K 3.6 3.4* 3.6    103 98   CO2 25 26 27    111* 155*   BUN 32* 34* 40*   CREATININE 2.5* 2.4* 2.9*   CALCIUM 9.0 9.3 8.6*   PROT  --   --  6.6   ALBUMIN  --   --  3.4*   BILITOT  --   --  0.7   ALKPHOS  --   --  54*   AST  --   --  21   ALT  --   --  17   ANIONGAP 14 13 13   EGFRNONAA 22* 23* 18.2*     Cardiac Markers:   Recent Labs   Lab 12/12/19  0903 12/13/19  0348 12/16/19  0555 12/17/19  1540   TROPONINI 0.167* 0.195*  --  0.071*   BNP  --   --  2,281* 1,719*        All pertinent labs within the past 24 hours have been reviewed.    Significant Imaging: I have reviewed all pertinent imaging results/findings within the past 24 hours.   Imaging Results          X-Ray Chest AP Portable (Final result)  Result time 12/17/19 16:23:46   Procedure changed from X-Ray Chest 1 View     Final result by Tierra CLEMENS  MD Christi (12/17/19 16:23:46)                 Impression:      Small bilateral pleural effusions with improvement of the airspace disease within the left lung base      Electronically signed by: Tierra Barraza MD  Date:    12/17/2019  Time:    16:23             Narrative:    EXAMINATION:  XR CHEST AP PORTABLE    CLINICAL HISTORY:  Pain; Syncope and collapse    FINDINGS:  Portable chest at 15:58 hours is compared to 12/16/2019 shows normal cardiomediastinal silhouette.    The lungs are hyperexpanded.  There are small pleural effusions.  There is improvement of the airspace disease in the left lung base.  The remainder of the lungs are clear.    No acute osseous abnormality.                               CT Cervical Spine Without Contrast (Final result)  Result time 12/17/19 16:12:10    Final result by Tierra Barraza MD (12/17/19 16:12:10)                 Impression:      Degenerative changes of the cervical spine without evidence of fracture or subluxation.      Electronically signed by: Tierra Barraza MD  Date:    12/17/2019  Time:    16:12             Narrative:      CMS MANDATED QUALITY DATA - CT RADIATION - 436    All CT scans at this facility utilize dose modulation, iterative reconstruction, and/or weight based dosing when appropriate to reduce radiation dose to as low as reasonably achievable.    EXAMINATION:  CT CERVICAL SPINE WITHOUT CONTRAST    CLINICAL HISTORY:  fall;    TECHNIQUE:  Cervical spine CT without IV contrast obtained with coronal and sagittal reformations.    COMPARISON:  None    FINDINGS:  The cervical spine is in satisfactory alignment.  The vertebral bodies are of normal height.  There is anterior bony fusion and bony fusion of the left facets at C3-4.  There is disc space narrowing anterior spurring at C4-5, C5-6 and C6-7.    The facet joints are aligned.  The odontoid process is intact and the lateral masses of C1 are symmetrical.  The cranial cervical junction is normal.    There  is no fracture or subluxation.    There is multilevel foraminal narrowing secondary to facet hypertrophy.  This is most significant on the left at C3-4, on the right at C4-5 and bilaterally at C5-6 and C6-7.    The paraspinous soft tissues are normal.  There is vascular calcification at the carotid bifurcations.  There is no epidural fluid collection.  The lung apices are clear.                               CT Head Without Contrast (Final result)  Result time 12/17/19 16:13:16    Final result by Joel Hammond MD (12/17/19 16:13:16)                 Impression:      1. No acute intracranial abnormality.  2. Multiple chronic changes as described above.      Electronically signed by: Joel Hammond MD  Date:    12/17/2019  Time:    16:13             Narrative:      CMS MANDATED QUALITY DATA - CT RADIATION - 436    All CT scans at this facility utilize dose modulation, iterative reconstruction, and/or weight based dosing when appropriate to reduce radiation dose to as low as reasonably achievable.    EXAMINATION:  CT HEAD WITHOUT CONTRAST    CLINICAL HISTORY:  syncope, possible head trauma;    TECHNIQUE:  Head CT without IV contrast.    COMPARISON:  CT head 01/11/2019.    FINDINGS:  Gray-white differentiation is maintained without hemorrhage, midline shift, or mass effect.  Involutional changes of the brain parenchyma noted with associated ex vacuo dilatation of the ventricles.  Periventricular and deep white matter hypoattenuation noted.  Right thalamus chronic lacunar infarct noted.  The ventricles and cisterns are maintained.Calvarium is intact. Left maxillary sinus mucous retention cyst versus polyp, otherwise the paranasal sinuses and mastoid air cells are patent.  A subcutaneous lipoma is noted in the left mastoid region.                              12/12/19 ECHO  · Concentric left ventricular hypertrophy.  · Global hypokinetic wall motion.  · Decreased left ventricular systolic function. The estimated ejection  fraction is 45%  · Grade I (mild) left ventricular diastolic dysfunction consistent with impaired relaxation.  · Normal right ventricular systolic function.  · Mild left atrial enlargement.  · Mild mitral regurgitation.  · Normal central venous pressure (3 mm Hg).  · The estimated PA systolic pressure is 35 mm Hg  · Mild tricuspid regurgitation.

## 2019-12-18 NOTE — DISCHARGE SUMMARY
"[facility] Hospital Medicine  Discharge Summary      Patient Name: Troy Yuan Sr.  MRN: [id number]  Admission Date: 12/12/2019  Hospital Length of Stay: 5 days  Discharge Date and Time: 12/17/2019  2:53 PM  Attending Physician: Sandi att. providers found   Discharging Provider: Arcadio Velazquez MD  Primary Care Provider: Gentry Encinas MD      HPI:   Troy Yuan Sr. is a 89-year-old male with past medical history of hypertension, chronic kidney disease, hyperlipidemia, and rheumatoid arthritis who presented to the emergency room tonight with reports of shortness of breath.  Patient states "I could not breathe."  Patient reports onset of symptoms at approximately 12:30 a.m. Patient also reports leg swelling. Patient denies recent sick contacts or recent travel.  Patient denies chest pain, nausea, vomiting, or diarrhea.  Patient denies use supplemental oxygen at home.  Patient accompanied by his daughter, Lakia and grandson at bedside.  Emergency room patient noted to be in acute pulmonary edema with associated hypertensive urgency.  Patient started on nitroglycerin drip and given 40 mg of IV Lasix.  Patient requiring BiPAP therapy.  Patient admitted to ICU on 12/12/2019 at approximately 5:30 a.m..    * No surgery found *      Hospital Course:   Patient is a 89-year-old  male with a past medical history significant for chronic diastolic heart failure who was admitted the hospital with worsening heart failure.  He underwent Lasix administration echocardiogram which confirmed his diagnosis and he improved symptomatically.  His blood pressure remained difficult to control throughout his admission and eventually blood pressure medication was titrated accordingly.  Patient's symptoms were back to his baseline and he was discharged home with home health with CHF management program.     Consults:   Consults (From admission, onward)        Status Ordering Provider     Inpatient consult " to Registered Dietitian/Nutritionist  Once     Provider:  (Not yet assigned)    Completed JERMAN CASTILLO     Inpatient consult to Social Work/Case Management  Once     Provider:  (Not yet assigned)    Completed JERMAN CASTILLO          No new Assessment & Plan notes have been filed under this hospital service since the last note was generated.  Service: Hospital Medicine    Final Active Diagnoses:    Diagnosis Date Noted POA    PRINCIPAL PROBLEM:  Chronic diastolic heart failure [I50.32] 12/12/2019 Yes    Essential hypertension [I10] 01/11/2019 Yes    Hypertensive urgency [I16.0] 12/12/2019 Yes    Multiple lacunar infarcts [I63.81] 12/13/2019 Yes    Acute renal failure superimposed on stage 4 chronic kidney disease [N17.9, N18.4] 12/16/2019 Yes    Hyperlipidemia [E78.5] 12/12/2019 Yes    Rheumatoid arthritis [M06.9] 12/12/2019 Yes    Macrocytic anemia [D53.9] 07/02/2015 Yes    BPH (benign prostatic hypertrophy) with urinary retention [N40.1, R33.8] 06/22/2014 Yes      Problems Resolved During this Admission:    Diagnosis Date Noted Date Resolved POA    Stage 3 chronic kidney disease [N18.3] 01/11/2019 12/16/2019 Yes    Encephalopathy, metabolic [G93.41] 12/13/2019 12/15/2019 No    SOB (shortness of breath) [R06.02] 12/12/2019 12/16/2019 Yes    Acute pulmonary edema [J81.0] 12/12/2019 12/16/2019 Yes    Acute respiratory failure with hypoxia [J96.01] 12/12/2019 12/16/2019 Yes    Hypokalemia [E87.6] 12/12/2019 12/15/2019 Yes    NSTEMI (non-ST elevated myocardial infarction) [I21.4] 12/12/2019 12/16/2019 Yes       Discharged Condition: stable    [place] [facility]    [place]:  Follow-up Information     Gentry Encinas MD In 1 week.    Specialty:  Family Medicine  Why:  hospital f/u: Jose Alfredo, 12/23/19 @ 10:30am  Contact information:  [address] [place] [id number]  [phone]             Lloyd Grissom MD On 12/30/2019.    Specialty:  Cardiology  Why:  f/u on Monday,  12/30/19 @ 3:30pm  Contact information:  [address] [facility]  [address]  [place] [address]  [phone]             [facility].    Specialty:  Home Health Services  Contact information:  [address]   [address]  [place] [address]  [phone]                 Patient Instructions:      Referral to Home health   Referral Priority: Routine Referral Type: Home Health Care   Referral Reason: Specialty Services Required   Requested Specialty: Home Health Services   Number of Visits Requested: 1     Diet Cardiac   Order Comments: 3g NA diet with 2L fluid restriction     Notify your health care provider if you experience any of the following:  temperature >100.4     Notify your health care provider if you experience any of the following:  severe uncontrolled pain     Notify your health care provider if you experience any of the following:  difficulty breathing or increased cough     Notify your health care provider if you experience any of the following:  increased confusion or weakness     OT evaluation   Standing Status: Future Standing Exp. Date: 12/13/20   Order Comments: Weakness     PT evaluation   Standing Status: Future Standing Exp. Date: 12/13/20   Order Comments: Weakness -icu     Activity as tolerated       Significant Diagnostic Studies:     Pending Diagnostic Studies:     Procedure Component Value Units Date/Time    EKG 12-lead [[id number]]     Order Status:  Sent Lab Status:  No result          Medications:  Reconciled Home Medications:      Medication List      START taking these medications    furosemide 40 MG tablet  Commonly known as:  LASIX  Take 1 tablet (40 mg total) by mouth once daily.  Start taking on:  December 18, 2019     isosorbide mononitrate 60 MG 24 hr tablet  Commonly known as:  IMDUR  Take 1 tablet (60 mg total) by mouth once daily.  Start taking on:  December 18, 2019     metoprolol succinate 50 MG 24 hr tablet  Commonly known as:  TOPROL-XL  Take 1 tablet (50 mg total) by  mouth once daily.  Start taking on:  December 18, 2019        CONTINUE taking these medications    alfuzosin 10 mg Tb24  Commonly known as:  UROXATRAL  TAKE 1 TABLET(10 MG) BY MOUTH EVERY DAY     amLODIPine 10 MG tablet  Commonly known as:  NORVASC  Take 10 mg by mouth once daily.     atorvastatin 20 MG tablet  Commonly known as:  LIPITOR  Take 1 tablet (20 mg total) by mouth once daily.     cimetidine 200 MG tablet  Commonly known as:  TAGAMET  Take 200 mg by mouth 2 (two) times daily.     ferrous sulfate 325 mg (65 mg iron) Tab tablet  Commonly known as:  FEOSOL  Take 325 mg by mouth once daily.     * hydroxychloroquine 200 mg tablet  Commonly known as:  PLAQUENIL  Take 1 tablet (200 mg total) by mouth once daily.     * hydroxychloroquine 200 mg tablet  Commonly known as:  PLAQUENIL  TAKE 1 TABLET(200 MG) BY MOUTH EVERY DAY         * This list has 2 medication(s) that are the same as other medications prescribed for you. Read the directions carefully, and ask your doctor or other care provider to review them with you.            STOP taking these medications    doxycycline 100 MG Cap  Commonly known as:  VIBRAMYCIN     hydrALAZINE 25 MG tablet  Commonly known as:  APRESOLINE            Indwelling Lines/Drains at time of discharge:   Lines/Drains/Airways     None                 Time spent on the discharge of patient: 37 minutes  Patient was seen and examined on the date of discharge and determined to be suitable for discharge.         Arcadio Velazquez MD  Department of Hospital Medicine  [facility]

## 2019-12-18 NOTE — PT/OT/SLP EVAL
"Physical Therapy Evaluation    Patient Name:  Troy Yuan Sr.   MRN:  [id number]    Recommendations:     Discharge Recommendations:  home health PT   Discharge Equipment Recommendations:     Barriers to discharge: None    Assessment:     Troy Yuan Sr. is a 89 y.o. male admitted with a medical diagnosis of Syncope and collapse.  He presents with the following impairments/functional limitations:  weakness, impaired balance, gait instability, impaired self care skills, impaired functional mobilty, impaired coordination . Pt was d/c'ed d from [facility] on 12/17 after care for heart failure. Shortly after D/C home pt had syncopal episode and collapsed. Pt  ambulated in the perez 150 ft with RW with shuffling gait and safety issues. Pt has dementia and decreased orientation and was a poor historian. Pt had no complaint of lightheadedness nor dizziness.  Recommend  Home with HH PT.      Rehab Prognosis: Good; patient would benefit from acute skilled PT services to address these deficits and reach maximum level of function.    Recent Surgery: * No surgery found *      Plan:     During this hospitalization, patient to be seen   to address the identified rehab impairments via gait training, therapeutic activities, therapeutic exercises and progress toward the following goals:    · Plan of Care Expires:  01/18/20    Subjective     Chief Complaint: "I'm doing fine."  Patient/Family Comments/goals:home with family  Pain/Comfort:  ·      Patients cultural, spiritual, Restoration conflicts given the current situation:      Living Environment:  Pt lives at home with family in elevated house with 15 entry steps.  Patients level of function was Mod I before  recent admit to [facility]. Per family member pt was very active and negotiated steps w/o difficulty.  Equipment used at home: shower chair, walker, standard.  DME owned (not currently used): none.  Upon discharge, patient will have assistance from  family    Objective:     Communicated " with nurse prior to session.  Patient found supine with bed alarm, telemetry  upon PT entry to room.    General Precautions: Standard, fall   Orthopedic Precautions:    Braces:       Exams:  · Cognitive Exam:  Patient is oriented to Person  · RLE ROM: WFL  · RLE Strength: WFL  · LLE ROM: WFL  · LLE Strength: WFL    Functional Mobility:  · Transfers:     · Sit to Stand:  minimum assistance with rolling walker  · Gait: 150 ft RW and MIn A  with cueing for staying closer to RW   · Balance: good sitting balance, poor standing balance   · Negotiate 15 steps with Min A      Therapeutic Activities and Exercises:   Pt presented seated in chair, Stood with Min A . Ambulated 150 ft Rw and Min A .    AM-PAC 6 CLICK MOBILITY  Total Score:17     Patient left up in chair with call button in reach.    GOALS:   Multidisciplinary Problems     Physical Therapy Goals        Problem: Physical Therapy Goal    Goal Priority Disciplines Outcome Goal Variances Interventions   Physical Therapy Goal     PT, PT/OT      Description:  Goals to be met by: D/C    Patient will increase functional independence with mobility by performin. Supine to sit with Contact Guard Assistance  2. Sit to stand transfer with Contact Guard Assistance  3. Gait  x 200 feet with Contact Guard Assistance using Rolling Walker.                       History:     Past Medical History:   Diagnosis Date    Bladder stones     Cancer     Encounter for blood transfusion     Hypertension     Kidney stone     Prostate cancer 2004    Radiation 2005    prostate    Stage 3 chronic kidney disease 2019       Past Surgical History:   Procedure Laterality Date    CYSTOSCOPY      KIDNEY STONE SURGERY  May 2014       Time Tracking:     PT Received On: 19  PT Start Time: 0950     PT Stop Time: 1010  PT Total Time (min): 20 min     Billable Minutes: Evaluation 10 minutes and Gait Training 10 minutes      Tabitha Clemens, PT  2019

## 2019-12-18 NOTE — PLAN OF CARE
Problem: Fall Injury Risk  Goal: Absence of Fall and Fall-Related Injury  Outcome: Ongoing, Progressing     Problem: Adult Inpatient Plan of Care  Goal: Plan of Care Review  Outcome: Ongoing, Progressing  Goal: Patient-Specific Goal (Individualization)  Outcome: Ongoing, Progressing  Goal: Absence of Hospital-Acquired Illness or Injury  Outcome: Ongoing, Progressing  Goal: Optimal Comfort and Wellbeing  Outcome: Ongoing, Progressing  Goal: Readiness for Transition of Care  Outcome: Ongoing, Progressing  Goal: Rounds/Family Conference  Outcome: Ongoing, Progressing     Problem: Skin Injury Risk Increased  Goal: Skin Health and Integrity  Outcome: Ongoing, Progressing   Pt alert to self, up with assist to chair call light in place, pt pulled out iv. Writer attempted to start another I.V. Times two and so did charge nurse, attempts unsuccessful.

## 2019-12-18 NOTE — ASSESSMENT & PLAN NOTE
HR 50's on admission  Holding BB for now  Per chart review, patient was re-started on Toprol-xl 50 mg today.   Consider restart at lower dose in AM

## 2019-12-18 NOTE — PT/OT/SLP EVAL
Occupational Therapy   Evaluation    Name: Troy Yuan Sr.  MRN: [id number]  Admitting Diagnosis:  Syncope and collapse      Recommendations:     Discharge Recommendations: home health OT, rehabilitation facility  Discharge Equipment Recommendations:  other (see comments)(TBD)  Barriers to discharge:  None    Assessment:     Troy Yuan Sr. is a 89 y.o. male with a medical diagnosis of Syncope and collapse.  He presents with general weakness and decreased ADL independence. Granddaughter present and does not want patient going to Inpatient Rehab. Would like Home Health services. Performance deficits affecting function: weakness, impaired endurance, impaired self care skills, impaired functional mobilty, gait instability, impaired balance.      Rehab Prognosis: Fair; patient would benefit from acute skilled OT services to address these deficits and reach maximum level of function.       Plan:     Patient to be seen 5 x/week to address the above listed problems via self-care/home management, therapeutic activities, therapeutic exercises  · Plan of Care Expires: 01/18/20  · Plan of Care Reviewed with: patient(Granddaughter)    Subjective     Chief Complaint: General Weakness  Patient/Family Comments/goals: to get stronger and restore ADL independence    Occupational Profile:  Living Environment: lives with a big family who can provide assistance as needed at home. Previous level of function: Granddaughter reports that patient was performing ADL independently, going up and down 16 steps inside and outside of the home several times daily. Likes to work in the yard.   Roles and Routines: homemaker, works in the yard. No longer drives, relies on family for transportation.  Equipment Used at Home:  cane, straight, walker, rolling, bedside commode  Assistance upon Discharge: Family    Pain/Comfort:  · Pain Rating 1: 0/10  · Pain Rating Post-Intervention 1: 0/10    Patients cultural, spiritual, Temple conflicts given  the current situation: no    Objective:     Communicated with: nurse prior to session.  Patient found supine with telemetry, peripheral IV upon OT entry to room.    General Precautions: Standard, fall   Orthopedic Precautions:N/A   Braces: N/A     Occupational Performance:    Bed Mobility:    · Patient completed Scooting/Bridging with contact guard assistance  · Patient completed Supine to Sit with contact guard assistance   · Performed unsupported sitting EOB for 10 minutes with contact guard assistance.    Functional Mobility/Transfers:  · Patient completed Bed <> Chair Transfer using Step Transfer technique with minimum assistance with hand-held assist    Activities of Daily Living:  · Grooming: contact guard assistance to brush teeth sitting EOB.  · Upper Body Dressing: minimum assistance to don hospital robe worn as a robe sitting EOB.  · Lower Body Dressing: minimum assistance to don/doff socks sitting EOB.    Cognitive/Visual Perceptual:  Cognitive/Psychosocial Skills:     -       Oriented to: Person and Place   -       Follows Commands/attention:Follows one-step commands  -       Communication: clear/fluent  -       Memory: Impaired STM  -       Safety awareness/insight to disability: impaired   -       Mood/Affect/Coping skills/emotional control: Cooperative and Pleasant  Visual/Perceptual:      -Intact     Physical Exam:  Balance:    -       Sitting: Contact Guard, Standing: Minimal Assistance  Upper Extremity Range of Motion:     -       Right Upper Extremity: WFL  -       Left Upper Extremity: WFL  Upper Extremity Strength:    -       Right Upper Extremity: 3+/5  -       Left Upper Extremity: 3+/5   Strength:    -       Right Upper Extremity: Fair  -       Left Upper Extremity: Fair  Fine Motor Coordination:    -       Intact    AMPAC 6 Click ADL:  AMPAC Total Score: 18    Treatment & Education:  Patient educated on the purpose of OT and getting OOB. Although family does not want patient to go to  inpatient rehab, a short inpatient rehab stay would be the best option to restore strength and endurance to be more independent with ADLs at home.  Education:    Patient left up in chair with all lines intact and call button in reach    GOALS:   Multidisciplinary Problems     Occupational Therapy Goals        Problem: Occupational Therapy Goal    Goal Priority Disciplines Outcome Interventions   Occupational Therapy Goal     OT, PT/OT     Description:  Goals to be met by: discharge     Patient will increase functional independence with ADLs by performing:    UE Dressing with Supervision.  LE Dressing with Supervision.  Grooming while standing with Supervision.  Toileting from toilet with Supervision for hygiene and clothing management.   Toilet transfer to toilet with Supervision.                      History:     Past Medical History:   Diagnosis Date    Bladder stones     Cancer     Encounter for blood transfusion     Hypertension     Kidney stone     Prostate cancer 2004    Radiation 2005    prostate    Stage 3 chronic kidney disease 1/11/2019       Past Surgical History:   Procedure Laterality Date    CYSTOSCOPY      KIDNEY STONE SURGERY  May 2014       Time Tracking:     OT Date of Treatment:    OT Start Time: 0909  OT Stop Time: 0947  OT Total Time (min): 38 min    Billable Minutes:Evaluation 10  Self Care/Home Management 28    Herbert Segovia OT  12/18/2019

## 2019-12-18 NOTE — ASSESSMENT & PLAN NOTE
- likely secondary to over-diuresis  -baseline cr 1.6-1.8  -On admission Bun/cr 40/ 2.9  -IVF :  Gentle hydration given recent acute on chronic CHF exacerbation  -Will continue to monitor labs  - avoid nephrotoxic agents when possible

## 2019-12-18 NOTE — CONSULTS
"              [facility]  Cardiology consult     Patient Name: Troy Yuan Sr.  MRN: [id number]  Admission Date: 12/17/2019  Attending Physician: Pamela Hammond DO   Primary Care Provider: Gentry Encinas MD           Patient information was obtained from patient, relative(s), past medical records and ER records.      Subjective:      Principal Problem:Syncope and collapse     Chief Complaint:   Chief Complaint   Patient presents with    Weakness       DIscharged today from [facility],   presents feeling weak         HPI: 89 yo AAM with PMH of HTN, CKD 4, HLD, RA, chronic combined systolic/diastolic heart failure, dementia, AOCD presents to ED following pre-syncopal episode. Patient was discharged earlier today from [facility] (12/12-12/17) where he was admitted for acute on chronic CHF.  He was at home for approximately 45 min when he got up to use the restroom to wash his hands he got weak and dizzy and had to support himself to prevent a fall.  He was initially admitted to ICU at [facility] placed on bipap, nitro gtt and diuresed with IV lasix. He was stepped to floor and transitioned to PO lasix with recommendations for discharge to Rehab, however family preferred to take patient home.  Blood pressure was elevated during his stay-predominantly systolic hypertension.  Once at home, he attempted to ambulate to bathroom and became fatigued and "wobbly on his feet" per patient's son. Family was able to get patient to chair. Family denies patient hitting head, LOC or complaints of pain.  History is limited 2/2 patient's dementia.       In Ed, he was noted to have elevated bun/cr.  Creatinine admit to [facility] was 1.6 BUN  24 and a discharge 3.2 and 46 respectively He also had elevated bnp and trop, however these were both improved from initial admission on 12/12.  Ct head and CT spine negative.                    Past Medical History:   Diagnosis Date    Bladder stones      Cancer      " Encounter for blood transfusion      Hypertension      Kidney stone      Prostate cancer 2004    Radiation 2005     prostate    Stage 3 chronic kidney disease 1/11/2019               Past Surgical History:   Procedure Laterality Date    CYSTOSCOPY        KIDNEY STONE SURGERY   May 2014              Review of patient's allergies indicates:   Allergen Reactions    Soma [carisoprodol] Rash    Aspirin      Sulfa (sulfonamide antibiotics) Rash         Current Facility-Administered Medications on File Prior to Encounter   Medication    [DISCONTINUED] alfuzosin 24 hr tablet 10 mg    [DISCONTINUED] amLODIPine tablet 10 mg    [DISCONTINUED] atorvastatin tablet 20 mg    [DISCONTINUED] famotidine tablet 20 mg    [DISCONTINUED] ferrous sulfate EC tablet 325 mg    [DISCONTINUED] furosemide tablet 40 mg    [DISCONTINUED] haloperidol tablet 2 mg    [DISCONTINUED] hydrALAZINE injection 10 mg    [DISCONTINUED] hydroxychloroquine tablet 200 mg    [DISCONTINUED] isosorbide mononitrate 24 hr tablet 60 mg    [DISCONTINUED] magnesium oxide tablet 800 mg    [DISCONTINUED] magnesium oxide tablet 800 mg    [DISCONTINUED] metoprolol succinate (TOPROL-XL) 24 hr tablet 50 mg    [DISCONTINUED] ondansetron disintegrating tablet 4 mg    [DISCONTINUED] ondansetron injection 4 mg    [DISCONTINUED] potassium chloride 10% oral solution 40 mEq    [DISCONTINUED] potassium chloride 10% oral solution 40 mEq    [DISCONTINUED] potassium chloride 10% oral solution 60 mEq    [DISCONTINUED] potassium, sodium phosphates 280-160-250 mg packet 2 packet    [DISCONTINUED] potassium, sodium phosphates 280-160-250 mg packet 2 packet    [DISCONTINUED] potassium, sodium phosphates 280-160-250 mg packet 2 packet    [DISCONTINUED] senna-docusate 8.6-50 mg per tablet 1 tablet    [DISCONTINUED] sodium chloride 0.9% flush 10 mL    [DISCONTINUED] ziprasidone injection 10 mg           Current Outpatient Medications on File Prior to Encounter    Medication Sig    alfuzosin (UROXATRAL) 10 mg Tb24 TAKE 1 TABLET(10 MG) BY MOUTH EVERY DAY    amlodipine (NORVASC) 10 MG tablet Take 10 mg by mouth once daily.      atorvastatin (LIPITOR) 20 MG tablet Take 1 tablet (20 mg total) by mouth once daily.    cimetidine (TAGAMET) 200 MG tablet Take 200 mg by mouth 2 (two) times daily.     ferrous sulfate 325 mg (65 mg iron) Tab tablet Take 325 mg by mouth once daily.    [START ON 12/18/2019] furosemide (LASIX) 40 MG tablet Take 1 tablet (40 mg total) by mouth once daily.    hydroxychloroquine (PLAQUENIL) 200 mg tablet Take 1 tablet (200 mg total) by mouth once daily.    hydroxychloroquine (PLAQUENIL) 200 mg tablet TAKE 1 TABLET(200 MG) BY MOUTH EVERY DAY    [START ON 12/18/2019] isosorbide mononitrate (IMDUR) 60 MG 24 hr tablet Take 1 tablet (60 mg total) by mouth once daily.    [START ON 12/18/2019] metoprolol succinate (TOPROL-XL) 50 MG 24 hr tablet Take 1 tablet (50 mg total) by mouth once daily.    [DISCONTINUED] hydrALAZINE (APRESOLINE) 25 MG tablet Take 1 tablet (25 mg total) by mouth every 8 (eight) hours.           Family History      Problem Relation (Age of Onset)     Asthma Son     Diabetes Daughter     Hypertension Daughter, Son                Tobacco Use    Smoking status: Former Smoker       Years: 40.00       Types: Cigarettes       Start date: 9/26/1985    Smokeless tobacco: Never Used   Substance and Sexual Activity    Alcohol use: No    Drug use: No    Sexual activity: Never      Review of Systems   Unable to perform ROS: Dementia      Objective:      Vital Signs (Most Recent):  Temp: 98 °F (36.7 °C) (12/17/19 1516)  Pulse: (!) 52 (12/17/19 1800)  Resp: 16 (12/17/19 1923)  BP: (!) 117/56 (12/17/19 1800)  SpO2: 95 % (12/17/19 1800) Vital Signs (24h Range):  Temp:  [97.5 °F (36.4 °C)-98.5 °F (36.9 °C)] 98 °F (36.7 °C)  Pulse:  [50-77] 52  Resp:  [14-20] 16  SpO2:  [94 %-97 %] 95 %  BP: (104-175)/(51-90) 117/56      Weight: 71.2 kg (157  lb)  Body mass index is 25.34 kg/m².     Physical Exam   Constitutional: No distress.   Eldery, frail   HENT:   Head: Normocephalic and atraumatic.   Mouth/Throat: Oropharynx is clear and moist.   Eyes: Pupils are equal, round, and reactive to light. Conjunctivae and EOM are normal.   Neck: Normal range of motion. Neck supple.   Cardiovascular: Regular rhythm, normal heart sounds and intact distal pulses.   bradycardia   Pulmonary/Chest: Effort normal and breath sounds normal.   Abdominal: Soft. Bowel sounds are normal. He exhibits no distension. There is no tenderness.   Musculoskeletal: Normal range of motion. He exhibits no edema, tenderness or deformity.   Neurological: He has normal reflexes.   Sleeping but arousable  Oriented to name   Skin: Skin is warm and dry. He is not diaphoretic.   Psychiatric:   Unable to obtain 2/2 dementia   Nursing note and vitals reviewed.        Significant Labs:   CBC:       Recent Labs   Lab 12/17/19  1540   WBC 6.34   HGB 10.9*   HCT 34.5*         CMP:         Recent Labs   Lab 12/16/19  0555 12/17/19  0541 12/17/19  1540    142 138   K 3.6 3.4* 3.6    103 98   CO2 25 26 27    111* 155*   BUN 32* 34* 40*   CREATININE 2.5* 2.4* 2.9*   CALCIUM 9.0 9.3 8.6*   PROT  --   --  6.6   ALBUMIN  --   --  3.4*   BILITOT  --   --  0.7   ALKPHOS  --   --  54*   AST  --   --  21   ALT  --   --  17   ANIONGAP 14 13 13   EGFRNONAA 22* 23* 18.2*      Cardiac Markers:          Recent Labs   Lab 12/12/19  0903 12/13/19  0348 12/16/19  0555 12/17/19  1540   TROPONINI 0.167* 0.195*  --  0.071*   BNP  --   --  2,281* 1,719*         All pertinent labs within the past 24 hours have been reviewed.     Significant Imaging: I have reviewed all pertinent imaging results/findings within the past 24 hours.       Imaging Results            X-Ray Chest AP Portable (Final result)  Result time 12/17/19 16:23:46   Procedure changed from X-Ray Chest 1 View                 Final result by  Tierra Barraza MD (12/17/19 16:23:46)                               Impression:        Small bilateral pleural effusions with improvement of the airspace disease within the left lung base        Electronically signed by:     Tierra Barraza MD  Date:                                            12/17/2019  Time:                                            16:23                         Narrative:     EXAMINATION:  XR CHEST AP PORTABLE     CLINICAL HISTORY:  Pain; Syncope and collapse     FINDINGS:  Portable chest at 15:58 hours is compared to 12/16/2019 shows normal cardiomediastinal silhouette.     The lungs are hyperexpanded.  There are small pleural effusions.  There is improvement of the airspace disease in the left lung base.  The remainder of the lungs are clear.     No acute osseous abnormality.                                                  CT Cervical Spine Without Contrast (Final result)  Result time 12/17/19 16:12:10                Final result by Tierra Barraza MD (12/17/19 16:12:10)                               Impression:        Degenerative changes of the cervical spine without evidence of fracture or subluxation.        Electronically signed by:     Tierra Barraza MD  Date:                                            12/17/2019  Time:                                            16:12                         Narrative:        CMS MANDATED QUALITY DATA - CT RADIATION - 436     All CT scans at this facility utilize dose modulation, iterative reconstruction, and/or weight based dosing when appropriate to reduce radiation dose to as low as reasonably achievable.     EXAMINATION:  CT CERVICAL SPINE WITHOUT CONTRAST     CLINICAL HISTORY:  fall;     TECHNIQUE:  Cervical spine CT without IV contrast obtained with coronal and sagittal reformations.     COMPARISON:  None     FINDINGS:  The cervical spine is in satisfactory alignment.  The vertebral bodies are of normal height.  There is anterior bony  fusion and bony fusion of the left facets at C3-4.  There is disc space narrowing anterior spurring at C4-5, C5-6 and C6-7.     The facet joints are aligned.  The odontoid process is intact and the lateral masses of C1 are symmetrical.  The cranial cervical junction is normal.     There is no fracture or subluxation.     There is multilevel foraminal narrowing secondary to facet hypertrophy.  This is most significant on the left at C3-4, on the right at C4-5 and bilaterally at C5-6 and C6-7.     The paraspinous soft tissues are normal.  There is vascular calcification at the carotid bifurcations.  There is no epidural fluid collection.  The lung apices are clear.                                                  CT Head Without Contrast (Final result)  Result time 12/17/19 16:13:16                Final result by Joel Hammond MD (12/17/19 16:13:16)                               Impression:        1. No acute intracranial abnormality.  2. Multiple chronic changes as described above.        Electronically signed by:     Joel Hammond MD  Date:                                            12/17/2019  Time:                                            16:13                         Narrative:        CMS MANDATED QUALITY DATA - CT RADIATION - 436     All CT scans at this facility utilize dose modulation, iterative reconstruction, and/or weight based dosing when appropriate to reduce radiation dose to as low as reasonably achievable.     EXAMINATION:  CT HEAD WITHOUT CONTRAST     CLINICAL HISTORY:  syncope, possible head trauma;     TECHNIQUE:  Head CT without IV contrast.     COMPARISON:  CT head 01/11/2019.     FINDINGS:  Gray-white differentiation is maintained without hemorrhage, midline shift, or mass effect.  Involutional changes of the brain parenchyma noted with associated ex vacuo dilatation of the ventricles.  Periventricular and deep white matter hypoattenuation noted.  Right thalamus chronic lacunar infarct  noted.  The ventricles and cisterns are maintained.Calvarium is intact. Left maxillary sinus mucous retention cyst versus polyp, otherwise the paranasal sinuses and mastoid air cells are patent.  A subcutaneous lipoma is noted in the left mastoid region.                                          12/12/19 ECHO  · Concentric left ventricular hypertrophy.  · Global hypokinetic wall motion.  · Decreased left ventricular systolic function. The estimated ejection fraction is 45%  · Grade I (mild) left ventricular diastolic dysfunction consistent with impaired relaxation.  · Normal right ventricular systolic function.  · Mild left atrial enlargement.  · Mild mitral regurgitation.  · Normal central venous pressure (3 mm Hg).  · The estimated PA systolic pressure is 35 mm Hg  · Mild tricuspid regurgitation.     Assessment/Plan:      * Syncope and collapse  Admit to Med/surg: remote Tele  Recent admission for acute on chronic chf with discharge from [facility] today  Deconditioning with recommendation for discharge to Rehab, however family declined  Medication change prior to discharge, increase in bb  Over diuresis as seen with elevated BUN creatinine  Gentle hydration with NS @ 50cc/hr.  Orthostatics.  Recheck lab in a.m..  He be discharged in a day or 2 on Lasix 20 mg daily  Hold bb for now  PT/OT            Acute renal failure superimposed on stage 4 chronic kidney disease  - likely secondary to over-diuresis  -baseline cr 1.6-1.8  -On admission Bun/cr 40/ 2.9  -IVF :  Gentle hydration given recent acute on chronic CHF exacerbation  -Will continue to monitor labs  - avoid nephrotoxic agents when possible              Chronic diastolic heart failure  Recent admission for AoC CHF  Most recent ECHO EF 45%  Holding lasix for now given GUME  Holding BB for now given low HR  C/w isosorbid mononitrate   BNP elevated on admission but downtrending  No sob, lungs clears, no edema        Essential hypertension  Soft bp on  admission   Chronic medical condition  Will continue amlopidine 10 mg   Holding toprol -xl 50 mg for now given low HR.  Transition to Toprol 25 mg daily P              Bradycardia  HR 50's on admission  Holding BB for now  Per chart review, patient was re-started on Toprol-xl 50 mg today.   Consider restart at lower dose in AM        Dementia without behavioral disturbance  Stable  Chronic medical condition  .              Rheumatoid arthritis  Stable  Chronic medical condition  Continuing home medications.                     VTE Risk Mitigation (From admission, onward)                  Ordered        Place JACKI hose  Until discontinued      12/17/19 1811        Place sequential compression device  Until discontinued      12/17/19 1811        IP VTE HIGH RISK PATIENT  Once      12/17/19 1811                        SHERIE Grissom MD [facility]  Cardiology  [facility]

## 2019-12-19 PROBLEM — R00.1 BRADYCARDIA: Status: RESOLVED | Noted: 2019-12-17 | Resolved: 2019-12-19

## 2019-12-19 PROBLEM — E87.6 HYPOKALEMIA: Status: RESOLVED | Noted: 2019-12-18 | Resolved: 2019-12-19

## 2019-12-19 PROBLEM — R33.9 URINARY RETENTION: Chronic | Status: ACTIVE | Noted: 2019-12-19

## 2019-12-19 PROBLEM — E83.39 HYPERPHOSPHATEMIA: Status: RESOLVED | Noted: 2019-12-18 | Resolved: 2019-12-19

## 2019-12-19 LAB
ANION GAP SERPL CALC-SCNC: 7 MMOL/L (ref 8–16)
BUN SERPL-MCNC: 45 MG/DL (ref 8–23)
CALCIUM SERPL-MCNC: 8.3 MG/DL (ref 8.7–10.5)
CHLORIDE SERPL-SCNC: 106 MMOL/L (ref 95–110)
CO2 SERPL-SCNC: 29 MMOL/L (ref 23–29)
CREAT SERPL-MCNC: 2.9 MG/DL (ref 0.5–1.4)
ERYTHROCYTE [DISTWIDTH] IN BLOOD BY AUTOMATED COUNT: 14 % (ref 11.5–14.5)
EST. GFR  (AFRICAN AMERICAN): 21.1 ML/MIN/1.73 M^2
EST. GFR  (NON AFRICAN AMERICAN): 18.2 ML/MIN/1.73 M^2
GLUCOSE SERPL-MCNC: 102 MG/DL (ref 70–110)
GLUCOSE SERPL-MCNC: 121 MG/DL (ref 70–110)
GLUCOSE SERPL-MCNC: 123 MG/DL (ref 70–110)
GLUCOSE SERPL-MCNC: 93 MG/DL (ref 70–110)
GLUCOSE SERPL-MCNC: 95 MG/DL (ref 70–110)
HCT VFR BLD AUTO: 29.5 % (ref 40–54)
HGB BLD-MCNC: 9.4 G/DL (ref 14–18)
MAGNESIUM SERPL-MCNC: 2.2 MG/DL (ref 1.6–2.6)
MCH RBC QN AUTO: 30.3 PG (ref 27–31)
MCHC RBC AUTO-ENTMCNC: 31.9 G/DL (ref 32–36)
MCV RBC AUTO: 95 FL (ref 82–98)
PHOSPHATE SERPL-MCNC: 4.1 MG/DL (ref 2.7–4.5)
PLATELET # BLD AUTO: 254 K/UL (ref 150–350)
PMV BLD AUTO: 10.6 FL (ref 9.2–12.9)
POTASSIUM SERPL-SCNC: 3.5 MMOL/L (ref 3.5–5.1)
RBC # BLD AUTO: 3.1 M/UL (ref 4.6–6.2)
SODIUM SERPL-SCNC: 142 MMOL/L (ref 136–145)
WBC # BLD AUTO: 5.87 K/UL (ref 3.9–12.7)

## 2019-12-19 PROCEDURE — 36415 COLL VENOUS BLD VENIPUNCTURE: CPT

## 2019-12-19 PROCEDURE — 97535 SELF CARE MNGMENT TRAINING: CPT

## 2019-12-19 PROCEDURE — 25000003 PHARM REV CODE 250: Performed by: STUDENT IN AN ORGANIZED HEALTH CARE EDUCATION/TRAINING PROGRAM

## 2019-12-19 PROCEDURE — 80048 BASIC METABOLIC PNL TOTAL CA: CPT

## 2019-12-19 PROCEDURE — 82962 GLUCOSE BLOOD TEST: CPT

## 2019-12-19 PROCEDURE — 97116 GAIT TRAINING THERAPY: CPT

## 2019-12-19 PROCEDURE — 85027 COMPLETE CBC AUTOMATED: CPT

## 2019-12-19 PROCEDURE — 83735 ASSAY OF MAGNESIUM: CPT

## 2019-12-19 PROCEDURE — 12000002 HC ACUTE/MED SURGE SEMI-PRIVATE ROOM

## 2019-12-19 PROCEDURE — 84100 ASSAY OF PHOSPHORUS: CPT

## 2019-12-19 PROCEDURE — 63600175 PHARM REV CODE 636 W HCPCS: Performed by: INTERNAL MEDICINE

## 2019-12-19 PROCEDURE — 25000003 PHARM REV CODE 250: Performed by: INTERNAL MEDICINE

## 2019-12-19 RX ORDER — TAMSULOSIN HYDROCHLORIDE 0.4 MG/1
0.4 CAPSULE ORAL DAILY
Status: DISCONTINUED | OUTPATIENT
Start: 2019-12-19 | End: 2019-12-20 | Stop reason: HOSPADM

## 2019-12-19 RX ADMIN — FERROUS SULFATE TAB 325 MG (65 MG ELEMENTAL FE) 325 MG: 325 (65 FE) TAB at 09:12

## 2019-12-19 RX ADMIN — TAMSULOSIN HYDROCHLORIDE 0.4 MG: 0.4 CAPSULE ORAL at 09:12

## 2019-12-19 RX ADMIN — AMLODIPINE BESYLATE 10 MG: 5 TABLET ORAL at 09:12

## 2019-12-19 RX ADMIN — CEFTRIAXONE 1 G: 1 INJECTION, SOLUTION INTRAVENOUS at 08:12

## 2019-12-19 RX ADMIN — HYDROXYCHLOROQUINE SULFATE 200 MG: 200 TABLET, FILM COATED ORAL at 09:12

## 2019-12-19 RX ADMIN — FAMOTIDINE 20 MG: 20 TABLET ORAL at 09:12

## 2019-12-19 RX ADMIN — ISOSORBIDE MONONITRATE 60 MG: 60 TABLET, EXTENDED RELEASE ORAL at 09:12

## 2019-12-19 RX ADMIN — ATORVASTATIN CALCIUM 20 MG: 20 TABLET, FILM COATED ORAL at 09:12

## 2019-12-19 NOTE — ASSESSMENT & PLAN NOTE
Likely secondary to over-diuresis with dehydration.  Does have acute kidney injury on CKD.  Blood pressure elevated today.  CT head no acute.    Continue to hold diuretic therapy.  Beta-blocker discontinued.  Status post IV fluid hydration.  Check and document orthostatic vitals.  Continue to wear compression stockings  Ambulate with PT/OT  Appreciate Cardiology recommendations

## 2019-12-19 NOTE — PLAN OF CARE
Problem: Occupational Therapy Goal  Goal: Occupational Therapy Goal  Description  Goals to be met by: discharge     Patient will increase functional independence with ADLs by performing:    UE Dressing with Supervision.  LE Dressing with Supervision.  Grooming while standing with Supervision.  Toileting from toilet with Supervision for hygiene and clothing management.   Toilet transfer to toilet with Supervision.     Outcome: Ongoing, Progressing

## 2019-12-19 NOTE — HOSPITAL COURSE
Patient was admitted to the medical floor with remote cardiac telemetry monitoring.  Admission labs with continued acute kidney injury on chronic kidney disease consistent with recent discharge labs. UA was positive.  He was started on antibiotics however urine culture without any growth.  Likely more presyncopal episode from history obtained.  He was also noted to have bradycardia.Orthostatic vitals were checked and positive.  Lasix and beta-blocker were discontinued. He was started on IV fluid hydration.  Bladder scan revealed consistent urinary retention requiring multiple straight caths and ultimately Neumann catheter was placed.  Chart review- patient was on alfuzosin and has a history of BPH followed by Dr. Holloway.  His kidney function did improve with discharge from creatinine 2.6.  He did have episode of delirium on dementia and family highly requesting that patient be discharged home with home health given impression he does better at home and has good home support. Again PT and OT recommending inpatient rehab placement however family declining.  Patient wearing compression stockings.  Repeat orthostatic vitals negative.  Medically stable for discharge though high risk of decline.  Discharge plan including medication changes, Neumann catheter, follow-up as well as return precautions explained to the granddaughter over the phone on discharge.    Discharge examination  Elderly male lying in bed, no apparent distress, eating breakfast with assistant of relative  Regular heart rhythm, no significant lower extremity edema  Not on supplemental oxygen  Neumann catheter in place with dark  urine draining    Discharge recommendations  Patient to continue alfuzosin  Discharge with Neumann catheter in place, to schedule appointment in 1 week time with urologist for voiding trial  Patient to have repeat BMP done next week and call PCP for results and further instructions on Lasix.    Once renal function improved would  recommend Lasix 20 mg daily    To continue to wear compression stockings  Outpatient Urology and Cardiology follow-up  Close outpatient PCP follow-up

## 2019-12-19 NOTE — PT/OT/SLP PROGRESS
Physical Therapy Treatment    Patient Name:  Troy Yuan Sr.   MRN:  [id number]    Recommendations:     Discharge Recommendations:  home health PT   Discharge Equipment Recommendations:     Barriers to discharge: None    Assessment:     Troy Yuan Sr. is a 89 y.o. male admitted with a medical diagnosis of Syncope and collapse.  He presents with the following impairments/functional limitations:  weakness, gait instability, impaired endurance, impaired balance, impaired cognition, impaired functional mobilty, decreased coordination, impaired self care skills, decreased safety awareness, decreased lower extremity function. Pt agreeable to therapy with grandson present. Pt demonstrated confusion during session. Pt tolerated gait well with no LOB.     Rehab Prognosis: Good; patient would benefit from acute skilled PT services to address these deficits and reach maximum level of function.    Recent Surgery: * No surgery found *      Plan:     During this hospitalization, patient to be seen 6 x/week to address the identified rehab impairments via gait training, therapeutic activities, therapeutic exercises and progress toward the following goals:    · Plan of Care Expires:  01/18/20    Subjective     Chief Complaint: None stated.   Patient/Family Comments/goals: Going home.   Pain/Comfort:  · Pain Rating 1: 0/10      Objective:     Communicated with nurse Gerardo prior to session.  Patient found HOB elevated with bed alarm, telemetry upon PT entry to room.     General Precautions: Standard, fall   Orthopedic Precautions:N/A   Braces: N/A     Functional Mobility:  · Bed Mobility:     · Rolling Right: contact guard assistance  · Scooting: contact guard assistance  · Supine to Sit: contact guard assistance  · Transfers:     · Sit to Stand:  moderate assistance with rolling walker  · Gait: 60' Min A with RW. VC to stay inside RW and larger stride.       AM-PAC 6 CLICK MOBILITY          Therapeutic Activities and  Exercises:   Pt stated fatigue during gait and wanted to return to room.     Patient left up in chair with all lines intact, call button in reach, chair alarm on and nurse Humaira notified..    GOALS:   Multidisciplinary Problems     Physical Therapy Goals        Problem: Physical Therapy Goal    Goal Priority Disciplines Outcome Goal Variances Interventions   Physical Therapy Goal     PT, PT/OT Ongoing, Progressing     Description:  Goals to be met by: D/C    Patient will increase functional independence with mobility by performin. Supine to sit with Contact Guard Assistance  2. Sit to stand transfer with Contact Guard Assistance  3. Gait  x 200 feet with Contact Guard Assistance using Rolling Walker.                       Time Tracking:     PT Received On: 19  PT Start Time: 909     PT Stop Time: 921  PT Total Time (min): 12 min     Billable Minutes: Gait Training 12 minutes       PT/PTA: PTA     PTA Visit Number: 1     Ana Maria Brown, PTA  2019

## 2019-12-19 NOTE — ASSESSMENT & PLAN NOTE
UA is positive with WBC and leukocyte.  Patient denies any cystitis symptoms.  However with presyncope and acute kidney injury in this elderly male will empirically treat.  Started on IV Rocephin, day 2/3, urine culture still pending.

## 2019-12-19 NOTE — PLAN OF CARE
Patient progressing towards PT goals with focus on gait and transfer training to improve functional mobility.

## 2019-12-19 NOTE — PROGRESS NOTES
"[facility] Medicine  Progress Note    Patient Name: Troy Yuan Sr.  MRN: [id number]  Patient Class: IP- Inpatient   Admission Date: 12/17/2019  Length of Stay: 2 days  Attending Physician: Sasha Espinosa MD  Primary Care Provider: Gentry Encinas MD        Subjective:     Principal Problem:Syncope and collapse        HPI:  89 yo AAM with PMH of HTN, CKD 4, HLD, RA, chronic combined systolic/diastolic heart failure, dementia, AOCD presents to ED following pre-syncopal episode. Patient was discharged earlier today from [facility] (12/12-12/17) where he was admitted for acute on chronic CHF. He was initially admitted to ICU placed on bipap, nitro gtt and diuresed with IV lasix. He was stepped to floor and transitioned to PO lasix with recommendations for discharge to Rehab, however family preferred to take patient home.   Once at home, he attempted to ambulate to bathroom and became fatigued and "wobbly on his feet" per patient's son. Family was able to get patient to chair. Family denies patient hitting head, LOC or complaints of pain.  History is limited 2/2 patient's dementia.      In Ed, he was noted to have elevated bun/cr. He also had elevated bnp and trop, however these were both improved from initial admission on 12/12.  Ct head and CT spine negative.           Overview/Hospital Course:  Patient was admitted to the medical floor with remote cardiac telemetry monitoring.  Admission labs with continued acute kidney injury on chronic kidney disease consistent with recent discharge labs.  UA was positive.  Likely more presyncopal episode from history obtained.  Holding diuresis and beta blocker, feels symptomatically better.  Seen by Cardiology consultation.  Orthostatic vitals positive, recheck today and better with compression stockings on.  Renal function slowly downtrending however having urinary retention, starting Flomax, continuing bladder scan, repeating labs in the " a.m. Ideally he would benefit from inpatient rehab however family continues to be adamant that does not want any facility placement and want home with home health on discharge.    Interval History:  Patient seen sitting out in chair.  Family member also present.  No acute events overnight.  Orthostatic vitals checked yesterday was positive, recheck today and improved.  Continues to die any lightheadedness, dizziness, chest pain, shortness of breath, lower extremity edema.  Family member states he did have significant lower extremity and ankle edema when he 1st presented to outside hospital.  Overnight bladder scan was performed as ordered, 300 cc and then this morning 560, straight cath with 500.  Discussed with therapy, ideally again recommended inpatient rehab however family adamant on taking him home on discharge.    Review of Systems   Constitutional: Negative for chills and fever.   Cardiovascular: Negative for chest pain, palpitations and leg swelling.   Genitourinary: Positive for difficulty urinating.   Musculoskeletal: Negative for neck stiffness.   Skin: Negative for wound.   Neurological: Negative for syncope and light-headedness.   Psychiatric/Behavioral: Positive for confusion (Baseline dementia).     Objective:     Vital Signs (Most Recent):  Temp: 98.1 °F (36.7 °C) (12/19/19 1206)  Pulse: 79 (12/19/19 1206)  Resp: 18 (12/19/19 1206)  BP: (!) 163/90 (12/19/19 1206)  SpO2: 98 % (12/19/19 1206) Vital Signs (24h Range):  Temp:  [98.1 °F (36.7 °C)-98.7 °F (37.1 °C)] 98.1 °F (36.7 °C)  Pulse:  [57-79] 79  Resp:  [18-20] 18  SpO2:  [95 %-98 %] 98 %  BP: (127-177)/(67-90) 163/90     Weight: 71.2 kg (157 lb)  Body mass index is 25.34 kg/m².    Intake/Output Summary (Last 24 hours) at 12/19/2019 1442  Last data filed at 12/19/2019 1337  Gross per 24 hour   Intake 890 ml   Output 775 ml   Net 115 ml      Physical Exam   Constitutional: No distress.   Elderly male, sitting out in chair, cooperative, no apparent  distress   HENT:   Head: Normocephalic and atraumatic.   Eyes: Conjunctivae are normal. Right eye exhibits no discharge. Left eye exhibits no discharge.   Senile arcus bilaterally   Neck: Neck supple.   Cardiovascular: Normal rate.   Murmur heard.  No lower extremity edema, wearing bilateral compression stockings   Pulmonary/Chest: Effort normal and breath sounds normal. No stridor. He has no wheezes.   Not on supplemental oxygen with reduced air entry at the bases   Abdominal: Soft. Bowel sounds are normal. He exhibits no distension. There is no tenderness.   Genitourinary:   Genitourinary Comments: No suprapubic fullness on examination however patient was recently straight cathed for 500 cc   Musculoskeletal:   Arthritis changes   Neurological: He is alert.   Alert to person and place, he does not remember me, answering questions appropriately, following commands   Skin: Skin is warm and dry. He is not diaphoretic.   Psychiatric: He has a normal mood and affect.   Nursing note and vitals reviewed.      Significant Labs:   BMP:   Recent Labs   Lab 12/19/19  0433   GLU 95      K 3.5      CO2 29   BUN 45*   CREATININE 2.9*   CALCIUM 8.3*   MG 2.2     CBC:   Recent Labs   Lab 12/17/19  1540 12/18/19  0432 12/19/19  0434   WBC 6.34 6.18 5.87   HGB 10.9* 10.1* 9.4*   HCT 34.5* 31.8* 29.5*    255 254     CMP:   Recent Labs   Lab 12/17/19  1540 12/18/19  0432 12/19/19  0433    143 142   K 3.6 3.4* 3.5   CL 98 104 106   CO2 27 28 29   * 93 95   BUN 40* 46* 45*   CREATININE 2.9* 3.2* 2.9*   CALCIUM 8.6* 8.5* 8.3*   PROT 6.6  --   --    ALBUMIN 3.4*  --   --    BILITOT 0.7  --   --    ALKPHOS 54*  --   --    AST 21  --   --    ALT 17  --   --    ANIONGAP 13 11 7*   EGFRNONAA 18.2* 16.2* 18.2*     Cardiac Markers:   Recent Labs   Lab 12/17/19  1540   BNP 1,719*     Magnesium:   Recent Labs   Lab 12/18/19  0432 12/19/19  0433   MG 2.2 2.2     POCT Glucose: No results for input(s): POCTGLUCOSE in  the last 48 hours.  Urine Culture:   Recent Labs   Lab 12/18/19  2230   LABURIN No growth to date     Urine Studies:   Recent Labs   Lab 12/17/19 2018   COLORU Yellow   APPEARANCEUA Hazy*   PHUR 6.0   SPECGRAV 1.015   PROTEINUA 1+*   GLUCUA Negative   KETONESU Trace*   BILIRUBINUA Negative   OCCULTUA Negative   NITRITE Negative   UROBILINOGEN 2.0-3.0*   LEUKOCYTESUR 3+*   RBCUA 3   WBCUA >100*   BACTERIA Rare   SQUAMEPITHEL 1   HYALINECASTS 4*     All pertinent labs within the past 24 hours have been reviewed.    Significant Imaging: I have reviewed all pertinent imaging results/findings within the past 24 hours.      Assessment/Plan:      * Presyncope  Likely secondary to over-diuresis with dehydration.  Orthostatic vitals initially was also positive.  Does have acute kidney injury on CKD.    CT head no acute.    Continue to hold diuretic therapy.  Beta-blocker discontinued.  Status post IV fluid hydration.  Continue to wear compression stockings.  Orthostatics/fall precautions  Continue PT/OT while hospitalized, home health orders placed, case management consulted  Appreciate Cardiology recommendations        Urinary retention  Known history of BPH and retaining urine on bladder scan requiring straight catheterization earlier today.  Flomax 0.4 mg started.  Continue to bladder scan.  Follow as per clinical course.      UTI (urinary tract infection)  UA is positive with WBC and leukocyte.  Patient denies any cystitis symptoms.  However with presyncope and acute kidney injury in this elderly male will empirically treat.  Started on IV Rocephin, day 2/3, urine culture still pending.      Acute kidney injury on CKD stage 3  Previous baseline prior to the outside hospital admission was 24/1.6, on discharge outside hospital was 46/3.2.  Today serum creatinine down to 2.9.  Likely in the setting of IV diuresis, prerenal/hypovolemia, potential UTI, and now urinary retention in this elderly male.  Status post IV fluid  hydration.  Encourage oral intake.  Continue with scheduled bladder scan Q 6 to rule out urinary retention.    Continue IV antibiotics for UTI.  Follow up pending urine culture.   Continue to hold Lasix.  Renally dose all medications and avoid nephrotoxin drugs.    Repeat renal panel tomorrow.          Dementia without behavioral disturbance  High risk for delirium given prior history of same, underlying dementia, renal derangements, hospitalization.  Delirium precautions.  Encourage family visitation.  .          Multiple lacunar infarcts  CT head with right thalamic chronic lacunar infarct      Rheumatoid arthritis  Stable  Chronic medical condition  Continuing home medications.          Chronic diastolic heart failure  Recent admission for [facility] CHF  Most recent ECHO EF 45%, grade 1 diastolic dysfunction with severe wall motion abnormalities  Holding lasix for now given GUME  Holding BB for now given low HR  C/w isosorbid mononitrate   BNP elevated on admission but downtrending  As per Cardiology may consider Lasix 20 mg on discharge.      Essential hypertension  Up trending.  Discontinued Lasix.  Continue amlodipine and Imdur.  Added p.r.n. IV hydralazine with parameters.          Macrocytic anemia  Chronic medical condition.  Monitor.        VTE Risk Mitigation (From admission, onward)         Ordered     Place JACKI hose  Until discontinued      12/17/19 1811     Place sequential compression device  Until discontinued      12/17/19 1811     IP VTE HIGH RISK PATIENT  Once      12/17/19 1811                      Sasha Espinosa MD  Department of Hospital Medicine   Formerly [facility]

## 2019-12-19 NOTE — ASSESSMENT & PLAN NOTE
Known history of BPH and retaining urine on bladder scan requiring straight catheterization earlier today.  Flomax 0.4 mg started.  Continue to bladder scan.  Follow as per clinical course.

## 2019-12-19 NOTE — PROGRESS NOTES
"Replaced by [facility]  Progress Note    Patient Name: Troy Yuan Sr.  MRN: [id number]  Patient Class: IP- Inpatient   Admission Date: 12/17/2019  Length of Stay: 1 days  Attending Physician: Sasha Espinosa MD  Primary Care Provider: Gentry Encinas MD        Subjective:     Principal Problem:Syncope and collapse        HPI:  89 yo AAM with PMH of HTN, CKD 4, HLD, RA, chronic combined systolic/diastolic heart failure, dementia, AOCD presents to ED following pre-syncopal episode. Patient was discharged earlier today from [facility] (12/12-12/17) where he was admitted for acute on chronic CHF. He was initially admitted to ICU placed on bipap, nitro gtt and diuresed with IV lasix. He was stepped to floor and transitioned to PO lasix with recommendations for discharge to Rehab, however family preferred to take patient home.   Once at home, he attempted to ambulate to bathroom and became fatigued and "wobbly on his feet" per patient's son. Family was able to get patient to chair. Family denies patient hitting head, LOC or complaints of pain.  History is limited 2/2 patient's dementia.      In Ed, he was noted to have elevated bun/cr. He also had elevated bnp and trop, however these were both improved from initial admission on 12/12.  Ct head and CT spine negative.           Overview/Hospital Course:  Patient was admitted to observation status to medical floor with remote cardiac telemetry monitoring.  Admission labs with continued acute kidney injury on chronic kidney disease consistent with recent discharge labs.  UA was positive.  Likely more presyncopal episode from history obtained.  Holding diuresis and beta blocker, feels symptomatically better.  Family hoping for discharge soon.  Cardiology consulted.    Interval History:  Patient states he feels better today.  Denies any chest pain, palpitation, shortness of breath, lightheadedness, dizziness.  Daughter present at bedside is " eager for discharge soon as she states in the hospital he usually gets more confused and he has multiple family members who are supportive at home.  Patient is unclear if he is having any urinary symptoms, has been voiding well.  Therapy recommended continued sessions at home and daughter states this was being arranged following his recent discharge.      Review of Systems   Constitutional: Negative for chills and fever.   Respiratory: Negative for shortness of breath.    Cardiovascular: Negative for chest pain, palpitations and leg swelling.   Gastrointestinal: Negative for abdominal pain.   Genitourinary: Negative for difficulty urinating and dysuria.   Neurological: Negative for dizziness and seizures.   Psychiatric/Behavioral: Positive for confusion (History of dementia).     Objective:     Vital Signs (Most Recent):  Temp: 98.1 °F (36.7 °C) (12/18/19 1606)  Pulse: 72 (12/18/19 1606)  Resp: 18 (12/18/19 1606)  BP: 127/70 (12/18/19 1606)  SpO2: 96 % (12/18/19 1606) Vital Signs (24h Range):  Temp:  [97.5 °F (36.4 °C)-98.9 °F (37.2 °C)] 98.1 °F (36.7 °C)  Pulse:  [57-84] 72  Resp:  [16-20] 18  SpO2:  [94 %-99 %] 96 %  BP: (115-173)/(65-75) 127/70     Weight: 71.2 kg (157 lb)  Body mass index is 25.34 kg/m².    Intake/Output Summary (Last 24 hours) at 12/18/2019 1953  Last data filed at 12/18/2019 1653  Gross per 24 hour   Intake 1610.83 ml   Output --   Net 1610.83 ml      Physical Exam   Constitutional:   Elderly male, lying in bed, no apparent distress   Eyes: Conjunctivae are normal. Right eye exhibits no discharge. Left eye exhibits no discharge.   Senile arcus bilaterally   Neck: Neck supple.   Cardiovascular: Normal rate.   Murmur heard.  No lower extremity edema   Pulmonary/Chest: Effort normal and breath sounds normal. No stridor. He has no wheezes.   Not on supplemental oxygen with reduced air entry at the bases   Abdominal: Soft. Bowel sounds are normal. He exhibits no distension. There is no tenderness.    Genitourinary:   Genitourinary Comments: No suprapubic fullness felt   Musculoskeletal:   Arthritis changes   Neurological: He is alert.   Alert to person, recognizes family member, speech intact, moving all 4 extremities   Skin: Skin is warm and dry.   Psychiatric: He has a normal mood and affect.   Nursing note and vitals reviewed.      Significant Labs:   CBC:   Recent Labs   Lab 12/17/19  1540 12/18/19  0432   WBC 6.34 6.18   HGB 10.9* 10.1*   HCT 34.5* 31.8*    255     CMP:   Recent Labs   Lab 12/17/19  0541 12/17/19  1540 12/18/19  0432    138 143   K 3.4* 3.6 3.4*    98 104   CO2 26 27 28   * 155* 93   BUN 34* 40* 46*   CREATININE 2.4* 2.9* 3.2*   CALCIUM 9.3 8.6* 8.5*   PROT  --  6.6  --    ALBUMIN  --  3.4*  --    BILITOT  --  0.7  --    ALKPHOS  --  54*  --    AST  --  21  --    ALT  --  17  --    ANIONGAP 13 13 11   EGFRNONAA 23* 18.2* 16.2*     Cardiac Markers:   Recent Labs   Lab 12/17/19  1540   BNP 1,719*     Lactic Acid: No results for input(s): LACTATE in the last 48 hours.  Magnesium:   Recent Labs   Lab 12/18/19  0432   MG 2.2     POCT Glucose: No results for input(s): POCTGLUCOSE in the last 48 hours.  Troponin:   Recent Labs   Lab 12/17/19  1540 12/18/19  0432   TROPONINI 0.071* 0.070*     Urine Culture: No results for input(s): LABURIN in the last 48 hours.  Urine Studies:   Recent Labs   Lab 12/17/19  2018   COLORU Yellow   APPEARANCEUA Hazy*   PHUR 6.0   SPECGRAV 1.015   PROTEINUA 1+*   GLUCUA Negative   KETONESU Trace*   BILIRUBINUA Negative   OCCULTUA Negative   NITRITE Negative   UROBILINOGEN 2.0-3.0*   LEUKOCYTESUR 3+*   RBCUA 3   WBCUA >100*   BACTERIA Rare   SQUAMEPITHEL 1   HYALINECASTS 4*     All pertinent labs within the past 24 hours have been reviewed.    Significant Imaging: CT: I have reviewed all pertinent results/findings within the past 24 hours and my personal findings are:  Chronic right  lacunar infarct, no acute intracranial hemorrhage, mass  effect, midline shift  CXR: I have reviewed all pertinent results/findings within the past 24 hours and my personal findings are:  Bilateral small pleural effusion  EKG: I have reviewed all pertinent results/findings within the past 24 hours and my personal findings are: Sinus rhythm with PACs no ST elevation     X-ray Chest 1 View    Result Date: 12/16/2019  EXAMINATION: XR CHEST 1 VIEW CLINICAL HISTORY: chf; TECHNIQUE: Single frontal view of the chest was performed. COMPARISON: 12/13/2019 FINDINGS: The cardiomediastinal silhouette is stable..  There are small bilateral pleural effusions left larger than right not appearing significantly changed..  Left lung infiltrate is decreased.  No definite left lung infiltrate today right lung infiltrate also appears mildly decreased in the perihilar region.     Small bilateral pleural effusions left larger than right and mild right lung infiltrate with lung infiltrates decreased compared to the prior exam. Electronically signed by: Sarah Byers MD Date:    12/16/2019 Time:    08:26    X-ray Chest 1 View    Result Date: 12/13/2019  EXAMINATION: XR CHEST 1 VIEW CLINICAL HISTORY: chf; TECHNIQUE: Single frontal view of the chest was performed. COMPARISON: 12/12/2019 FINDINGS: The cardiomediastinal silhouette is stable.  Aortic knob appears right-sided as on multiple prior exams.  There is decrease of the bilateral infiltrates.  Trace bilateral pleural effusions also decreased     Improvement in the appearance of the chest compared to the prior exam consistent with improving CHF Electronically signed by: Sarah Byers MD Date:    12/13/2019 Time:    09:32    Ct Head Without Contrast    Result Date: 12/17/2019  CMS MANDATED QUALITY DATA - CT RADIATION - 436 All CT scans at this facility utilize dose modulation, iterative reconstruction, and/or weight based dosing when appropriate to reduce radiation dose to as low as reasonably achievable. EXAMINATION: CT HEAD WITHOUT CONTRAST  CLINICAL HISTORY: syncope, possible head trauma; TECHNIQUE: Head CT without IV contrast. COMPARISON: CT head 01/11/2019. FINDINGS: Gray-white differentiation is maintained without hemorrhage, midline shift, or mass effect.  Involutional changes of the brain parenchyma noted with associated ex vacuo dilatation of the ventricles.  Periventricular and deep white matter hypoattenuation noted.  Right thalamus chronic lacunar infarct noted.  The ventricles and cisterns are maintained.Calvarium is intact. Left maxillary sinus mucous retention cyst versus polyp, otherwise the paranasal sinuses and mastoid air cells are patent.  A subcutaneous lipoma is noted in the left mastoid region.     1. No acute intracranial abnormality. 2. Multiple chronic changes as described above. Electronically signed by: Joel Hammond MD Date:    12/17/2019 Time:    16:13    Ct Cervical Spine Without Contrast    Result Date: 12/17/2019  CMS MANDATED QUALITY DATA - CT RADIATION - 436 All CT scans at this facility utilize dose modulation, iterative reconstruction, and/or weight based dosing when appropriate to reduce radiation dose to as low as reasonably achievable. EXAMINATION: CT CERVICAL SPINE WITHOUT CONTRAST CLINICAL HISTORY: fall; TECHNIQUE: Cervical spine CT without IV contrast obtained with coronal and sagittal reformations. COMPARISON: None FINDINGS: The cervical spine is in satisfactory alignment.  The vertebral bodies are of normal height.  There is anterior bony fusion and bony fusion of the left facets at C3-4.  There is disc space narrowing anterior spurring at C4-5, C5-6 and C6-7. The facet joints are aligned.  The odontoid process is intact and the lateral masses of C1 are symmetrical.  The cranial cervical junction is normal. There is no fracture or subluxation. There is multilevel foraminal narrowing secondary to facet hypertrophy.  This is most significant on the left at C3-4, on the right at C4-5 and bilaterally at C5-6 and  C6-7. The paraspinous soft tissues are normal.  There is vascular calcification at the carotid bifurcations.  There is no epidural fluid collection.  The lung apices are clear.     Degenerative changes of the cervical spine without evidence of fracture or subluxation. Electronically signed by: Tierra Barraza MD Date:    12/17/2019 Time:    16:12    X-ray Chest Ap Portable    Result Date: 12/17/2019  EXAMINATION: XR CHEST AP PORTABLE CLINICAL HISTORY: Pain; Syncope and collapse FINDINGS: Portable chest at 15:58 hours is compared to 12/16/2019 shows normal cardiomediastinal silhouette. The lungs are hyperexpanded.  There are small pleural effusions.  There is improvement of the airspace disease in the left lung base.  The remainder of the lungs are clear. No acute osseous abnormality.     Small bilateral pleural effusions with improvement of the airspace disease within the left lung base Electronically signed by: Tierra Barraza MD Date:    12/17/2019 Time:    16:23    X-ray Chest Ap Portable    Result Date: 12/12/2019  EXAMINATION: XR CHEST AP PORTABLE CLINICAL HISTORY: sob; TECHNIQUE: Single frontal view of the chest was performed. COMPARISON: 12/12/2019 FINDINGS: The cardiomediastinal silhouette is with normal limits.  Hazy opacification is present at both lung bases, mildly increased.     Mild increase in hazy bibasilar opacities likely reflecting layering pleural effusions and atelectasis or pneumonia. Electronically signed by: Valerio Chapa MD Date:    12/12/2019 Time:    12:13    X-ray Chest Ap Portable    Result Date: 12/12/2019  EXAMINATION: XR CHEST AP PORTABLE CLINICAL HISTORY: CHF; TECHNIQUE: Single frontal view of the chest was performed. COMPARISON: 01/11/2019 FINDINGS: Interstitial opacities in the perihilar regions in both lower lung zones.  Mild cardiomegaly.  Right-sided aortic arch.  Plethora.  No pleural effusion or pneumothorax.     1. Interstitial opacities favor pulmonary edema given  cardiomegaly.  Atypical infection or pneumonitis also possible depending on clinical presentation. 2. Suspect elevated pulmonary pressures. Electronically signed by: Sadi Faustin Date:    12/12/2019 Time:    08:46  ECG Results          EKG 12-lead (In process)  Result time 12/17/19 16:33:19    In process by Interface, Lab In [facility] (12/17/19 16:33:19)                 Narrative:    Test Reason : R55,    Vent. Rate : 060 BPM     Atrial Rate : 060 BPM     P-R Int : 156 ms          QRS Dur : 098 ms      QT Int : 564 ms       P-R-T Axes : 026 016 136 degrees     QTc Int : 564 ms    Sinus rhythm with Premature atrial complexes  LVH with repolarization abnormality  Abnormal ECG  When compared with ECG of 12-DEC-2019 02:22,  Premature atrial complexes are now Present  QT has lengthened    Referred By: EMETERIO   SELF           Confirmed By:                               Echocardiogram  EF of 45% with grade 1 diastolic dysfunction, global hypokinesis       Assessment/Plan:      * Presyncope  Likely secondary to over-diuresis with dehydration.  Does have acute kidney injury on CKD.  Blood pressure elevated today.  CT head no acute.    Continue to hold diuretic therapy.  Beta-blocker discontinued.  Status post IV fluid hydration.  Check and document orthostatic vitals.  Continue to wear compression stockings  Ambulate with PT/OT  Appreciate Cardiology recommendations        Bradycardia  Likely secondary to recent initiation of beta-blocker.  Discontinued metoprolol.  Heart rate improved today.      UTI (urinary tract infection)  UA is positive with WBC and leukocyte.  Patient denies any cystitis symptoms.  However with presyncope and acute kidney injury in this elderly male will empirically treat.  Start IV Rocephin.  Urine culture to be sent.      Acute kidney injury on CKD stage 3  Previous baseline prior to the outside hospital admission was 24/1.6, on discharge outside hospital was 46/3.2.  Likely in the setting of IV  diuresis, prerenal/hypovolemia, potential UTI, rule out obstruction in this elderly.  Status post IV fluid hydration.  Encourage oral intake.  Scheduled bladder scan Q 6 to rule out urinary retention.  Start treatment for urinary tract infection.  Renally dose all medications and avoid nephrotoxin drugs.  Holding Lasix.  Repeat BMP tomorrow.           Hypokalemia  Potassium 3.4.  Monitor closely with renal function.  Repeating levels in the a.m..      Hyperphosphatemia  Likely secondary to acute kidney injury.  Repeat levels in the a.m..      Dementia without behavioral disturbance  High risk for delirium given prior history of same, underlying dementia, renal derangements, hospitalization.  Delirium precautions.  Encourage family visitation.  .          Multiple lacunar infarcts  CT head with right thalamic chronic lacunar infarct      Rheumatoid arthritis  Stable  Chronic medical condition  Continuing home medications.          Chronic diastolic heart failure  Recent admission for [facility] CHF  Most recent ECHO EF 45%, grade 1 diastolic dysfunction with severe wall motion abnormalities  Holding lasix for now given GUME  Holding BB for now given low HR  C/w isosorbid mononitrate   BNP elevated on admission but downtrending  As per Cardiology may consider Lasix 20 mg on discharge.      Essential hypertension  Up trending.  Discontinued Lasix.  Continue amlodipine and Imdur.  Added p.r.n. IV hydralazine with parameters.          Macrocytic anemia  Chronic medical condition.  Monitor.        VTE Risk Mitigation (From admission, onward)         Ordered     Place JACKI hose  Until discontinued      12/17/19 1811     Place sequential compression device  Until discontinued      12/17/19 1811     IP VTE HIGH RISK PATIENT  Once      12/17/19 1811                      Sasha Espinosa MD  Department of Hospital Medicine   [facility]

## 2019-12-19 NOTE — ASSESSMENT & PLAN NOTE
Recent admission for [facility] CHF  Most recent ECHO EF 45%, grade 1 diastolic dysfunction with severe wall motion abnormalities  Holding lasix for now given GUME  Holding BB for now given low HR  C/w isosorbid mononitrate   BNP elevated on admission but downtrending  As per Cardiology may consider Lasix 20 mg on discharge.

## 2019-12-19 NOTE — ASSESSMENT & PLAN NOTE
Up trending.  Discontinued Lasix.  Continue amlodipine and Imdur.  Added p.r.n. IV hydralazine with parameters.

## 2019-12-19 NOTE — ASSESSMENT & PLAN NOTE
Likely secondary to recent initiation of beta-blocker.  Discontinued metoprolol.  Heart rate improved today.

## 2019-12-19 NOTE — ASSESSMENT & PLAN NOTE
Previous baseline prior to the outside hospital admission was 24/1.6, on discharge outside hospital was 46/3.2.  Today serum creatinine down to 2.9.  Likely in the setting of IV diuresis, prerenal/hypovolemia, potential UTI, and now urinary retention in this elderly male.  Status post IV fluid hydration.  Encourage oral intake.  Continue with scheduled bladder scan Q 6 to rule out urinary retention.    Continue IV antibiotics for UTI.  Follow up pending urine culture.   Continue to hold Lasix.  Renally dose all medications and avoid nephrotoxin drugs.    Repeat renal panel tomorrow.

## 2019-12-19 NOTE — PT/OT/SLP PROGRESS
Occupational Therapy   Treatment    Name: Troy Yuan Sr.  MRN: [id number]  Admitting Diagnosis:  Syncope and collapse       Recommendations:     Discharge Recommendations: home health OT, rehabilitation facility  Discharge Equipment Recommendations:  other (see comments)(TBD)  Barriers to discharge:       Assessment:     Troy Yuan Sr. is a 89 y.o. male with a medical diagnosis of Syncope and collapse.  Pt agreeable to OT therapy session. Performance deficits affecting function are weakness, impaired endurance, impaired self care skills, decreased safety awareness, impaired coordination, impaired balance.     Rehab Prognosis:  Fair; patient would benefit from acute skilled OT services to address these deficits and reach maximum level of function.       Plan:     Patient to be seen 5 x/week to address the above listed problems via self-care/home management, therapeutic activities, therapeutic exercises  · Plan of Care Expires: 01/18/20  · Plan of Care Reviewed with: patient, daughter    Subjective     Pain/Comfort:  · Pain Rating 1: 0/10    Objective:     Communicated with: nursing prior to session.  Patient found up in chair with telemetry upon OT entry to room.    General Precautions: Standard, fall   Orthopedic Precautions:N/A   Braces: N/A     Occupational Performance:     Activities of Daily Living:  · Grooming: stand by assistance seated up in chair; V/C's needed for oral hygiene with swab  · Lower Body Dressing: contact guard assistance to don/doff socks seated in chair       Treatment & Education:  Pt educated on safety during ADLs.    Patient left up in chair with all lines intact, call button in reach and daughter presentEducation:      GOALS:   Multidisciplinary Problems     Occupational Therapy Goals        Problem: Occupational Therapy Goal    Goal Priority Disciplines Outcome Interventions   Occupational Therapy Goal     OT, PT/OT Ongoing, Progressing    Description:  Goals to be met by:  discharge     Patient will increase functional independence with ADLs by performing:    UE Dressing with Supervision.  LE Dressing with Supervision.  Grooming while standing with Supervision.  Toileting from toilet with Supervision for hygiene and clothing management.   Toilet transfer to toilet with Supervision.                      Time Tracking:     OT Date of Treatment: 12/19/19  OT Start Time: 1351  OT Stop Time: 1400  OT Total Time (min): 9 min    Billable Minutes:Self Care/Home Management 9    Tatiana Cruz OT  12/19/2019

## 2019-12-19 NOTE — ASSESSMENT & PLAN NOTE
High risk for delirium given prior history of same, underlying dementia, renal derangements, hospitalization.  Delirium precautions.  Encourage family visitation.  .

## 2019-12-19 NOTE — SUBJECTIVE & OBJECTIVE
Interval History:  Patient states he feels better today.  Denies any chest pain, palpitation, shortness of breath, lightheadedness, dizziness.  Daughter present at bedside is eager for discharge soon as she states in the hospital he usually gets more confused and he has multiple family members who are supportive at home.  Patient is unclear if he is having any urinary symptoms, has been voiding well.  Therapy recommended continued sessions at home and daughter states this was being arranged following his recent discharge.      Review of Systems   Constitutional: Negative for chills and fever.   Respiratory: Negative for shortness of breath.    Cardiovascular: Negative for chest pain, palpitations and leg swelling.   Gastrointestinal: Negative for abdominal pain.   Genitourinary: Negative for difficulty urinating and dysuria.   Neurological: Negative for dizziness and seizures.   Psychiatric/Behavioral: Positive for confusion (History of dementia).     Objective:     Vital Signs (Most Recent):  Temp: 98.1 °F (36.7 °C) (12/18/19 1606)  Pulse: 72 (12/18/19 1606)  Resp: 18 (12/18/19 1606)  BP: 127/70 (12/18/19 1606)  SpO2: 96 % (12/18/19 1606) Vital Signs (24h Range):  Temp:  [97.5 °F (36.4 °C)-98.9 °F (37.2 °C)] 98.1 °F (36.7 °C)  Pulse:  [57-84] 72  Resp:  [16-20] 18  SpO2:  [94 %-99 %] 96 %  BP: (115-173)/(65-75) 127/70     Weight: 71.2 kg (157 lb)  Body mass index is 25.34 kg/m².    Intake/Output Summary (Last 24 hours) at 12/18/2019 1953  Last data filed at 12/18/2019 1653  Gross per 24 hour   Intake 1610.83 ml   Output --   Net 1610.83 ml      Physical Exam   Constitutional:   Elderly male, lying in bed, no apparent distress   Eyes: Conjunctivae are normal. Right eye exhibits no discharge. Left eye exhibits no discharge.   Senile arcus bilaterally   Neck: Neck supple.   Cardiovascular: Normal rate.   Murmur heard.  No lower extremity edema   Pulmonary/Chest: Effort normal and breath sounds normal. No stridor. He  has no wheezes.   Not on supplemental oxygen with reduced air entry at the bases   Abdominal: Soft. Bowel sounds are normal. He exhibits no distension. There is no tenderness.   Genitourinary:   Genitourinary Comments: No suprapubic fullness felt   Musculoskeletal:   Arthritis changes   Neurological: He is alert.   Alert to person, recognizes family member, speech intact, moving all 4 extremities   Skin: Skin is warm and dry.   Psychiatric: He has a normal mood and affect.   Nursing note and vitals reviewed.      Significant Labs:   CBC:   Recent Labs   Lab 12/17/19  1540 12/18/19  0432   WBC 6.34 6.18   HGB 10.9* 10.1*   HCT 34.5* 31.8*    255     CMP:   Recent Labs   Lab 12/17/19  0541 12/17/19  1540 12/18/19  0432    138 143   K 3.4* 3.6 3.4*    98 104   CO2 26 27 28   * 155* 93   BUN 34* 40* 46*   CREATININE 2.4* 2.9* 3.2*   CALCIUM 9.3 8.6* 8.5*   PROT  --  6.6  --    ALBUMIN  --  3.4*  --    BILITOT  --  0.7  --    ALKPHOS  --  54*  --    AST  --  21  --    ALT  --  17  --    ANIONGAP 13 13 11   EGFRNONAA 23* 18.2* 16.2*     Cardiac Markers:   Recent Labs   Lab 12/17/19  1540   BNP 1,719*     Lactic Acid: No results for input(s): LACTATE in the last 48 hours.  Magnesium:   Recent Labs   Lab 12/18/19  0432   MG 2.2     POCT Glucose: No results for input(s): POCTGLUCOSE in the last 48 hours.  Troponin:   Recent Labs   Lab 12/17/19  1540 12/18/19  0432   TROPONINI 0.071* 0.070*     Urine Culture: No results for input(s): LABURIN in the last 48 hours.  Urine Studies:   Recent Labs   Lab 12/17/19  2018   COLORU Yellow   APPEARANCEUA Hazy*   PHUR 6.0   SPECGRAV 1.015   PROTEINUA 1+*   GLUCUA Negative   KETONESU Trace*   BILIRUBINUA Negative   OCCULTUA Negative   NITRITE Negative   UROBILINOGEN 2.0-3.0*   LEUKOCYTESUR 3+*   RBCUA 3   WBCUA >100*   BACTERIA Rare   SQUAMEPITHEL 1   HYALINECASTS 4*     All pertinent labs within the past 24 hours have been reviewed.    Significant Imaging: CT: I  have reviewed all pertinent results/findings within the past 24 hours and my personal findings are:  Chronic right  lacunar infarct, no acute intracranial hemorrhage, mass effect, midline shift  CXR: I have reviewed all pertinent results/findings within the past 24 hours and my personal findings are:  Bilateral small pleural effusion  EKG: I have reviewed all pertinent results/findings within the past 24 hours and my personal findings are: Sinus rhythm with PACs no ST elevation     X-ray Chest 1 View    Result Date: 12/16/2019  EXAMINATION: XR CHEST 1 VIEW CLINICAL HISTORY: chf; TECHNIQUE: Single frontal view of the chest was performed. COMPARISON: 12/13/2019 FINDINGS: The cardiomediastinal silhouette is stable..  There are small bilateral pleural effusions left larger than right not appearing significantly changed..  Left lung infiltrate is decreased.  No definite left lung infiltrate today right lung infiltrate also appears mildly decreased in the perihilar region.     Small bilateral pleural effusions left larger than right and mild right lung infiltrate with lung infiltrates decreased compared to the prior exam. Electronically signed by: Sarah Byers MD Date:    12/16/2019 Time:    08:26    X-ray Chest 1 View    Result Date: 12/13/2019  EXAMINATION: XR CHEST 1 VIEW CLINICAL HISTORY: chf; TECHNIQUE: Single frontal view of the chest was performed. COMPARISON: 12/12/2019 FINDINGS: The cardiomediastinal silhouette is stable.  Aortic knob appears right-sided as on multiple prior exams.  There is decrease of the bilateral infiltrates.  Trace bilateral pleural effusions also decreased     Improvement in the appearance of the chest compared to the prior exam consistent with improving CHF Electronically signed by: Sarah Byers MD Date:    12/13/2019 Time:    09:32    Ct Head Without Contrast    Result Date: 12/17/2019  CMS MANDATED QUALITY DATA - CT RADIATION - 436 All CT scans at this facility utilize dose  modulation, iterative reconstruction, and/or weight based dosing when appropriate to reduce radiation dose to as low as reasonably achievable. EXAMINATION: CT HEAD WITHOUT CONTRAST CLINICAL HISTORY: syncope, possible head trauma; TECHNIQUE: Head CT without IV contrast. COMPARISON: CT head 01/11/2019. FINDINGS: Gray-white differentiation is maintained without hemorrhage, midline shift, or mass effect.  Involutional changes of the brain parenchyma noted with associated ex vacuo dilatation of the ventricles.  Periventricular and deep white matter hypoattenuation noted.  Right thalamus chronic lacunar infarct noted.  The ventricles and cisterns are maintained.Calvarium is intact. Left maxillary sinus mucous retention cyst versus polyp, otherwise the paranasal sinuses and mastoid air cells are patent.  A subcutaneous lipoma is noted in the left mastoid region.     1. No acute intracranial abnormality. 2. Multiple chronic changes as described above. Electronically signed by: Joel Hammond MD Date:    12/17/2019 Time:    16:13    Ct Cervical Spine Without Contrast    Result Date: 12/17/2019  CMS MANDATED QUALITY DATA - CT RADIATION - 436 All CT scans at this facility utilize dose modulation, iterative reconstruction, and/or weight based dosing when appropriate to reduce radiation dose to as low as reasonably achievable. EXAMINATION: CT CERVICAL SPINE WITHOUT CONTRAST CLINICAL HISTORY: fall; TECHNIQUE: Cervical spine CT without IV contrast obtained with coronal and sagittal reformations. COMPARISON: None FINDINGS: The cervical spine is in satisfactory alignment.  The vertebral bodies are of normal height.  There is anterior bony fusion and bony fusion of the left facets at C3-4.  There is disc space narrowing anterior spurring at C4-5, C5-6 and C6-7. The facet joints are aligned.  The odontoid process is intact and the lateral masses of C1 are symmetrical.  The cranial cervical junction is normal. There is no fracture or  subluxation. There is multilevel foraminal narrowing secondary to facet hypertrophy.  This is most significant on the left at C3-4, on the right at C4-5 and bilaterally at C5-6 and C6-7. The paraspinous soft tissues are normal.  There is vascular calcification at the carotid bifurcations.  There is no epidural fluid collection.  The lung apices are clear.     Degenerative changes of the cervical spine without evidence of fracture or subluxation. Electronically signed by: Tierra Barraza MD Date:    12/17/2019 Time:    16:12    X-ray Chest Ap Portable    Result Date: 12/17/2019  EXAMINATION: XR CHEST AP PORTABLE CLINICAL HISTORY: Pain; Syncope and collapse FINDINGS: Portable chest at 15:58 hours is compared to 12/16/2019 shows normal cardiomediastinal silhouette. The lungs are hyperexpanded.  There are small pleural effusions.  There is improvement of the airspace disease in the left lung base.  The remainder of the lungs are clear. No acute osseous abnormality.     Small bilateral pleural effusions with improvement of the airspace disease within the left lung base Electronically signed by: Tierra Barraza MD Date:    12/17/2019 Time:    16:23    X-ray Chest Ap Portable    Result Date: 12/12/2019  EXAMINATION: XR CHEST AP PORTABLE CLINICAL HISTORY: sob; TECHNIQUE: Single frontal view of the chest was performed. COMPARISON: 12/12/2019 FINDINGS: The cardiomediastinal silhouette is with normal limits.  Hazy opacification is present at both lung bases, mildly increased.     Mild increase in hazy bibasilar opacities likely reflecting layering pleural effusions and atelectasis or pneumonia. Electronically signed by: Valerio Chapa MD Date:    12/12/2019 Time:    12:13    X-ray Chest Ap Portable    Result Date: 12/12/2019  EXAMINATION: XR CHEST AP PORTABLE CLINICAL HISTORY: CHF; TECHNIQUE: Single frontal view of the chest was performed. COMPARISON: 01/11/2019 FINDINGS: Interstitial opacities in the perihilar regions in  both lower lung zones.  Mild cardiomegaly.  Right-sided aortic arch.  Plethora.  No pleural effusion or pneumothorax.     1. Interstitial opacities favor pulmonary edema given cardiomegaly.  Atypical infection or pneumonitis also possible depending on clinical presentation. 2. Suspect elevated pulmonary pressures. Electronically signed by: Sadi Fuastin Date:    12/12/2019 Time:    08:46  ECG Results          EKG 12-lead (In process)  Result time 12/17/19 16:33:19    In process by Interface, Lab In [facility] (12/17/19 16:33:19)                 Narrative:    Test Reason : R55,    Vent. Rate : 060 BPM     Atrial Rate : 060 BPM     P-R Int : 156 ms          QRS Dur : 098 ms      QT Int : 564 ms       P-R-T Axes : 026 016 136 degrees     QTc Int : 564 ms    Sinus rhythm with Premature atrial complexes  LVH with repolarization abnormality  Abnormal ECG  When compared with ECG of 12-DEC-2019 02:22,  Premature atrial complexes are now Present  QT has lengthened    Referred By: [email or web address]   SELF           Confirmed By:                               Echocardiogram  EF of 45% with grade 1 diastolic dysfunction, global hypokinesis

## 2019-12-19 NOTE — ASSESSMENT & PLAN NOTE
Previous baseline prior to the outside hospital admission was 24/1.6, on discharge outside hospital was 46/3.2.  Likely in the setting of IV diuresis, prerenal/hypovolemia, potential UTI, rule out obstruction in this elderly.  Status post IV fluid hydration.  Encourage oral intake.  Scheduled bladder scan Q 6 to rule out urinary retention.  Start treatment for urinary tract infection.  Renally dose all medications and avoid nephrotoxin drugs.  Holding Lasix.  Repeat BMP tomorrow.

## 2019-12-19 NOTE — ASSESSMENT & PLAN NOTE
Likely secondary to over-diuresis with dehydration.  Orthostatic vitals initially was also positive.  Does have acute kidney injury on CKD.    CT head no acute.    Continue to hold diuretic therapy.  Beta-blocker discontinued.  Status post IV fluid hydration.  Continue to wear compression stockings.  Orthostatics/fall precautions  Continue PT/OT while hospitalized, home health orders placed, case management consulted  Appreciate Cardiology recommendations

## 2019-12-19 NOTE — ASSESSMENT & PLAN NOTE
UA is positive with WBC and leukocyte.  Patient denies any cystitis symptoms.  However with presyncope and acute kidney injury in this elderly male will empirically treat.  Start IV Rocephin.  Urine culture to be sent.

## 2019-12-19 NOTE — SUBJECTIVE & OBJECTIVE
Interval History:  Patient seen sitting out in chair.  Family member also present.  No acute events overnight.  Orthostatic vitals checked yesterday was positive, recheck today and improved.  Continues to die any lightheadedness, dizziness, chest pain, shortness of breath, lower extremity edema.  Family member states he did have significant lower extremity and ankle edema when he 1st presented to outside hospital.  Overnight bladder scan was performed as ordered, 300 cc and then this morning 560, straight cath with 500.  Discussed with therapy, ideally again recommended inpatient rehab however family adamant on taking him home on discharge.    Review of Systems   Constitutional: Negative for chills and fever.   Cardiovascular: Negative for chest pain, palpitations and leg swelling.   Genitourinary: Positive for difficulty urinating.   Musculoskeletal: Negative for neck stiffness.   Skin: Negative for wound.   Neurological: Negative for syncope and light-headedness.   Psychiatric/Behavioral: Positive for confusion (Baseline dementia).     Objective:     Vital Signs (Most Recent):  Temp: 98.1 °F (36.7 °C) (12/19/19 1206)  Pulse: 79 (12/19/19 1206)  Resp: 18 (12/19/19 1206)  BP: (!) 163/90 (12/19/19 1206)  SpO2: 98 % (12/19/19 1206) Vital Signs (24h Range):  Temp:  [98.1 °F (36.7 °C)-98.7 °F (37.1 °C)] 98.1 °F (36.7 °C)  Pulse:  [57-79] 79  Resp:  [18-20] 18  SpO2:  [95 %-98 %] 98 %  BP: (127-177)/(67-90) 163/90     Weight: 71.2 kg (157 lb)  Body mass index is 25.34 kg/m².    Intake/Output Summary (Last 24 hours) at 12/19/2019 1442  Last data filed at 12/19/2019 1337  Gross per 24 hour   Intake 890 ml   Output 775 ml   Net 115 ml      Physical Exam   Constitutional: No distress.   Elderly male, sitting out in chair, cooperative, no apparent distress   HENT:   Head: Normocephalic and atraumatic.   Eyes: Conjunctivae are normal. Right eye exhibits no discharge. Left eye exhibits no discharge.   Senile arcus bilaterally    Neck: Neck supple.   Cardiovascular: Normal rate.   Murmur heard.  No lower extremity edema, wearing bilateral compression stockings   Pulmonary/Chest: Effort normal and breath sounds normal. No stridor. He has no wheezes.   Not on supplemental oxygen with reduced air entry at the bases   Abdominal: Soft. Bowel sounds are normal. He exhibits no distension. There is no tenderness.   Genitourinary:   Genitourinary Comments: No suprapubic fullness on examination however patient was recently straight cathed for 500 cc   Musculoskeletal:   Arthritis changes   Neurological: He is alert.   Alert to person and place, he does not remember me, answering questions appropriately, following commands   Skin: Skin is warm and dry. He is not diaphoretic.   Psychiatric: He has a normal mood and affect.   Nursing note and vitals reviewed.      Significant Labs:   BMP:   Recent Labs   Lab 12/19/19  0433   GLU 95      K 3.5      CO2 29   BUN 45*   CREATININE 2.9*   CALCIUM 8.3*   MG 2.2     CBC:   Recent Labs   Lab 12/17/19  1540 12/18/19  0432 12/19/19  0434   WBC 6.34 6.18 5.87   HGB 10.9* 10.1* 9.4*   HCT 34.5* 31.8* 29.5*    255 254     CMP:   Recent Labs   Lab 12/17/19  1540 12/18/19  0432 12/19/19  0433    143 142   K 3.6 3.4* 3.5   CL 98 104 106   CO2 27 28 29   * 93 95   BUN 40* 46* 45*   CREATININE 2.9* 3.2* 2.9*   CALCIUM 8.6* 8.5* 8.3*   PROT 6.6  --   --    ALBUMIN 3.4*  --   --    BILITOT 0.7  --   --    ALKPHOS 54*  --   --    AST 21  --   --    ALT 17  --   --    ANIONGAP 13 11 7*   EGFRNONAA 18.2* 16.2* 18.2*     Cardiac Markers:   Recent Labs   Lab 12/17/19  1540   BNP 1,719*     Magnesium:   Recent Labs   Lab 12/18/19  0432 12/19/19  0433   MG 2.2 2.2     POCT Glucose: No results for input(s): POCTGLUCOSE in the last 48 hours.  Urine Culture:   Recent Labs   Lab 12/18/19  2230   LABURIN No growth to date     Urine Studies:   Recent Labs   Lab 12/17/19 2018   COLORU Yellow    APPEARANCEUA Hazy*   PHUR 6.0   SPECGRAV 1.015   PROTEINUA 1+*   GLUCUA Negative   KETONESU Trace*   BILIRUBINUA Negative   OCCULTUA Negative   NITRITE Negative   UROBILINOGEN 2.0-3.0*   LEUKOCYTESUR 3+*   RBCUA 3   WBCUA >100*   BACTERIA Rare   SQUAMEPITHEL 1   HYALINECASTS 4*     All pertinent labs within the past 24 hours have been reviewed.    Significant Imaging: I have reviewed all pertinent imaging results/findings within the past 24 hours.

## 2019-12-20 ENCOUNTER — TELEPHONE (OUTPATIENT)
Dept: UROLOGY | Facility: CLINIC | Age: 84
End: 2019-12-20

## 2019-12-20 VITALS
SYSTOLIC BLOOD PRESSURE: 145 MMHG | DIASTOLIC BLOOD PRESSURE: 68 MMHG | RESPIRATION RATE: 18 BRPM | HEART RATE: 57 BPM | OXYGEN SATURATION: 97 % | BODY MASS INDEX: 25.23 KG/M2 | HEIGHT: 66 IN | WEIGHT: 157 LBS | TEMPERATURE: 98 F

## 2019-12-20 PROBLEM — R55 SYNCOPE AND COLLAPSE: Status: RESOLVED | Noted: 2019-12-17 | Resolved: 2019-12-20

## 2019-12-20 PROBLEM — N39.0 UTI (URINARY TRACT INFECTION): Status: RESOLVED | Noted: 2019-12-18 | Resolved: 2019-12-20

## 2019-12-20 LAB
ANION GAP SERPL CALC-SCNC: 9 MMOL/L (ref 8–16)
BACTERIA UR CULT: NORMAL
BACTERIA UR CULT: NORMAL
BUN SERPL-MCNC: 40 MG/DL (ref 8–23)
CALCIUM SERPL-MCNC: 8.9 MG/DL (ref 8.7–10.5)
CHLORIDE SERPL-SCNC: 107 MMOL/L (ref 95–110)
CO2 SERPL-SCNC: 29 MMOL/L (ref 23–29)
CREAT SERPL-MCNC: 2.6 MG/DL (ref 0.5–1.4)
ERYTHROCYTE [DISTWIDTH] IN BLOOD BY AUTOMATED COUNT: 14 % (ref 11.5–14.5)
EST. GFR  (AFRICAN AMERICAN): 24 ML/MIN/1.73 M^2
EST. GFR  (NON AFRICAN AMERICAN): 20.8 ML/MIN/1.73 M^2
GLUCOSE SERPL-MCNC: 97 MG/DL (ref 70–110)
HCT VFR BLD AUTO: 29.6 % (ref 40–54)
HGB BLD-MCNC: 9.3 G/DL (ref 14–18)
MAGNESIUM SERPL-MCNC: 2.2 MG/DL (ref 1.6–2.6)
MCH RBC QN AUTO: 30.6 PG (ref 27–31)
MCHC RBC AUTO-ENTMCNC: 31.4 G/DL (ref 32–36)
MCV RBC AUTO: 97 FL (ref 82–98)
PHOSPHATE SERPL-MCNC: 4.1 MG/DL (ref 2.7–4.5)
PLATELET # BLD AUTO: 236 K/UL (ref 150–350)
PMV BLD AUTO: 10.1 FL (ref 9.2–12.9)
POTASSIUM SERPL-SCNC: 3.9 MMOL/L (ref 3.5–5.1)
RBC # BLD AUTO: 3.04 M/UL (ref 4.6–6.2)
SODIUM SERPL-SCNC: 145 MMOL/L (ref 136–145)
WBC # BLD AUTO: 6.71 K/UL (ref 3.9–12.7)

## 2019-12-20 PROCEDURE — 85027 COMPLETE CBC AUTOMATED: CPT

## 2019-12-20 PROCEDURE — 36415 COLL VENOUS BLD VENIPUNCTURE: CPT

## 2019-12-20 PROCEDURE — 80048 BASIC METABOLIC PNL TOTAL CA: CPT

## 2019-12-20 PROCEDURE — 84100 ASSAY OF PHOSPHORUS: CPT

## 2019-12-20 PROCEDURE — 25000003 PHARM REV CODE 250: Performed by: INTERNAL MEDICINE

## 2019-12-20 PROCEDURE — 25000003 PHARM REV CODE 250: Performed by: STUDENT IN AN ORGANIZED HEALTH CARE EDUCATION/TRAINING PROGRAM

## 2019-12-20 PROCEDURE — 63600175 PHARM REV CODE 636 W HCPCS: Performed by: INTERNAL MEDICINE

## 2019-12-20 PROCEDURE — 83735 ASSAY OF MAGNESIUM: CPT

## 2019-12-20 PROCEDURE — 97535 SELF CARE MNGMENT TRAINING: CPT

## 2019-12-20 PROCEDURE — 97530 THERAPEUTIC ACTIVITIES: CPT

## 2019-12-20 RX ORDER — DIPHENHYDRAMINE HYDROCHLORIDE 50 MG/ML
50 INJECTION INTRAMUSCULAR; INTRAVENOUS ONCE
Status: COMPLETED | OUTPATIENT
Start: 2019-12-20 | End: 2019-12-20

## 2019-12-20 RX ORDER — HYDRALAZINE HYDROCHLORIDE 25 MG/1
25 TABLET, FILM COATED ORAL EVERY 8 HOURS
Qty: 90 TABLET | Refills: 1 | Status: ON HOLD | OUTPATIENT
Start: 2019-12-20 | End: 2021-10-06 | Stop reason: HOSPADM

## 2019-12-20 RX ORDER — ALFUZOSIN HYDROCHLORIDE 10 MG/1
10 TABLET, EXTENDED RELEASE ORAL
Qty: 30 TABLET | Refills: 0 | Status: SHIPPED | OUTPATIENT
Start: 2019-12-20 | End: 2020-03-03 | Stop reason: SDUPTHER

## 2019-12-20 RX ADMIN — ATORVASTATIN CALCIUM 20 MG: 20 TABLET, FILM COATED ORAL at 09:12

## 2019-12-20 RX ADMIN — FERROUS SULFATE TAB 325 MG (65 MG ELEMENTAL FE) 325 MG: 325 (65 FE) TAB at 09:12

## 2019-12-20 RX ADMIN — FAMOTIDINE 20 MG: 20 TABLET ORAL at 09:12

## 2019-12-20 RX ADMIN — TAMSULOSIN HYDROCHLORIDE 0.4 MG: 0.4 CAPSULE ORAL at 09:12

## 2019-12-20 RX ADMIN — MELATONIN 6 MG: at 12:12

## 2019-12-20 RX ADMIN — ISOSORBIDE MONONITRATE 60 MG: 60 TABLET, EXTENDED RELEASE ORAL at 09:12

## 2019-12-20 RX ADMIN — AMLODIPINE BESYLATE 10 MG: 5 TABLET ORAL at 09:12

## 2019-12-20 RX ADMIN — DIPHENHYDRAMINE HYDROCHLORIDE 25 MG: 50 INJECTION INTRAMUSCULAR; INTRAVENOUS at 01:12

## 2019-12-20 RX ADMIN — HYDROXYCHLOROQUINE SULFATE 200 MG: 200 TABLET, FILM COATED ORAL at 09:12

## 2019-12-20 NOTE — ASSESSMENT & PLAN NOTE
High risk for delirium given prior history of same, underlying dementia, renal derangements, hospitalization.  Delirium precautions. .

## 2019-12-20 NOTE — NURSING
Patient is confused and keeps taking off his tele heart monitor. Bed alarm on. Patients daughter at bedside and is staying the night.

## 2019-12-20 NOTE — NURSING
Dc instructions given and reviewed with pt and pts granddaughter. Verbalized understanding. Pt dc home via w/c to private vehicle.

## 2019-12-20 NOTE — ASSESSMENT & PLAN NOTE
To continue to wear compression stockings.  Beta-blocker discontinued.  Holding diuretic for now with repeat renal panel.

## 2019-12-20 NOTE — ASSESSMENT & PLAN NOTE
Previous baseline prior to the outside hospital admission was 24/1.6, on discharge outside hospital was 46/3.2.  Discharge labs 40/2.6.  Multifactorial secondary to prerenal/hypovolemia, UTI, urinary retention.  Treatment for urinary retention.  Neumann in place  Hold further diuretics therapy.    Repeat BMP in 1 week with PCP follow-up.

## 2019-12-20 NOTE — PT/OT/SLP PROGRESS
Occupational Therapy   Treatment    Name: Troy Yuan Sr.  MRN: [id number]  Admitting Diagnosis:  Syncope and collapse       Recommendations:     Discharge Recommendations: home with home health  Discharge Equipment Recommendations:  other (see comments)(TBD)  Barriers to discharge:       Assessment:     Troy Yuan Sr. is a 89 y.o. male with a medical diagnosis of Syncope and collapse.  He presents with caregiver and pt sitting on side of the bed getting dressed in preparation for discharge home with family. Performance deficits affecting function are weakness, impaired self care skills, impaired cardiopulmonary response to activity, impaired balance, decreased lower extremity function, decreased upper extremity function, decreased coordination, decreased safety awareness.     Rehab Prognosis:  Good; patient would benefit from acute skilled OT services to address these deficits and reach maximum level of function.       Plan:     Patient to be seen 5 x/week to address the above listed problems via self-care/home management, therapeutic activities, therapeutic exercises  · Plan of Care Expires: 12/20/19(pt is being discharged today)  · Plan of Care Reviewed with: patient, caregiver, daughter    Subjective     Pain/Comfort:  · Pain Rating 1: 0/10  · Pain Rating Post-Intervention 1: 0/10    Objective:     Communicated with: Nursing regarding MD note about orthostatics prior to session; OT agreed to obtain BP's; since pt was sitting on side of the bed, sitting was obtained of 151/64, however, standing BP was not able to be obtained due to machine not catching the reading despite 3 attempts during 4 minutes of standing;  Patient found sitting on the side of the bed with caregiver with peripheral IV, yost catheter(caregiver removing tele for discharge) upon OT entry to room.    General Precautions: Standard, fall   Orthopedic Precautions:N/A   Braces: N/A     Occupational Performance:     Bed Mobility:    · Patient  completed Sit to Supine with moderate assistance and assist to lift B legs into the bed     Functional Mobility/Transfers:  · Patient completed Sit <> Stand Transfer with minimum assistance and with cues for forward wt shifting and hand placement   with  rolling walker   · Functional Mobility: standing tolerance during BP reading x 4 min with max fatigue not reached     Activities of Daily Living:  · Lower Body Dressing: total assistance and pt needs assist for each component of donaravind blue jeans (yost increasing the difficulty of donning jedarrian)         [facility] 6 Click ADL: 13    Treatment & Education: Role of OT with daughter and caregiver regarding benefits of cont therapy after discharge to return to his PLOF of Mod I with self care     Patient left HOB elevated with all lines intact, call button in reach, nurse regarding blood pressures notified and daughter and caregiver  presentEducation:      GOALS:   Multidisciplinary Problems     Occupational Therapy Goals     Not on file          Multidisciplinary Problems (Resolved)        Problem: Occupational Therapy Goal    Goal Priority Disciplines Outcome Interventions   Occupational Therapy Goal   (Resolved)     OT, PT/OT Met    Description:  Goals to be met by: discharge     Patient will increase functional independence with ADLs by performing:    UE Dressing with Supervision.  LE Dressing with Supervision.  Grooming while standing with Supervision.  Toileting from toilet with Supervision for hygiene and clothing management.   Toilet transfer to toilet with Supervision.                      Time Tracking:     OT Date of Treatment: 12/20/19  OT Start Time: 1105  OT Stop Time: 1128  OT Total Time (min): 23 min    Billable Minutes:Self Care/Home Management 13  Therapeutic Activity 10    Katie Rubio OT  12/20/201911:44 AM

## 2019-12-20 NOTE — PT/OT/SLP DISCHARGE
Occupational Therapy Discharge Summary    Troy Yuan Sr.  MRN: [id number]   Principal Problem: Syncope and collapse      Patient Discharged from acute Occupational Therapy on 12/20/2019.  Please refer to prior OT note dated 12/20/2019 for functional status.    Assessment:      Patient appropriate for care in another setting. Patient has not met goals.    Objective:     GOALS:   Multidisciplinary Problems     Occupational Therapy Goals     Not on file          Multidisciplinary Problems (Resolved)        Problem: Occupational Therapy Goal    Goal Priority Disciplines Outcome Interventions   Occupational Therapy Goal   (Resolved)     OT, PT/OT Met    Description:  Goals to be met by: discharge     Patient will increase functional independence with ADLs by performing:    UE Dressing with Supervision.  LE Dressing with Supervision.  Grooming while standing with Supervision.  Toileting from toilet with Supervision for hygiene and clothing management.   Toilet transfer to toilet with Supervision.                      Reasons for Discontinuation of Therapy Services  Transfer to alternate level of care.      Plan:     Patient Discharged to: Home with Home Health Service    Katie Rubio OT  12/20/201911:45 AM

## 2019-12-20 NOTE — PLAN OF CARE
Problem: Fall Injury Risk  Goal: Absence of Fall and Fall-Related Injury  Outcome: Ongoing, Progressing     Problem: Adult Inpatient Plan of Care  Goal: Plan of Care Review  Outcome: Ongoing, Progressing  Goal: Patient-Specific Goal (Individualization)  Outcome: Ongoing, Progressing  Goal: Absence of Hospital-Acquired Illness or Injury  Outcome: Ongoing, Progressing  Goal: Optimal Comfort and Wellbeing  Outcome: Ongoing, Progressing  Goal: Readiness for Transition of Care  Outcome: Ongoing, Progressing  Goal: Rounds/Family Conference  Outcome: Ongoing, Progressing     Problem: Skin Injury Risk Increased  Goal: Skin Health and Integrity  Outcome: Ongoing, Progressing     Problem: Infection  Goal: Infection Symptom Resolution  Outcome: Ongoing, Progressing

## 2019-12-20 NOTE — ASSESSMENT & PLAN NOTE
Likely secondary to recent initiation of beta-blocker.  Discontinued metoprolol.  Heart rate improved.

## 2019-12-20 NOTE — PLAN OF CARE
12/20/19 1034   Discharge Reassessment   Assessment Type Discharge Planning Reassessment   Anticipated Discharge Disposition Home-Health     D/C orders are in for home health. Fax was previously received from [facility] stating pt was a member of theirs.    SW went to speak with pt, but he was not alert/oriented. JCARLOS contacted pt's dtr Lakia at [phone] to discuss referral and Medicare freedom of choice. Lakia denied any previous services with [facility], and stated she had no preference of agency and was OK with N placing pt with an agency. Lakia provided verbal consent for pt choice form. JCARLOS faxed referral to [facility] via right fax and informed nurse that pt is ready for d/c from a CM perspective.    13:56 Per Danay from [facility], pt was set up with [facility] 12/18/19 upon d/c from [facility] 12/17/19. Pt was admitted to [facility] later same day. Per Danay, referral can be sent directly to [facility].    14:02 D/C orders sent to [facility] via right fax. JCARLOS spoke with Lizette at [phone], who stated pt is set to be seen tomorrow.

## 2019-12-20 NOTE — PLAN OF CARE
12/20/19 1403   Final Note   Assessment Type Final Discharge Note   Anticipated Discharge Disposition Home-Health     Pt has d/c with [facility] home health, which was set up after d/c from [facility] 12/17/19.

## 2019-12-20 NOTE — ASSESSMENT & PLAN NOTE
Recent admission for AoC CHF  Most recent ECHO EF 45%, grade 1 diastolic dysfunction with severe wall motion abnormalities  As per Cardiology may consider Lasix 20 mg when renal function recovers.

## 2019-12-20 NOTE — TELEPHONE ENCOUNTER
----- Message from Ashley Wilson sent at 12/20/2019 11:21 AM CST -----  Contact: Lakia Yuan (Daughter) ph# [phone]   Lakia Yuan (Daughter) ph# [phone]   Returning phone call

## 2019-12-20 NOTE — DISCHARGE SUMMARY
"[facility] Medicine  Discharge Summary      Patient Name: Troy Yuan Sr.  MRN: [id number]  Admission Date: 12/17/2019  Hospital Length of Stay: 3 days  Discharge Date and Time:  12/20/2019 10:17 AM  Attending Physician: Sasha Espinosa MD   Discharging Provider: Sasha Espinosa MD  Primary Care Provider: Gentry Encinas MD      HPI:   89 yo AAM with PMH of HTN, CKD 4, HLD, RA, chronic combined systolic/diastolic heart failure, dementia, AOCD presents to ED following pre-syncopal episode. Patient was discharged earlier today from [facility] (12/12-12/17) where he was admitted for acute on chronic CHF. He was initially admitted to ICU placed on bipap, nitro gtt and diuresed with IV lasix. He was stepped to floor and transitioned to PO lasix with recommendations for discharge to Rehab, however family preferred to take patient home.   Once at home, he attempted to ambulate to bathroom and became fatigued and "wobbly on his feet" per patient's son. Family was able to get patient to chair. Family denies patient hitting head, LOC or complaints of pain.  History is limited 2/2 patient's dementia.      In Ed, he was noted to have elevated bun/cr. He also had elevated bnp and trop, however these were both improved from initial admission on 12/12.  Ct head and CT spine negative.     * No surgery found *      Hospital Course:   Patient was admitted to the medical floor with remote cardiac telemetry monitoring.  Admission labs with continued acute kidney injury on chronic kidney disease consistent with recent discharge labs. UA was positive.  He was started on antibiotics however urine culture without any growth.  Likely more presyncopal episode from history obtained.  He was also noted to have bradycardia.Orthostatic vitals were checked and positive.  Lasix and beta-blocker were discontinued. He was started on IV fluid hydration.  Bladder scan revealed consistent urinary retention " requiring multiple straight caths and ultimately Neumann catheter was placed.  Chart review- patient was on alfuzosin and has a history of BPH followed by Dr. Holloway.  His kidney function did improve with discharge from creatinine 2.6.  He did have episode of delirium on dementia and family highly requesting that patient be discharged home with home health given impression he does better at home and has good home support. Again PT and OT recommending inpatient rehab placement however family declining.  Patient wearing compression stockings.  Repeat orthostatic vitals negative.  Medically stable for discharge though high risk of decline.  Discharge plan including medication changes, Neumann catheter, follow-up as well as return precautions explained to the granddaughter over the phone on discharge.    Discharge examination  Elderly male lying in bed, no apparent distress, eating breakfast with assistant of relative  Regular heart rhythm, no significant lower extremity edema  Not on supplemental oxygen  Neumann catheter in place with dark  urine draining    Discharge recommendations  Patient to continue alfuzosin  Discharge with Neumann catheter in place, to schedule appointment in 1 week time with urologist for voiding trial  Patient to have repeat BMP done next week and call PCP for results and further instructions on Lasix.    Once renal function improved would recommend Lasix 20 mg daily    To continue to wear compression stockings  Outpatient Urology and Cardiology follow-up  Close outpatient PCP follow-up     Consults:   Consults (From admission, onward)        Status Ordering Provider     Inpatient consult to Cardiology  Once     Provider:  Lloyd Grissom MD    Acknowledged GEORGE MISHRA     Inpatient consult to Hospitalist  Once     Provider:  Pamela Hammond DO    Acknowledged PAMELA HAMMOND     Inpatient consult to Social Work/Case Management  Once     Provider:  (Not yet assigned)    Acknowledged  GEORGE MISHRA.          * Presyncope-resolved as of 12/20/2019  To continue to wear compression stockings.  Beta-blocker discontinued.  Holding diuretic for now with repeat renal panel.      Urinary retention  Known history of BPH and retaining urine on bladder scan requiring straight catheterization.  Discharge with Neumann in place.  Outpatient Urology follow-up for voiding trial.    Bradycardia-resolved as of 12/19/2019  Likely secondary to recent initiation of beta-blocker.  Discontinued metoprolol.  Heart rate improved.    Acute kidney injury on CKD stage 3  Previous baseline prior to the outside hospital admission was 24/1.6, on discharge outside hospital was 46/3.2.  Discharge labs 40/2.6.  Multifactorial secondary to prerenal/hypovolemia, UTI, urinary retention.  Treatment for urinary retention.  Neumann in place  Hold further diuretics therapy.    Repeat BMP in 1 week with PCP follow-up.    Dementia without behavioral disturbance  High risk for delirium given prior history of same, underlying dementia, renal derangements, hospitalization.  Delirium precautions. .          Chronic diastolic heart failure  Recent admission for AoC CHF  Most recent ECHO EF 45%, grade 1 diastolic dysfunction with severe wall motion abnormalities  As per Cardiology may consider Lasix 20 mg when renal function recovers.      Essential hypertension  Amlodipine, Imdur, hydralazine.  Lasix discontinued.            Final Active Diagnoses:    Diagnosis Date Noted POA    Urinary retention [R33.9] 12/19/2019 Yes     Chronic    Acute kidney injury on CKD stage 3 [N17.9, N18.4] 12/16/2019 Yes    Dementia without behavioral disturbance [F03.90] 12/17/2019 Yes    Multiple lacunar infarcts [I63.81] 12/13/2019 Yes    Chronic diastolic heart failure [I50.32] 12/12/2019 Yes    Rheumatoid arthritis [M06.9] 12/12/2019 Yes    Essential hypertension [I10] 01/11/2019 Yes    Macrocytic anemia [D53.9] 07/02/2015 Yes      Problems Resolved  During this Admission:    Diagnosis Date Noted Date Resolved POA    PRINCIPAL PROBLEM:  Presyncope [R55] 12/17/2019 12/20/2019 Yes    Bradycardia [R00.1] 12/17/2019 12/19/2019 Yes    UTI (urinary tract infection) [N39.0] 12/18/2019 12/20/2019 Yes    Hyperphosphatemia [E83.39] 12/18/2019 12/19/2019 Yes    Hypokalemia [E87.6] 12/18/2019 12/19/2019 Yes       Discharged Condition: fair    Disposition: Home-Health Care [facility]    Follow Up:  Follow-up Information     Miko Holloway MD In 1 week.    Specialty:  Urology  Why:  Urinary retention, Neumann catheter  Contact information:  [address] [place] [address] [address]  [address]  [place] [address]  [phone]             Lloyd Grissom MD In 2 weeks.    Specialty:  Cardiology  Why:  Heart failure  Contact information:  [address] [facility]  [address]  [place] [address]  [phone]             Gentry Encinas MD. Schedule an appointment as soon as possible for a visit in 1 week.    Specialty:  Family Medicine  Why:  Acute kidney injury, labs, post hospital follow-up  Contact information:  [address]  [place] [address]  [phone]                 Patient Instructions:      Basic metabolic panel   Standing Status: Future Standing Exp. Date: 02/17/21   Order Comments: Please have home health check gin a BMP next week and results to primary care provider.  Please call primary provider to get further information about Lasix dosing.     Referral to Home health   Referral Priority: Routine Referral Type: Home Health   Referral Reason: Specialty Services Required   Requested Specialty: Home Health Services   Number of Visits Requested: 1     Diet Adult Regular     Activity as tolerated   Order Comments: Falls precaution       Significant Diagnostic Studies: Labs:   BMP:   Recent Labs   Lab 12/19/19  0433 12/20/19  0529   GLU 95 97    145   K 3.5 3.9    107   CO2 29 29   BUN 45* 40*   CREATININE 2.9* 2.6*   CALCIUM 8.3* 8.9   MG 2.2 2.2   , CMP   Recent Labs   Lab  12/19/19  0433 12/20/19  0529    145   K 3.5 3.9    107   CO2 29 29   GLU 95 97   BUN 45* 40*   CREATININE 2.9* 2.6*   CALCIUM 8.3* 8.9   ANIONGAP 7* 9   ESTGFRAFRICA 21.1* 24.0*   EGFRNONAA 18.2* 20.8*   , CBC   Recent Labs   Lab 12/19/19  0434 12/20/19  0529   WBC 5.87 6.71   HGB 9.4* 9.3*   HCT 29.5* 29.6*    236   , INR   Lab Results   Component Value Date    INR 1.0 12/12/2019    INR 1.0 01/11/2019    INR 1.0 09/26/2015   , Lipid Panel   Lab Results   Component Value Date    CHOL 157 12/12/2019    HDL 69 12/12/2019    LDLCALC 83.0 12/12/2019    TRIG 25 (L) 12/12/2019    CHOLHDL 43.9 12/12/2019   , Troponin   Recent Labs   Lab 12/18/19  0432   TROPONINI 0.070*   , A1C:   Recent Labs   Lab 12/12/19  0903   HGBA1C 5.3    and All labs within the past 24 hours have been reviewed    Pending Diagnostic Studies:     None      X-ray Chest 1 View    Result Date: 12/16/2019  EXAMINATION: XR CHEST 1 VIEW CLINICAL HISTORY: chf; TECHNIQUE: Single frontal view of the chest was performed. COMPARISON: 12/13/2019 FINDINGS: The cardiomediastinal silhouette is stable..  There are small bilateral pleural effusions left larger than right not appearing significantly changed..  Left lung infiltrate is decreased.  No definite left lung infiltrate today right lung infiltrate also appears mildly decreased in the perihilar region.     Small bilateral pleural effusions left larger than right and mild right lung infiltrate with lung infiltrates decreased compared to the prior exam. Electronically signed by: Sarah Byers MD Date:    12/16/2019 Time:    08:26    X-ray Chest 1 View    Result Date: 12/13/2019  EXAMINATION: XR CHEST 1 VIEW CLINICAL HISTORY: chf; TECHNIQUE: Single frontal view of the chest was performed. COMPARISON: 12/12/2019 FINDINGS: The cardiomediastinal silhouette is stable.  Aortic knob appears right-sided as on multiple prior exams.  There is decrease of the bilateral infiltrates.  Trace bilateral  pleural effusions also decreased     Improvement in the appearance of the chest compared to the prior exam consistent with improving CHF Electronically signed by: Sarah Byers MD Date:    12/13/2019 Time:    09:32    Ct Head Without Contrast    Result Date: 12/17/2019  CMS MANDATED QUALITY DATA - CT RADIATION - 436 All CT scans at this facility utilize dose modulation, iterative reconstruction, and/or weight based dosing when appropriate to reduce radiation dose to as low as reasonably achievable. EXAMINATION: CT HEAD WITHOUT CONTRAST CLINICAL HISTORY: syncope, possible head trauma; TECHNIQUE: Head CT without IV contrast. COMPARISON: CT head 01/11/2019. FINDINGS: Gray-white differentiation is maintained without hemorrhage, midline shift, or mass effect.  Involutional changes of the brain parenchyma noted with associated ex vacuo dilatation of the ventricles.  Periventricular and deep white matter hypoattenuation noted.  Right thalamus chronic lacunar infarct noted.  The ventricles and cisterns are maintained.Calvarium is intact. Left maxillary sinus mucous retention cyst versus polyp, otherwise the paranasal sinuses and mastoid air cells are patent.  A subcutaneous lipoma is noted in the left mastoid region.     1. No acute intracranial abnormality. 2. Multiple chronic changes as described above. Electronically signed by: Joel Hammond MD Date:    12/17/2019 Time:    16:13    Ct Cervical Spine Without Contrast    Result Date: 12/17/2019  CMS MANDATED QUALITY DATA - CT RADIATION - 436 All CT scans at this facility utilize dose modulation, iterative reconstruction, and/or weight based dosing when appropriate to reduce radiation dose to as low as reasonably achievable. EXAMINATION: CT CERVICAL SPINE WITHOUT CONTRAST CLINICAL HISTORY: fall; TECHNIQUE: Cervical spine CT without IV contrast obtained with coronal and sagittal reformations. COMPARISON: None FINDINGS: The cervical spine is in satisfactory alignment.  The  vertebral bodies are of normal height.  There is anterior bony fusion and bony fusion of the left facets at C3-4.  There is disc space narrowing anterior spurring at C4-5, C5-6 and C6-7. The facet joints are aligned.  The odontoid process is intact and the lateral masses of C1 are symmetrical.  The cranial cervical junction is normal. There is no fracture or subluxation. There is multilevel foraminal narrowing secondary to facet hypertrophy.  This is most significant on the left at C3-4, on the right at C4-5 and bilaterally at C5-6 and C6-7. The paraspinous soft tissues are normal.  There is vascular calcification at the carotid bifurcations.  There is no epidural fluid collection.  The lung apices are clear.     Degenerative changes of the cervical spine without evidence of fracture or subluxation. Electronically signed by: Tierra Barraza MD Date:    12/17/2019 Time:    16:12    X-ray Chest Ap Portable    Result Date: 12/17/2019  EXAMINATION: XR CHEST AP PORTABLE CLINICAL HISTORY: Pain; Syncope and collapse FINDINGS: Portable chest at 15:58 hours is compared to 12/16/2019 shows normal cardiomediastinal silhouette. The lungs are hyperexpanded.  There are small pleural effusions.  There is improvement of the airspace disease in the left lung base.  The remainder of the lungs are clear. No acute osseous abnormality.     Small bilateral pleural effusions with improvement of the airspace disease within the left lung base Electronically signed by: Tierra Barraza MD Date:    12/17/2019 Time:    16:23    X-ray Chest Ap Portable    Result Date: 12/12/2019  EXAMINATION: XR CHEST AP PORTABLE CLINICAL HISTORY: sob; TECHNIQUE: Single frontal view of the chest was performed. COMPARISON: 12/12/2019 FINDINGS: The cardiomediastinal silhouette is with normal limits.  Hazy opacification is present at both lung bases, mildly increased.     Mild increase in hazy bibasilar opacities likely reflecting layering pleural effusions and  atelectasis or pneumonia. Electronically signed by: Valerio Chapa MD Date:    12/12/2019 Time:    12:13    X-ray Chest Ap Portable    Result Date: 12/12/2019  EXAMINATION: XR CHEST AP PORTABLE CLINICAL HISTORY: CHF; TECHNIQUE: Single frontal view of the chest was performed. COMPARISON: 01/11/2019 FINDINGS: Interstitial opacities in the perihilar regions in both lower lung zones.  Mild cardiomegaly.  Right-sided aortic arch.  Plethora.  No pleural effusion or pneumothorax.     1. Interstitial opacities favor pulmonary edema given cardiomegaly.  Atypical infection or pneumonitis also possible depending on clinical presentation. 2. Suspect elevated pulmonary pressures. Electronically signed by: Sadi Faustin Date:    12/12/2019 Time:    08:46    Medications:  Reconciled Home Medications:      Medication List      CHANGE how you take these medications    alfuzosin 10 mg Tb24  Commonly known as:  UROXATRAL  Take 1 tablet (10 mg total) by mouth daily with breakfast.  What changed:  See the new instructions.     hydroxychloroquine 200 mg tablet  Commonly known as:  PLAQUENIL  Take 1 tablet (200 mg total) by mouth once daily.  What changed:  Another medication with the same name was removed. Continue taking this medication, and follow the directions you see here.        CONTINUE taking these medications    amLODIPine 10 MG tablet  Commonly known as:  NORVASC  Take 10 mg by mouth once daily.     atorvastatin 20 MG tablet  Commonly known as:  LIPITOR  Take 1 tablet (20 mg total) by mouth once daily.     cimetidine 200 MG tablet  Commonly known as:  TAGAMET  Take 200 mg by mouth 2 (two) times daily.     ferrous sulfate 325 mg (65 mg iron) Tab tablet  Commonly known as:  FEOSOL  Take 325 mg by mouth once daily.     hydrALAZINE 25 MG tablet  Commonly known as:  APRESOLINE  Take 1 tablet (25 mg total) by mouth every 8 (eight) hours.     isosorbide mononitrate 60 MG 24 hr tablet  Commonly known as:  IMDUR  Take 1 tablet (60  mg total) by mouth once daily.        STOP taking these medications    furosemide 40 MG tablet  Commonly known as:  LASIX     metoprolol succinate 50 MG 24 hr tablet  Commonly known as:  TOPROL-XL            Indwelling Lines/Drains at time of discharge:   Lines/Drains/Airways     Drain                 Urethral Catheter 12/20/19 1001 16 Fr. less than 1 day                Time spent on the discharge of patient: 36 minutes  Patient was seen and examined on the date of discharge and determined to be suitable for discharge.         Sasha Espinosa MD  Department of Hospital Medicine  [facility]

## 2019-12-20 NOTE — DISCHARGE INSTRUCTIONS
He will go home with a urine catheter.  Please call urologist schedule an appointment in 1 week to evaluate if this can be removed.    He will need to have your blood tested by the home health nurse next week.  Results need to be sent to your primary care provider.  Please call your primary care provider to get further instructions about water pill.    New need to continue to wear your stockings on neural exit to prevent for the episodes of passing out.

## 2019-12-20 NOTE — TELEPHONE ENCOUNTER
----- Message from Arcadio Mejia sent at 12/20/2019 10:32 AM CST -----  Contact: pt daughter Lakia  Type:  Sooner Apoointment Request    Caller is requesting a sooner appointment.  Caller declined first available appointment listed below.  Caller will not accept being placed on the waitlist and is requesting a message be sent to doctor.    Name of Caller:  Lakia  When is the first available appointment?  2.28.20  Symptoms:  hosp f/u for bladder not voiding  Best Call Back Number:  [phone]  Additional Information:  Pt was discharged 12.20.19 and needing 1 wk f/u. Pt was in [facility]

## 2019-12-20 NOTE — ASSESSMENT & PLAN NOTE
Known history of BPH and retaining urine on bladder scan requiring straight catheterization.  Discharge with Neumann in place.  Outpatient Urology follow-up for voiding trial.

## 2019-12-29 ENCOUNTER — HOSPITAL ENCOUNTER (EMERGENCY)
Facility: HOSPITAL | Age: 84
Discharge: HOME | End: 2019-12-30
Attending: EMERGENCY MEDICINE
Payer: MEDICARE

## 2019-12-29 VITALS
WEIGHT: 153 LBS | DIASTOLIC BLOOD PRESSURE: 78 MMHG | TEMPERATURE: 98 F | OXYGEN SATURATION: 99 % | RESPIRATION RATE: 16 BRPM | SYSTOLIC BLOOD PRESSURE: 185 MMHG | BODY MASS INDEX: 24.01 KG/M2 | HEIGHT: 67 IN | HEART RATE: 66 BPM

## 2019-12-29 DIAGNOSIS — N39.0 URINARY TRACT INFECTION WITH HEMATURIA, SITE UNSPECIFIED: ICD-10-CM

## 2019-12-29 DIAGNOSIS — R31.9 URINARY TRACT INFECTION WITH HEMATURIA, SITE UNSPECIFIED: ICD-10-CM

## 2019-12-29 DIAGNOSIS — R31.0 GROSS HEMATURIA: Primary | ICD-10-CM

## 2019-12-29 PROCEDURE — 99284 EMERGENCY DEPT VISIT MOD MDM: CPT | Mod: 25

## 2019-12-29 PROCEDURE — 96365 THER/PROPH/DIAG IV INF INIT: CPT

## 2019-12-30 ENCOUNTER — OFFICE VISIT (OUTPATIENT)
Dept: UROLOGY | Facility: CLINIC | Age: 84
End: 2019-12-30
Payer: MEDICARE

## 2019-12-30 VITALS
HEIGHT: 67 IN | DIASTOLIC BLOOD PRESSURE: 60 MMHG | RESPIRATION RATE: 18 BRPM | HEART RATE: 80 BPM | TEMPERATURE: 99 F | SYSTOLIC BLOOD PRESSURE: 150 MMHG | WEIGHT: 153 LBS | BODY MASS INDEX: 24.01 KG/M2

## 2019-12-30 DIAGNOSIS — R33.9 URINARY RETENTION: ICD-10-CM

## 2019-12-30 DIAGNOSIS — C61 MALIGNANT NEOPLASM OF PROSTATE: Primary | ICD-10-CM

## 2019-12-30 LAB
AMORPH CRY URNS QL MICRO: ABNORMAL
ANION GAP SERPL CALC-SCNC: 11 MMOL/L (ref 8–16)
BACTERIA #/AREA URNS HPF: ABNORMAL /HPF
BASOPHILS # BLD AUTO: 0.02 K/UL (ref 0–0.2)
BASOPHILS NFR BLD: 0.3 % (ref 0–1.9)
BILIRUB UR QL STRIP: NEGATIVE
BUN SERPL-MCNC: 31 MG/DL (ref 8–23)
CALCIUM SERPL-MCNC: 8.6 MG/DL (ref 8.7–10.5)
CHLORIDE SERPL-SCNC: 105 MMOL/L (ref 95–110)
CLARITY UR: ABNORMAL
CO2 SERPL-SCNC: 24 MMOL/L (ref 23–29)
COLOR UR: ABNORMAL
CREAT SERPL-MCNC: 1.8 MG/DL (ref 0.5–1.4)
DIFFERENTIAL METHOD: ABNORMAL
EOSINOPHIL # BLD AUTO: 0.1 K/UL (ref 0–0.5)
EOSINOPHIL NFR BLD: 2.3 % (ref 0–8)
ERYTHROCYTE [DISTWIDTH] IN BLOOD BY AUTOMATED COUNT: 13.6 % (ref 11.5–14.5)
EST. GFR  (AFRICAN AMERICAN): 37 ML/MIN/1.73 M^2
EST. GFR  (NON AFRICAN AMERICAN): 32 ML/MIN/1.73 M^2
GLUCOSE SERPL-MCNC: 108 MG/DL (ref 70–110)
GLUCOSE UR QL STRIP: NEGATIVE
HCT VFR BLD AUTO: 28.6 % (ref 40–54)
HGB BLD-MCNC: 9 G/DL (ref 14–18)
HGB UR QL STRIP: ABNORMAL
HYALINE CASTS #/AREA URNS LPF: 0 /LPF
IMM GRANULOCYTES # BLD AUTO: 0.02 K/UL (ref 0–0.04)
KETONES UR QL STRIP: NEGATIVE
LEUKOCYTE ESTERASE UR QL STRIP: ABNORMAL
LYMPHOCYTES # BLD AUTO: 0.9 K/UL (ref 1–4.8)
LYMPHOCYTES NFR BLD: 14.2 % (ref 18–48)
MCH RBC QN AUTO: 30.6 PG (ref 27–31)
MCHC RBC AUTO-ENTMCNC: 31.5 G/DL (ref 32–36)
MCV RBC AUTO: 97 FL (ref 82–98)
MICROSCOPIC COMMENT: ABNORMAL
MONOCYTES # BLD AUTO: 0.6 K/UL (ref 0.3–1)
MONOCYTES NFR BLD: 10.7 % (ref 4–15)
NEUTROPHILS # BLD AUTO: 4.3 K/UL (ref 1.8–7.7)
NEUTROPHILS NFR BLD: 72.2 % (ref 38–73)
NITRITE UR QL STRIP: POSITIVE
NRBC BLD-RTO: 0 /100 WBC
PH UR STRIP: 7 [PH] (ref 5–8)
PLATELET # BLD AUTO: 266 K/UL (ref 150–350)
PMV BLD AUTO: 10.6 FL (ref 9.2–12.9)
POTASSIUM SERPL-SCNC: 4.4 MMOL/L (ref 3.5–5.1)
PROT UR QL STRIP: ABNORMAL
RBC # BLD AUTO: 2.94 M/UL (ref 4.6–6.2)
RBC #/AREA URNS HPF: >100 /HPF (ref 0–4)
SODIUM SERPL-SCNC: 140 MMOL/L (ref 136–145)
SP GR UR STRIP: 1.01 (ref 1–1.03)
SQUAMOUS #/AREA URNS HPF: 6 /HPF
URN SPEC COLLECT METH UR: ABNORMAL
UROBILINOGEN UR STRIP-ACNC: NEGATIVE EU/DL
WBC # BLD AUTO: 6 K/UL (ref 3.9–12.7)
WBC #/AREA URNS HPF: 47 /HPF (ref 0–5)

## 2019-12-30 PROCEDURE — 63600175 PHARM REV CODE 636 W HCPCS: Performed by: EMERGENCY MEDICINE

## 2019-12-30 PROCEDURE — 87077 CULTURE AEROBIC IDENTIFY: CPT

## 2019-12-30 PROCEDURE — 87088 URINE BACTERIA CULTURE: CPT

## 2019-12-30 PROCEDURE — 36415 COLL VENOUS BLD VENIPUNCTURE: CPT

## 2019-12-30 PROCEDURE — 99214 OFFICE O/P EST MOD 30 MIN: CPT | Mod: S$GLB,,, | Performed by: UROLOGY

## 2019-12-30 PROCEDURE — 99214 PR OFFICE/OUTPT VISIT, EST, LEVL IV, 30-39 MIN: ICD-10-PCS | Mod: S$GLB,,, | Performed by: UROLOGY

## 2019-12-30 PROCEDURE — 1101F PT FALLS ASSESS-DOCD LE1/YR: CPT | Mod: CPTII,S$GLB,, | Performed by: UROLOGY

## 2019-12-30 PROCEDURE — 1159F MED LIST DOCD IN RCRD: CPT | Mod: S$GLB,,, | Performed by: UROLOGY

## 2019-12-30 PROCEDURE — 1159F PR MEDICATION LIST DOCUMENTED IN MEDICAL RECORD: ICD-10-PCS | Mod: S$GLB,,, | Performed by: UROLOGY

## 2019-12-30 PROCEDURE — 1126F PR PAIN SEVERITY QUANTIFIED, NO PAIN PRESENT: ICD-10-PCS | Mod: S$GLB,,, | Performed by: UROLOGY

## 2019-12-30 PROCEDURE — 1126F AMNT PAIN NOTED NONE PRSNT: CPT | Mod: S$GLB,,, | Performed by: UROLOGY

## 2019-12-30 PROCEDURE — 85025 COMPLETE CBC W/AUTO DIFF WBC: CPT

## 2019-12-30 PROCEDURE — 99999 PR PBB SHADOW E&M-EST. PATIENT-LVL III: ICD-10-PCS | Mod: PBBFAC,,, | Performed by: UROLOGY

## 2019-12-30 PROCEDURE — 87186 SC STD MICRODIL/AGAR DIL: CPT

## 2019-12-30 PROCEDURE — 99999 PR PBB SHADOW E&M-EST. PATIENT-LVL III: CPT | Mod: PBBFAC,,, | Performed by: UROLOGY

## 2019-12-30 PROCEDURE — 81000 URINALYSIS NONAUTO W/SCOPE: CPT

## 2019-12-30 PROCEDURE — 1101F PR PT FALLS ASSESS DOC 0-1 FALLS W/OUT INJ PAST YR: ICD-10-PCS | Mod: CPTII,S$GLB,, | Performed by: UROLOGY

## 2019-12-30 PROCEDURE — 87086 URINE CULTURE/COLONY COUNT: CPT

## 2019-12-30 PROCEDURE — 80048 BASIC METABOLIC PNL TOTAL CA: CPT

## 2019-12-30 RX ORDER — CALCITRIOL 0.25 UG/1
0.25 CAPSULE ORAL DAILY
COMMUNITY
Start: 2019-12-23

## 2019-12-30 RX ORDER — CIPROFLOXACIN 500 MG/1
500 TABLET ORAL 2 TIMES DAILY
Qty: 14 TABLET | Refills: 0 | Status: SHIPPED | OUTPATIENT
Start: 2019-12-30 | End: 2020-01-06

## 2019-12-30 RX ADMIN — CEFTRIAXONE 1 G: 1 INJECTION, SOLUTION INTRAVENOUS at 12:12

## 2019-12-30 NOTE — PROGRESS NOTES
OFFICE NOTE  [unfilled]  [id number]  12/30/2019       CHIEF COMPLAINT:   urinary retention, adenocarcinoma prostate, BPH     HISTORY OF PRESENT ILLNESS:   this 89-year-old male was hospitalized on 12/17/2019 with congestive heart failure.  He required placement of a Neumann catheter due to urinary retention.  He is now returning to us for recheck with indwelling Neumann catheter stent in place.  He is currently on Uroxatral and Cipro.  He also has history of adenocarcinoma prostate for which he underwent external beam radiation therapy over 20 years ago and has shown no evidence recurrence and since then.    Physical exam:  Abdomen - soft benign nontender no masses no hernias no organomegaly                             External genitalia - normal phallus with adequate meatus testes descended and feel normal no scrotal mass indwelling Neumann catheter with clear urine                             Rectal -  firm flattened prostatic area no nodules normal sphincter tone     PSA 0.92 on 07/15/2019        FINAL IMPRESSION:   urinary retention with indwelling Neumann catheter, adenocarcinoma prostate     RECOMMENDATIONS:  Continue on Cipro and Uroxatral .  Voiding trial approximately 1- 2 weeks .

## 2019-12-30 NOTE — ED PROVIDER NOTES
Encounter Date: 12/29/2019    SCRIBE #1 NOTE: IVioleta am scribing for, and in the presence of, Maxx Garvey MD.       History     Chief Complaint   Patient presents with    Hematuria     has blood in urine      Time seen by provider: 11:15 PM on 12/29/2019    Troy Yuan Sr. is a 89 y.o. male with Stage 3 CKD who presents to the ED with an onset of hematuria PTA. The patient's daughter noticed presence of blood in the patient's yost catheter bag. The patient denies abdominal pain or any other symptoms at this time. Additional pertinent PMHx includes HTN, cancer, kidney stone, and bladder stones. Pertinent PSHx includes kidney stone surgery. Known drug allergies include soma, aspirin, and sulfa.      The history is provided by the patient and a relative.     Review of patient's allergies indicates:   Allergen Reactions    Soma [carisoprodol] Rash    Aspirin     Sulfa (sulfonamide antibiotics) Rash     Past Medical History:   Diagnosis Date    Bladder stones     Cancer     Encounter for blood transfusion     Hypertension     Kidney stone     Prostate cancer 2004    Radiation 2005    prostate    Stage 3 chronic kidney disease 1/11/2019     Past Surgical History:   Procedure Laterality Date    CYSTOSCOPY      KIDNEY STONE SURGERY  May 2014     Family History   Problem Relation Age of Onset    Hypertension Daughter     Diabetes Daughter     Hypertension Son     Asthma Son      Social History     Tobacco Use    Smoking status: Former Smoker     Years: 40.00     Types: Cigarettes     Start date: 9/26/1985    Smokeless tobacco: Never Used   Substance Use Topics    Alcohol use: No    Drug use: No     Review of Systems   Unable to perform ROS: Dementia   Constitutional: Negative for activity change, diaphoresis and fever.   HENT: Negative for drooling, rhinorrhea, sore throat and trouble swallowing.    Eyes: Negative for pain and visual disturbance.   Respiratory: Negative for cough,  shortness of breath and stridor.    Cardiovascular: Negative for chest pain and leg swelling.   Gastrointestinal: Negative for abdominal distention, abdominal pain, constipation and vomiting.   Genitourinary: Positive for hematuria. Negative for discharge and dysuria.   Musculoskeletal: Negative for gait problem.   Skin: Negative for rash.   Neurological: Negative for seizures, facial asymmetry and headaches.   Psychiatric/Behavioral: Negative for hallucinations and suicidal ideas.       Physical Exam     Initial Vitals [12/29/19 2249]   BP Pulse Resp Temp SpO2   (!) 185/78 66 16 98.3 °F (36.8 °C) 99 %      MAP       --         Physical Exam    Nursing note and vitals reviewed.  Constitutional: No distress.   Patient has dementia.   HENT:   Head: Normocephalic and atraumatic.   Nose: Nose normal.   Eyes: EOM are normal.   Neck: Neck supple. No tracheal deviation present. No JVD present.   Cardiovascular: Normal rate, regular rhythm, normal heart sounds and intact distal pulses. Exam reveals no gallop and no friction rub.    No murmur heard.  Pulmonary/Chest: Breath sounds normal. No respiratory distress. He has no wheezes. He has no rhonchi. He has no rales.   Abdominal: Soft. Bowel sounds are normal. There is no tenderness.   Genitourinary:   Genitourinary Comments: Suspected presence of blood in yost catheter bag.   Musculoskeletal: Normal range of motion.   Neurological: He is alert and oriented to person, place, and time. No cranial nerve deficit.   Skin: Skin is warm and dry. Capillary refill takes less than 2 seconds. No rash noted.   Psychiatric: He has a normal mood and affect.         ED Course   Procedures  Labs Reviewed   CBC W/ AUTO DIFFERENTIAL - Abnormal; Notable for the following components:       Result Value    RBC 2.94 (*)     Hemoglobin 9.0 (*)     Hematocrit 28.6 (*)     Mean Corpuscular Hemoglobin Conc 31.5 (*)     Lymph # 0.9 (*)     Lymph% 14.2 (*)     All other components within normal  limits   BASIC METABOLIC PANEL - Abnormal; Notable for the following components:    BUN, Bld 31 (*)     Creatinine 1.8 (*)     Calcium 8.6 (*)     eGFR if  37 (*)     eGFR if non  32 (*)     All other components within normal limits   URINALYSIS, REFLEX TO URINE CULTURE - Abnormal; Notable for the following components:    Color, UA Red (*)     Appearance, UA Cloudy (*)     Protein, UA 2+ (*)     Occult Blood UA 3+ (*)     Nitrite, UA Positive (*)     Leukocytes, UA Trace (*)     All other components within normal limits    Narrative:     Preferred Collection Type->Urine, Clean Catch   URINALYSIS MICROSCOPIC - Abnormal; Notable for the following components:    RBC, UA >100 (*)     WBC, UA 47 (*)     Bacteria Few (*)     All other components within normal limits    Narrative:     Preferred Collection Type->Urine, Clean Catch   CULTURE, URINE          Imaging Results    None          Medical Decision Making:   History:   Old Medical Records: I decided to obtain old medical records.  Clinical Tests:   Lab Tests: Ordered and Reviewed  ED Management:  89 yo AAM with PMH of HTN, CKD 4, HLD, RA, chronic combined systolic/diastolic heart failure, dementia, AICD pw gross hematuria.  Patient's history, exam, and studies ordered are not consistent with kidney stone, urethral or intra-abdominal trauma, drug reaction, coagulopathy, prostatitis or other serious bacterial infection.  Neumann flushed and drains well. No abdominal pain. UA concerning for UTI. Given rocephin and ciproflxacin. Pt has fu with Urology this morning. Hb 9 which is slightly less than last time but hemodynamically stable. Creatinine  Improved. Pt understands and agrees with discharge instructions. Pt also given strict return precautions for any new or worsening symptoms and plans to follow up with urology in AM.                 Scribe Attestation:   Scribe #1: I performed the above scribed service and the documentation  accurately describes the services I performed. I attest to the accuracy of the note.      Attending Attestation:     Physician Attestation for Scribe:    I, Dr. Maxx Garvey, personally performed the services described in this documentation.   All medical record entries made by the scribe were at my direction and in my presence.   I have reviewed the chart and agree that the record is accurate and complete.   Maxx Garvey MD  1:03 AM 12/30/2019     DISCLAIMER: This note was prepared with Reissued Naturally [employer] voice recognition transcription software. Garbled syntax, mangled pronouns, and other bizarre constructions may be attributed to that software system.          ED Course as of Dec 30 0107   Sun Dec 29, 2019   2912    [BD]      ED Course User Index  [BD] Maxx Garvey MD                Clinical Impression:       ICD-10-CM ICD-9-CM   1. Gross hematuria R31.0 599.71                             Maxx Garvey MD  12/30/19 0107       Maxx Garvey MD  12/30/19 0110

## 2019-12-30 NOTE — ED PROVIDER NOTES
Encounter Date: 12/29/2019       History     Chief Complaint   Patient presents with    Hematuria     has blood in urine      HPI  Review of patient's allergies indicates:   Allergen Reactions    Soma [carisoprodol] Rash    Aspirin     Sulfa (sulfonamide antibiotics) Rash     Past Medical History:   Diagnosis Date    Bladder stones     Cancer     Encounter for blood transfusion     Hypertension     Kidney stone     Prostate cancer 2004    Radiation 2005    prostate    Stage 3 chronic kidney disease 1/11/2019     Past Surgical History:   Procedure Laterality Date    CYSTOSCOPY      KIDNEY STONE SURGERY  May 2014     Family History   Problem Relation Age of Onset    Hypertension Daughter     Diabetes Daughter     Hypertension Son     Asthma Son      Social History     Tobacco Use    Smoking status: Former Smoker     Years: 40.00     Types: Cigarettes     Start date: 9/26/1985    Smokeless tobacco: Never Used   Substance Use Topics    Alcohol use: No    Drug use: No     Review of Systems    Physical Exam     Initial Vitals [12/29/19 2249]   BP Pulse Resp Temp SpO2   (!) 185/78 66 16 98.3 °F (36.8 °C) 99 %      MAP       --         Physical Exam    ED Course   Procedures  Labs Reviewed   CBC W/ AUTO DIFFERENTIAL - Abnormal; Notable for the following components:       Result Value    RBC 2.94 (*)     Hemoglobin 9.0 (*)     Hematocrit 28.6 (*)     Mean Corpuscular Hemoglobin Conc 31.5 (*)     Lymph # 0.9 (*)     Lymph% 14.2 (*)     All other components within normal limits   BASIC METABOLIC PANEL - Abnormal; Notable for the following components:    BUN, Bld 31 (*)     Creatinine 1.8 (*)     Calcium 8.6 (*)     eGFR if  37 (*)     eGFR if non  32 (*)     All other components within normal limits   URINALYSIS, REFLEX TO URINE CULTURE - Abnormal; Notable for the following components:    Color, UA Red (*)     Appearance, UA Cloudy (*)     Protein, UA 2+ (*)     Occult  Blood UA 3+ (*)     Nitrite, UA Positive (*)     Leukocytes, UA Trace (*)     All other components within normal limits    Narrative:     Preferred Collection Type->Urine, Clean Catch   URINALYSIS MICROSCOPIC - Abnormal; Notable for the following components:    RBC, UA >100 (*)     WBC, UA 47 (*)     Bacteria Few (*)     All other components within normal limits    Narrative:     Preferred Collection Type->Urine, Clean Catch   CULTURE, URINE          Imaging Results    None                            ED Course as of Dec 30 0157   Sun Dec 29, 2019   2308 89 yo AAM with PMH of HTN, CKD 4, HLD, RA, chronic combined systolic/diastolic heart failure, dementia, AICD     [BD]      ED Course User Index  [BD] Maxx Garvey MD                Clinical Impression:       ICD-10-CM ICD-9-CM   1. Gross hematuria R31.0 599.71   2. Urinary tract infection with hematuria, site unspecified N39.0 599.0    R31.9 599.70         Disposition:   Disposition: Discharged  Condition: Stable

## 2020-01-01 LAB — BACTERIA UR CULT: ABNORMAL

## 2020-01-14 ENCOUNTER — CLINICAL SUPPORT (OUTPATIENT)
Dept: UROLOGY | Facility: CLINIC | Age: 85
End: 2020-01-14
Payer: MEDICARE

## 2020-01-14 DIAGNOSIS — Z97.8 INDWELLING FOLEY CATHETER PRESENT: Primary | ICD-10-CM

## 2020-01-14 NOTE — PROGRESS NOTES
Patient arrived to clinic to have catheter removed, deflated 10 ml saline balloon, removed 16 fr yost catheter without difficulty, patient tolerated well, discharge teaching done, daughter verbally understood.

## 2020-01-15 ENCOUNTER — OFFICE VISIT (OUTPATIENT)
Dept: UROLOGY | Facility: CLINIC | Age: 85
End: 2020-01-15
Payer: MEDICARE

## 2020-01-15 VITALS
DIASTOLIC BLOOD PRESSURE: 78 MMHG | HEIGHT: 67 IN | WEIGHT: 153 LBS | HEART RATE: 61 BPM | BODY MASS INDEX: 24.01 KG/M2 | TEMPERATURE: 99 F | RESPIRATION RATE: 18 BRPM | SYSTOLIC BLOOD PRESSURE: 174 MMHG

## 2020-01-15 DIAGNOSIS — N40.1 BENIGN PROSTATIC HYPERPLASIA WITH NOCTURIA: Primary | ICD-10-CM

## 2020-01-15 DIAGNOSIS — R35.1 BENIGN PROSTATIC HYPERPLASIA WITH NOCTURIA: Primary | ICD-10-CM

## 2020-01-15 DIAGNOSIS — C61 MALIGNANT NEOPLASM OF PROSTATE: ICD-10-CM

## 2020-01-15 DIAGNOSIS — R33.9 URINARY RETENTION: ICD-10-CM

## 2020-01-15 PROCEDURE — 1101F PR PT FALLS ASSESS DOC 0-1 FALLS W/OUT INJ PAST YR: ICD-10-PCS | Mod: CPTII,S$GLB,, | Performed by: UROLOGY

## 2020-01-15 PROCEDURE — 99999 PR PBB SHADOW E&M-EST. PATIENT-LVL III: ICD-10-PCS | Mod: PBBFAC,,, | Performed by: UROLOGY

## 2020-01-15 PROCEDURE — 99214 OFFICE O/P EST MOD 30 MIN: CPT | Mod: 25,S$GLB,, | Performed by: UROLOGY

## 2020-01-15 PROCEDURE — 99214 PR OFFICE/OUTPT VISIT, EST, LEVL IV, 30-39 MIN: ICD-10-PCS | Mod: 25,S$GLB,, | Performed by: UROLOGY

## 2020-01-15 PROCEDURE — 1159F PR MEDICATION LIST DOCUMENTED IN MEDICAL RECORD: ICD-10-PCS | Mod: S$GLB,,, | Performed by: UROLOGY

## 2020-01-15 PROCEDURE — 51798 PR MEAS,POST-VOID RES,US,NON-IMAGING: ICD-10-PCS | Mod: S$GLB,,, | Performed by: UROLOGY

## 2020-01-15 PROCEDURE — 1126F AMNT PAIN NOTED NONE PRSNT: CPT | Mod: S$GLB,,, | Performed by: UROLOGY

## 2020-01-15 PROCEDURE — 1126F PR PAIN SEVERITY QUANTIFIED, NO PAIN PRESENT: ICD-10-PCS | Mod: S$GLB,,, | Performed by: UROLOGY

## 2020-01-15 PROCEDURE — 99999 PR PBB SHADOW E&M-EST. PATIENT-LVL III: CPT | Mod: PBBFAC,,, | Performed by: UROLOGY

## 2020-01-15 PROCEDURE — 1101F PT FALLS ASSESS-DOCD LE1/YR: CPT | Mod: CPTII,S$GLB,, | Performed by: UROLOGY

## 2020-01-15 PROCEDURE — 51798 US URINE CAPACITY MEASURE: CPT | Mod: S$GLB,,, | Performed by: UROLOGY

## 2020-01-15 PROCEDURE — 1159F MED LIST DOCD IN RCRD: CPT | Mod: S$GLB,,, | Performed by: UROLOGY

## 2020-01-15 NOTE — PROGRESS NOTES
OFFICE NOTE  [unfilled]  [id number]  1/15/2020       CHIEF COMPLAINT:   BPH with history urinary retention, adenocarcinoma prostate     HISTORY OF PRESENT ILLNESS:   this 89-year-old male returns routine recheck after he had his Neumann catheter removed several weeks ago and taking Uroxatral.  On today's visit he states he is voiding much better and is satisfied with his voiding status.  He also has history of adenocarcinoma prostate for which he underwent external beam radiation therapy over 20 years ago      Bladder scan - 140 mL     PSA  - 0.92 on 07/15/2019        FINAL IMPRESSION:   BPH, adenocarcinoma prostate     RECOMMENDATIONS:   continue on Uroxatral 10 mg p.o. Q.d..  Recheck 4-6 weeks.

## 2020-01-17 NOTE — ASSESSMENT & PLAN NOTE
Patient is identified as having Combined Systolic and Diastolic heart failure that is Acute on Chronic. CHF is currently uncontrolled due to Dyspnea not returned to baseline after Multiple doses of IV diuretic. Latest ECHO shows ejection fraction of 45%. Continue Nitrate, BB and Lasix and monitor clinical status closely. Monitor on telemetry. Patient is on CHF pathway.  Monitor strict Is&Os and daily weights.  Place on fluid restriction of 1.5 L. Continue to stress to patient importance of self efficacy and  on diet for CHF. Last BNP reviewed- and noted below   .  Decreased Lasix IV to oral Lasix, as patient appears to be adequately diuresed and creatinine has marginally increased

## 2020-01-17 NOTE — SUBJECTIVE & OBJECTIVE
Interval History:   Patient has no complaint of  OF CP OR SOB    Review of Systems   Unable to perform ROS: Other   Constitutional: Positive for fatigue. Negative for activity change, appetite change and fever.   HENT: Negative.    Eyes: Negative.    Respiratory: Positive for shortness of breath. Negative for chest tightness and wheezing.    Cardiovascular: Negative for chest pain, palpitations and leg swelling.   Gastrointestinal: Negative for abdominal distention, abdominal pain, blood in stool, diarrhea and vomiting.   Genitourinary: Negative for dysuria and hematuria.   Neurological: Negative for headaches.   Hematological: Negative for adenopathy.   Psychiatric/Behavioral: Negative for confusion.     Objective:     Vital Signs (Most Recent):  Temp: 98 °F (36.7 °C) (12/17/19 1124)  Pulse: 60 (12/17/19 1124)  Resp: 18 (12/17/19 1124)  BP: (!) 104/54 (12/17/19 1124)  SpO2: 95 % (12/17/19 1124) Vital Signs (24h Range):        Weight: 67.9 kg (149 lb 11.1 oz)  Body mass index is 23.45 kg/m².  No intake or output data in the 24 hours ending 01/16/20 1817   Physical Exam   Constitutional: He is oriented to person, place, and time. He appears well-developed and well-nourished. No distress.   Chronically ill-appearing   HENT:   Head: Normocephalic and atraumatic.   Eyes: Pupils are equal, round, and reactive to light. EOM are normal. Right eye exhibits no discharge. Left eye exhibits no discharge.   Neck: Normal range of motion. Neck supple. No JVD present. No thyromegaly present.   Cardiovascular: Normal rate, regular rhythm and intact distal pulses. Exam reveals no gallop and no friction rub.   Murmur heard.  Muffled heart sounds, positive murmur   Pulmonary/Chest: Effort normal and breath sounds normal. No respiratory distress. He has no wheezes. He exhibits no tenderness.   Inspiratory and expiratory wheezing noted throughout all lung fields upon auscultation. Mild crackles at the bases   Abdominal: Soft. Bowel  sounds are normal. He exhibits no distension. There is no tenderness. There is no rebound and no guarding.   Genitourinary:   Genitourinary Comments: Exam Deferred      Musculoskeletal: Normal range of motion. He exhibits edema. He exhibits no tenderness or deformity.   +3 pitting edema noted to bilateral lower extremities.   Neurological: He is alert and oriented to person, place, and time. No cranial nerve deficit or sensory deficit.   Skin: Skin is warm and dry. Capillary refill takes 2 to 3 seconds. No rash noted. He is not diaphoretic. No erythema.   Dry skin   Psychiatric: He has a normal mood and affect. His behavior is normal. Judgment and thought content normal.   Nursing note and vitals reviewed.      Significant Labs: All pertinent labs within the past 24 hours have been reviewed.    Significant Imaging: I have reviewed and interpreted all pertinent imaging results/findings within the past 24 hours.

## 2020-01-17 NOTE — PROGRESS NOTES
"[facility] Medicine  Progress Note    Patient Name: Troy Yuan Sr.  MRN: [id number]  Patient Class: IP- Inpatient   Admission Date: 12/12/2019  Length of Stay: 5 days  Attending Physician: No att. providers found  Primary Care Provider: Gentry Encinas MD        Subjective:     Principal Problem:Chronic diastolic heart failure        HPI:  Troy Yuan Sr. is a 89-year-old male with past medical history of hypertension, chronic kidney disease, hyperlipidemia, and rheumatoid arthritis who presented to the emergency room tonight with reports of shortness of breath.  Patient states "I could not breathe."  Patient reports onset of symptoms at approximately 12:30 a.m. Patient also reports leg swelling. Patient denies recent sick contacts or recent travel.  Patient denies chest pain, nausea, vomiting, or diarrhea.  Patient denies use supplemental oxygen at home.  Patient accompanied by his daughter, Lakia and grandson at bedside.  Emergency room patient noted to be in acute pulmonary edema with associated hypertensive urgency.  Patient started on nitroglycerin drip and given 40 mg of IV Lasix.  Patient requiring BiPAP therapy.  Patient admitted to ICU on 12/12/2019 at approximately 5:30 a.m..    Overview/Hospital Course:  Patient is a 89-year-old  male with a past medical history significant for chronic diastolic heart failure who was admitted the hospital with worsening heart failure.  He underwent Lasix administration echocardiogram which confirmed his diagnosis and he improved symptomatically.  His blood pressure remained difficult to control throughout his admission and eventually blood pressure medication was titrated accordingly.     Interval History:   Patient has no complaint of  OF CP OR SOB    Review of Systems   Unable to perform ROS: Other   Constitutional: Positive for fatigue. Negative for activity change, appetite change and fever.   HENT: Negative.  "   Eyes: Negative.    Respiratory: Positive for shortness of breath. Negative for chest tightness and wheezing.    Cardiovascular: Negative for chest pain, palpitations and leg swelling.   Gastrointestinal: Negative for abdominal distention, abdominal pain, blood in stool, diarrhea and vomiting.   Genitourinary: Negative for dysuria and hematuria.   Neurological: Negative for headaches.   Hematological: Negative for adenopathy.   Psychiatric/Behavioral: Negative for confusion.     Objective:     Vital Signs (Most Recent):  Temp: 98 °F (36.7 °C) (12/17/19 1124)  Pulse: 60 (12/17/19 1124)  Resp: 18 (12/17/19 1124)  BP: (!) 104/54 (12/17/19 1124)  SpO2: 95 % (12/17/19 1124) Vital Signs (24h Range):        Weight: 67.9 kg (149 lb 11.1 oz)  Body mass index is 23.45 kg/m².  No intake or output data in the 24 hours ending 01/16/20 1817   Physical Exam   Constitutional: He is oriented to person, place, and time. He appears well-developed and well-nourished. No distress.   Chronically ill-appearing   HENT:   Head: Normocephalic and atraumatic.   Eyes: Pupils are equal, round, and reactive to light. EOM are normal. Right eye exhibits no discharge. Left eye exhibits no discharge.   Neck: Normal range of motion. Neck supple. No JVD present. No thyromegaly present.   Cardiovascular: Normal rate, regular rhythm and intact distal pulses. Exam reveals no gallop and no friction rub.   Murmur heard.  Muffled heart sounds, positive murmur   Pulmonary/Chest: Effort normal and breath sounds normal. No respiratory distress. He has no wheezes. He exhibits no tenderness.   Inspiratory and expiratory wheezing noted throughout all lung fields upon auscultation. Mild crackles at the bases   Abdominal: Soft. Bowel sounds are normal. He exhibits no distension. There is no tenderness. There is no rebound and no guarding.   Genitourinary:   Genitourinary Comments: Exam Deferred      Musculoskeletal: Normal range of motion. He exhibits edema. He  exhibits no tenderness or deformity.   +3 pitting edema noted to bilateral lower extremities.   Neurological: He is alert and oriented to person, place, and time. No cranial nerve deficit or sensory deficit.   Skin: Skin is warm and dry. Capillary refill takes 2 to 3 seconds. No rash noted. He is not diaphoretic. No erythema.   Dry skin   Psychiatric: He has a normal mood and affect. His behavior is normal. Judgment and thought content normal.   Nursing note and vitals reviewed.      Significant Labs: All pertinent labs within the past 24 hours have been reviewed.    Significant Imaging: I have reviewed and interpreted all pertinent imaging results/findings within the past 24 hours.      Assessment/Plan:      * Chronic diastolic heart failure  Patient is identified as having Combined Systolic and Diastolic heart failure that is Acute on Chronic. CHF is currently uncontrolled due to Dyspnea not returned to baseline after Multiple doses of IV diuretic. Latest ECHO shows ejection fraction of 45%. Continue Nitrate, BB and Lasix and monitor clinical status closely. Monitor on telemetry. Patient is on CHF pathway.  Monitor strict Is&Os and daily weights.  Place on fluid restriction of 1.5 L. Continue to stress to patient importance of self efficacy and  on diet for CHF. Last BNP reviewed- and noted below   .  Decreased Lasix IV to oral Lasix, as patient appears to be adequately diuresed and creatinine has marginally increased        Acute kidney injury on CKD stage 3  Patient with GUME likely d/t IVVD  Which is currently worsening. Labs reviewed- according to latest data. Monitor UOP and serial BMP and adjust therapy as needed. Avoid nephrotoxins and renally dose meds for GFR listed above.        Multiple lacunar infarcts  Likely secondary to cerebrovascular disease and uncontrolled hypertension.  Continue statin.  Continue to control blood pressure as above.      Rheumatoid arthritis  Chronic, controlled.  Will  continue home medication.      Hyperlipidemia   Patient is chronically on statin.will continue for now. Monitor clinically. Last LDL was   Lab Results   Component Value Date    LDLCALC 83.0 12/12/2019          Hypertensive urgency  Management for essential hypertension above.      Essential hypertension  Chronic, uncontrolled.  Latest blood pressure and vitals reviewed-   Temp:  [98.4 °F (36.9 °C)-99.2 °F (37.3 °C)]   Pulse:  [55-75]   Resp:  [14-18]   BP: (141-187)/(65-95)   SpO2:  [93 %-97 %] .   Home meds for hypertension were reviewed and noted below. Hospital anti-hypertensive changes were made as shown below.  Hypertension Medications             amlodipine (NORVASC) 10 MG tablet Take 10 mg by mouth once daily.      hydrALAZINE (APRESOLINE) 25 MG tablet Take 1 tablet (25 mg total) by mouth every 8 (eight) hours.      Hospital Medications             amLODIPine tablet 10 mg 10 mg, Oral, Daily    furosemide tablet 40 mg 40 mg, Oral, Daily    hydrALAZINE injection 10 mg 10 mg, Intravenous, Every 8 hours PRN, 1. Consider placing patient on continuous cardiac monitor (obtain order if needed).<BR>2. Monitor BP and HR pre-administration, post-administration, then every 30 minutes x2, then every hour x2.<BR>3. Administer at a rate no faster than 5mg over 1 minute.<BR>    isosorbide mononitrate 24 hr tablet 60 mg 60 mg, Oral, Daily, DO NOT CRUSH OR CHEW; SWALLOW WHOLE.    metoprolol succinate (TOPROL-XL) 24 hr tablet 50 mg Starting on 12/17/2019. 50 mg, Oral, Daily, DO NOT CRUSH OR CHEW; SWALLOW WHOLE.        Will utilize p.r.n. blood pressure medication only if patient's blood pressure greater than  180/110 and he develops symptoms such as worsening chest pain or shortness of breath.      Macrocytic anemia  Patient's anemia is currently controlled. S/p 0 units of PRBCs. Etiology likely d/t chronic disease-congestive heart failure  Current CBC reviewed-   Lab Results   Component Value Date    HGB 8.9 (L) 12/13/2019     HCT 28.8 (L) 12/13/2019     Monitor serial CBC and transfuse if patient becomes hemodynamically unstable, symptomatic or H/H drops below 7/21.       BPH (benign prostatic hypertrophy) with urinary retention  Chronic, controlled.  Will continue home medication.        VTE Risk Mitigation (From admission, onward)    None         Addend--  CXR unchanged. Wean off IV NTG.-bp control improved  May use prn hydralazine  IV 5-10 mg q 4 h prn for HTN. Got hydralazine 100 mg today.           Sonia Hall MD  Department of Hospital Medicine   [facility]

## 2020-01-17 NOTE — ASSESSMENT & PLAN NOTE
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 Labs reviewed- according to latest data. Monitor UOP and serial BMP and adjust therapy as needed. Avoid nephrotoxins and renally dose meds for GFR listed above.

## 2020-01-22 ENCOUNTER — HOSPITAL ENCOUNTER (OUTPATIENT)
Facility: HOSPITAL | Age: 85
Discharge: HOME HEALTH | End: 2020-01-24
Attending: EMERGENCY MEDICINE | Admitting: INTERNAL MEDICINE
Payer: MEDICARE

## 2020-01-22 DIAGNOSIS — R06.02 SOB (SHORTNESS OF BREATH): ICD-10-CM

## 2020-01-22 DIAGNOSIS — I50.9 CONGESTIVE HEART FAILURE, UNSPECIFIED HF CHRONICITY, UNSPECIFIED HEART FAILURE TYPE: Primary | ICD-10-CM

## 2020-01-22 PROBLEM — N18.4 CKD (CHRONIC KIDNEY DISEASE), STAGE IV: Chronic | Status: ACTIVE | Noted: 2020-01-22

## 2020-01-22 LAB
ALBUMIN SERPL BCP-MCNC: 4 G/DL (ref 3.5–5.2)
ALP SERPL-CCNC: 78 U/L (ref 55–135)
ALT SERPL W/O P-5'-P-CCNC: 63 U/L (ref 10–44)
ANION GAP SERPL CALC-SCNC: 13 MMOL/L (ref 8–16)
AST SERPL-CCNC: 70 U/L (ref 10–40)
BASOPHILS # BLD AUTO: 0.01 K/UL (ref 0–0.2)
BASOPHILS NFR BLD: 0.2 % (ref 0–1.9)
BILIRUB SERPL-MCNC: 0.6 MG/DL (ref 0.1–1)
BNP SERPL-MCNC: 3424 PG/ML (ref 0–99)
BUN SERPL-MCNC: 38 MG/DL (ref 10–30)
CALCIUM SERPL-MCNC: 9.2 MG/DL (ref 8.7–10.5)
CHLORIDE SERPL-SCNC: 103 MMOL/L (ref 95–110)
CO2 SERPL-SCNC: 26 MMOL/L (ref 23–29)
CREAT SERPL-MCNC: 1.7 MG/DL (ref 0.5–1.4)
DIFFERENTIAL METHOD: ABNORMAL
EOSINOPHIL # BLD AUTO: 0.1 K/UL (ref 0–0.5)
EOSINOPHIL NFR BLD: 1.5 % (ref 0–8)
ERYTHROCYTE [DISTWIDTH] IN BLOOD BY AUTOMATED COUNT: 14.2 % (ref 11.5–14.5)
EST. GFR  (AFRICAN AMERICAN): 39.9 ML/MIN/1.73 M^2
EST. GFR  (NON AFRICAN AMERICAN): 34.5 ML/MIN/1.73 M^2
GLUCOSE SERPL-MCNC: 136 MG/DL (ref 70–110)
HCT VFR BLD AUTO: 30.7 % (ref 40–54)
HGB BLD-MCNC: 9.3 G/DL (ref 14–18)
IMM GRANULOCYTES # BLD AUTO: 0.02 K/UL (ref 0–0.04)
IMM GRANULOCYTES NFR BLD AUTO: 0.3 % (ref 0–0.5)
INR PPP: 1
LYMPHOCYTES # BLD AUTO: 0.4 K/UL (ref 1–4.8)
LYMPHOCYTES NFR BLD: 6.4 % (ref 18–48)
MCH RBC QN AUTO: 29.7 PG (ref 27–31)
MCHC RBC AUTO-ENTMCNC: 30.3 G/DL (ref 32–36)
MCV RBC AUTO: 98 FL (ref 82–98)
MONOCYTES # BLD AUTO: 0.5 K/UL (ref 0.3–1)
MONOCYTES NFR BLD: 7.8 % (ref 4–15)
NEUTROPHILS # BLD AUTO: 5.5 K/UL (ref 1.8–7.7)
NEUTROPHILS NFR BLD: 83.8 % (ref 38–73)
NRBC BLD-RTO: 0 /100 WBC
PLATELET # BLD AUTO: 190 K/UL (ref 150–350)
PMV BLD AUTO: 11.2 FL (ref 9.2–12.9)
POTASSIUM SERPL-SCNC: 3.7 MMOL/L (ref 3.5–5.1)
PROT SERPL-MCNC: 7.3 G/DL (ref 6–8.4)
PROTHROMBIN TIME: 12.7 SEC (ref 10.6–14.8)
RBC # BLD AUTO: 3.13 M/UL (ref 4.6–6.2)
SODIUM SERPL-SCNC: 142 MMOL/L (ref 136–145)
TROPONIN I SERPL DL<=0.01 NG/ML-MCNC: 0.06 NG/ML
TROPONIN I SERPL DL<=0.01 NG/ML-MCNC: 0.08 NG/ML
TROPONIN I SERPL DL<=0.01 NG/ML-MCNC: 0.08 NG/ML
WBC # BLD AUTO: 6.56 K/UL (ref 3.9–12.7)

## 2020-01-22 PROCEDURE — G0378 HOSPITAL OBSERVATION PER HR: HCPCS

## 2020-01-22 PROCEDURE — 84484 ASSAY OF TROPONIN QUANT: CPT | Mod: 91

## 2020-01-22 PROCEDURE — 93005 ELECTROCARDIOGRAM TRACING: CPT

## 2020-01-22 PROCEDURE — 94761 N-INVAS EAR/PLS OXIMETRY MLT: CPT

## 2020-01-22 PROCEDURE — 63600175 PHARM REV CODE 636 W HCPCS: Performed by: EMERGENCY MEDICINE

## 2020-01-22 PROCEDURE — 63600175 PHARM REV CODE 636 W HCPCS: Performed by: NURSE PRACTITIONER

## 2020-01-22 PROCEDURE — 99900035 HC TECH TIME PER 15 MIN (STAT)

## 2020-01-22 PROCEDURE — 99285 EMERGENCY DEPT VISIT HI MDM: CPT | Mod: 25

## 2020-01-22 PROCEDURE — 96376 TX/PRO/DX INJ SAME DRUG ADON: CPT

## 2020-01-22 PROCEDURE — 63600175 PHARM REV CODE 636 W HCPCS: Performed by: HOSPITALIST

## 2020-01-22 PROCEDURE — 96372 THER/PROPH/DIAG INJ SC/IM: CPT | Mod: 59

## 2020-01-22 PROCEDURE — 84484 ASSAY OF TROPONIN QUANT: CPT

## 2020-01-22 PROCEDURE — 27000221 HC OXYGEN, UP TO 24 HOURS

## 2020-01-22 PROCEDURE — 25000003 PHARM REV CODE 250: Performed by: EMERGENCY MEDICINE

## 2020-01-22 PROCEDURE — 83880 ASSAY OF NATRIURETIC PEPTIDE: CPT

## 2020-01-22 PROCEDURE — 36415 COLL VENOUS BLD VENIPUNCTURE: CPT

## 2020-01-22 PROCEDURE — 85610 PROTHROMBIN TIME: CPT

## 2020-01-22 PROCEDURE — 96374 THER/PROPH/DIAG INJ IV PUSH: CPT

## 2020-01-22 PROCEDURE — 85025 COMPLETE CBC W/AUTO DIFF WBC: CPT

## 2020-01-22 PROCEDURE — 80053 COMPREHEN METABOLIC PANEL: CPT

## 2020-01-22 PROCEDURE — 25000003 PHARM REV CODE 250: Performed by: NURSE PRACTITIONER

## 2020-01-22 RX ORDER — HYDROXYCHLOROQUINE SULFATE 200 MG/1
200 TABLET, FILM COATED ORAL DAILY
Status: DISCONTINUED | OUTPATIENT
Start: 2020-01-22 | End: 2020-01-24 | Stop reason: HOSPADM

## 2020-01-22 RX ORDER — ATORVASTATIN CALCIUM 20 MG/1
20 TABLET, FILM COATED ORAL DAILY
Status: DISCONTINUED | OUTPATIENT
Start: 2020-01-22 | End: 2020-01-24 | Stop reason: HOSPADM

## 2020-01-22 RX ORDER — PANTOPRAZOLE SODIUM 40 MG/1
40 TABLET, DELAYED RELEASE ORAL DAILY
Status: DISCONTINUED | OUTPATIENT
Start: 2020-01-22 | End: 2020-01-24 | Stop reason: HOSPADM

## 2020-01-22 RX ORDER — HYDROCODONE BITARTRATE AND ACETAMINOPHEN 5; 325 MG/1; MG/1
1 TABLET ORAL EVERY 4 HOURS PRN
Status: DISCONTINUED | OUTPATIENT
Start: 2020-01-22 | End: 2020-01-24 | Stop reason: HOSPADM

## 2020-01-22 RX ORDER — ONDANSETRON 2 MG/ML
4 INJECTION INTRAMUSCULAR; INTRAVENOUS EVERY 8 HOURS PRN
Status: DISCONTINUED | OUTPATIENT
Start: 2020-01-22 | End: 2020-01-24 | Stop reason: HOSPADM

## 2020-01-22 RX ORDER — AMLODIPINE BESYLATE 5 MG/1
10 TABLET ORAL DAILY
Status: DISCONTINUED | OUTPATIENT
Start: 2020-01-22 | End: 2020-01-24 | Stop reason: HOSPADM

## 2020-01-22 RX ORDER — ALFUZOSIN HYDROCHLORIDE 10 MG/1
10 TABLET, EXTENDED RELEASE ORAL
Status: DISCONTINUED | OUTPATIENT
Start: 2020-01-22 | End: 2020-01-24 | Stop reason: HOSPADM

## 2020-01-22 RX ORDER — ASPIRIN 325 MG
325 TABLET ORAL
Status: DISCONTINUED | OUTPATIENT
Start: 2020-01-22 | End: 2020-01-22

## 2020-01-22 RX ORDER — ASPIRIN 325 MG
325 TABLET ORAL
Status: COMPLETED | OUTPATIENT
Start: 2020-01-22 | End: 2020-01-22

## 2020-01-22 RX ORDER — HALOPERIDOL 5 MG/ML
2 INJECTION INTRAMUSCULAR EVERY 6 HOURS PRN
Status: DISCONTINUED | OUTPATIENT
Start: 2020-01-22 | End: 2020-01-24 | Stop reason: HOSPADM

## 2020-01-22 RX ORDER — BISACODYL 10 MG
10 SUPPOSITORY, RECTAL RECTAL DAILY PRN
Status: DISCONTINUED | OUTPATIENT
Start: 2020-01-22 | End: 2020-01-24 | Stop reason: HOSPADM

## 2020-01-22 RX ORDER — ISOSORBIDE MONONITRATE 30 MG/1
60 TABLET, EXTENDED RELEASE ORAL DAILY
Status: DISCONTINUED | OUTPATIENT
Start: 2020-01-22 | End: 2020-01-24 | Stop reason: HOSPADM

## 2020-01-22 RX ORDER — FUROSEMIDE 10 MG/ML
40 INJECTION INTRAMUSCULAR; INTRAVENOUS
Status: COMPLETED | OUTPATIENT
Start: 2020-01-22 | End: 2020-01-22

## 2020-01-22 RX ORDER — HYDRALAZINE HYDROCHLORIDE 25 MG/1
25 TABLET, FILM COATED ORAL EVERY 8 HOURS
Status: DISCONTINUED | OUTPATIENT
Start: 2020-01-22 | End: 2020-01-24 | Stop reason: HOSPADM

## 2020-01-22 RX ORDER — FUROSEMIDE 10 MG/ML
20 INJECTION INTRAMUSCULAR; INTRAVENOUS 2 TIMES DAILY
Status: DISCONTINUED | OUTPATIENT
Start: 2020-01-22 | End: 2020-01-23

## 2020-01-22 RX ORDER — FERROUS SULFATE 325(65) MG
325 TABLET ORAL DAILY
Status: DISCONTINUED | OUTPATIENT
Start: 2020-01-22 | End: 2020-01-24 | Stop reason: HOSPADM

## 2020-01-22 RX ORDER — SODIUM CHLORIDE 0.9 % (FLUSH) 0.9 %
10 SYRINGE (ML) INJECTION
Status: DISCONTINUED | OUTPATIENT
Start: 2020-01-22 | End: 2020-01-24 | Stop reason: HOSPADM

## 2020-01-22 RX ADMIN — HALOPERIDOL LACTATE 2 MG: 5 INJECTION, SOLUTION INTRAMUSCULAR at 05:01

## 2020-01-22 RX ADMIN — HYDRALAZINE HYDROCHLORIDE 25 MG: 25 TABLET, FILM COATED ORAL at 09:01

## 2020-01-22 RX ADMIN — PANTOPRAZOLE SODIUM 40 MG: 40 TABLET, DELAYED RELEASE ORAL at 08:01

## 2020-01-22 RX ADMIN — HYDROXYCHLOROQUINE SULFATE 200 MG: 200 TABLET, FILM COATED ORAL at 08:01

## 2020-01-22 RX ADMIN — FUROSEMIDE 20 MG: 20 INJECTION, SOLUTION INTRAMUSCULAR; INTRAVENOUS at 05:01

## 2020-01-22 RX ADMIN — ISOSORBIDE MONONITRATE 60 MG: 30 TABLET, EXTENDED RELEASE ORAL at 08:01

## 2020-01-22 RX ADMIN — FERROUS SULFATE TAB 325 MG (65 MG ELEMENTAL FE) 325 MG: 325 (65 FE) TAB at 08:01

## 2020-01-22 RX ADMIN — ATORVASTATIN CALCIUM 20 MG: 20 TABLET, FILM COATED ORAL at 08:01

## 2020-01-22 RX ADMIN — AMLODIPINE BESYLATE 10 MG: 5 TABLET ORAL at 08:01

## 2020-01-22 RX ADMIN — FUROSEMIDE 40 MG: 10 INJECTION, SOLUTION INTRAMUSCULAR; INTRAVENOUS at 04:01

## 2020-01-22 RX ADMIN — ASPIRIN 325 MG ORAL TABLET 325 MG: 325 PILL ORAL at 06:01

## 2020-01-22 RX ADMIN — HYDRALAZINE HYDROCHLORIDE 25 MG: 25 TABLET, FILM COATED ORAL at 02:01

## 2020-01-22 NOTE — H&P
[facility] History & Physical Examination   Patient Name: Troy Yuan Sr.  MRN: [id number]  Patient Class: Emergency   Admission Date: 1/22/2020  2:14 AM  Length of Stay: 0  Attending Physician:   Primary Care Provider: Gentry Encinas MD  Face-to-Face encounter date: 01/22/2020  Code Status  MPOA:  Chief Complaint: Shortness of Breath (x several days. Hx CHF)        Patient information was obtained from patient, past medical records and ER records.   HISTORY OF PRESENT ILLNESS:   Troy Yuan Sr. is a 89 y.o. old male who  has a past medical history of Bladder stones, Cancer, Encounter for blood transfusion, Hypertension, Kidney stone, Prostate cancer (2004), Radiation (2005), and Stage 3 chronic kidney disease (1/11/2019).. The patient presented to [facility] on 1/22/2020 with a primary complaint of Shortness of Breath (x several days. Hx CHF)  .   89 yearold   male presents emergency room with shortness of breath.  According to the patient's family for the last 24 hr the patient has been having increasing shortness of breath and dyspnea with minimal exertion.  The according to the patient's family the patient just walking from the bathroom to his bed become short of breath.  On December 23, 2019 he was admitted to this hospital with diastolic heart failure.  They did not discharge him on Lasix secondary to his renal suppression and concerns for drying out his kidneys too much.  So he was never placed on a diuretic.  The patient is on oxygen at home his room air pulse ox was 88% on arrival.      Also has a known history of hypertension hyperlipidemia dementia and chronic kidney disease stage 3    REVIEW OF SYSTEMS:   10 Point Review of System was performed and was found to be negative except for that mentioned already in the HPI and   Review of Systems (Negative unless checked off)  Review of Systems   Constitutional: Positive for  malaise/fatigue.   HENT: Negative.    Eyes: Negative.    Respiratory: Positive for cough, sputum production and shortness of breath.    Cardiovascular: Positive for chest pain and palpitations.   Gastrointestinal: Negative.    Genitourinary: Negative.    Musculoskeletal: Negative.    Skin: Negative.    Neurological: Positive for weakness.   Endo/Heme/Allergies: Negative.    Psychiatric/Behavioral: Negative.            PAST MEDICAL HISTORY:     Past Medical History:   Diagnosis Date    Bladder stones     Cancer     Encounter for blood transfusion     Hypertension     Kidney stone     Prostate cancer 2004    Radiation 2005    prostate    Stage 3 chronic kidney disease 1/11/2019       PAST SURGICAL HISTORY:     Past Surgical History:   Procedure Laterality Date    CYSTOSCOPY      KIDNEY STONE SURGERY  May 2014       ALLERGIES:   Soma [carisoprodol]; Aspirin; and Sulfa (sulfonamide antibiotics)    FAMILY HISTORY:     Family History   Problem Relation Age of Onset    Hypertension Daughter     Diabetes Daughter     Hypertension Son     Asthma Son        SOCIAL HISTORY:     Social History     Tobacco Use    Smoking status: Former Smoker     Years: 40.00     Types: Cigarettes     Start date: 9/26/1985    Smokeless tobacco: Never Used   Substance Use Topics    Alcohol use: No        Social History     Substance and Sexual Activity   Sexual Activity Never        HOME MEDICATIONS:     Prior to Admission medications    Medication Sig Start Date End Date Taking? Authorizing Provider   alfuzosin (UROXATRAL) 10 mg Tb24 Take 1 tablet (10 mg total) by mouth daily with breakfast. 12/20/19 1/19/20  Sasha Espinosa MD   amlodipine (NORVASC) 10 MG tablet Take 10 mg by mouth once daily.      Historical Provider, MD   atorvastatin (LIPITOR) 20 MG tablet Take 1 tablet (20 mg total) by mouth once daily. 1/13/19 1/13/20  Kris Woodward MD   calcitRIOL (ROCALTROL) 0.25 MCG Cap  12/23/19   Historical Provider, MD  "  cimetidine (TAGAMET) 200 MG tablet Take 200 mg by mouth 2 (two) times daily.     Historical Provider, MD   ferrous sulfate 325 mg (65 mg iron) Tab tablet Take 325 mg by mouth once daily.    Historical Provider, MD   hydrALAZINE (APRESOLINE) 25 MG tablet Take 1 tablet (25 mg total) by mouth every 8 (eight) hours. 12/20/19 1/19/20  Sasha Espinosa MD   hydroxychloroquine (PLAQUENIL) 200 mg tablet Take 1 tablet (200 mg total) by mouth once daily. 1/10/19   Ignacia Seay MD   isosorbide mononitrate (IMDUR) 60 MG 24 hr tablet Take 1 tablet (60 mg total) by mouth once daily. 12/18/19 12/17/20  Arcadio Velazquez MD         PHYSICAL EXAM:   BP (!) 210/91   Pulse 71   Temp 98.5 °F (36.9 °C) (Oral)   Resp 20   Ht 5' 7" (1.702 m)   SpO2 100%   BMI 23.96 kg/m²   Vitals Reviewed  General appearance: Well-developed, well-nourished male in no apparent distress.  Skin: No Rash.   Neuro: Motor and sensory exams grossly intact. Good tone. Power in all 4 extremities 5/5.   HENT: Atraumatic head. Moist mucous membranes of oral cavity.  Eyes: Normal extraocular movements.   Neck: Supple. No evidence of lymphadenopathy. No thyroidomegaly.  Lungs: Clear to auscultation bilaterally. No wheezing present.   Heart: Regular rate and rhythm. S1 and S2 present with no murmurs/gallop/rub. No pedal edema. No JVD present.   Abdomen: Soft, non-distended, non-tender. No rebound tenderness/guarding. No masses or organomegaly. Bowel sounds are normal. Bladder is not palpable.   Extremities: No cyanosis, clubbing, or edema.  Psych/mental status: Alert and oriented. Cooperative. Responds appropriately to questions.   EMERGENCY DEPARTMENT LABS AND IMAGING:   Following labs were Reviewed   Recent Labs   Lab 01/22/20  0245 01/22/20  0336   WBC  --  6.56   HGB  --  9.3*   HCT  --  30.7*   PLT  --  190   CALCIUM 9.2  --    ALBUMIN 4.0  --    PROT 7.3  --      --    K 3.7  --    CO2 26  --      --    BUN 38*  --    CREATININE 1.7*  " --    ALKPHOS 78  --    ALT 63*  --    AST 70*  --    BILITOT 0.6  --          CMP   Recent Labs   Lab 01/22/20  0245      K 3.7      CO2 26   *   BUN 38*   CREATININE 1.7*   CALCIUM 9.2   PROT 7.3   ALBUMIN 4.0   BILITOT 0.6   ALKPHOS 78   AST 70*   ALT 63*   ANIONGAP 13   ESTGFRAFRICA 39.9*   EGFRNONAA 34.5*   , CBC   Recent Labs   Lab 01/22/20  0336   WBC 6.56   HGB 9.3*   HCT 30.7*      , INR   Lab Results   Component Value Date    INR 1.0 01/22/2020    INR 1.0 12/12/2019    INR 1.0 01/11/2019   , Lipid Panel   Lab Results   Component Value Date    CHOL 157 12/12/2019    HDL 69 12/12/2019    LDLCALC 83.0 12/12/2019    TRIG 25 (L) 12/12/2019    CHOLHDL 43.9 12/12/2019   , Troponin   Recent Labs   Lab 01/22/20  0245   TROPONINI 0.055*   , A1C:   Recent Labs   Lab 12/12/19  0903   HGBA1C 5.3    and All labs within the past 24 hours have been reviewed  Microbiology Results (last 7 days)     ** No results found for the last 168 hours. **        X-Ray Chest AP Portable    (Results Pending)       ASSESSMENT & PLAN:   Troy Yuan Sr. is a 89 y.o. male admitted for       1.  Acute on chronic diastolic heart failure  Echo noted from January 2019 revealing - Normal left ventricular systolic function. The estimated ejection fraction is 67% with Left ventricular diastolic dysfunction and Normal left atrial pressure. Mildly reduced right ventricular systolic function.  -consult cardiologist  -gentle IV diuresing with Lasix  -trend cardiac enzymes and troponin    2.  Essential hypertension  Continue current home pharmacologic regime    3.  BPH with a history of urinary retention  -get bladder scan with almost 400ml of urine retention  - place yost cath  -possible urology consult if retention becomes a problem    4. Troponin elevation  - maybe s/t renal failure  - trend    5.  Transaminitis  -may be from right-sided heart failure with liver engorgement      DVT Prophylaxis: will be placed on  Heparin/Lovenox for DVT prophylaxis and will be advised to be as mobile as possible and sit in a chair as tolerated.   ________________________________________________________________________________    Discharge Planning and Disposition: No mobility needs. Ambulating well. Good social support system. Patient will be discharged in   Face-to-Face encounter date: 01/22/2020  Encounter included review of the medical records, interviewing and examining the patient face-to-face, discussion with family and other health care providers including emergency medicine physician, admission orders, interpreting lab/test results and formulating a plan of care.   Medical Decision Making during this encounter was  [_] Low Complexity  [_] Moderate Complexity  [x] High Complexity  _________________________________________________________________________________    INPATIENT LIST OF MEDICATIONS     Current Facility-Administered Medications:     aspirin tablet 325 mg, 325 mg, Oral, ED 1 Time, Isabel Campos MD    Current Outpatient Medications:     alfuzosin (UROXATRAL) 10 mg Tb24, Take 1 tablet (10 mg total) by mouth daily with breakfast., Disp: 30 tablet, Rfl: 0    amlodipine (NORVASC) 10 MG tablet, Take 10 mg by mouth once daily.  , Disp: , Rfl:     atorvastatin (LIPITOR) 20 MG tablet, Take 1 tablet (20 mg total) by mouth once daily., Disp: 30 tablet, Rfl: 1    calcitRIOL (ROCALTROL) 0.25 MCG Cap, , Disp: , Rfl:     cimetidine (TAGAMET) 200 MG tablet, Take 200 mg by mouth 2 (two) times daily. , Disp: , Rfl:     ferrous sulfate 325 mg (65 mg iron) Tab tablet, Take 325 mg by mouth once daily., Disp: , Rfl:     hydrALAZINE (APRESOLINE) 25 MG tablet, Take 1 tablet (25 mg total) by mouth every 8 (eight) hours., Disp: 90 tablet, Rfl: 1    hydroxychloroquine (PLAQUENIL) 200 mg tablet, Take 1 tablet (200 mg total) by mouth once daily., Disp: 60 tablet, Rfl: 0    isosorbide mononitrate (IMDUR) 60 MG 24 hr tablet, Take 1 tablet  (60 mg total) by mouth once daily., Disp: 30 tablet, Rfl: 11      Scheduled Meds:   aspirin  325 mg Oral ED 1 Time     Continuous Infusions:  PRN Meds:.      Ashly Sullivan  [facility] Hospitalist NP  01/22/2020

## 2020-01-22 NOTE — PLAN OF CARE
01/22/20 0912   Discharge Assessment   Assessment Type Discharge Planning Assessment   Confirmed/corrected address and phone number on facesheet? Yes   Assessment information obtained from? Patient;Caregiver  (Pt's son, Troy Cuevas, was also present [phone])   Communicated expected length of stay with patient/caregiver yes   Prior to hospitilization cognitive status: Alert/Oriented   Prior to hospitalization functional status: Independent;Assistive Equipment  (Pt uses a walker to get around his house but is otherwise independent.)   Current cognitive status: Alert/Oriented   Current Functional Status: Independent;Assistive Equipment   Lives With spouse;child(rian), adult  (Pt resides with his daughter, Lakia, as well as his wife, Lakia, and his nephew, Jorge A.)   Able to Return to Prior Arrangements yes   Is patient able to care for self after discharge? Yes   Who are your caregiver(s) and their phone number(s)? Pt's emergency contact is his daughter, Lakia [phone]. Pt has not addressed any POA or advanced directives.   Patient's perception of discharge disposition home or selfcare   Readmission Within the Last 30 Days no previous admission in last 30 days   Patient currently being followed by outpatient case management? No   Patient currently receives any other outside agency services? No   Equipment Currently Used at Home walker, rolling   Part D Coverage PHN   Do you have any problems affording any of your prescribed medications? No   Is the patient taking medications as prescribed? yes   Does the patient have transportation home? Yes   Transportation Anticipated family or friend will provide   Does the patient receive services at the [facility]? No   Discharge Plan A Home with family;Home   DME Needed Upon Discharge  none   Patient/Family in Agreement with Plan yes

## 2020-01-22 NOTE — ED PROVIDER NOTES
Encounter Date: 1/22/2020       History     Chief Complaint   Patient presents with    Shortness of Breath     x several days. Hx CHF     Chief complaint is shortness of breath HPI the patient's wife noticed he was short of breath while walking back from the bathroom tonight she therefore brought him to the ER for evaluation.  Patient last admitted for heart failure December discharge December 23rd.  According to the daughter is not taking any Lasix or fluid pills at the present time.  The patient states he is here because of blood pressure.  He does have slight dementia.  He is not aware that he is here for shortness of breath.  When I asked him if you short of breath he said no at the present time.  He is on oxygen in his pulse ox 92%.  Pulse ox on room air was 88% he does have a history of hypertension cured prostate cancer stage 3 kidney disease        Review of patient's allergies indicates:   Allergen Reactions    Soma [carisoprodol] Rash    Aspirin     Sulfa (sulfonamide antibiotics) Rash     Past Medical History:   Diagnosis Date    Bladder stones     Cancer     Encounter for blood transfusion     Hypertension     Kidney stone     Prostate cancer 2004    Radiation 2005    prostate    Stage 3 chronic kidney disease 1/11/2019     Past Surgical History:   Procedure Laterality Date    CYSTOSCOPY      KIDNEY STONE SURGERY  May 2014     Family History   Problem Relation Age of Onset    Hypertension Daughter     Diabetes Daughter     Hypertension Son     Asthma Son      Social History     Tobacco Use    Smoking status: Former Smoker     Years: 40.00     Types: Cigarettes     Start date: 9/26/1985    Smokeless tobacco: Never Used   Substance Use Topics    Alcohol use: No    Drug use: No     Review of Systems   Constitutional: Negative for chills and fever.   HENT: Negative for ear pain, rhinorrhea and sore throat.    Eyes: Negative for pain and visual disturbance.   Respiratory: Positive for  shortness of breath. Negative for cough.    Cardiovascular: Negative for chest pain and palpitations.   Gastrointestinal: Negative for abdominal pain, constipation, diarrhea, nausea and vomiting.   Genitourinary: Negative for dysuria, frequency, hematuria and urgency.   Musculoskeletal: Negative for back pain, joint swelling and myalgias.   Skin: Negative for rash.   Neurological: Negative for dizziness, seizures, weakness and headaches.   Psychiatric/Behavioral: Negative for dysphoric mood. The patient is not nervous/anxious.        Physical Exam     Initial Vitals   BP Pulse Resp Temp SpO2   01/22/20 0214 01/22/20 0212 01/22/20 0212 01/22/20 0212 01/22/20 0212   (!) 215/95 79 (!) 22 98.5 °F (36.9 °C) (!) 88 %      MAP       --                Physical Exam    Nursing note and vitals reviewed.  Constitutional: He appears well-developed and well-nourished.   HENT:   Head: Normocephalic and atraumatic.   Eyes: Conjunctivae, EOM and lids are normal. Pupils are equal, round, and reactive to light.   Neck: Trachea normal. Neck supple. No thyroid mass present.   Cardiovascular: Normal rate, regular rhythm and normal heart sounds.   Pulmonary/Chest: No respiratory distress.   Patient with slight rales both bases left greater than right   Abdominal: Soft. Bowel sounds are normal. There is no tenderness.   Musculoskeletal: Normal range of motion.   Neurological: He is alert. He has normal strength and normal reflexes. No cranial nerve deficit or sensory deficit.   Skin: Skin is warm and dry.   Patient has slight leg swelling to both legs.   Psychiatric: He has a normal mood and affect. His speech is normal and behavior is normal. Judgment and thought content normal.         ED Course   Procedures  Labs Reviewed - No data to display       Imaging Results    None                       Attending Attestation:             Attending ED Notes:   The patient will be admitted to the hospitalist with a diagnosis of congestive heart  failure and mildly elevated troponin.Isabel Campos MD  5:54 AM 01/22/2020                            Clinical Impression:       ICD-10-CM ICD-9-CM   1. Congestive heart failure, unspecified HF chronicity, unspecified heart failure type I50.9 428.0   2. SOB (shortness of breath) R06.02 786.05                             Isabel Campos MD  01/22/20 0554

## 2020-01-22 NOTE — PROGRESS NOTES
Patient was seen and examined bedside.  Admitting provider's note and plan noted. Blood pressure is running high however his home medications were resumed now.  Cardiac enzymes are still up trending however most likely due to CHF exacerbation. Neumann's catheter in place and has good urine output.  Reports significant improvement in his breathing.  Will continue same management.  Waiting for cardiology evaluation.

## 2020-01-22 NOTE — PLAN OF CARE
01/22/20 0922   BEE Message   Medicare Outpatient and Observation Notification regarding financial responsibility Given to patient/caregiver;Explained to patient/caregiver;Signed/date by patient/caregiver   Date BEE was signed 01/22/20   Time BEE was signed 0910

## 2020-01-23 PROBLEM — R33.8 URINARY RETENTION DUE TO BENIGN PROSTATIC HYPERPLASIA: Chronic | Status: ACTIVE | Noted: 2020-01-23

## 2020-01-23 PROBLEM — N40.1 URINARY RETENTION DUE TO BENIGN PROSTATIC HYPERPLASIA: Chronic | Status: ACTIVE | Noted: 2020-01-23

## 2020-01-23 PROBLEM — R79.89 TROPONIN LEVEL ELEVATED: Status: ACTIVE | Noted: 2020-01-23

## 2020-01-23 LAB
ALBUMIN SERPL BCP-MCNC: 3.4 G/DL (ref 3.5–5.2)
ALP SERPL-CCNC: 61 U/L (ref 55–135)
ALT SERPL W/O P-5'-P-CCNC: 37 U/L (ref 10–44)
ANION GAP SERPL CALC-SCNC: 11 MMOL/L (ref 8–16)
AST SERPL-CCNC: 28 U/L (ref 10–40)
BASOPHILS # BLD AUTO: 0.02 K/UL (ref 0–0.2)
BASOPHILS NFR BLD: 0.5 % (ref 0–1.9)
BILIRUB SERPL-MCNC: 0.6 MG/DL (ref 0.1–1)
BUN SERPL-MCNC: 33 MG/DL (ref 10–30)
CALCIUM SERPL-MCNC: 8.8 MG/DL (ref 8.7–10.5)
CHLORIDE SERPL-SCNC: 102 MMOL/L (ref 95–110)
CO2 SERPL-SCNC: 28 MMOL/L (ref 23–29)
CREAT SERPL-MCNC: 1.8 MG/DL (ref 0.5–1.4)
DIFFERENTIAL METHOD: ABNORMAL
EOSINOPHIL # BLD AUTO: 0.2 K/UL (ref 0–0.5)
EOSINOPHIL NFR BLD: 3.9 % (ref 0–8)
ERYTHROCYTE [DISTWIDTH] IN BLOOD BY AUTOMATED COUNT: 14.2 % (ref 11.5–14.5)
EST. GFR  (AFRICAN AMERICAN): 37.2 ML/MIN/1.73 M^2
EST. GFR  (NON AFRICAN AMERICAN): 32.2 ML/MIN/1.73 M^2
GLUCOSE SERPL-MCNC: 100 MG/DL (ref 70–110)
HCT VFR BLD AUTO: 30.8 % (ref 40–54)
HGB BLD-MCNC: 9.6 G/DL (ref 14–18)
IMM GRANULOCYTES # BLD AUTO: 0.01 K/UL (ref 0–0.04)
IMM GRANULOCYTES NFR BLD AUTO: 0.2 % (ref 0–0.5)
LYMPHOCYTES # BLD AUTO: 0.7 K/UL (ref 1–4.8)
LYMPHOCYTES NFR BLD: 16.9 % (ref 18–48)
MAGNESIUM SERPL-MCNC: 2 MG/DL (ref 1.6–2.6)
MCH RBC QN AUTO: 30 PG (ref 27–31)
MCHC RBC AUTO-ENTMCNC: 31.2 G/DL (ref 32–36)
MCV RBC AUTO: 96 FL (ref 82–98)
MONOCYTES # BLD AUTO: 0.6 K/UL (ref 0.3–1)
MONOCYTES NFR BLD: 13 % (ref 4–15)
NEUTROPHILS # BLD AUTO: 2.9 K/UL (ref 1.8–7.7)
NEUTROPHILS NFR BLD: 65.5 % (ref 38–73)
NRBC BLD-RTO: 0 /100 WBC
PLATELET # BLD AUTO: 209 K/UL (ref 150–350)
PMV BLD AUTO: 11.3 FL (ref 9.2–12.9)
POTASSIUM SERPL-SCNC: 3.9 MMOL/L (ref 3.5–5.1)
PROT SERPL-MCNC: 6.5 G/DL (ref 6–8.4)
RBC # BLD AUTO: 3.2 M/UL (ref 4.6–6.2)
SODIUM SERPL-SCNC: 141 MMOL/L (ref 136–145)
WBC # BLD AUTO: 4.37 K/UL (ref 3.9–12.7)

## 2020-01-23 PROCEDURE — 63600175 PHARM REV CODE 636 W HCPCS: Performed by: HOSPITALIST

## 2020-01-23 PROCEDURE — 63600175 PHARM REV CODE 636 W HCPCS: Performed by: NURSE PRACTITIONER

## 2020-01-23 PROCEDURE — G0378 HOSPITAL OBSERVATION PER HR: HCPCS

## 2020-01-23 PROCEDURE — 85025 COMPLETE CBC W/AUTO DIFF WBC: CPT

## 2020-01-23 PROCEDURE — 80053 COMPREHEN METABOLIC PANEL: CPT

## 2020-01-23 PROCEDURE — 94761 N-INVAS EAR/PLS OXIMETRY MLT: CPT

## 2020-01-23 PROCEDURE — 27000221 HC OXYGEN, UP TO 24 HOURS

## 2020-01-23 PROCEDURE — 25000003 PHARM REV CODE 250: Performed by: NURSE PRACTITIONER

## 2020-01-23 PROCEDURE — 97161 PT EVAL LOW COMPLEX 20 MIN: CPT

## 2020-01-23 PROCEDURE — 93005 ELECTROCARDIOGRAM TRACING: CPT

## 2020-01-23 PROCEDURE — 99900035 HC TECH TIME PER 15 MIN (STAT)

## 2020-01-23 PROCEDURE — 36415 COLL VENOUS BLD VENIPUNCTURE: CPT

## 2020-01-23 PROCEDURE — 96376 TX/PRO/DX INJ SAME DRUG ADON: CPT

## 2020-01-23 PROCEDURE — 83735 ASSAY OF MAGNESIUM: CPT

## 2020-01-23 PROCEDURE — 25000003 PHARM REV CODE 250: Performed by: INTERNAL MEDICINE

## 2020-01-23 PROCEDURE — 96372 THER/PROPH/DIAG INJ SC/IM: CPT | Mod: 59

## 2020-01-23 PROCEDURE — 97116 GAIT TRAINING THERAPY: CPT

## 2020-01-23 RX ORDER — FUROSEMIDE 10 MG/ML
20 INJECTION INTRAMUSCULAR; INTRAVENOUS DAILY
Status: DISCONTINUED | OUTPATIENT
Start: 2020-01-24 | End: 2020-01-24 | Stop reason: HOSPADM

## 2020-01-23 RX ORDER — ACETAMINOPHEN 325 MG/1
650 TABLET ORAL EVERY 6 HOURS PRN
Status: DISCONTINUED | OUTPATIENT
Start: 2020-01-23 | End: 2020-01-24 | Stop reason: HOSPADM

## 2020-01-23 RX ORDER — POLYETHYLENE GLYCOL 3350 17 G/17G
17 POWDER, FOR SOLUTION ORAL DAILY
Status: DISCONTINUED | OUTPATIENT
Start: 2020-01-23 | End: 2020-01-24 | Stop reason: HOSPADM

## 2020-01-23 RX ADMIN — HYDROXYCHLOROQUINE SULFATE 200 MG: 200 TABLET, FILM COATED ORAL at 08:01

## 2020-01-23 RX ADMIN — HYDRALAZINE HYDROCHLORIDE 25 MG: 25 TABLET, FILM COATED ORAL at 05:01

## 2020-01-23 RX ADMIN — ISOSORBIDE MONONITRATE 60 MG: 30 TABLET, EXTENDED RELEASE ORAL at 08:01

## 2020-01-23 RX ADMIN — HYDRALAZINE HYDROCHLORIDE 25 MG: 25 TABLET, FILM COATED ORAL at 01:01

## 2020-01-23 RX ADMIN — ATORVASTATIN CALCIUM 20 MG: 20 TABLET, FILM COATED ORAL at 08:01

## 2020-01-23 RX ADMIN — HYDRALAZINE HYDROCHLORIDE 25 MG: 25 TABLET, FILM COATED ORAL at 09:01

## 2020-01-23 RX ADMIN — HALOPERIDOL LACTATE 2 MG: 5 INJECTION, SOLUTION INTRAMUSCULAR at 06:01

## 2020-01-23 RX ADMIN — AMLODIPINE BESYLATE 10 MG: 5 TABLET ORAL at 08:01

## 2020-01-23 RX ADMIN — POLYETHYLENE GLYCOL 3350 17 G: 17 POWDER, FOR SOLUTION ORAL at 01:01

## 2020-01-23 RX ADMIN — FERROUS SULFATE TAB 325 MG (65 MG ELEMENTAL FE) 325 MG: 325 (65 FE) TAB at 08:01

## 2020-01-23 RX ADMIN — FUROSEMIDE 20 MG: 20 INJECTION, SOLUTION INTRAMUSCULAR; INTRAVENOUS at 08:01

## 2020-01-23 RX ADMIN — PANTOPRAZOLE SODIUM 40 MG: 40 TABLET, DELAYED RELEASE ORAL at 05:01

## 2020-01-23 NOTE — HPI
Troy Yuan Sr. is a 89 y.o. old male who  has a past medical history of Bladder stones, Cancer, Encounter for blood transfusion, Hypertension, Kidney stone, Prostate cancer (2004), Radiation (2005), and Stage 3 chronic kidney disease (1/11/2019).. The patient presented to [facility] on 1/22/2020 with a primary complaint of Shortness of Breath (x several days. Hx CHF)  .   89 yearold   male presents emergency room with shortness of breath.  According to the patient's family for the last 24 hr the patient has been having increasing shortness of breath and dyspnea with minimal exertion.  The according to the patient's family the patient just walking from the bathroom to his bed become short of breath.  On December 23, 2019 he was admitted to this hospital with diastolic heart failure.  They did not discharge him on Lasix secondary to his renal suppression and concerns for drying out his kidneys too much.  So he was never placed on a diuretic.  The patient is on oxygen at home his room air pulse ox was 88% on arrival.       Also has a known history of hypertension hyperlipidemia dementia and chronic kidney disease stage 3.

## 2020-01-23 NOTE — CONSULTS
Attestation signed by Sarah Cooley MD at 1/22/2020 6:37 AM   I have seen and examined the patient independently. I agree with the findings and plan of care as documented in the Nurse Practitioner note.                           [facility]  Cardiology consultation  Patient Name: Troy Yuan Sr.  MRN: [id number]  Patient Class: Emergency   Admission Date: 1/22/2020  2:14 AM  Length of Stay: 0  Attending Physician:   Primary Care Provider: Gentry Encinas MD  Face-to-Face encounter date: 01/22/2020  Code Status  MPOA:  Chief Complaint: Shortness of Breath (x several days. Hx CHF)        Patient information was obtained from patient, past medical records and ER records.   HISTORY OF PRESENT ILLNESS:   Troy Yuan Sr. is a 89 y.o. old male who  has a past medical history of Bladder stones, Cancer, Encounter for blood transfusion, Hypertension, Kidney stone, Prostate cancer (2004), Radiation (2005), and Stage 3 chronic kidney disease (1/11/2019).. The patient presented to [facility] on 1/22/2020 with a primary complaint of Shortness of Breath (x several days. Hx CHF)  .   89 yearold   male presents emergency room with shortness of breath.  According to the patient's family for the last 24 hr the patient has been having increasing shortness of breath and dyspnea with minimal exertion.  The according to the patient's family the patient just walking from the bathroom to his bed become short of breath.  On December 23, 2019 he was admitted to this hospital with diastolic heart failure.  They did not discharge him on Lasix secondary to his renal suppression and concerns for drying out his kidneys too much.  So he was never placed on a diuretic.  The patient is on oxygen at home his room air pulse ox was 88% on arrival.    Family member at the bedside reports that he is walking all day.  He ambulates with a walker.  He is able to climb up 18 steps to the  house taking a break in between.  The patient was admitted with CHF to [facility] 12/12/2019; he had a syncopal spell and was readmitted 12/17/2019-thought secondary to volume depletion due to aggressive diuresis; BUN creatinine were elevated.  Also has a known history of hypertension hyperlipidemia dementia and chronic kidney disease stage 3     REVIEW OF SYSTEMS:   10 Point Review of System was performed and was found to be negative except for that mentioned already in the HPI and   Review of Systems (Negative unless checked off)  Review of Systems   Constitutional: Positive for malaise/fatigue.   HENT: Negative.    Eyes: Negative.    Respiratory: Positive for cough, sputum production and shortness of breath.    Cardiovascular: Positive for chest pain and palpitations.   Gastrointestinal: Negative.    Genitourinary: Negative.    Musculoskeletal: Negative.    Skin: Negative.    Neurological: Positive for weakness.   Endo/Heme/Allergies: Negative.    Psychiatric/Behavioral: Negative.             PAST MEDICAL HISTORY:           Past Medical History:   Diagnosis Date    Bladder stones      Cancer      Encounter for blood transfusion      Hypertension      Kidney stone      Prostate cancer 2004    Radiation 2005     prostate    Stage 3 chronic kidney disease 1/11/2019         PAST SURGICAL HISTORY:      Past Surgical History:   Procedure Laterality Date    CYSTOSCOPY        KIDNEY STONE SURGERY   May 2014         ALLERGIES:   Soma [carisoprodol]; Aspirin; and Sulfa (sulfonamide antibiotics)     FAMILY HISTORY:            Family History   Problem Relation Age of Onset    Hypertension Daughter      Diabetes Daughter      Hypertension Son      Asthma Son           SOCIAL HISTORY:      Social History            Tobacco Use    Smoking status: Former Smoker       Years: 40.00       Types: Cigarettes       Start date: 9/26/1985    Smokeless tobacco: Never Used   Substance Use Topics    Alcohol use:  "No         Social History          Substance and Sexual Activity   Sexual Activity Never         HOME MEDICATIONS:              Prior to Admission medications    Medication Sig Start Date End Date Taking? Authorizing Provider   alfuzosin (UROXATRAL) 10 mg Tb24 Take 1 tablet (10 mg total) by mouth daily with breakfast. 12/20/19 1/19/20   Sasha Espinosa MD   amlodipine (NORVASC) 10 MG tablet Take 10 mg by mouth once daily.         Historical Provider, MD   atorvastatin (LIPITOR) 20 MG tablet Take 1 tablet (20 mg total) by mouth once daily. 1/13/19 1/13/20   Kris Woodward MD   calcitRIOL (ROCALTROL) 0.25 MCG Cap   12/23/19     Historical Provider, MD   cimetidine (TAGAMET) 200 MG tablet Take 200 mg by mouth 2 (two) times daily.        Historical Provider, MD   ferrous sulfate 325 mg (65 mg iron) Tab tablet Take 325 mg by mouth once daily.       Historical Provider, MD   hydrALAZINE (APRESOLINE) 25 MG tablet Take 1 tablet (25 mg total) by mouth every 8 (eight) hours. 12/20/19 1/19/20   Sasha Espinosa MD   hydroxychloroquine (PLAQUENIL) 200 mg tablet Take 1 tablet (200 mg total) by mouth once daily. 1/10/19     Ignacia Seay MD   isosorbide mononitrate (IMDUR) 60 MG 24 hr tablet Take 1 tablet (60 mg total) by mouth once daily. 12/18/19 12/17/20   Arcadio Velazquez MD            PHYSICAL EXAM:   BP (!) 210/91   Pulse 71   Temp 98.5 °F (36.9 °C) (Oral)   Resp 20   Ht 5' 7" (1.702 m)   SpO2 100%   BMI 23.96 kg/m²   Vitals Reviewed  General appearance: Well-developed, well-nourished male in no apparent distress.  Skin: No Rash.   Neuro: Motor and sensory exams grossly intact. Good tone. Power in all 4 extremities 5/5.   HENT: Atraumatic head. Moist mucous membranes of oral cavity.  Complete upper and lower plate  Eyes: Normal extraocular movements.   Neck: Supple. No evidence of lymphadenopathy. No thyroidomegaly.  Lungs: Clear to auscultation bilaterally. No wheezing present.   Heart: Regular rate " and rhythm. S1 and S2 present with no murmurs/gallop/rub. No pedal edema. No JVD present.   Abdomen: Soft, non-distended, non-tender. No rebound tenderness/guarding. No masses or organomegaly. Bowel sounds are normal. Bladder is not palpable.   Extremities: No cyanosis, clubbing, or edema.  Psych/mental status: Alert and oriented. Cooperative. Responds appropriately to questions.   EMERGENCY DEPARTMENT LABS AND IMAGING:   Following labs were Reviewed        Recent Labs   Lab 01/22/20 0245 01/22/20 0336   WBC  --  6.56   HGB  --  9.3*   HCT  --  30.7*   PLT  --  190   CALCIUM 9.2  --    ALBUMIN 4.0  --    PROT 7.3  --      --    K 3.7  --    CO2 26  --      --    BUN 38*  --    CREATININE 1.7*  --    ALKPHOS 78  --    ALT 63*  --    AST 70*  --    BILITOT 0.6  --             CMP       Recent Labs   Lab 01/22/20 0245      K 3.7      CO2 26   *   BUN 38*   CREATININE 1.7*   CALCIUM 9.2   PROT 7.3   ALBUMIN 4.0   BILITOT 0.6   ALKPHOS 78   AST 70*   ALT 63*   ANIONGAP 13   ESTGFRAFRICA 39.9*   EGFRNONAA 34.5*   , CBC       Recent Labs   Lab 01/22/20 0336   WBC 6.56   HGB 9.3*   HCT 30.7*      , INR         Lab Results   Component Value Date     INR 1.0 01/22/2020     INR 1.0 12/12/2019     INR 1.0 01/11/2019   , Lipid Panel         Lab Results   Component Value Date     CHOL 157 12/12/2019     HDL 69 12/12/2019     LDLCALC 83.0 12/12/2019     TRIG 25 (L) 12/12/2019     CHOLHDL 43.9 12/12/2019   , Troponin       Recent Labs   Lab 01/22/20  0245   TROPONINI 0.055*   , A1C:       Recent Labs   Lab 12/12/19  0903   HGBA1C 5.3    and All labs within the past 24 hours have been reviewed      Microbiology Results (last 7 days)      ** No results found for the last 168 hours. **          Imaging Results          X-Ray Chest AP Portable (Final result)  Result time 01/22/20 06:43:58    Final result by Marty Mccoy MD (01/22/20 06:43:58)                 Impression:      Slightly less  conspicuous right lung base opacities suggesting decreasing right pleural effusion, with unchanged suspected tiny left pleural effusion and slight worsening of bibasilar interstitial opacities either due to interstitial edema or atypical infection.      Electronically signed by: Marty Mccoy MD  Date:    01/22/2020  Time:    06:43             Narrative:    EXAMINATION:  XR CHEST AP PORTABLE    CLINICAL HISTORY:  SOB;    FINDINGS:  Portable chest at 243 compared with 12/17/2019 shows unchanged cardiomediastinal silhouette. Right-sided aortic arch again evident.    Bibasilar pleuroparenchymal opacities persist, slightly less conspicuous in the right lung base.  Increased interstitial opacities are noted in bilateral lung bases.  Central pulmonary vasculature remains within normal limits.  No pneumothorax or acute osseous abnormality.                                       ASSESSMENT & PLAN:   Troy Yuan Sr. is a 89 y.o. male admitted for       1.  Acute on chronic diastolic heart failure  Echo noted from January 2019 revealing - Normal left ventricular systolic function. The estimated ejection fraction is 67% with Left ventricular diastolic dysfunction and Normal left atrial pressure. Mildly reduced right ventricular systolic function.  -gentle IV diuresing with Lasix  -trend cardiac enzymes and troponin; markedly elevated BNP     2.  Essential hypertension; accelerated hypertension  Continue current home pharmacologic regime     3.  BPH with a history of urinary retention; chronic kidney disease-elevated creatinine  -get bladder scan with almost 400ml of urine retention  - place yost cath  -possible urology consult if retention becomes a problem     4. Troponin elevation  - maybe s/t renal failure  - trend     5.  Transaminitis  -may be from right-sided heart failure with liver engorgement        DVT Prophylaxis: will be placed on Heparin/Lovenox for DVT prophylaxis and will be advised to be as mobile as  possible and sit in a chair as tolerated.   ________________________________________________________________________________     __________________________________________________     INPATIENT LIST OF MEDICATIONS      Current Facility-Administered Medications:     aspirin tablet 325 mg, 325 mg, Oral, ED 1 Time, Isabel Campos MD     Current Outpatient Medications:     alfuzosin (UROXATRAL) 10 mg Tb24, Take 1 tablet (10 mg total) by mouth daily with breakfast., Disp: 30 tablet, Rfl: 0    amlodipine (NORVASC) 10 MG tablet, Take 10 mg by mouth once daily.  , Disp: , Rfl:     atorvastatin (LIPITOR) 20 MG tablet, Take 1 tablet (20 mg total) by mouth once daily., Disp: 30 tablet, Rfl: 1    calcitRIOL (ROCALTROL) 0.25 MCG Cap, , Disp: , Rfl:     cimetidine (TAGAMET) 200 MG tablet, Take 200 mg by mouth 2 (two) times daily. , Disp: , Rfl:     ferrous sulfate 325 mg (65 mg iron) Tab tablet, Take 325 mg by mouth once daily., Disp: , Rfl:     hydrALAZINE (APRESOLINE) 25 MG tablet, Take 1 tablet (25 mg total) by mouth every 8 (eight) hours., Disp: 90 tablet, Rfl: 1    hydroxychloroquine (PLAQUENIL) 200 mg tablet, Take 1 tablet (200 mg total) by mouth once daily., Disp: 60 tablet, Rfl: 0    isosorbide mononitrate (IMDUR) 60 MG 24 hr tablet, Take 1 tablet (60 mg total) by mouth once daily., Disp: 30 tablet, Rfl: 11        Scheduled Meds:   aspirin  325 mg Oral ED 1 Time      Continuous Infusions:  PRN Meds:.        SHERIE Grissom MD [facility]  Cardiology  01/22/2020      Cosigned by: Sarah Cooley MD at 1/22/2020  6:37 AM   Revision History

## 2020-01-23 NOTE — ASSESSMENT & PLAN NOTE
Patient known history of dementia, community dwelling, cared for family at home.  Does get delirium while hospitalized and family is very concerned.  Delirium precautions.  Encourage family visitation.  Frequent orientation.

## 2020-01-23 NOTE — ASSESSMENT & PLAN NOTE
Mild elevation in troponin.  Likely secondary to heart failure, unlikely secondary to ACS.  Does have hypokinesia of the left ventricle at baseline.  Remains chest pain free.

## 2020-01-23 NOTE — ASSESSMENT & PLAN NOTE
Uncontrolled on admission, better controlled today.  Continue amlodipine, hydralazine, IV diuresis.  Monitor and adjust as needed.

## 2020-01-23 NOTE — PROGRESS NOTES
[facility]  Progress Note    Patient Name: Troy Yuan Sr.  MRN: [id number]  Patient Class: OP- Observation   Admission Date: 1/22/2020  Length of Stay: 0 days  Attending Physician: Sasha Espinosa MD  Primary Care Provider: Gentry Encinas MD        Subjective:     Principal Problem:Acute on chronic diastolic heart failure        HPI:  Troy Yuan Sr. is a 89 y.o. old male who  has a past medical history of Bladder stones, Cancer, Encounter for blood transfusion, Hypertension, Kidney stone, Prostate cancer (2004), Radiation (2005), and Stage 3 chronic kidney disease (1/11/2019).. The patient presented to [facility] on 1/22/2020 with a primary complaint of Shortness of Breath (x several days. Hx CHF)  .   89 yearold   male presents emergency room with shortness of breath.  According to the patient's family for the last 24 hr the patient has been having increasing shortness of breath and dyspnea with minimal exertion.  The according to the patient's family the patient just walking from the bathroom to his bed become short of breath.  On December 23, 2019 he was admitted to this hospital with diastolic heart failure.  They did not discharge him on Lasix secondary to his renal suppression and concerns for drying out his kidneys too much.  So he was never placed on a diuretic.  The patient is on oxygen at home his room air pulse ox was 88% on arrival.       Also has a known history of hypertension hyperlipidemia dementia and chronic kidney disease stage 3.    Overview/Hospital Course:  Patient presented with worsening shortness of breath.  Known history of diastolic heart failure however diuretics were previously discontinued in the setting of worsening kidney function.  On admission also noted to have urinary retention, had similar issue in the past likely secondary to BPH and was discharged with Neumann in place, Neumann catheter placed in the ED.   He was admitted on the observation status to cardiac telemetry, started on IV diuresis, troponin was mildly elevated, Cardiology was consulted.  On 01/23 with improvement in respiratory status at rest, ambulating, likely will need Neumann on discharge, discussed with family at bedside.    Interval History:  Patient reports interval improvement in shortness of breath, location chest, resolved at rest, no associated productive cough, has not ambulated yet.  Denies any chest pain or lower extremity swelling. He has known history of dementia and does tend to get confused during hospitalization and therefore family member staying at bedside.  Discussed with son at bedside, during last admission was discharged with Neumann, outpatient follow-up and was removed about 1 month ago, was voiding at home however in the ED was noted to have urinary retention and Neumann catheter placed.  T-max last 24 hr 99.3.  Blood pressure better this morning.  Labs with BNP 3424.  Chest x-ray with pulmonary edema with tiny bilateral pleural effusions.  EKG sinus bradycardia 59 beats per minute, QTC 473ms.  Urine output over last 24 hr 2300ml.  Telemetry sinus bradycardia in the 50s, first-degree heart block.  Case discussed with Nursing.    Review of Systems  Objective:     Vital Signs (Most Recent):  Temp: 98.3 °F (36.8 °C) (01/23/20 0826)  Pulse: 68 (01/23/20 0902)  Resp: 17 (01/23/20 0902)  BP: (!) 151/71 (01/23/20 0826)  SpO2: 96 % (01/23/20 0902) Vital Signs (24h Range):  Temp:  [96.8 °F (36 °C)-99.3 °F (37.4 °C)] 98.3 °F (36.8 °C)  Pulse:  [59-72] 68  Resp:  [16-20] 17  SpO2:  [96 %-100 %] 96 %  BP: (151-185)/(67-86) 151/71     Weight: 65.5 kg (144 lb 6.4 oz)  Body mass index is 22.62 kg/m².    Intake/Output Summary (Last 24 hours) at 1/23/2020 1008  Last data filed at 1/23/2020 0328  Gross per 24 hour   Intake 200 ml   Output 1600 ml   Net -1400 ml      Physical Exam    Significant Labs:   BMP:   Recent Labs   Lab 01/23/20  0556          K 3.9      CO2 28   BUN 33*   CREATININE 1.8*   CALCIUM 8.8   MG 2.0     CBC:   Recent Labs   Lab 01/22/20  0336 01/23/20  0556   WBC 6.56 4.37   HGB 9.3* 9.6*   HCT 30.7* 30.8*    209     Cardiac Markers:   Recent Labs   Lab 01/22/20  0336   BNP 3,424*     Magnesium:   Recent Labs   Lab 01/23/20  0556   MG 2.0     POCT Glucose: No results for input(s): POCTGLUCOSE in the last 48 hours.  Troponin:   Recent Labs   Lab 01/22/20  0245 01/22/20  0938 01/22/20  1656   TROPONINI 0.055* 0.080* 0.082*     Urine Culture: No results for input(s): LABURIN in the last 48 hours.  Urine Studies: No results for input(s): COLORU, APPEARANCEUA, PHUR, SPECGRAV, PROTEINUA, GLUCUA, KETONESU, BILIRUBINUA, OCCULTUA, NITRITE, UROBILINOGEN, LEUKOCYTESUR, RBCUA, WBCUA, BACTERIA, SQUAMEPITHEL, HYALINECASTS in the last 48 hours.    Invalid input(s): WRIGHTSUR  All pertinent labs within the past 24 hours have been reviewed.    Significant Imaging: I have reviewed all pertinent imaging results/findings within the past 24 hours.     · Echocardiogram 12/2019  · Concentric left ventricular hypertrophy.  · Global hypokinetic wall motion.  · Decreased left ventricular systolic function. The estimated ejection fraction is 45%  · Grade I (mild) left ventricular diastolic dysfunction consistent with impaired relaxation.  · Normal right ventricular systolic function.  · Mild left atrial enlargement.  · Mild mitral regurgitation.  · Normal central venous pressure (3 mm Hg).  · The estimated PA systolic pressure is 35 mm Hg  Mild tricuspid regurgitation.        Assessment/Plan:      * Acute on chronic diastolic heart failure  Presenting with worsening shortness of breath at rest.  Known history of CHF however Lasix was previously discontinued given worsening kidney function.  Hypervolemic with elevated BNP and evidence of pulmonary edema on admission.  Interval improvement in shortness of breath, nearing baseline.  Echocardiogram  reviewed, EF of 45% with grade 1 diastolic dysfunction, severe hypokinesia of left ventricle.  Continue monitor on cardiac telemetry  Decreased IV Lasix to 20 mg once daily, likely will be transitioned to oral tomorrow  Change diet to cardiac with 2 L oral fluid restriction, sodium restriction, heart failure education  Daily BMP while on IV diuresis  PT, ambulated today to monitor symptoms with exertion  Appreciate Cardiology input      Troponin level elevated  Mild elevation in troponin.  Likely secondary to heart failure, unlikely secondary to ACS.  Does have hypokinesia of the left ventricle at baseline.  Remains chest pain free.      Bradycardia  Known history of sinus bradycardia.  Metoprolol was discontinued last admission.  Continuing to monitor.      Urinary retention due to benign prostatic hyperplasia  Patient with known history of prior urinary retention requiring yost placement.    Follows with urologist Dr. Holloway.  On admission was noted to have recurrent urinary retention with 400-500 cc drained on placement.  Continue Yost catheter for today.  Continue alfuzosin.  May consider voiding trial tomorrow versus discharge with Yost and outpatient follow-up before voiding trial.      Hypertension  Uncontrolled on admission, better controlled today.  Continue amlodipine, hydralazine, IV diuresis.  Monitor and adjust as needed.      CKD (chronic kidney disease), stage III  Patient with known history of chronic kidney disease stage 3.  Currently at baseline.  Renally dose all medications avoid nephrotoxin drugs.  Monitoring BMP.      Dementia without behavioral disturbance  Patient known history of dementia, community dwelling, cared for family at home.  Does get delirium while hospitalized and family is very concerned.  Delirium precautions.  Encourage family visitation.  Frequent orientation.      Rheumatoid arthritis  On Plaquenil.      Hyperlipidemia  Continue statin therapy.      Anemia  Known history  of chronic anemia.  Continue iron supplementation.  Intermittent CBC.        VTE Risk Mitigation (From admission, onward)         Ordered     IP VTE LOW RISK PATIENT  Once      01/22/20 0603     Place sequential compression device  Until discontinued      01/22/20 0603                      Sasha Espinosa MD  Department of Hospital Medicine   [facility]

## 2020-01-23 NOTE — HOSPITAL COURSE
Patient presented with worsening shortness of breath.  Known history of diastolic heart failure however diuretics were previously discontinued in the setting of worsening kidney function.  On admission also noted to have urinary retention, had similar issue in the past likely secondary to BPH and was discharged with Neumann in place, Neumann catheter placed in the ED.  He was admitted on the observation status to cardiac telemetry, started on IV diuresis, troponin was mildly elevated, Cardiology was consulted.  On 01/23 with improvement in respiratory status at rest, ambulating, likely will need Neumann on discharge, discussed with family at bedside.  Patient continued to improve during hospitalization.  Symptoms resolved. Patient ambulated on room air at 98% and was able to walk flight of stairs without symptoms.  Patient had Neumann removed and was able to urinate. Patient was stable at discharge to home with home health with instructions to follow up with Cardiology, Urology, PCP.    General: Patient resting comfortably in no acute distress. Appears as stated age. Calm. Frail appearing  Eyes: EOM intact. No conjunctivae injection. No scleral icterus.  ENT: Hearing grossly intact. No discharge from ears. No nasal discharge.   CVS: RRR. No LE edema BL.  Lungs: CTA BL, no wheezing or crackles. Good breath sounds. No accessory muscle use. No acute respiratory distress

## 2020-01-23 NOTE — ASSESSMENT & PLAN NOTE
Patient with known history of chronic kidney disease stage 3.  Currently at baseline.  Renally dose all medications avoid nephrotoxin drugs.  Monitoring BMP.

## 2020-01-23 NOTE — ASSESSMENT & PLAN NOTE
Known history of sinus bradycardia.  Metoprolol was discontinued last admission.  Continuing to monitor.

## 2020-01-23 NOTE — ASSESSMENT & PLAN NOTE
Presenting with worsening shortness of breath at rest.  Known history of CHF however Lasix was previously discontinued given worsening kidney function.  Hypervolemic with elevated BNP and evidence of pulmonary edema on admission.  Interval improvement in shortness of breath, nearing baseline.  Echocardiogram reviewed, EF of 45% with grade 1 diastolic dysfunction, severe hypokinesia of left ventricle.  Continue monitor on cardiac telemetry  Decreased IV Lasix to 20 mg once daily, likely will be transitioned to oral tomorrow  Change diet to cardiac with 2 L oral fluid restriction, sodium restriction, heart failure education  Daily BMP while on IV diuresis  PT, ambulated today to monitor symptoms with exertion  Appreciate Cardiology input

## 2020-01-23 NOTE — SUBJECTIVE & OBJECTIVE
Interval History:  Patient reports interval improvement in shortness of breath, location chest, resolved at rest, no associated productive cough, has not ambulated yet.  Denies any chest pain or lower extremity swelling. He has known history of dementia and does tend to get confused during hospitalization and therefore family member staying at bedside.  Discussed with son at bedside, during last admission was discharged with Neumann, outpatient follow-up and was removed about 1 month ago, was voiding at home however in the ED was noted to have urinary retention and Neumann catheter placed.  T-max last 24 hr 99.3.  Blood pressure better this morning.  Labs with BNP 3424.  Chest x-ray with pulmonary edema with tiny bilateral pleural effusions.  EKG sinus bradycardia 59 beats per minute, QTC 473ms.  Urine output over last 24 hr 2300ml.  Telemetry sinus bradycardia in the 50s, first-degree heart block.  Case discussed with Nursing.    Review of Systems  Objective:     Vital Signs (Most Recent):  Temp: 98.3 °F (36.8 °C) (01/23/20 0826)  Pulse: 68 (01/23/20 0902)  Resp: 17 (01/23/20 0902)  BP: (!) 151/71 (01/23/20 0826)  SpO2: 96 % (01/23/20 0902) Vital Signs (24h Range):  Temp:  [96.8 °F (36 °C)-99.3 °F (37.4 °C)] 98.3 °F (36.8 °C)  Pulse:  [59-72] 68  Resp:  [16-20] 17  SpO2:  [96 %-100 %] 96 %  BP: (151-185)/(67-86) 151/71     Weight: 65.5 kg (144 lb 6.4 oz)  Body mass index is 22.62 kg/m².    Intake/Output Summary (Last 24 hours) at 1/23/2020 1008  Last data filed at 1/23/2020 0328  Gross per 24 hour   Intake 200 ml   Output 1600 ml   Net -1400 ml      Physical Exam    Significant Labs:   BMP:   Recent Labs   Lab 01/23/20  0556         K 3.9      CO2 28   BUN 33*   CREATININE 1.8*   CALCIUM 8.8   MG 2.0     CBC:   Recent Labs   Lab 01/22/20  0336 01/23/20  0556   WBC 6.56 4.37   HGB 9.3* 9.6*   HCT 30.7* 30.8*    209     Cardiac Markers:   Recent Labs   Lab 01/22/20  0336   BNP 3,424*      Magnesium:   Recent Labs   Lab 01/23/20  0556   MG 2.0     POCT Glucose: No results for input(s): POCTGLUCOSE in the last 48 hours.  Troponin:   Recent Labs   Lab 01/22/20  0245 01/22/20  0938 01/22/20  1656   TROPONINI 0.055* 0.080* 0.082*     Urine Culture: No results for input(s): LABURIN in the last 48 hours.  Urine Studies: No results for input(s): COLORU, APPEARANCEUA, PHUR, SPECGRAV, PROTEINUA, GLUCUA, KETONESU, BILIRUBINUA, OCCULTUA, NITRITE, UROBILINOGEN, LEUKOCYTESUR, RBCUA, WBCUA, BACTERIA, SQUAMEPITHEL, HYALINECASTS in the last 48 hours.    Invalid input(s): WRIGHTSUR  All pertinent labs within the past 24 hours have been reviewed.    Significant Imaging: I have reviewed all pertinent imaging results/findings within the past 24 hours.     · Echocardiogram 12/2019  · Concentric left ventricular hypertrophy.  · Global hypokinetic wall motion.  · Decreased left ventricular systolic function. The estimated ejection fraction is 45%  · Grade I (mild) left ventricular diastolic dysfunction consistent with impaired relaxation.  · Normal right ventricular systolic function.  · Mild left atrial enlargement.  · Mild mitral regurgitation.  · Normal central venous pressure (3 mm Hg).  · The estimated PA systolic pressure is 35 mm Hg  Mild tricuspid regurgitation.

## 2020-01-23 NOTE — ASSESSMENT & PLAN NOTE
Patient with known history of prior urinary retention requiring yost placement.    Follows with urologist Dr. Holloway.  On admission was noted to have recurrent urinary retention with 400-500 cc drained on placement.  Continue Yost catheter for today.  Continue alfuzosin.  May consider voiding trial tomorrow versus discharge with Yost and outpatient follow-up before voiding trial.

## 2020-01-23 NOTE — PT/OT/SLP EVAL
Physical Therapy Evaluation    Patient Name:  Troy Yuan Sr.   MRN:  [id number]    Recommendations:     Discharge Recommendations:  home health PT   Discharge Equipment Recommendations: none   Barriers to discharge: None    Assessment:     Troy Yuan Sr. is a 89 y.o. male admitted with a medical diagnosis of Acute on chronic diastolic heart failure.  He presents with the following impairments/functional limitations:  weakness, impaired functional mobilty, gait instability, impaired endurance, decreased coordination, decreased safety awareness, impaired coordination, impaired balance, impaired self care skills. Pt supine in NAD; son present; pt cooperative and pleasant with PT; participates in gait training; son provides history and PLOF.     Rehab Prognosis: Good; patient would benefit from acute skilled PT services to address these deficits and reach maximum level of function.    Recent Surgery: * No surgery found *      Plan:     During this hospitalization, patient to be seen 6 x/week to address the identified rehab impairments via gait training, therapeutic activities, therapeutic exercises and progress toward the following goals:    · Plan of Care Expires:  02/23/20    Subjective     Chief Complaint: none  Patient/Family Comments/goals: home  Pain/Comfort:  · Pain Rating 1: 0/10    Patients cultural, spiritual, Alevism conflicts given the current situation:      Living Environment:  Pt lives at home with spouse and daughter. Good support system with son assisting and present also. Home is raised with one flight of steps to enter with handrail.  Prior to admission, patients level of function was Mod indep with RW; son denies falls but states baseline pt shuffles with walker.  Equipment used at home: walker, rolling.  DME owned (not currently used): none.  Upon discharge, patient will have assistance from family.    Objective:     Communicated with RN prior to session.  Patient found supine with priyank  catheter and telemetry upon PT entry to room.    General Precautions: Standard, fall   Orthopedic Precautions:    Braces:       Exams:  · Cognitive Exam:  Patient is oriented to Person and confused at times  · RLE ROM: WFL  · RLE Strength: WFL  · LLE ROM: WFL  · LLE Strength: WFL    Functional Mobility:  · Bed Mobility:     · Supine to Sit: stand by assistance  · Transfers:     · Sit to Stand:  minimum assistance with rolling walker  · Bed to Chair: contact guard assistance with  rolling walker  using  Stand Pivot  · Gait: 50' with RW Min A; flexed posture with shuffle gait; Max cues for correct positioning inside RW  · Balance: good in sitting; fair in standing      Therapeutic Activities and Exercises:   bed mobility; transfer training; PT educated pt/ family on importance of out of bed to chair and functional mobility to negate negative effects of prolonged bed rest. Will progress activity as tolerated by patient;     AM-PAC 6 CLICK MOBILITY  Total Score:19     Patient left up in chair with all lines intact, RN notified and son present.    GOALS:   Multidisciplinary Problems     Physical Therapy Goals        Problem: Physical Therapy Goal    Goal Priority Disciplines Outcome Goal Variances Interventions   Physical Therapy Goal     PT, PT/OT      Description:  Goals to be met by: discharge     Patient will increase functional independence with mobility by performin. Supine to sit with Supervision  2. Sit to stand transfer with Stand-by Assistance  3. Bed to chair transfer with Stand-by Assistance using Rolling Walker  4. Gait  x 100 feet with Stand-by Assistance using Rolling Walker.                       History:     Past Medical History:   Diagnosis Date    Bladder stones     Cancer     Encounter for blood transfusion     Hypertension     Kidney stone     Prostate cancer 2004    Radiation 2005    prostate    Stage 3 chronic kidney disease 2019       Past Surgical History:   Procedure  Laterality Date    CYSTOSCOPY      KIDNEY STONE SURGERY  May 2014       Time Tracking:     PT Received On: 01/23/20  PT Start Time: 1416     PT Stop Time: 1430  PT Total Time (min): 14 min     Billable Minutes: Evaluation 6 and Gait Training 8      Letty Cerda, PT  01/23/2020

## 2020-01-24 VITALS
OXYGEN SATURATION: 96 % | DIASTOLIC BLOOD PRESSURE: 69 MMHG | WEIGHT: 150.56 LBS | TEMPERATURE: 99 F | SYSTOLIC BLOOD PRESSURE: 156 MMHG | BODY MASS INDEX: 23.63 KG/M2 | HEIGHT: 67 IN | RESPIRATION RATE: 16 BRPM | HEART RATE: 62 BPM

## 2020-01-24 PROBLEM — I50.42 CHRONIC COMBINED SYSTOLIC AND DIASTOLIC HEART FAILURE: Status: ACTIVE | Noted: 2019-12-12

## 2020-01-24 LAB
ANION GAP SERPL CALC-SCNC: 11 MMOL/L (ref 8–16)
BUN SERPL-MCNC: 30 MG/DL (ref 10–30)
CALCIUM SERPL-MCNC: 8.9 MG/DL (ref 8.7–10.5)
CHLORIDE SERPL-SCNC: 104 MMOL/L (ref 95–110)
CO2 SERPL-SCNC: 26 MMOL/L (ref 23–29)
CREAT SERPL-MCNC: 1.9 MG/DL (ref 0.5–1.4)
ERYTHROCYTE [DISTWIDTH] IN BLOOD BY AUTOMATED COUNT: 14.1 % (ref 11.5–14.5)
EST. GFR  (AFRICAN AMERICAN): 34.9 ML/MIN/1.73 M^2
EST. GFR  (NON AFRICAN AMERICAN): 30.1 ML/MIN/1.73 M^2
GLUCOSE SERPL-MCNC: 105 MG/DL (ref 70–110)
HCT VFR BLD AUTO: 31.5 % (ref 40–54)
HGB BLD-MCNC: 10 G/DL (ref 14–18)
MAGNESIUM SERPL-MCNC: 2.1 MG/DL (ref 1.6–2.6)
MCH RBC QN AUTO: 30 PG (ref 27–31)
MCHC RBC AUTO-ENTMCNC: 31.7 G/DL (ref 32–36)
MCV RBC AUTO: 95 FL (ref 82–98)
PHOSPHATE SERPL-MCNC: 3.7 MG/DL (ref 2.7–4.5)
PLATELET # BLD AUTO: 214 K/UL (ref 150–350)
PMV BLD AUTO: 11.5 FL (ref 9.2–12.9)
POTASSIUM SERPL-SCNC: 3.6 MMOL/L (ref 3.5–5.1)
RBC # BLD AUTO: 3.33 M/UL (ref 4.6–6.2)
SODIUM SERPL-SCNC: 141 MMOL/L (ref 136–145)
WBC # BLD AUTO: 5.47 K/UL (ref 3.9–12.7)

## 2020-01-24 PROCEDURE — 25000003 PHARM REV CODE 250: Performed by: NURSE PRACTITIONER

## 2020-01-24 PROCEDURE — 36415 COLL VENOUS BLD VENIPUNCTURE: CPT

## 2020-01-24 PROCEDURE — 80048 BASIC METABOLIC PNL TOTAL CA: CPT

## 2020-01-24 PROCEDURE — 85027 COMPLETE CBC AUTOMATED: CPT

## 2020-01-24 PROCEDURE — 97530 THERAPEUTIC ACTIVITIES: CPT | Mod: CQ

## 2020-01-24 PROCEDURE — G0378 HOSPITAL OBSERVATION PER HR: HCPCS

## 2020-01-24 PROCEDURE — 83735 ASSAY OF MAGNESIUM: CPT

## 2020-01-24 PROCEDURE — 63600175 PHARM REV CODE 636 W HCPCS: Performed by: HOSPITALIST

## 2020-01-24 PROCEDURE — 97116 GAIT TRAINING THERAPY: CPT | Mod: CQ

## 2020-01-24 PROCEDURE — 84100 ASSAY OF PHOSPHORUS: CPT

## 2020-01-24 PROCEDURE — 96376 TX/PRO/DX INJ SAME DRUG ADON: CPT

## 2020-01-24 PROCEDURE — 93005 ELECTROCARDIOGRAM TRACING: CPT

## 2020-01-24 PROCEDURE — 94760 N-INVAS EAR/PLS OXIMETRY 1: CPT

## 2020-01-24 PROCEDURE — 96372 THER/PROPH/DIAG INJ SC/IM: CPT | Mod: 59

## 2020-01-24 PROCEDURE — 25000003 PHARM REV CODE 250: Performed by: INTERNAL MEDICINE

## 2020-01-24 PROCEDURE — 63600175 PHARM REV CODE 636 W HCPCS: Performed by: INTERNAL MEDICINE

## 2020-01-24 RX ORDER — FUROSEMIDE 20 MG/1
20 TABLET ORAL DAILY
Qty: 30 TABLET | Refills: 0 | Status: SHIPPED | OUTPATIENT
Start: 2020-01-24 | End: 2023-04-18

## 2020-01-24 RX ADMIN — HALOPERIDOL LACTATE 2 MG: 5 INJECTION, SOLUTION INTRAMUSCULAR at 03:01

## 2020-01-24 RX ADMIN — ISOSORBIDE MONONITRATE 60 MG: 30 TABLET, EXTENDED RELEASE ORAL at 08:01

## 2020-01-24 RX ADMIN — AMLODIPINE BESYLATE 10 MG: 5 TABLET ORAL at 08:01

## 2020-01-24 RX ADMIN — FUROSEMIDE 20 MG: 20 INJECTION, SOLUTION INTRAMUSCULAR; INTRAVENOUS at 08:01

## 2020-01-24 RX ADMIN — PANTOPRAZOLE SODIUM 40 MG: 40 TABLET, DELAYED RELEASE ORAL at 08:01

## 2020-01-24 RX ADMIN — POLYETHYLENE GLYCOL 3350 17 G: 17 POWDER, FOR SOLUTION ORAL at 08:01

## 2020-01-24 RX ADMIN — ATORVASTATIN CALCIUM 20 MG: 20 TABLET, FILM COATED ORAL at 08:01

## 2020-01-24 RX ADMIN — FERROUS SULFATE TAB 325 MG (65 MG ELEMENTAL FE) 325 MG: 325 (65 FE) TAB at 08:01

## 2020-01-24 RX ADMIN — HYDRALAZINE HYDROCHLORIDE 25 MG: 25 TABLET, FILM COATED ORAL at 08:01

## 2020-01-24 RX ADMIN — HYDROXYCHLOROQUINE SULFATE 200 MG: 200 TABLET, FILM COATED ORAL at 08:01

## 2020-01-24 NOTE — PLAN OF CARE
01/24/20 1149   Discharge Reassessment   Assessment Type Discharge Planning Reassessment   Anticipated Discharge Disposition Home-Health   Provided patient/caregiver education on the expected discharge date and the discharge plan Yes   Discharge Plan A Home with family;Home Health   DME Needed Upon Discharge  none   Patient choice form signed by patient/caregiver List with quality metrics by geographic area provided;List from CMS Compare  (SW received orders for home health. SW met with pt and nephew at bedside. Pt choice form signed at 1133 and scanned into media. Referral sent to [facility] per protocol. )   Post-Acute Status   Post-Acute Authorization Home Health/Hospice   Home Health/Hospice Status Referrals Sent

## 2020-01-24 NOTE — PT/OT/SLP PROGRESS
Physical Therapy Treatment    Patient Name:  Troy Yuan Sr.   MRN:  [id number]    Recommendations:     Discharge Recommendations:  home health PT   Discharge Equipment Recommendations: none   Barriers to discharge: None    Assessment:     Troy Yuan Sr. is a 89 y.o. male admitted with a medical diagnosis of Chronic combined systolic and diastolic heart failure.  He presents with the following impairments/functional limitations:  weakness, impaired functional mobilty, gait instability, impaired endurance, decreased coordination, decreased safety awareness, impaired coordination, impaired balance, impaired self care skills. Pt progressing with gait and mobility today, increasing gait distance and ascending/descending 1 flight of stairs with CGA. No LOB or SOB noted throughout tx. Pt ambulated on RA and sats noted to be 92/98% with gait training. Continue with PT and POC.    Rehab Prognosis: Good; patient would benefit from acute skilled PT services to address these deficits and reach maximum level of function.    Recent Surgery: * No surgery found *      Plan:     During this hospitalization, patient to be seen 6 x/week to address the identified rehab impairments via gait training, therapeutic activities, therapeutic exercises and progress toward the following goals:    · Plan of Care Expires:  02/23/20    Subjective     Chief Complaint: No complaints  Patient/Family Comments/goals:   Pain/Comfort:  · Pain Rating 1: 0/10  · Pain Rating Post-Intervention 1: 0/10      Objective:     Communicated with nursing prior to session.  Patient found HOB elevated with yost catheter, telemetry upon PT entry to room.     General Precautions: Standard, fall   Orthopedic Precautions:    Braces:       Functional Mobility:  · Bed Mobility:     · Supine to Sit: minimum assistance  · Transfers:     · Sit to Stand:  contact guard assistance with rolling walker  · Bed to Chair: contact guard assistance with  rolling walker  using   Step Transfer  · Gait: 75ft with RW and CGA  · Stairs:  Pt ascended/descended 1 flight(s) with No Assistive Device with one HR with Contact Guard Assistance.       AM-PAC 6 CLICK MOBILITY          Therapeutic Activities and Exercises:   bed mobility; sitting EOB for trunk control and midline orientation; sit <> stands; transfer training; gait training; stairs navigation    Patient left up in chair with all lines intact, call button in reach, chair alarm on, nurse notified and pt's son present..    GOALS:   Multidisciplinary Problems     Physical Therapy Goals        Problem: Physical Therapy Goal    Goal Priority Disciplines Outcome Goal Variances Interventions   Physical Therapy Goal     PT, PT/OT Ongoing, Progressing     Description:  Goals to be met by: discharge     Patient will increase functional independence with mobility by performin. Supine to sit with Supervision  2. Sit to stand transfer with Stand-by Assistance  3. Bed to chair transfer with Stand-by Assistance using Rolling Walker  4. Gait  x 100 feet with Stand-by Assistance using Rolling Walker.                       Time Tracking:     PT Received On: 20  PT Start Time: 0944     PT Stop Time: 1007  PT Total Time (min): 23 min     Billable Minutes: Gait Training 13 and Therapeutic Activity 10    Treatment Type: Treatment  PT/PTA: PTA     PTA Visit Number: 1     Danay Gonzales, PTA  2020

## 2020-01-24 NOTE — DISCHARGE SUMMARY
[facility] Medicine  Discharge Summary      Patient Name: Troy Yuan Sr.  MRN: [id number]  Admission Date: 1/22/2020  Hospital Length of Stay: 0 days  Discharge Date and Time: 1/24/2020  1:37 PM  Attending Physician: Sandi att. providers found   Discharging Provider: Jas Escobedo MD  Primary Care Provider: Gentry Encinas MD      HPI:   Troy Yuan Sr. is a 89 y.o. old male who  has a past medical history of Bladder stones, Cancer, Encounter for blood transfusion, Hypertension, Kidney stone, Prostate cancer (2004), Radiation (2005), and Stage 3 chronic kidney disease (1/11/2019).. The patient presented to [facility] on 1/22/2020 with a primary complaint of Shortness of Breath (x several days. Hx CHF)  .   89 yearold   male presents emergency room with shortness of breath.  According to the patient's family for the last 24 hr the patient has been having increasing shortness of breath and dyspnea with minimal exertion.  The according to the patient's family the patient just walking from the bathroom to his bed become short of breath.  On December 23, 2019 he was admitted to this hospital with diastolic heart failure.  They did not discharge him on Lasix secondary to his renal suppression and concerns for drying out his kidneys too much.  So he was never placed on a diuretic.  The patient is on oxygen at home his room air pulse ox was 88% on arrival.       Also has a known history of hypertension hyperlipidemia dementia and chronic kidney disease stage 3.    * No surgery found *      Hospital Course:   Patient presented with worsening shortness of breath.  Known history of diastolic heart failure however diuretics were previously discontinued in the setting of worsening kidney function.  On admission also noted to have urinary retention, had similar issue in the past likely secondary to BPH and was discharged with Neumann in place, Neumann catheter placed in  the ED.  He was admitted on the observation status to cardiac telemetry, started on IV diuresis, troponin was mildly elevated, Cardiology was consulted.  On 01/23 with improvement in respiratory status at rest, ambulating, likely will need Neumann on discharge, discussed with family at bedside.  Patient continued to improve during hospitalization.  Symptoms resolved. Patient ambulated on room air at 98% and was able to walk flight of stairs without symptoms.  Patient had Neumann removed and was able to urinate. Patient was stable at discharge to home with home health with instructions to follow up with Cardiology, Urology, PCP.    General: Patient resting comfortably in no acute distress. Appears as stated age. Calm. Frail appearing  Eyes: EOM intact. No conjunctivae injection. No scleral icterus.  ENT: Hearing grossly intact. No discharge from ears. No nasal discharge.   CVS: RRR. No LE edema BL.  Lungs: CTA BL, no wheezing or crackles. Good breath sounds. No accessory muscle use. No acute respiratory distress     Consults:       No new Assessment & Plan notes have been filed under this hospital service since the last note was generated.  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    Specialty:  Cardiology  Why:  For check up s/p hospital discharge  Contact information:  [address]  [address]  [place] [address]  [phone]             Gentry Encinas MD In 2 weeks.    Specialty:  Family Medicine  Why:  For check up s/p hospital discharge  Contact information:  [address]  [place] [address]  [phone]             Miko Holloway MD In 1 week.    Specialty:  Urology  Why:  urinary retention  Contact information:  [address] [place] [address]  [address]  [place] [address]  [phone]                 Patient Instructions:      Referral to Home health   Referral Priority: Routine Referral Type: Home Health   Referral Reason: Specialty Services Required   Requested Specialty: Home Health Services   Number of Visits Requested: 1     Diet Cardiac     Notify your health care provider if you experience any of the following:  temperature >100.4     Notify your health care provider if you experience any of the following:  persistent nausea and vomiting or diarrhea     Notify your health care provider if you experience any of the following:  severe uncontrolled pain     Notify your health care provider if you experience any of the following:  redness, tenderness, or signs of infection (pain, swelling, redness, odor or green/yellow discharge around incision site)     Notify your health care provider if you experience any of the following:  difficulty breathing or increased cough     Notify your health care provider if you experience any of the following:  severe persistent headache     Notify your health care provider if you experience any of the following:  worsening rash     Notify your health care provider if you experience any of the following:  persistent dizziness, light-headedness, or visual disturbances     Notify your health care provider if you experience any of the following:  increased  confusion or weakness     Activity as tolerated       Significant Diagnostic Studies: Labs:   CMP   Recent Labs   Lab 01/23/20  0556 01/24/20  0441    141   K 3.9 3.6    104   CO2 28 26    105   BUN 33* 30   CREATININE 1.8* 1.9*   CALCIUM 8.8 8.9   PROT 6.5  --    ALBUMIN 3.4*  --    BILITOT 0.6  --    ALKPHOS 61  --    AST 28  --    ALT 37  --    ANIONGAP 11 11   ESTGFRAFRICA 37.2* 34.9*   EGFRNONAA 32.2* 30.1*    and CBC   Recent Labs   Lab 01/23/20  0556 01/24/20  0442   WBC 4.37 5.47   HGB 9.6* 10.0*   HCT 30.8* 31.5*    214       Pending Diagnostic Studies:     None         Medications:  Reconciled Home Medications:      Medication List      START taking these medications    furosemide 20 MG tablet  Commonly known as:  LASIX  Take 1 tablet (20 mg total) by mouth once daily.        CONTINUE taking these medications    alfuzosin 10 mg Tb24  Commonly known as:  UROXATRAL  Take 1 tablet (10 mg total) by mouth daily with breakfast.     amLODIPine 10 MG tablet  Commonly known as:  NORVASC  Take 10 mg by mouth once daily.     atorvastatin 20 MG tablet  Commonly known as:  LIPITOR  Take 1 tablet (20 mg total) by mouth once daily.     calcitRIOL 0.25 MCG Cap  Commonly known as:  ROCALTROL     cimetidine 200 MG tablet  Commonly known as:  TAGAMET  Take 200 mg by mouth 2 (two) times daily.     ferrous sulfate 325 mg (65 mg iron) Tab tablet  Commonly known as:  FEOSOL  Take 325 mg by mouth once daily.     hydrALAZINE 25 MG tablet  Commonly known as:  APRESOLINE  Take 1 tablet (25 mg total) by mouth every 8 (eight) hours.     hydroxychloroquine 200 mg tablet  Commonly known as:  PLAQUENIL  Take 1 tablet (200 mg total) by mouth once daily.     isosorbide mononitrate 60 MG 24 hr tablet  Commonly known as:  IMDUR  Take 1 tablet (60 mg total) by mouth once daily.            Indwelling Lines/Drains at time of discharge:   Lines/Drains/Airways     None                 Time spent on the discharge of  patient: 35 minutes  Patient was seen and examined on the date of discharge and determined to be suitable for discharge.         Jas Escobedo MD  Department of Hospital Medicine  [facility]  Date of service: 01/24/2020 4:03 PM

## 2020-01-24 NOTE — CARE UPDATE
01/23/20 2129   Patient Assessment/Suction   Level of Consciousness (AVPU) alert   Respiratory Effort Normal;Unlabored   Expansion/Accessory Muscles/Retractions expansion symmetric;no retractions;no use of accessory muscles   Rhythm/Pattern, Respiratory no shortness of breath reported;pattern regular;unlabored   PRE-TX-O2   O2 Device (Oxygen Therapy) nasal cannula   $ Is the patient on Low Flow Oxygen? Yes   Flow (L/min) 3   SpO2 98 %   Pulse Oximetry Type Intermittent   Pulse 71   Resp 18   Respiratory Interventions   Cough And Deep Breathing done with encouragement   Breathing Techniques/Airway Clearance deep/controlled cough encouraged;diaphragmatic breathing promoted   Respiratory Evaluation   $ Care Plan Tech Time 15 min   Home Oxygen   Has Home Oxygen? Yes   Liter Flow 3   Duration continuous   Route nasal cannula   Mode continuous   Device home concentrator   Home Aerosol, MDI, DPI, and Other Treatments/Therapies   Home Respiratory Therapy Per Patient/Review of Chart Yes   Instructed and encouraged deep diaphragmatic breathing and cough exercises.

## 2020-01-24 NOTE — PLAN OF CARE
Problem: Physical Therapy Goal  Goal: Physical Therapy Goal  Description  Goals to be met by: discharge     Patient will increase functional independence with mobility by performin. Supine to sit with Supervision  2. Sit to stand transfer with Stand-by Assistance  3. Bed to chair transfer with Stand-by Assistance using Rolling Walker  4. Gait  x 100 feet with Stand-by Assistance using Rolling Walker.      Outcome: Ongoing, Progressing   Pt continues to progress towards goals.

## 2020-02-18 ENCOUNTER — TELEPHONE (OUTPATIENT)
Dept: UROLOGY | Facility: CLINIC | Age: 85
End: 2020-02-18

## 2020-02-18 ENCOUNTER — CLINICAL SUPPORT (OUTPATIENT)
Dept: UROLOGY | Facility: CLINIC | Age: 85
End: 2020-02-18
Payer: MEDICARE

## 2020-02-18 DIAGNOSIS — R35.0 URINARY FREQUENCY: Primary | ICD-10-CM

## 2020-02-18 LAB
BILIRUB SERPL-MCNC: NEGATIVE MG/DL
BLOOD URINE, POC: ABNORMAL
COLOR, POC UA: ABNORMAL
GLUCOSE UR QL STRIP: NEGATIVE
KETONES UR QL STRIP: NEGATIVE
LEUKOCYTE ESTERASE URINE, POC: ABNORMAL
NITRITE, POC UA: NEGATIVE
PH, POC UA: 6
POC RESIDUAL URINE VOLUME: 213 ML (ref 0–100)
PROTEIN, POC: ABNORMAL
SPECIFIC GRAVITY, POC UA: 1.02
UROBILINOGEN, POC UA: 0.2

## 2020-02-18 PROCEDURE — 81002 POCT URINE DIPSTICK WITHOUT MICROSCOPE: ICD-10-PCS | Mod: S$GLB,,, | Performed by: UROLOGY

## 2020-02-18 PROCEDURE — 51798 POCT BLADDER SCAN: ICD-10-PCS | Mod: S$GLB,,, | Performed by: UROLOGY

## 2020-02-18 PROCEDURE — 87086 URINE CULTURE/COLONY COUNT: CPT

## 2020-02-18 PROCEDURE — 81002 URINALYSIS NONAUTO W/O SCOPE: CPT | Mod: S$GLB,,, | Performed by: UROLOGY

## 2020-02-18 PROCEDURE — 51798 US URINE CAPACITY MEASURE: CPT | Mod: S$GLB,,, | Performed by: UROLOGY

## 2020-02-18 NOTE — TELEPHONE ENCOUNTER
----- Message from Cammie Harris sent at 2/18/2020  8:58 AM CST -----  Contact: Pt Wife Lakia Petty  Type:  Sooner Apoointment Request    Caller is requesting a sooner appointment.  Caller declined first available appointment listed below.  Caller will not accept being placed on the waitlist and is requesting a message be sent to doctor.    Name of Caller:  Lakia Petty (wife)  When is the first available appointment?  None available. Pt is schd to come in on 3/4  Symptoms:  Possible uti. Needs bladder check. Bad odor. Wife stated that he may not be disposing of his urine as should.  Best Call Back Number:   Additional Information:  Please call pt wife back as soon as possible to advise and assist. Thank you.

## 2020-02-18 NOTE — PROGRESS NOTES
Patient arrived to clinic for c/o urinary frequency. Urine sample given for POCT, results abnormal, bladder scan done, resulted 213 ml. Results reviewed by MD, order to sent urine out for culture and no need for catheter, specimen prepared for lab .

## 2020-02-19 LAB — BACTERIA UR CULT: NO GROWTH

## 2020-02-21 ENCOUNTER — TELEPHONE (OUTPATIENT)
Dept: UROLOGY | Facility: CLINIC | Age: 85
End: 2020-02-21

## 2020-02-21 NOTE — TELEPHONE ENCOUNTER
----- Message from Miko Holloway MD sent at 2/21/2020  9:17 AM CST -----  Urine culture negative    Rec:  Keep appointment

## 2020-02-21 NOTE — TELEPHONE ENCOUNTER
Call placed to patient daughter to give urine culture results, no answer, message left with call back number.

## 2020-03-03 ENCOUNTER — OFFICE VISIT (OUTPATIENT)
Dept: UROLOGY | Facility: CLINIC | Age: 85
End: 2020-03-03
Payer: MEDICARE

## 2020-03-03 VITALS — TEMPERATURE: 98 F | RESPIRATION RATE: 14 BRPM | HEIGHT: 67 IN | WEIGHT: 150.56 LBS | BODY MASS INDEX: 23.63 KG/M2

## 2020-03-03 DIAGNOSIS — N40.1 BENIGN PROSTATIC HYPERPLASIA WITH NOCTURIA: ICD-10-CM

## 2020-03-03 DIAGNOSIS — R35.1 BENIGN PROSTATIC HYPERPLASIA WITH NOCTURIA: ICD-10-CM

## 2020-03-03 DIAGNOSIS — C61 MALIGNANT NEOPLASM OF PROSTATE: Primary | ICD-10-CM

## 2020-03-03 PROCEDURE — 1159F MED LIST DOCD IN RCRD: CPT | Mod: S$GLB,,, | Performed by: UROLOGY

## 2020-03-03 PROCEDURE — 1101F PR PT FALLS ASSESS DOC 0-1 FALLS W/OUT INJ PAST YR: ICD-10-PCS | Mod: CPTII,S$GLB,, | Performed by: UROLOGY

## 2020-03-03 PROCEDURE — 99999 PR PBB SHADOW E&M-EST. PATIENT-LVL III: CPT | Mod: PBBFAC,,, | Performed by: UROLOGY

## 2020-03-03 PROCEDURE — 1101F PT FALLS ASSESS-DOCD LE1/YR: CPT | Mod: CPTII,S$GLB,, | Performed by: UROLOGY

## 2020-03-03 PROCEDURE — 99999 PR PBB SHADOW E&M-EST. PATIENT-LVL III: ICD-10-PCS | Mod: PBBFAC,,, | Performed by: UROLOGY

## 2020-03-03 PROCEDURE — 99214 PR OFFICE/OUTPT VISIT, EST, LEVL IV, 30-39 MIN: ICD-10-PCS | Mod: S$GLB,,, | Performed by: UROLOGY

## 2020-03-03 PROCEDURE — 99214 OFFICE O/P EST MOD 30 MIN: CPT | Mod: S$GLB,,, | Performed by: UROLOGY

## 2020-03-03 PROCEDURE — 1126F PR PAIN SEVERITY QUANTIFIED, NO PAIN PRESENT: ICD-10-PCS | Mod: S$GLB,,, | Performed by: UROLOGY

## 2020-03-03 PROCEDURE — 1126F AMNT PAIN NOTED NONE PRSNT: CPT | Mod: S$GLB,,, | Performed by: UROLOGY

## 2020-03-03 PROCEDURE — 1159F PR MEDICATION LIST DOCUMENTED IN MEDICAL RECORD: ICD-10-PCS | Mod: S$GLB,,, | Performed by: UROLOGY

## 2020-03-03 RX ORDER — DONEPEZIL HYDROCHLORIDE 5 MG/1
10 TABLET, FILM COATED ORAL DAILY
COMMUNITY
Start: 2020-01-29 | End: 2022-04-13

## 2020-03-03 RX ORDER — ALFUZOSIN HYDROCHLORIDE 10 MG/1
10 TABLET, EXTENDED RELEASE ORAL
Qty: 90 TABLET | Refills: 3 | Status: SHIPPED | OUTPATIENT
Start: 2020-03-03 | End: 2021-07-06 | Stop reason: SDUPTHER

## 2020-03-03 RX ORDER — TRAZODONE HYDROCHLORIDE 50 MG/1
TABLET ORAL
COMMUNITY
Start: 2020-01-29 | End: 2020-12-02

## 2020-03-03 NOTE — PROGRESS NOTES
OFFICE NOTE  [unfilled]  [id number]  3/3/2020       CHIEF COMPLAINT:   BPH with lower urinary tract symptoms, adenocarcinoma prostate     HISTORY OF PRESENT ILLNESS:   this 89-year-old male returns routine recheck.  He has a history of BPH for which he is taking Uroxatral 10 mg p.o. q.d. which he states he feels he is doing much better.  He did require Neumann catheter for urinary retention several months ago. He does have a history of adenocarcinoma prostate for which he underwent external beam radiation therapy over 20 years ago and has shown no evidence of any recurrences.  His last PSA was 0.92 on 07/15/2019       PSA  - 0.92 on 07/15/2019        FINAL IMPRESSION:   BPH, adenocarcinoma prostate     RECOMMENDATIONS:  Continue Uroxatral 10 mg p.o. Q.d..  Recheck 6 weeks

## 2020-03-09 DIAGNOSIS — R55 SYNCOPE, UNSPECIFIED SYNCOPE TYPE: Primary | ICD-10-CM

## 2020-03-10 ENCOUNTER — TELEPHONE (OUTPATIENT)
Dept: CARDIOLOGY | Facility: HOSPITAL | Age: 85
End: 2020-03-10

## 2020-03-11 ENCOUNTER — CLINICAL SUPPORT (OUTPATIENT)
Dept: CARDIOLOGY | Facility: HOSPITAL | Age: 85
End: 2020-03-11
Attending: NURSE PRACTITIONER
Payer: MEDICARE

## 2020-03-11 ENCOUNTER — HOSPITAL ENCOUNTER (OUTPATIENT)
Dept: RADIOLOGY | Facility: HOSPITAL | Age: 85
Discharge: HOME | End: 2020-03-11
Attending: NURSE PRACTITIONER
Payer: MEDICARE

## 2020-03-11 DIAGNOSIS — R55 SYNCOPE, UNSPECIFIED SYNCOPE TYPE: ICD-10-CM

## 2020-03-11 PROCEDURE — 93306 TTE W/DOPPLER COMPLETE: CPT

## 2020-03-11 PROCEDURE — 93880 EXTRACRANIAL BILAT STUDY: CPT | Mod: TC

## 2020-03-14 LAB
AORTIC ROOT ANNULUS: 3 CM
AORTIC VALVE CUSP SEPERATION: 1.52 CM
AV INDEX (PROSTH): 0.3
AV MEAN GRADIENT: 10 MMHG
AV PEAK GRADIENT: 20 MMHG
AV VALVE AREA: 0.94 CM2
AV VELOCITY RATIO: 32.37
CV ECHO LV RWT: 0.62 CM
DOP CALC AO PEAK VEL: 2.25 M/S
DOP CALC AO VTI: 48.55 CM
DOP CALC LVOT AREA: 3.1 CM2
DOP CALC LVOT DIAMETER: 1.99 CM
DOP CALC LVOT PEAK VEL: 72.84 M/S
DOP CALC LVOT STROKE VOLUME: 45.42 CM3
DOP CALCLVOT PEAK VEL VTI: 14.61 CM
E WAVE DECELERATION TIME: 271.66 MSEC
E/A RATIO: 0.65
E/E' RATIO: 12.91 M/S
ECHO LV POSTERIOR WALL: 1.53 CM (ref 0.6–1.1)
FRACTIONAL SHORTENING: 13 % (ref 28–44)
INTERVENTRICULAR SEPTUM: 1.75 CM (ref 0.6–1.1)
LEFT ATRIUM SIZE: 4.26 CM
LEFT INTERNAL DIMENSION IN SYSTOLE: 4.3 CM (ref 2.1–4)
LEFT VENTRICLE DIASTOLIC VOLUME: 87.7 ML
LEFT VENTRICLE SYSTOLIC VOLUME: 51 ML
LEFT VENTRICULAR INTERNAL DIMENSION IN DIASTOLE: 4.96 CM (ref 3.5–6)
LEFT VENTRICULAR MASS: 364.56 G
LV LATERAL E/E' RATIO: 11.83 M/S
LV SEPTAL E/E' RATIO: 14.2 M/S
MV PEAK A VEL: 1.1 M/S
MV PEAK E VEL: 0.71 M/S
PISA TR MAX VEL: 2.74 M/S
PV PEAK VELOCITY: 108.78 CM/S
RA PRESSURE: 3 MMHG
RIGHT VENTRICULAR END-DIASTOLIC DIMENSION: 172 CM
TDI LATERAL: 0.06 M/S
TDI SEPTAL: 0.05 M/S
TDI: 0.06 M/S
TR MAX PG: 30 MMHG
TV REST PULMONARY ARTERY PRESSURE: 33 MMHG

## 2020-03-30 ENCOUNTER — TELEPHONE (OUTPATIENT)
Dept: UROLOGY | Facility: CLINIC | Age: 85
End: 2020-03-30

## 2020-03-30 NOTE — TELEPHONE ENCOUNTER
----- Message from Robyn Quick sent at 3/30/2020 12:45 PM CDT -----  Type:  Patient Returning Call    Who Called: Lakia KAMALA Yuan (Daughter)  Who Left Message for Patient:  Aba  Does the patient know what this is regarding?:    Best Call Back Number:  [phone]  Additional Information:

## 2020-04-02 ENCOUNTER — OFFICE VISIT (OUTPATIENT)
Dept: UROLOGY | Facility: CLINIC | Age: 85
End: 2020-04-02
Payer: MEDICARE

## 2020-04-02 DIAGNOSIS — N40.1 BENIGN PROSTATIC HYPERPLASIA WITH NOCTURIA: ICD-10-CM

## 2020-04-02 DIAGNOSIS — C61 MALIGNANT NEOPLASM OF PROSTATE: Primary | ICD-10-CM

## 2020-04-02 DIAGNOSIS — R35.1 BENIGN PROSTATIC HYPERPLASIA WITH NOCTURIA: ICD-10-CM

## 2020-04-02 PROCEDURE — 99214 OFFICE O/P EST MOD 30 MIN: CPT | Mod: 95,,, | Performed by: UROLOGY

## 2020-04-02 PROCEDURE — 1159F MED LIST DOCD IN RCRD: CPT | Mod: 95,,, | Performed by: UROLOGY

## 2020-04-02 PROCEDURE — 99214 PR OFFICE/OUTPT VISIT, EST, LEVL IV, 30-39 MIN: ICD-10-PCS | Mod: 95,,, | Performed by: UROLOGY

## 2020-04-02 PROCEDURE — 1101F PR PT FALLS ASSESS DOC 0-1 FALLS W/OUT INJ PAST YR: ICD-10-PCS | Mod: CPTII,95,, | Performed by: UROLOGY

## 2020-04-02 PROCEDURE — 1159F PR MEDICATION LIST DOCUMENTED IN MEDICAL RECORD: ICD-10-PCS | Mod: 95,,, | Performed by: UROLOGY

## 2020-04-02 PROCEDURE — 1101F PT FALLS ASSESS-DOCD LE1/YR: CPT | Mod: CPTII,95,, | Performed by: UROLOGY

## 2020-04-02 NOTE — PROGRESS NOTES
OFFICE NOTE  [unfilled]  [id number]  4/2/2020       CHIEF COMPLAINT:   BPH with lower urinary tract symptoms, adenocarcinoma prostate     HISTORY OF PRESENT ILLNESS:   this 89-year-old male presents for virtual visit in view of the Coronavirus situation.  He continues to take Uroxatral 10 mg p.o. q.d. for management of his BPH and he states he is voiding very well with no urinary complaints.  He also has a history of adenocarcinoma prostate for which he underwent external beam radiation therapy over 20 years ago and has shown no evidence of any recurrences.  His last PSA was 0.92 on 07/15/2019.  No other changes in his general health    Physical exam:  Abdomen -  physical examination could not be done since this is a virtual visit in view of the corona virus situation       PSA - 0.92 on 07/15/2019        FINAL IMPRESSION:  BPH, adenocarcinoma prostate       RECOMMENDATIONS:   continue on Uroxatral 10 mg p.o. Q.d..  Recheck 4-6 months.

## 2020-04-16 RX ORDER — HYDROXYCHLOROQUINE SULFATE 200 MG/1
TABLET, FILM COATED ORAL
Qty: 60 TABLET | Refills: 0 | OUTPATIENT
Start: 2020-04-16

## 2020-10-16 ENCOUNTER — OFFICE VISIT (OUTPATIENT)
Dept: UROLOGY | Facility: CLINIC | Age: 85
End: 2020-10-16
Payer: MEDICARE

## 2020-10-16 VITALS
SYSTOLIC BLOOD PRESSURE: 176 MMHG | TEMPERATURE: 97 F | BODY MASS INDEX: 23.63 KG/M2 | WEIGHT: 150.56 LBS | RESPIRATION RATE: 18 BRPM | HEIGHT: 67 IN | DIASTOLIC BLOOD PRESSURE: 62 MMHG | HEART RATE: 67 BPM

## 2020-10-16 DIAGNOSIS — N40.1 BENIGN PROSTATIC HYPERPLASIA WITH NOCTURIA: ICD-10-CM

## 2020-10-16 DIAGNOSIS — C61 PROSTATE CANCER: Primary | ICD-10-CM

## 2020-10-16 DIAGNOSIS — R35.1 BENIGN PROSTATIC HYPERPLASIA WITH NOCTURIA: ICD-10-CM

## 2020-10-16 PROCEDURE — 99999 PR PBB SHADOW E&M-EST. PATIENT-LVL III: CPT | Mod: PBBFAC,,, | Performed by: UROLOGY

## 2020-10-16 PROCEDURE — 99214 OFFICE O/P EST MOD 30 MIN: CPT | Mod: S$GLB,,, | Performed by: UROLOGY

## 2020-10-16 PROCEDURE — 1101F PT FALLS ASSESS-DOCD LE1/YR: CPT | Mod: CPTII,S$GLB,, | Performed by: UROLOGY

## 2020-10-16 PROCEDURE — 1126F PR PAIN SEVERITY QUANTIFIED, NO PAIN PRESENT: ICD-10-PCS | Mod: S$GLB,,, | Performed by: UROLOGY

## 2020-10-16 PROCEDURE — 99214 PR OFFICE/OUTPT VISIT, EST, LEVL IV, 30-39 MIN: ICD-10-PCS | Mod: S$GLB,,, | Performed by: UROLOGY

## 2020-10-16 PROCEDURE — 99999 PR PBB SHADOW E&M-EST. PATIENT-LVL III: ICD-10-PCS | Mod: PBBFAC,,, | Performed by: UROLOGY

## 2020-10-16 PROCEDURE — 1101F PR PT FALLS ASSESS DOC 0-1 FALLS W/OUT INJ PAST YR: ICD-10-PCS | Mod: CPTII,S$GLB,, | Performed by: UROLOGY

## 2020-10-16 PROCEDURE — 1159F PR MEDICATION LIST DOCUMENTED IN MEDICAL RECORD: ICD-10-PCS | Mod: S$GLB,,, | Performed by: UROLOGY

## 2020-10-16 PROCEDURE — 1126F AMNT PAIN NOTED NONE PRSNT: CPT | Mod: S$GLB,,, | Performed by: UROLOGY

## 2020-10-16 PROCEDURE — 1159F MED LIST DOCD IN RCRD: CPT | Mod: S$GLB,,, | Performed by: UROLOGY

## 2020-10-16 RX ORDER — OMEPRAZOLE 20 MG/1
20 CAPSULE, DELAYED RELEASE ORAL DAILY
Status: ON HOLD | COMMUNITY
Start: 2020-07-14 | End: 2020-12-03 | Stop reason: SDUPTHER

## 2020-10-16 NOTE — PROGRESS NOTES
OFFICE NOTE  [unfilled]  [id number]  10/16/2020       CHIEF COMPLAINT:   adenocarcinoma prostate, BPH with lower urinary tract symptoms     HISTORY OF PRESENT ILLNESS:   this 89-year-old male returns routine recheck.  He has history of adenocarcinoma prostate for which he underwent external beam radiation therapy over 20 years ago and has shown no evidence recurrence since then.  He also has BPH which he continues to take oral Uroxatral which continues to be effective.  On today's visit he states he is doing well and denies any complaints.    No other changes in his general health    Physical exam:  Abdomen - soft benign nontender no masses small left inguinal hernia still present unchanged and reducible, no organomegaly                             External genitalia - normal phallus with adequate meatus testes descended and feel normal no scrotal mass                             Rectal -  firm flattened prostatic area no nodules normal sphincter tone         PSA  - 0.92 on 07/15/2019        FINAL IMPRESSION:   adenocarcinoma prostate, BPH with lower urinary tract symptoms     RECOMMENDATIONS:  PSA for which he is due recheck.  Continue on Uroxatral 10 mg p.o. q.d..  Will contact patient with the PSA result and of his next appointment.

## 2020-10-18 ENCOUNTER — HOSPITAL ENCOUNTER (EMERGENCY)
Facility: HOSPITAL | Age: 85
Discharge: LEFT BEFORE DISCHARGE | End: 2020-10-18
Attending: EMERGENCY MEDICINE
Payer: MEDICARE

## 2020-10-18 VITALS
RESPIRATION RATE: 18 BRPM | TEMPERATURE: 99 F | HEART RATE: 70 BPM | SYSTOLIC BLOOD PRESSURE: 165 MMHG | OXYGEN SATURATION: 97 % | DIASTOLIC BLOOD PRESSURE: 79 MMHG

## 2020-10-18 DIAGNOSIS — N39.0 URINARY TRACT INFECTION WITHOUT HEMATURIA, SITE UNSPECIFIED: Primary | ICD-10-CM

## 2020-10-18 LAB
ALBUMIN SERPL BCP-MCNC: 3.3 G/DL (ref 3.5–5.2)
ALP SERPL-CCNC: 77 U/L (ref 55–135)
ALT SERPL W/O P-5'-P-CCNC: 13 U/L (ref 10–44)
ANION GAP SERPL CALC-SCNC: 14 MMOL/L (ref 8–16)
AST SERPL-CCNC: 18 U/L (ref 10–40)
BACTERIA #/AREA URNS HPF: ABNORMAL /HPF
BASOPHILS # BLD AUTO: 0.01 K/UL (ref 0–0.2)
BASOPHILS NFR BLD: 0.2 % (ref 0–1.9)
BILIRUB SERPL-MCNC: 0.4 MG/DL (ref 0.1–1)
BILIRUB UR QL STRIP: NEGATIVE
BUN SERPL-MCNC: 24 MG/DL (ref 10–30)
CALCIUM SERPL-MCNC: 9.1 MG/DL (ref 8.7–10.5)
CHLORIDE SERPL-SCNC: 103 MMOL/L (ref 95–110)
CLARITY UR: ABNORMAL
CO2 SERPL-SCNC: 28 MMOL/L (ref 23–29)
COLOR UR: YELLOW
CREAT SERPL-MCNC: 1.8 MG/DL (ref 0.5–1.4)
DIFFERENTIAL METHOD: ABNORMAL
EOSINOPHIL # BLD AUTO: 0.2 K/UL (ref 0–0.5)
EOSINOPHIL NFR BLD: 4 % (ref 0–8)
ERYTHROCYTE [DISTWIDTH] IN BLOOD BY AUTOMATED COUNT: 15.4 % (ref 11.5–14.5)
EST. GFR  (AFRICAN AMERICAN): 37 ML/MIN/1.73 M^2
EST. GFR  (NON AFRICAN AMERICAN): 32 ML/MIN/1.73 M^2
GLUCOSE SERPL-MCNC: 100 MG/DL (ref 70–110)
GLUCOSE UR QL STRIP: NEGATIVE
HCT VFR BLD AUTO: 30.4 % (ref 40–54)
HGB BLD-MCNC: 9.2 G/DL (ref 14–18)
HGB UR QL STRIP: ABNORMAL
IMM GRANULOCYTES # BLD AUTO: 0.01 K/UL (ref 0–0.04)
IMM GRANULOCYTES NFR BLD AUTO: 0.2 % (ref 0–0.5)
KETONES UR QL STRIP: NEGATIVE
LEUKOCYTE ESTERASE UR QL STRIP: ABNORMAL
LYMPHOCYTES # BLD AUTO: 0.9 K/UL (ref 1–4.8)
LYMPHOCYTES NFR BLD: 17.8 % (ref 18–48)
MCH RBC QN AUTO: 28.6 PG (ref 27–31)
MCHC RBC AUTO-ENTMCNC: 30.3 G/DL (ref 32–36)
MCV RBC AUTO: 94 FL (ref 82–98)
MICROSCOPIC COMMENT: ABNORMAL
MONOCYTES # BLD AUTO: 0.6 K/UL (ref 0.3–1)
MONOCYTES NFR BLD: 11.3 % (ref 4–15)
NEUTROPHILS # BLD AUTO: 3.5 K/UL (ref 1.8–7.7)
NEUTROPHILS NFR BLD: 66.5 % (ref 38–73)
NITRITE UR QL STRIP: NEGATIVE
NRBC BLD-RTO: 0 /100 WBC
PH UR STRIP: 6 [PH] (ref 5–8)
PLATELET # BLD AUTO: 287 K/UL (ref 150–350)
PMV BLD AUTO: 9.8 FL (ref 9.2–12.9)
POTASSIUM SERPL-SCNC: 4.1 MMOL/L (ref 3.5–5.1)
PROT SERPL-MCNC: 7.3 G/DL (ref 6–8.4)
PROT UR QL STRIP: ABNORMAL
RBC # BLD AUTO: 3.22 M/UL (ref 4.6–6.2)
RBC #/AREA URNS HPF: 4 /HPF (ref 0–4)
SODIUM SERPL-SCNC: 145 MMOL/L (ref 136–145)
SP GR UR STRIP: 1.01 (ref 1–1.03)
SQUAMOUS #/AREA URNS HPF: 2 /HPF
URN SPEC COLLECT METH UR: ABNORMAL
UROBILINOGEN UR STRIP-ACNC: NEGATIVE EU/DL
WBC # BLD AUTO: 5.23 K/UL (ref 3.9–12.7)
WBC #/AREA URNS HPF: >100 /HPF (ref 0–5)

## 2020-10-18 PROCEDURE — 87088 URINE BACTERIA CULTURE: CPT

## 2020-10-18 PROCEDURE — 99283 EMERGENCY DEPT VISIT LOW MDM: CPT

## 2020-10-18 PROCEDURE — 87086 URINE CULTURE/COLONY COUNT: CPT

## 2020-10-18 PROCEDURE — 81000 URINALYSIS NONAUTO W/SCOPE: CPT

## 2020-10-18 PROCEDURE — 36415 COLL VENOUS BLD VENIPUNCTURE: CPT

## 2020-10-18 PROCEDURE — 85025 COMPLETE CBC W/AUTO DIFF WBC: CPT

## 2020-10-18 PROCEDURE — 80053 COMPREHEN METABOLIC PANEL: CPT

## 2020-10-18 RX ORDER — CEPHALEXIN 500 MG/1
500 CAPSULE ORAL 4 TIMES DAILY
Qty: 20 CAPSULE | Refills: 0 | Status: SHIPPED | OUTPATIENT
Start: 2020-10-18 | End: 2020-10-23

## 2020-10-18 NOTE — ED PROVIDER NOTES
Encounter Date: 10/18/2020    SCRIBE #1 NOTE: I, José Miguel Gilliland, am scribing for, and in the presence of, Justin Awad MD.       History     Chief Complaint   Patient presents with    Abdominal Pain     RLQ/flank x ~1 week       Time seen by provider: 12:03 PM on 10/18/2020    Troy Yuan Sr. is a 89 y.o. male with PMHx of kidney stone, HTN, cancer, CKD, and prostate cancer who presents to the ED with an onset of waxing and waning right flank pain beginning x3 days ago. The patient's daughter notes the patient's urine is abnormally pungent. The daughter endorses the patient has recurrent UTI's with his last infection being x6 months ago. The patient denies dysuria, hematuria, bowel incontinence, or any other symptoms at this time. Pertinent PSHx includes kidney stone surgery.       The history is provided by the patient and a relative.     Review of patient's allergies indicates:   Allergen Reactions    Soma [carisoprodol] Rash    Aspirin     Sulfa (sulfonamide antibiotics) Rash     Past Medical History:   Diagnosis Date    Bladder stones     Cancer     Encounter for blood transfusion     Hypertension     Kidney stone     Prostate cancer 2004    Radiation 2005    prostate    Stage 3 chronic kidney disease 1/11/2019     Past Surgical History:   Procedure Laterality Date    CYSTOSCOPY      KIDNEY STONE SURGERY  May 2014     Family History   Problem Relation Age of Onset    Hypertension Daughter     Diabetes Daughter     Hypertension Son     Asthma Son      Social History     Tobacco Use    Smoking status: Former Smoker     Years: 40.00     Types: Cigarettes     Start date: 9/26/1985    Smokeless tobacco: Never Used   Substance Use Topics    Alcohol use: No    Drug use: No     Review of Systems   Constitutional: Negative for fever.   HENT: Negative for sore throat.    Respiratory: Negative for shortness of breath.    Cardiovascular: Negative for chest pain.   Gastrointestinal: Negative for  nausea.   Genitourinary: Positive for flank pain. Negative for dysuria and hematuria.   Musculoskeletal: Negative for back pain.   Skin: Negative for rash.   Neurological: Negative for weakness.   Hematological: Does not bruise/bleed easily.       Physical Exam     Initial Vitals [10/18/20 1118]   BP Pulse Resp Temp SpO2   (!) 165/79 70 18 98.6 °F (37 °C) 97 %      MAP       --         Physical Exam    Nursing note and vitals reviewed.  Constitutional: He appears well-developed and well-nourished.  Non-toxic appearance. No distress.   HENT:   Head: Normocephalic and atraumatic.   Eyes: EOM are normal. Pupils are equal, round, and reactive to light.   Neck: Normal range of motion. Neck supple. No neck rigidity. No JVD present.   Cardiovascular: Normal rate, regular rhythm, normal heart sounds and intact distal pulses. Exam reveals no gallop and no friction rub.    No murmur heard.  Pulmonary/Chest: Breath sounds normal. He has no wheezes. He has no rhonchi. He has no rales.   Abdominal: Soft. Bowel sounds are normal. He exhibits no distension. There is no abdominal tenderness. There is no rebound and no guarding.   No abdominal pain or tenderness. No rebound or guarding.    Musculoskeletal: Normal range of motion.   Neurological: He is alert and oriented to person, place, and time. He has normal strength and normal reflexes. No cranial nerve deficit or sensory deficit. He exhibits normal muscle tone. Coordination normal. GCS eye subscore is 4. GCS verbal subscore is 5. GCS motor subscore is 6.   Skin: Skin is warm and dry.   Psychiatric: He has a normal mood and affect. His speech is normal and behavior is normal. He is not actively hallucinating.         ED Course   Procedures  Labs Reviewed   CBC W/ AUTO DIFFERENTIAL - Abnormal; Notable for the following components:       Result Value    RBC 3.22 (*)     Hemoglobin 9.2 (*)     Hematocrit 30.4 (*)     Mean Corpuscular Hemoglobin Conc 30.3 (*)     RDW 15.4 (*)      Lymph # 0.9 (*)     Lymph% 17.8 (*)     All other components within normal limits   COMPREHENSIVE METABOLIC PANEL - Abnormal; Notable for the following components:    Creatinine 1.8 (*)     Albumin 3.3 (*)     eGFR if  37 (*)     eGFR if non  32 (*)     All other components within normal limits   URINALYSIS, REFLEX TO URINE CULTURE - Abnormal; Notable for the following components:    Appearance, UA Hazy (*)     Protein, UA Trace (*)     Occult Blood UA Trace (*)     Leukocytes, UA 3+ (*)     All other components within normal limits    Narrative:     Specimen Source->Urine   URINALYSIS MICROSCOPIC - Abnormal; Notable for the following components:    WBC, UA >100 (*)     Bacteria Many (*)     All other components within normal limits    Narrative:     Specimen Source->Urine   CULTURE, URINE          Imaging Results    None          Medical Decision Making:   History:   Old Medical Records: I decided to obtain old medical records.  Initial Assessment:   Patient is a 89-year-old man who presents emergency department for evaluation of right flank pain that is reproduced in worse with movement.  He has no focal tenderness on examination.  No CVA or abdominal tenderness.  Denies any fever, dysuria.  Afebrile well-appearing nontoxic on examination.  Laboratory evaluation performed and CT of the abdomen pelvis ordered to rule out a kidney stone.  Patient did not want to wait on his blood work or CT scan to get performed and wished to leave since he felt better and his pain was gone.  He leaves against medical advice and understands he may return should he changes mind.  After he left urinalysis came back is concerning for UTI.  Called his daughter and discussed findings and called in antibiotics to the pharmacy.  Urine culture is pending.  Patient to follow-up with his urologist or return for fever worsening symptoms.  Clinical Tests:   Lab Tests: Reviewed and Ordered  Radiological Study:  Ordered and Reviewed            Scribe Attestation:   Scribe #1: I performed the above scribed service and the documentation accurately describes the services I performed. I attest to the accuracy of the note.     I, Ritesh Norwood, personally performed the services described in this documentation. All medical record entries made by the scribe were at my direction and in my presence.  I have reviewed the chart and agree that the record reflects my personal performance and is accurate and complete. Justin Awad MD.                  Clinical Impression:     ICD-10-CM ICD-9-CM   1. Urinary tract infection without hematuria, site unspecified  N39.0 599.0                          ED Disposition Condition    AMA                             Justin Awad MD  10/18/20 0805

## 2020-10-18 NOTE — ED NOTES
Pt presents with complaints of pain along right side that has been present for a couple of days. Pt says pain is with movement, however, he is presently not having any pain and is not tender to palpation. No noted deformities. No known trauma. No abdominal pain. Normal stool and no problems with urination. Pt in no noted distress.

## 2020-10-18 NOTE — LETTER
Patient: Troy uYan  YOB: 1929  Date: 10/18/2020 Time: 1:11 PM  Location: [facility]    Leaving the [facility] Against Medical Advice    Chart #:[id number]    This will certify that I, the undersigned,    ______________________________________________________________________    A patient in the above named medical center, having requested discharge and removal from the medical [place] against the advice of my attending physician(s), hereby release [facility], its physicians, officers and employees, severally and individually, from any and all liability of any nature whatsoever for any injury or harm or complication of any kind that may result directly or indirectly, by reason of my terminating my stay as a patient at [facility] and my departure from [facility], and hereby waive any and all rights of action I may now have or later acquire as a result of my voluntary departure from [facility] and the termination of my stay as a patient therein.    This release is made with the full knowledge of the danger that may result from the action which I am taking.      Date:_______________________                         ___________________________                                                                                    Patient/Legal Representative    Witness:        ____________________________                          ___________________________  Nurse                                                                        Physician

## 2020-10-20 LAB — BACTERIA UR CULT: ABNORMAL

## 2020-10-28 ENCOUNTER — HOSPITAL ENCOUNTER (EMERGENCY)
Facility: HOSPITAL | Age: 85
Discharge: HOME | End: 2020-10-28
Attending: EMERGENCY MEDICINE
Payer: MEDICARE

## 2020-10-28 VITALS
BODY MASS INDEX: 25.11 KG/M2 | OXYGEN SATURATION: 95 % | SYSTOLIC BLOOD PRESSURE: 172 MMHG | DIASTOLIC BLOOD PRESSURE: 87 MMHG | HEIGHT: 67 IN | WEIGHT: 160 LBS | TEMPERATURE: 99 F | HEART RATE: 78 BPM | RESPIRATION RATE: 27 BRPM

## 2020-10-28 DIAGNOSIS — N20.1 URETERAL CALCULI: Primary | ICD-10-CM

## 2020-10-28 LAB
ALBUMIN SERPL BCP-MCNC: 3.7 G/DL (ref 3.5–5.2)
ALP SERPL-CCNC: 62 U/L (ref 55–135)
ALT SERPL W/O P-5'-P-CCNC: 17 U/L (ref 10–44)
ANION GAP SERPL CALC-SCNC: 12 MMOL/L (ref 8–16)
AST SERPL-CCNC: 23 U/L (ref 10–40)
BACTERIA #/AREA URNS HPF: NEGATIVE /HPF
BASOPHILS # BLD AUTO: 0.02 K/UL (ref 0–0.2)
BASOPHILS NFR BLD: 0.4 % (ref 0–1.9)
BILIRUB SERPL-MCNC: 0.6 MG/DL (ref 0.1–1)
BILIRUB UR QL STRIP: NEGATIVE
BUN SERPL-MCNC: 34 MG/DL (ref 10–30)
CALCIUM SERPL-MCNC: 9.2 MG/DL (ref 8.7–10.5)
CHLORIDE SERPL-SCNC: 101 MMOL/L (ref 95–110)
CLARITY UR: CLEAR
CO2 SERPL-SCNC: 28 MMOL/L (ref 23–29)
COLOR UR: YELLOW
CREAT SERPL-MCNC: 2 MG/DL (ref 0.5–1.4)
DIFFERENTIAL METHOD: ABNORMAL
EOSINOPHIL # BLD AUTO: 0.2 K/UL (ref 0–0.5)
EOSINOPHIL NFR BLD: 4.1 % (ref 0–8)
ERYTHROCYTE [DISTWIDTH] IN BLOOD BY AUTOMATED COUNT: 16.1 % (ref 11.5–14.5)
EST. GFR  (AFRICAN AMERICAN): 32.8 ML/MIN/1.73 M^2
EST. GFR  (NON AFRICAN AMERICAN): 28.3 ML/MIN/1.73 M^2
GLUCOSE SERPL-MCNC: 105 MG/DL (ref 70–110)
GLUCOSE UR QL STRIP: NEGATIVE
HCT VFR BLD AUTO: 27.7 % (ref 40–54)
HGB BLD-MCNC: 8.5 G/DL (ref 14–18)
HGB UR QL STRIP: NEGATIVE
HYALINE CASTS #/AREA URNS LPF: 8 /LPF
IMM GRANULOCYTES # BLD AUTO: 0.01 K/UL (ref 0–0.04)
IMM GRANULOCYTES NFR BLD AUTO: 0.2 % (ref 0–0.5)
KETONES UR QL STRIP: NEGATIVE
LEUKOCYTE ESTERASE UR QL STRIP: ABNORMAL
LYMPHOCYTES # BLD AUTO: 0.5 K/UL (ref 1–4.8)
LYMPHOCYTES NFR BLD: 8.3 % (ref 18–48)
MCH RBC QN AUTO: 28.2 PG (ref 27–31)
MCHC RBC AUTO-ENTMCNC: 30.7 G/DL (ref 32–36)
MCV RBC AUTO: 92 FL (ref 82–98)
MICROSCOPIC COMMENT: ABNORMAL
MONOCYTES # BLD AUTO: 0.6 K/UL (ref 0.3–1)
MONOCYTES NFR BLD: 9.9 % (ref 4–15)
NEUTROPHILS # BLD AUTO: 4.4 K/UL (ref 1.8–7.7)
NEUTROPHILS NFR BLD: 77.1 % (ref 38–73)
NITRITE UR QL STRIP: NEGATIVE
NRBC BLD-RTO: 0 /100 WBC
PH UR STRIP: 6 [PH] (ref 5–8)
PLATELET # BLD AUTO: 264 K/UL (ref 150–350)
PMV BLD AUTO: 9.9 FL (ref 9.2–12.9)
POTASSIUM SERPL-SCNC: 3.8 MMOL/L (ref 3.5–5.1)
PROT SERPL-MCNC: 7.5 G/DL (ref 6–8.4)
PROT UR QL STRIP: ABNORMAL
RBC # BLD AUTO: 3.01 M/UL (ref 4.6–6.2)
RBC #/AREA URNS HPF: 2 /HPF (ref 0–4)
SODIUM SERPL-SCNC: 141 MMOL/L (ref 136–145)
SP GR UR STRIP: 1.01 (ref 1–1.03)
SQUAMOUS #/AREA URNS HPF: 1 /HPF
URN SPEC COLLECT METH UR: ABNORMAL
UROBILINOGEN UR STRIP-ACNC: NEGATIVE EU/DL
WBC # BLD AUTO: 5.67 K/UL (ref 3.9–12.7)
WBC #/AREA URNS HPF: 6 /HPF (ref 0–5)

## 2020-10-28 PROCEDURE — 81001 URINALYSIS AUTO W/SCOPE: CPT

## 2020-10-28 PROCEDURE — 99284 EMERGENCY DEPT VISIT MOD MDM: CPT | Mod: 25

## 2020-10-28 PROCEDURE — 85025 COMPLETE CBC W/AUTO DIFF WBC: CPT

## 2020-10-28 PROCEDURE — 36415 COLL VENOUS BLD VENIPUNCTURE: CPT

## 2020-10-28 PROCEDURE — 80053 COMPREHEN METABOLIC PANEL: CPT

## 2020-10-28 RX ORDER — HYDROCODONE BITARTRATE AND ACETAMINOPHEN 5; 325 MG/1; MG/1
1 TABLET ORAL EVERY 4 HOURS PRN
Qty: 10 TABLET | Refills: 0 | Status: SHIPPED | OUTPATIENT
Start: 2020-10-28 | End: 2020-12-02

## 2020-10-28 RX ORDER — TAMSULOSIN HYDROCHLORIDE 0.4 MG/1
0.4 CAPSULE ORAL DAILY
Qty: 10 CAPSULE | Refills: 0 | Status: SHIPPED | OUTPATIENT
Start: 2020-10-28 | End: 2020-11-03 | Stop reason: ALTCHOICE

## 2020-10-28 NOTE — ED PROVIDER NOTES
Encounter Date: 10/28/2020       History     Chief Complaint   Patient presents with    Groin Pain     pt c/o right groin pain that started two days ago. pt denies any pain with urination. reports hand cramping as well.      Patient here with reported right groin pain that started 2 days ago pain has been intermittent he has a history of kidney stones he states that he was recently treated for a bladder infection by his primary care physician is currently on antibiotics he denies any nausea denies any blood in his urine denies any fever chills denies any other abdominal pain he localizes the pain in his right inguinal area he denies any testicular pain denies any penile pain no discharge no fever chills        Review of patient's allergies indicates:   Allergen Reactions    Soma [carisoprodol] Rash    Aspirin     Sulfa (sulfonamide antibiotics) Rash     Past Medical History:   Diagnosis Date    Bladder stones     Cancer     Encounter for blood transfusion     Hypertension     Kidney stone     Prostate cancer 2004    Radiation 2005    prostate    Stage 3 chronic kidney disease 1/11/2019     Past Surgical History:   Procedure Laterality Date    CYSTOSCOPY      KIDNEY STONE SURGERY  May 2014     Family History   Problem Relation Age of Onset    Hypertension Daughter     Diabetes Daughter     Hypertension Son     Asthma Son      Social History     Tobacco Use    Smoking status: Former Smoker     Years: 40.00     Types: Cigarettes     Start date: 9/26/1985    Smokeless tobacco: Never Used   Substance Use Topics    Alcohol use: No    Drug use: No     Review of Systems   Constitutional: Negative for chills and fever.   HENT: Negative for congestion, ear pain, rhinorrhea and sore throat.    Eyes: Negative for discharge.   Respiratory: Negative for cough and shortness of breath.    Cardiovascular: Negative for chest pain and leg swelling.   Gastrointestinal: Negative for abdominal pain, blood in stool,  constipation, diarrhea, nausea and vomiting.   Genitourinary: Negative for dysuria, flank pain, frequency, hematuria, penile pain, penile swelling, scrotal swelling and testicular pain.   Musculoskeletal: Negative for myalgias.   Skin: Negative for rash.   Neurological: Negative for headaches.   Hematological: Negative for adenopathy.       Physical Exam     Initial Vitals [10/28/20 1453]   BP Pulse Resp Temp SpO2   (!) 184/81 73 18 98.5 °F (36.9 °C) 96 %      MAP       --         Physical Exam    Constitutional: He appears well-developed and well-nourished. No distress.   HENT:   Head: Normocephalic and atraumatic.   Right Ear: External ear normal.   Left Ear: External ear normal.   Mouth/Throat: Oropharynx is clear and moist.   Eyes: Pupils are equal, round, and reactive to light.   Neck: Normal range of motion. Neck supple.   Cardiovascular: Normal rate, regular rhythm, S1 normal, S2 normal and intact distal pulses.   Abdominal: Soft. Normal appearance and bowel sounds are normal. There is abdominal tenderness.   Right groin   Genitourinary:    Genitourinary Comments: Penis normal, testicles normal no tenderness no swelling no inguinal lymphadenopathy there is tenderness in the right inguinal canal without palpable hernia     Musculoskeletal: Normal range of motion.   Neurological: He is alert and oriented to person, place, and time. GCS score is 15. GCS eye subscore is 4. GCS verbal subscore is 5. GCS motor subscore is 6.   Skin: Skin is warm and dry. Capillary refill takes less than 2 seconds. No rash noted.   Psychiatric: He has a normal mood and affect. His behavior is normal.         ED Course   Procedures  Labs Reviewed   URINALYSIS, REFLEX TO URINE CULTURE - Abnormal; Notable for the following components:       Result Value    Protein, UA Trace (*)     Leukocytes, UA Trace (*)     All other components within normal limits    Narrative:     Specimen Source->Urine   CBC W/ AUTO DIFFERENTIAL - Abnormal;  Notable for the following components:    RBC 3.01 (*)     Hemoglobin 8.5 (*)     Hematocrit 27.7 (*)     MCHC 30.7 (*)     RDW 16.1 (*)     Lymph # 0.5 (*)     Gran % 77.1 (*)     Lymph % 8.3 (*)     All other components within normal limits   COMPREHENSIVE METABOLIC PANEL - Abnormal; Notable for the following components:    BUN 34 (*)     Creatinine 2.0 (*)     eGFR if  32.8 (*)     eGFR if non  28.3 (*)     All other components within normal limits   URINALYSIS MICROSCOPIC - Abnormal; Notable for the following components:    WBC, UA 6 (*)     Hyaline Casts, UA 8 (*)     All other components within normal limits    Narrative:     Specimen Source->Urine          Imaging Results          CT Renal Stone Study ABD Pelvis WO (Final result)  Result time 10/28/20 18:05:52    Final result by Cristina Talbot MD (10/28/20 18:05:52)                 Narrative:    CMS MANDATED QUALITY DATA - CT RADIATION 436. CT scans at this  facility utilize dose modulation, iterative reconstruction, and/or  weight based dosing when appropriate to reduce radiation dose to as  low as reasonably achievable.    PROCEDURE:    CT RENAL STONE STUDY ABD PELVIS WO  dated  10/28/2020  5:59 PM    CLINICAL HISTORY:   Male 89 years of age.   Flank pain, kidney stone  suspected    TECHNIQUE:  CT of the Abdomen and Pelvis was performed. Intravenous  contrast was not administered.    COMPARISON:  None    FINDINGS:    Lower chest is partially included in the field-of-view. There are  small dependent bilateral pleural effusions. Interlobular and  intralobular septae are thickened. The left heart is mildly dilated.    The aorta is heavily calcified but not dilated. There our  calcifications of the main renal arteries and more distal renal  arteries, the celiac axis and the superior mesenteric artery.    Calcifications within the kidneys are at least in part if not entirely  vascular calcifications. The right ureter is diffusely  mildly dilated  without periureteral edema. There is a calcification along the dorsal  aspect of the distal right ureter just proximal to the bladder. This  measures 3 mm in width by 5 mm in length. At least a portion of this  calcification appears to extend posterior to the ureter. However, the  volume averaging limits detail. This may be a phlebolith or may be a  ureteral stone. Urinary bladder is mildly filled and not thickened.    Lobulated heterogeneous structure extending exophytically off of the  upper pole left kidney measures 1.8 cm in diameter which is smaller  than on the prior study in 2014  Measures 2.8 cm. This is a complex cyst. There are a few other rounded  homogeneously hyperdense structures in the kidneys which are likely  hyperdense cysts.    The liver, gallbladder, pancreas, spleen and adrenal glands are  normal.    The appendix is normal. Bowel is not dilated or thickened. A moderate  amount of formed stool is distributed throughout the colon. A left  inguinal hernia contains a loop of nonobstructed proximal sigmoid  colon. The hernia is chronic (present in 2014). The opening of the  hernia is 2 cm in diameter. This is probably not incarcerated. There  is moderate diverticulosis of the descending colon, without  diverticulitis. There is a fat-containing right inguinal hernia. The  anterior inferior aspect of the cecum approaches but does not enter  the right inguinal canal.    Prostate gland and seminal vesicles are normal.    There is no ascites. There is no lymphadenopathy. There is no acute  fracture. Vertebral body height and spinal alignment are normal. There  is no osteolytic/osteoblastic lesion.    IMPRESSION:  1. Right ureter is diffusely mildly dilated. A 3 mm x 5 mm  calcification at the distal right ureter just proximal to the  ureterovesicular junction is either a phlebolith abutting the dorsal  aspect of the ureter or is a ureteral stone. There are definite  several vascular  calcifications in the kidneys. There is no convincing  renal stone. However, prior studies (2012 and 2014) show bilateral  nephrolithiasis.  2. Left heart failure is suspected, based on the presence of small  pleural effusions, probable interstitial edema within the included  lung bases, and dilated left ventricle.  3. There are chronic small bilateral inguinal hernias. A loop of  nonobstructed sigmoid colon is within the left inguinal hernia. This  is probably nonincarcerated.    Electronically Signed by Cristina Talbot on 10/28/2020 6:54 PM                               Medical Decision Making:   ED Management:  CT scan shows evidence of right-sided nephrolithiasis the likely cause of patient's right inguinal pain will treat with Norco and Flomax recommend outpatient follow-up                             Clinical Impression:     ICD-10-CM ICD-9-CM   1. Ureteral calculi  N20.1 592.1                          ED Disposition Condition    Discharge Stable        ED Prescriptions     Medication Sig Dispense Start Date End Date Auth. Provider    HYDROcodone-acetaminophen (NORCO) 5-325 mg per tablet Take 1 tablet by mouth every 4 (four) hours as needed for Pain. 10 tablet 10/28/2020  Carlos Cagle MD    tamsulosin (FLOMAX) 0.4 mg Cap Take 1 capsule (0.4 mg total) by mouth once daily. 10 capsule 10/28/2020 10/28/2021 Carlos Cagle MD        Follow-up Information     Follow up With Specialties Details Why Contact Info    Joslyn Sheriff MD Urology Call in 1 day for re-examination of your symptoms [address] [place] [address]  [address] [place] [id number]  [phone]                                         Carlos Cagle MD  10/28/20 6772

## 2020-10-29 ENCOUNTER — TELEPHONE (OUTPATIENT)
Dept: UROLOGY | Facility: CLINIC | Age: 85
End: 2020-10-29

## 2020-10-29 NOTE — TELEPHONE ENCOUNTER
----- Message from Miko Holloway MD sent at 10/29/2020  9:16 AM CDT -----  PSA - 1.3 on 10/16/2020    Rec - recheck 4-6 months

## 2020-10-29 NOTE — TELEPHONE ENCOUNTER
I spoke with the pt's daughter and advised her of results and recommendations. She states the pt went to the ER on two separate occassions. He was diagnosed with a UTI and kidney stone therefore we scheduled a follow up for next week.

## 2020-11-03 ENCOUNTER — OFFICE VISIT (OUTPATIENT)
Dept: UROLOGY | Facility: CLINIC | Age: 85
End: 2020-11-03
Payer: MEDICARE

## 2020-11-03 ENCOUNTER — HOSPITAL ENCOUNTER (OUTPATIENT)
Dept: RADIOLOGY | Facility: HOSPITAL | Age: 85
Discharge: HOME | End: 2020-11-03
Attending: UROLOGY
Payer: MEDICARE

## 2020-11-03 VITALS
DIASTOLIC BLOOD PRESSURE: 57 MMHG | BODY MASS INDEX: 25.12 KG/M2 | HEART RATE: 64 BPM | TEMPERATURE: 98 F | HEIGHT: 67 IN | RESPIRATION RATE: 18 BRPM | SYSTOLIC BLOOD PRESSURE: 152 MMHG | WEIGHT: 160.06 LBS

## 2020-11-03 DIAGNOSIS — C61 PROSTATE CANCER: ICD-10-CM

## 2020-11-03 DIAGNOSIS — N20.1 URETERAL CALCULUS: ICD-10-CM

## 2020-11-03 DIAGNOSIS — N20.1 RIGHT URETERAL CALCULUS: ICD-10-CM

## 2020-11-03 DIAGNOSIS — N20.1 RIGHT URETERAL CALCULUS: Primary | ICD-10-CM

## 2020-11-03 PROCEDURE — 99214 OFFICE O/P EST MOD 30 MIN: CPT | Mod: S$GLB,,, | Performed by: UROLOGY

## 2020-11-03 PROCEDURE — 74018 RADEX ABDOMEN 1 VIEW: CPT | Mod: 26,,, | Performed by: RADIOLOGY

## 2020-11-03 PROCEDURE — 1101F PR PT FALLS ASSESS DOC 0-1 FALLS W/OUT INJ PAST YR: ICD-10-PCS | Mod: CPTII,S$GLB,, | Performed by: UROLOGY

## 2020-11-03 PROCEDURE — 1159F PR MEDICATION LIST DOCUMENTED IN MEDICAL RECORD: ICD-10-PCS | Mod: S$GLB,,, | Performed by: UROLOGY

## 2020-11-03 PROCEDURE — 99999 PR PBB SHADOW E&M-EST. PATIENT-LVL III: CPT | Mod: PBBFAC,,, | Performed by: UROLOGY

## 2020-11-03 PROCEDURE — 99214 PR OFFICE/OUTPT VISIT, EST, LEVL IV, 30-39 MIN: ICD-10-PCS | Mod: S$GLB,,, | Performed by: UROLOGY

## 2020-11-03 PROCEDURE — 1101F PT FALLS ASSESS-DOCD LE1/YR: CPT | Mod: CPTII,S$GLB,, | Performed by: UROLOGY

## 2020-11-03 PROCEDURE — 1159F MED LIST DOCD IN RCRD: CPT | Mod: S$GLB,,, | Performed by: UROLOGY

## 2020-11-03 PROCEDURE — 99999 PR PBB SHADOW E&M-EST. PATIENT-LVL III: ICD-10-PCS | Mod: PBBFAC,,, | Performed by: UROLOGY

## 2020-11-03 PROCEDURE — 74018 RADEX ABDOMEN 1 VIEW: CPT | Mod: TC,FY

## 2020-11-03 PROCEDURE — 1126F AMNT PAIN NOTED NONE PRSNT: CPT | Mod: S$GLB,,, | Performed by: UROLOGY

## 2020-11-03 PROCEDURE — 74018 XR ABDOMEN AP 1 VIEW: ICD-10-PCS | Mod: 26,,, | Performed by: RADIOLOGY

## 2020-11-03 PROCEDURE — 1126F PR PAIN SEVERITY QUANTIFIED, NO PAIN PRESENT: ICD-10-PCS | Mod: S$GLB,,, | Performed by: UROLOGY

## 2020-11-03 NOTE — PROGRESS NOTES
OFFICE NOTE  [unfilled]  [id number]  11/3/2020       CHIEF COMPLAINT:   right ureteral calculus with right flank pain, history of adenocarcinoma prostate     HISTORY OF PRESENT ILLNESS:   this 89-year-old male was seen emergency room on 10/18/2020 with right flank pain and CT scan revealed a 3 x 5 mm distal right ureteral calculus.  The patient did subsequently pass the stone and the pain has resolved and he is feeling fine and no complaints on today's visit.  He also has a history of adenocarcinoma prostate for which he underwent external beam radiation therapy over 20 years ago and has shown no evidence recurrences since then.    Physical exam:  Abdomen - soft benign nontender no masses no hernias no organomegaly                             Full physical examination was carried out at the last visit on 10/16/2020 both external genital rectal examinations were unremarkable                                PSA  - 1.3 on 10/16/2020          FINAL IMPRESSION:  Right ureteral calculus that appears to have passed, adenocarcinoma prostate     RECOMMENDATIONS:   KUB will contact patient with the findings and notify his next appointment.

## 2020-11-04 ENCOUNTER — TELEPHONE (OUTPATIENT)
Dept: UROLOGY | Facility: CLINIC | Age: 85
End: 2020-11-04

## 2020-11-04 NOTE — TELEPHONE ENCOUNTER
Call placed to give KUB results and make follow up appt, no answer, message left with call back number.

## 2020-11-04 NOTE — TELEPHONE ENCOUNTER
----- Message from Miko Holloway MD sent at 11/3/2020  2:01 PM CST -----  KUB - 3 mm calcification over the right kidney probably a small stone and also small calcifications over the left kidney.    Rec - keep appointment to discuss treatment options

## 2020-11-17 ENCOUNTER — HOSPITAL ENCOUNTER (EMERGENCY)
Facility: HOSPITAL | Age: 85
Discharge: HOME | End: 2020-11-17
Attending: EMERGENCY MEDICINE
Payer: MEDICARE

## 2020-11-17 VITALS
HEIGHT: 67 IN | DIASTOLIC BLOOD PRESSURE: 81 MMHG | BODY MASS INDEX: 23.07 KG/M2 | SYSTOLIC BLOOD PRESSURE: 178 MMHG | TEMPERATURE: 99 F | WEIGHT: 147 LBS | HEART RATE: 69 BPM | RESPIRATION RATE: 18 BRPM | OXYGEN SATURATION: 99 %

## 2020-11-17 DIAGNOSIS — M54.50 ACUTE MIDLINE LOW BACK PAIN WITHOUT SCIATICA: Primary | ICD-10-CM

## 2020-11-17 DIAGNOSIS — N30.00 ACUTE CYSTITIS WITHOUT HEMATURIA: ICD-10-CM

## 2020-11-17 LAB
BACTERIA #/AREA URNS HPF: ABNORMAL /HPF
BILIRUB UR QL STRIP: NEGATIVE
CLARITY UR: ABNORMAL
COLOR UR: ABNORMAL
GLUCOSE UR QL STRIP: NEGATIVE
HGB UR QL STRIP: ABNORMAL
HYALINE CASTS #/AREA URNS LPF: 41 /LPF
KETONES UR QL STRIP: NEGATIVE
LEUKOCYTE ESTERASE UR QL STRIP: ABNORMAL
MICROSCOPIC COMMENT: ABNORMAL
NITRITE UR QL STRIP: NEGATIVE
PH UR STRIP: 6 [PH] (ref 5–8)
PROT UR QL STRIP: ABNORMAL
RBC #/AREA URNS HPF: 10 /HPF (ref 0–4)
SP GR UR STRIP: 1.02 (ref 1–1.03)
SQUAMOUS #/AREA URNS HPF: 2 /HPF
URN SPEC COLLECT METH UR: ABNORMAL
UROBILINOGEN UR STRIP-ACNC: NEGATIVE EU/DL
WBC #/AREA URNS HPF: >100 /HPF (ref 0–5)

## 2020-11-17 PROCEDURE — 81001 URINALYSIS AUTO W/SCOPE: CPT

## 2020-11-17 PROCEDURE — 99284 EMERGENCY DEPT VISIT MOD MDM: CPT | Mod: 25

## 2020-11-17 PROCEDURE — 25000003 PHARM REV CODE 250: Performed by: EMERGENCY MEDICINE

## 2020-11-17 RX ORDER — CEPHALEXIN 500 MG/1
500 CAPSULE ORAL 4 TIMES DAILY
Qty: 28 CAPSULE | Refills: 0 | Status: SHIPPED | OUTPATIENT
Start: 2020-11-17 | End: 2020-11-24

## 2020-11-17 RX ORDER — ACETAMINOPHEN 500 MG
1000 TABLET ORAL
Status: COMPLETED | OUTPATIENT
Start: 2020-11-17 | End: 2020-11-17

## 2020-11-17 RX ADMIN — ACETAMINOPHEN 1000 MG: 500 TABLET, FILM COATED ORAL at 06:11

## 2020-11-18 NOTE — ED PROVIDER NOTES
Encounter Date: 11/17/2020       History     Chief Complaint   Patient presents with    Back Pain     Started earlier this evening, denies any falls     This is a 89-year-old male who presents emergency department low back pain.  He says that it started earlier today.  Describes in the middle of his lower back.  It does not radiate.  Worse with movement and twisting and turning.  No associated bowel or bladder incontinence no fever reported.  No chills no trauma or injury to the back, no recent epidural steroid injections.  Says he has never had any issues like this in the past.  Says he had a kidney stone recently and at that this feels different.  He follow-up with the urologist for this and he has since passed the stone.  He says that he has not taken anything for his pain.        Review of patient's allergies indicates:   Allergen Reactions    Soma [carisoprodol] Rash    Aspirin     Sulfa (sulfonamide antibiotics) Rash     Past Medical History:   Diagnosis Date    Bladder stones     Cancer     Encounter for blood transfusion     Hypertension     Kidney stone     Prostate cancer 2004    Radiation 2005    prostate    Stage 3 chronic kidney disease 1/11/2019     Past Surgical History:   Procedure Laterality Date    CYSTOSCOPY      KIDNEY STONE SURGERY  May 2014     Family History   Problem Relation Age of Onset    Hypertension Daughter     Diabetes Daughter     Hypertension Son     Asthma Son      Social History     Tobacco Use    Smoking status: Former Smoker     Years: 40.00     Types: Cigarettes     Start date: 9/26/1985    Smokeless tobacco: Never Used   Substance Use Topics    Alcohol use: No    Drug use: No     Review of Systems   Constitutional: Negative for chills and fever.   HENT: Negative for sore throat.    Respiratory: Negative for shortness of breath.    Cardiovascular: Negative for chest pain.   Gastrointestinal: Negative for nausea and vomiting.   Genitourinary: Negative for  difficulty urinating, dysuria, flank pain and hematuria.   Musculoskeletal: Positive for back pain.   Skin: Negative for rash.   Neurological: Negative for weakness.   Hematological: Does not bruise/bleed easily.   All other systems reviewed and are negative.      Physical Exam     Initial Vitals [11/17/20 1722]   BP Pulse Resp Temp SpO2   (!) 150/87 (!) 59 16 98.9 °F (37.2 °C) 100 %      MAP       --         Physical Exam    Nursing note and vitals reviewed.  Constitutional: He appears well-developed and well-nourished. He is not diaphoretic. No distress.   HENT:   Head: Normocephalic and atraumatic.   Mouth/Throat: Oropharynx is clear and moist. No oropharyngeal exudate.   Eyes: Conjunctivae and EOM are normal. Pupils are equal, round, and reactive to light.   Neck: No tracheal deviation present.   Cardiovascular: Normal rate, regular rhythm, normal heart sounds and intact distal pulses.   No murmur heard.  Pulmonary/Chest: Breath sounds normal. No stridor. No respiratory distress. He has no wheezes. He has no rhonchi. He has no rales.   Abdominal: Soft. Bowel sounds are normal. He exhibits no distension and no mass. There is no abdominal tenderness. There is no rebound and no guarding.   No palpable pulsatile mass.   Musculoskeletal: Normal range of motion. No tenderness or edema.      Comments: Some tenderness overlying the midline of the lower back at the L2-L3 region.  No step-off appreciated.  No paraspinal tenderness appreciated no CVA tenderness appreciated.  Patient is able to walk without difficulty.   Lymphadenopathy:     He has no cervical adenopathy.   Neurological: He is alert and oriented to person, place, and time. He has normal strength. He displays abnormal reflex. No cranial nerve deficit or sensory deficit (0/4 in the knees/patellar tendons bilaterally.).   Negative straight leg test bilaterally.  5/5 strength in the lower extremities bilaterally.   Skin: Skin is warm and dry. Capillary refill  takes less than 2 seconds. No rash noted. No erythema. No pallor.   Psychiatric: He has a normal mood and affect.         ED Course   Procedures  Labs Reviewed   URINALYSIS - Abnormal; Notable for the following components:       Result Value    Color, UA Orange (*)     Appearance, UA Cloudy (*)     Protein, UA 2+ (*)     Occult Blood UA 2+ (*)     Leukocytes, UA 3+ (*)     All other components within normal limits   URINALYSIS MICROSCOPIC - Abnormal; Notable for the following components:    RBC, UA 10 (*)     WBC, UA >100 (*)     Bacteria Many (*)     Hyaline Casts, UA 41 (*)     All other components within normal limits           Results for orders placed or performed during the hospital encounter of 11/17/20   Urinalysis   Result Value Ref Range    Specimen UA Urine, Clean Catch     Color, UA Orange (A) Yellow, Straw, Melly    Appearance, UA Cloudy (A) Clear    pH, UA 6.0 5.0 - 8.0    Specific Gravity, UA 1.020 1.005 - 1.030    Protein, UA 2+ (A) Negative    Glucose, UA Negative Negative    Ketones, UA Negative Negative    Bilirubin (UA) Negative Negative    Occult Blood UA 2+ (A) Negative    Nitrite, UA Negative Negative    Urobilinogen, UA Negative Negative EU/dL    Leukocytes, UA 3+ (A) Negative   Urinalysis Microscopic   Result Value Ref Range    RBC, UA 10 (H) 0 - 4 /hpf    WBC, UA >100 (H) 0 - 5 /hpf    Bacteria Many (A) None-Occ /hpf    Squam Epithel, UA 2 /hpf    Hyaline Casts, UA 41 (A) 0-1/lpf /lpf    Microscopic Comment SEE COMMENT        Imaging Results          X-Ray Lumbar Spine Complete 5 View (Final result)  Result time 11/17/20 19:15:04   Procedure changed from X-Ray Lumbar Spine Ap And Lateral     Final result by Marty Mccoy MD (11/17/20 19:15:04)                 Impression:      No acute lumbar spine abnormality.      Electronically signed by: Marty Mccoy MD  Date:    11/17/2020  Time:    19:15             Narrative:    EXAMINATION:  XR LUMBAR SPINE COMPLETE 5 VIEW    CLINICAL HISTORY:  Back  pain or radiculopathy, osteoporosis presence or risk;    FINDINGS:  Five views of lumbar spine show normal lumbosacral alignment. No fracture or destructive osseous lesion.    Severe degenerative narrowing of the L4-L5 and L5-S1 disc occurs with moderate degenerative narrowing of L3-L4 disc.  Bulky paravertebral osteophytes occur throughout the lumbar spine.    Sacroiliac joints are normal. Arterial vascular calcifications are present.                                 Medical Decision Making:   ED Management:  Patient presents emergency department with back pain as described above.  Upon review of records he has a history of chronic kidney disease and he has recently seen here and had a kidney stone and for he has since followed up with Urology and has passed a stone.  Patient in the waiting room for 2 and half to 3 hours and after x-ray and urinalysis returned patient did not want to wait any longer for blood work.  He understands I would prefer to perform blood work to check his kidney function in the setting of him having a urinary tract infection.  I do not believe this is pyelonephritis but likely an acute cystitis.  Patient however does not want to wait for blood work and wants to go home.  He understands that this is suboptimal care and I will recommend that he get labs but he chooses to go home.  He does daughter comfortable with this and state they can follow-up with his nephrologist to draw the blood work in the next day or 2.  In addition also consider AAA but felt less likely as he has had a recent CT scan less than 2 weeks ago which evaluated his aorta and there was no evidence of aneurysm.  More than likely this is musculoskeletal back pain and uncomplicated cystitis.  Doubt any osteomyelitis epidural abscess, diskitis, cancer/cord compression/cauda equina.  Patient will follow up with primary care provider with his nephrologist he will and return to the emergency department if his symptoms change or  worsen.    I had a detailed discussion with the patient and/or guardian regarding: The historical points, exam findings, and diagnostic results supporting the discharge diagnosis, lab results, pertinent radiology results, and the need for outpatient follow-up, for definitive care with a family practitioner and to return to the emergency department if symptoms worsen or persist or if there are any questions or concerns that arise at home. All questions have been answered in detail. Strict return to Emergency Department precautions have been provided    A dictation software program was used for this note.  Please expect some simple typographical  errors in this note.                              Clinical Impression:       ICD-10-CM ICD-9-CM   1. Acute midline low back pain without sciatica  M54.5 724.2   2. Acute cystitis without hematuria  N30.00 595.0                          ED Disposition Condition    Discharge Stable        ED Prescriptions     Medication Sig Dispense Start Date End Date Auth. Provider    cephALEXin (KEFLEX) 500 MG capsule Take 1 capsule (500 mg total) by mouth 4 (four) times daily. for 7 days 28 capsule 11/17/2020 11/24/2020 Chang Smallwood DO        Follow-up Information     Follow up With Specialties Details Why Contact Info Additional Information    Gentry Encinas MD Family Medicine In 3 days  [address] [facility] [address]  [phone]       Critical access hospital Emergency Medicine  If symptoms worsen [address] [facility] [id number]  [phone] 1st floor                                       Chang Smallwood DO  11/18/20 0101

## 2020-11-18 NOTE — DISCHARGE INSTRUCTIONS
RETURN TO EMERGENCY DEPARTMENT WITHOUT FAIL, IF YOUR SYMPTOMS WORSEN, IF YOU GET NEW OR DIFFERENT SYMPTOMS, IF YOU ARE UNABLE TO FOLLOW UP AS DIRECTED, OR IF YOU HAVE ANY CONCERNS OR WORRIES.    Take antibiotics as directed.  You need to your kidney function checked in 1-2 days.  If her symptoms change or worsen he need to return to the emergency department

## 2020-12-02 ENCOUNTER — HOSPITAL ENCOUNTER (OUTPATIENT)
Facility: HOSPITAL | Age: 85
Discharge: HOME | End: 2020-12-03
Attending: EMERGENCY MEDICINE | Admitting: INTERNAL MEDICINE
Payer: MEDICARE
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
## 2020-12-02 DIAGNOSIS — D63.1 ANEMIA DUE TO STAGE 5 CHRONIC KIDNEY DISEASE, NOT ON CHRONIC DIALYSIS: Chronic | ICD-10-CM

## 2020-12-02 DIAGNOSIS — D64.9 ANEMIA, UNSPECIFIED TYPE: ICD-10-CM

## 2020-12-02 DIAGNOSIS — R07.9 CHEST PAIN: ICD-10-CM

## 2020-12-02 DIAGNOSIS — K92.2 GASTROINTESTINAL HEMORRHAGE, UNSPECIFIED GASTROINTESTINAL HEMORRHAGE TYPE: Primary | ICD-10-CM

## 2020-12-02 DIAGNOSIS — N18.5 ANEMIA DUE TO STAGE 5 CHRONIC KIDNEY DISEASE, NOT ON CHRONIC DIALYSIS: Chronic | ICD-10-CM

## 2020-12-02 LAB
ABO + RH BLD: NORMAL
ALBUMIN SERPL BCP-MCNC: 3.6 G/DL (ref 3.5–5.2)
ALP SERPL-CCNC: 67 U/L (ref 55–135)
ALT SERPL W/O P-5'-P-CCNC: 21 U/L (ref 10–44)
ANION GAP SERPL CALC-SCNC: 12 MMOL/L (ref 8–16)
AST SERPL-CCNC: 21 U/L (ref 10–40)
BASOPHILS # BLD AUTO: 0.01 K/UL (ref 0–0.2)
BASOPHILS NFR BLD: 0.1 % (ref 0–1.9)
BILIRUB SERPL-MCNC: 0.5 MG/DL (ref 0.1–1)
BLD GP AB SCN CELLS X3 SERPL QL: NORMAL
BLD PROD TYP BPU: NORMAL
BLOOD UNIT EXPIRATION DATE: NORMAL
BLOOD UNIT TYPE CODE: 6200
BLOOD UNIT TYPE: NORMAL
BUN SERPL-MCNC: 28 MG/DL (ref 10–30)
CALCIUM SERPL-MCNC: 8.7 MG/DL (ref 8.7–10.5)
CHLORIDE SERPL-SCNC: 104 MMOL/L (ref 95–110)
CO2 SERPL-SCNC: 26 MMOL/L (ref 23–29)
CODING SYSTEM: NORMAL
CREAT SERPL-MCNC: 1.9 MG/DL (ref 0.5–1.4)
DIFFERENTIAL METHOD: ABNORMAL
DISPENSE STATUS: NORMAL
EOSINOPHIL # BLD AUTO: 0.1 K/UL (ref 0–0.5)
EOSINOPHIL NFR BLD: 0.6 % (ref 0–8)
ERYTHROCYTE [DISTWIDTH] IN BLOOD BY AUTOMATED COUNT: 15.4 % (ref 11.5–14.5)
ERYTHROCYTE [DISTWIDTH] IN BLOOD BY AUTOMATED COUNT: 15.6 % (ref 11.5–14.5)
ERYTHROCYTE [DISTWIDTH] IN BLOOD BY AUTOMATED COUNT: 15.7 % (ref 11.5–14.5)
EST. GFR  (AFRICAN AMERICAN): 34.9 ML/MIN/1.73 M^2
EST. GFR  (NON AFRICAN AMERICAN): 30.1 ML/MIN/1.73 M^2
GLUCOSE SERPL-MCNC: 120 MG/DL (ref 70–110)
HCT VFR BLD AUTO: 23.3 % (ref 40–54)
HCT VFR BLD AUTO: 26 % (ref 40–54)
HCT VFR BLD AUTO: 28 % (ref 40–54)
HGB BLD-MCNC: 7.3 G/DL (ref 14–18)
HGB BLD-MCNC: 8 G/DL (ref 14–18)
HGB BLD-MCNC: 8.7 G/DL (ref 14–18)
IMM GRANULOCYTES # BLD AUTO: 0.04 K/UL (ref 0–0.04)
IMM GRANULOCYTES NFR BLD AUTO: 0.4 % (ref 0–0.5)
LYMPHOCYTES # BLD AUTO: 0.5 K/UL (ref 1–4.8)
LYMPHOCYTES NFR BLD: 4.8 % (ref 18–48)
MCH RBC QN AUTO: 28.9 PG (ref 27–31)
MCH RBC QN AUTO: 28.9 PG (ref 27–31)
MCH RBC QN AUTO: 29.2 PG (ref 27–31)
MCHC RBC AUTO-ENTMCNC: 30.8 G/DL (ref 32–36)
MCHC RBC AUTO-ENTMCNC: 31.1 G/DL (ref 32–36)
MCHC RBC AUTO-ENTMCNC: 31.3 G/DL (ref 32–36)
MCV RBC AUTO: 93 FL (ref 82–98)
MCV RBC AUTO: 93 FL (ref 82–98)
MCV RBC AUTO: 94 FL (ref 82–98)
MONOCYTES # BLD AUTO: 0.6 K/UL (ref 0.3–1)
MONOCYTES NFR BLD: 5.9 % (ref 4–15)
NEUTROPHILS # BLD AUTO: 8.8 K/UL (ref 1.8–7.7)
NEUTROPHILS NFR BLD: 88.2 % (ref 38–73)
NRBC BLD-RTO: 0 /100 WBC
NUM UNITS TRANS PACKED RBC: NORMAL
OB PNL STL: POSITIVE
PLATELET # BLD AUTO: 274 K/UL (ref 150–350)
PLATELET # BLD AUTO: 294 K/UL (ref 150–350)
PLATELET # BLD AUTO: 315 K/UL (ref 150–350)
PMV BLD AUTO: 10 FL (ref 9.2–12.9)
PMV BLD AUTO: 10 FL (ref 9.2–12.9)
PMV BLD AUTO: 10.2 FL (ref 9.2–12.9)
POTASSIUM SERPL-SCNC: 3.2 MMOL/L (ref 3.5–5.1)
PROT SERPL-MCNC: 6.5 G/DL (ref 6–8.4)
RBC # BLD AUTO: 2.5 M/UL (ref 4.6–6.2)
RBC # BLD AUTO: 2.77 M/UL (ref 4.6–6.2)
RBC # BLD AUTO: 3.01 M/UL (ref 4.6–6.2)
SARS-COV-2 RDRP RESP QL NAA+PROBE: NEGATIVE
SODIUM SERPL-SCNC: 142 MMOL/L (ref 136–145)
WBC # BLD AUTO: 10.04 K/UL (ref 3.9–12.7)
WBC # BLD AUTO: 8.87 K/UL (ref 3.9–12.7)
WBC # BLD AUTO: 9.96 K/UL (ref 3.9–12.7)

## 2020-12-02 PROCEDURE — 93005 ELECTROCARDIOGRAM TRACING: CPT | Performed by: INTERNAL MEDICINE

## 2020-12-02 PROCEDURE — 36415 COLL VENOUS BLD VENIPUNCTURE: CPT

## 2020-12-02 PROCEDURE — 99285 EMERGENCY DEPT VISIT HI MDM: CPT | Mod: 25

## 2020-12-02 PROCEDURE — 96375 TX/PRO/DX INJ NEW DRUG ADDON: CPT

## 2020-12-02 PROCEDURE — 63600175 PHARM REV CODE 636 W HCPCS: Performed by: EMERGENCY MEDICINE

## 2020-12-02 PROCEDURE — 80053 COMPREHEN METABOLIC PANEL: CPT

## 2020-12-02 PROCEDURE — 85027 COMPLETE CBC AUTOMATED: CPT | Mod: 91

## 2020-12-02 PROCEDURE — G0378 HOSPITAL OBSERVATION PER HR: HCPCS

## 2020-12-02 PROCEDURE — 25000003 PHARM REV CODE 250: Performed by: INTERNAL MEDICINE

## 2020-12-02 PROCEDURE — 93010 EKG 12-LEAD: ICD-10-PCS | Mod: ,,, | Performed by: INTERNAL MEDICINE

## 2020-12-02 PROCEDURE — P9016 RBC LEUKOCYTES REDUCED: HCPCS

## 2020-12-02 PROCEDURE — 86850 RBC ANTIBODY SCREEN: CPT

## 2020-12-02 PROCEDURE — 63600175 PHARM REV CODE 636 W HCPCS: Performed by: INTERNAL MEDICINE

## 2020-12-02 PROCEDURE — 96361 HYDRATE IV INFUSION ADD-ON: CPT | Mod: GZ

## 2020-12-02 PROCEDURE — U0002 COVID-19 LAB TEST NON-CDC: HCPCS

## 2020-12-02 PROCEDURE — 96374 THER/PROPH/DIAG INJ IV PUSH: CPT

## 2020-12-02 PROCEDURE — 85025 COMPLETE CBC W/AUTO DIFF WBC: CPT

## 2020-12-02 PROCEDURE — 25000003 PHARM REV CODE 250: Performed by: EMERGENCY MEDICINE

## 2020-12-02 PROCEDURE — 36430 TRANSFUSION BLD/BLD COMPNT: CPT

## 2020-12-02 PROCEDURE — 86920 COMPATIBILITY TEST SPIN: CPT

## 2020-12-02 PROCEDURE — 96376 TX/PRO/DX INJ SAME DRUG ADON: CPT

## 2020-12-02 PROCEDURE — 93010 ELECTROCARDIOGRAM REPORT: CPT | Mod: ,,, | Performed by: INTERNAL MEDICINE

## 2020-12-02 PROCEDURE — C9113 INJ PANTOPRAZOLE SODIUM, VIA: HCPCS | Performed by: INTERNAL MEDICINE

## 2020-12-02 PROCEDURE — 96372 THER/PROPH/DIAG INJ SC/IM: CPT | Mod: 59

## 2020-12-02 PROCEDURE — C9113 INJ PANTOPRAZOLE SODIUM, VIA: HCPCS | Performed by: EMERGENCY MEDICINE

## 2020-12-02 PROCEDURE — 82272 OCCULT BLD FECES 1-3 TESTS: CPT

## 2020-12-02 RX ORDER — AMLODIPINE BESYLATE 5 MG/1
10 TABLET ORAL DAILY
Status: DISCONTINUED | OUTPATIENT
Start: 2020-12-02 | End: 2020-12-03 | Stop reason: HOSPADM

## 2020-12-02 RX ORDER — IBUPROFEN 200 MG
24 TABLET ORAL
Status: DISCONTINUED | OUTPATIENT
Start: 2020-12-02 | End: 2020-12-03 | Stop reason: HOSPADM

## 2020-12-02 RX ORDER — POTASSIUM CHLORIDE 20 MEQ/1
40 TABLET, EXTENDED RELEASE ORAL ONCE
Status: COMPLETED | OUTPATIENT
Start: 2020-12-02 | End: 2020-12-02

## 2020-12-02 RX ORDER — PANTOPRAZOLE SODIUM 40 MG/10ML
40 INJECTION, POWDER, LYOPHILIZED, FOR SOLUTION INTRAVENOUS 2 TIMES DAILY
Status: DISCONTINUED | OUTPATIENT
Start: 2020-12-02 | End: 2020-12-03 | Stop reason: HOSPADM

## 2020-12-02 RX ORDER — HALOPERIDOL 5 MG/ML
5 INJECTION INTRAMUSCULAR EVERY 6 HOURS PRN
Status: DISCONTINUED | OUTPATIENT
Start: 2020-12-02 | End: 2020-12-03 | Stop reason: HOSPADM

## 2020-12-02 RX ORDER — LABETALOL HYDROCHLORIDE 5 MG/ML
10 INJECTION, SOLUTION INTRAVENOUS EVERY 6 HOURS PRN
Status: DISCONTINUED | OUTPATIENT
Start: 2020-12-03 | End: 2020-12-03 | Stop reason: HOSPADM

## 2020-12-02 RX ORDER — LANOLIN ALCOHOL/MO/W.PET/CERES
800 CREAM (GRAM) TOPICAL
Status: DISCONTINUED | OUTPATIENT
Start: 2020-12-02 | End: 2020-12-03 | Stop reason: HOSPADM

## 2020-12-02 RX ORDER — PANTOPRAZOLE SODIUM 40 MG/10ML
40 INJECTION, POWDER, LYOPHILIZED, FOR SOLUTION INTRAVENOUS
Status: COMPLETED | OUTPATIENT
Start: 2020-12-02 | End: 2020-12-02

## 2020-12-02 RX ORDER — HYDROCODONE BITARTRATE AND ACETAMINOPHEN 500; 5 MG/1; MG/1
TABLET ORAL
Status: DISCONTINUED | OUTPATIENT
Start: 2020-12-02 | End: 2020-12-03 | Stop reason: HOSPADM

## 2020-12-02 RX ORDER — SODIUM CHLORIDE 0.9 % (FLUSH) 0.9 %
10 SYRINGE (ML) INJECTION
Status: DISCONTINUED | OUTPATIENT
Start: 2020-12-02 | End: 2020-12-03 | Stop reason: HOSPADM

## 2020-12-02 RX ORDER — SODIUM CHLORIDE 9 MG/ML
INJECTION, SOLUTION INTRAVENOUS CONTINUOUS
Status: DISCONTINUED | OUTPATIENT
Start: 2020-12-02 | End: 2020-12-03

## 2020-12-02 RX ORDER — IBUPROFEN 200 MG
16 TABLET ORAL
Status: DISCONTINUED | OUTPATIENT
Start: 2020-12-02 | End: 2020-12-03 | Stop reason: HOSPADM

## 2020-12-02 RX ORDER — GLUCAGON 1 MG
1 KIT INJECTION
Status: DISCONTINUED | OUTPATIENT
Start: 2020-12-02 | End: 2020-12-03 | Stop reason: HOSPADM

## 2020-12-02 RX ORDER — DIPHENHYDRAMINE HCL 25 MG
50 CAPSULE ORAL NIGHTLY PRN
COMMUNITY
End: 2022-04-13

## 2020-12-02 RX ORDER — FERROUS SULFATE 325(65) MG
325 TABLET ORAL 2 TIMES DAILY
Status: DISCONTINUED | OUTPATIENT
Start: 2020-12-02 | End: 2020-12-03 | Stop reason: HOSPADM

## 2020-12-02 RX ORDER — DONEPEZIL HYDROCHLORIDE 5 MG/1
5 TABLET, FILM COATED ORAL NIGHTLY
Status: DISCONTINUED | OUTPATIENT
Start: 2020-12-02 | End: 2020-12-03 | Stop reason: HOSPADM

## 2020-12-02 RX ORDER — ATORVASTATIN CALCIUM 20 MG/1
20 TABLET, FILM COATED ORAL DAILY
Status: DISCONTINUED | OUTPATIENT
Start: 2020-12-02 | End: 2020-12-03 | Stop reason: HOSPADM

## 2020-12-02 RX ORDER — HYDROXYCHLOROQUINE SULFATE 200 MG/1
200 TABLET, FILM COATED ORAL DAILY
Status: DISCONTINUED | OUTPATIENT
Start: 2020-12-02 | End: 2020-12-03 | Stop reason: HOSPADM

## 2020-12-02 RX ORDER — ISOSORBIDE MONONITRATE 60 MG/1
60 TABLET, EXTENDED RELEASE ORAL NIGHTLY
Status: DISCONTINUED | OUTPATIENT
Start: 2020-12-02 | End: 2020-12-03 | Stop reason: HOSPADM

## 2020-12-02 RX ADMIN — SODIUM CHLORIDE 50 ML/HR: 0.9 INJECTION, SOLUTION INTRAVENOUS at 04:12

## 2020-12-02 RX ADMIN — PANTOPRAZOLE SODIUM 40 MG: 40 INJECTION, POWDER, FOR SOLUTION INTRAVENOUS at 11:12

## 2020-12-02 RX ADMIN — HALOPERIDOL LACTATE 5 MG: 5 INJECTION, SOLUTION INTRAMUSCULAR at 07:12

## 2020-12-02 RX ADMIN — HYDROXYCHLOROQUINE SULFATE 200 MG: 200 TABLET, FILM COATED ORAL at 04:12

## 2020-12-02 RX ADMIN — FERROUS SULFATE TAB 325 MG (65 MG ELEMENTAL FE) 325 MG: 325 (65 FE) TAB at 08:12

## 2020-12-02 RX ADMIN — AMLODIPINE BESYLATE 10 MG: 5 TABLET ORAL at 04:12

## 2020-12-02 RX ADMIN — SODIUM CHLORIDE 125 MG: 9 INJECTION, SOLUTION INTRAVENOUS at 11:12

## 2020-12-02 RX ADMIN — ISOSORBIDE MONONITRATE 60 MG: 60 TABLET, EXTENDED RELEASE ORAL at 08:12

## 2020-12-02 RX ADMIN — DONEPEZIL HYDROCHLORIDE 5 MG: 5 TABLET, FILM COATED ORAL at 08:12

## 2020-12-02 RX ADMIN — PANTOPRAZOLE SODIUM 40 MG: 40 INJECTION, POWDER, LYOPHILIZED, FOR SOLUTION INTRAVENOUS at 11:12

## 2020-12-02 RX ADMIN — POTASSIUM CHLORIDE 40 MEQ: 20 TABLET, EXTENDED RELEASE ORAL at 09:12

## 2020-12-02 RX ADMIN — ATORVASTATIN CALCIUM 20 MG: 20 TABLET, FILM COATED ORAL at 04:12

## 2020-12-02 NOTE — ED PROVIDER NOTES
Encounter Date: 12/2/2020       History     Chief Complaint   Patient presents with    Back Pain     Patient slipped when walking without walker in bathroom, hit his back on bath tub. Denies LOC.      89-year-old male brought to emergency room by his daughter with a history that the patient was in his bathroom this morning apparently fell.  It was not witnessed by the daughter but she heard a thud as he had fallen to the floor.  After was the patient complained initially of some back pain.  There is a question as to whether not he struck his back on the edge of the bathtub.  Is also unknown as to whether not the patient had a loss of consciousness causing his fall.  He in the past had had very brief transient lapses in consciousness.  He had no complaint of any headache at this time.  No nuchal pain.  He denies any anterior chest or abdominal pain.  No nausea vomiting.  No pelvic pain.  No complaints of pain to any of his extremities.        Review of patient's allergies indicates:   Allergen Reactions    Soma [carisoprodol] Rash    Aspirin     Sulfa (sulfonamide antibiotics) Rash     Past Medical History:   Diagnosis Date    Bladder stones     Cancer     Encounter for blood transfusion     Hypertension     Kidney stone     Prostate cancer 2004    Radiation 2005    prostate    Stage 3 chronic kidney disease 1/11/2019     Past Surgical History:   Procedure Laterality Date    CYSTOSCOPY      KIDNEY STONE SURGERY  May 2014     Family History   Problem Relation Age of Onset    Hypertension Daughter     Diabetes Daughter     Hypertension Son     Asthma Son      Social History     Tobacco Use    Smoking status: Former Smoker     Years: 40.00     Types: Cigarettes     Start date: 9/26/1985    Smokeless tobacco: Never Used   Substance Use Topics    Alcohol use: No    Drug use: No     Review of Systems   Constitutional: Negative for appetite change, chills, diaphoresis and fever.   HENT: Negative for  congestion, ear discharge, ear pain, rhinorrhea, sinus pressure, sinus pain, sore throat and trouble swallowing.    Respiratory: Negative for cough and shortness of breath.    Cardiovascular: Negative for chest pain.   Gastrointestinal: Negative for abdominal pain, constipation, diarrhea, nausea and vomiting.   Genitourinary: Negative for flank pain, frequency and hematuria.   Musculoskeletal: Positive for back pain.   Skin: Negative.  Negative for pallor, rash and wound.   Neurological: Positive for weakness. Negative for dizziness, seizures, speech difficulty and headaches.       Physical Exam     Initial Vitals [12/02/20 0658]   BP Pulse Resp Temp SpO2   (!) 220/84 68 18 97.9 °F (36.6 °C) 99 %      MAP       --         Physical Exam    Vitals reviewed.  Constitutional: He appears well-developed and well-nourished. He is not diaphoretic. No distress.   HENT:   Head: Normocephalic and atraumatic.   Right Ear: External ear normal.   Left Ear: External ear normal.   Nose: Nose normal.   Mouth/Throat: Oropharynx is clear and moist.   Eyes: Conjunctivae and EOM are normal. Pupils are equal, round, and reactive to light. Right eye exhibits no discharge. Left eye exhibits no discharge.   Arcus senilis   Neck: Normal range of motion. Neck supple. No JVD present.   Cardiovascular: Normal rate, regular rhythm, normal heart sounds and intact distal pulses. Exam reveals no gallop and no friction rub.    No murmur heard.  Pulmonary/Chest: Breath sounds normal. No respiratory distress. He has no wheezes. He has no rhonchi. He has no rales. He exhibits no tenderness.   Abdominal: Soft. Bowel sounds are normal. He exhibits no distension. There is no abdominal tenderness. There is no rebound and no guarding.   Rectal exam reveals black stool and enlarged prostate   Musculoskeletal: Normal range of motion. No tenderness or edema.   Lymphadenopathy:     He has no cervical adenopathy.   Neurological: He is alert and oriented to  person, place, and time. He has normal strength. GCS score is 15. GCS eye subscore is 4. GCS verbal subscore is 5. GCS motor subscore is 6.   Skin: Skin is warm and dry. Capillary refill takes less than 2 seconds. No rash noted. No erythema. No pallor.         ED Course   Procedures  Labs Reviewed   CBC W/ AUTO DIFFERENTIAL - Abnormal; Notable for the following components:       Result Value    RBC 2.50 (*)     Hemoglobin 7.3 (*)     Hematocrit 23.3 (*)     MCHC 31.3 (*)     RDW 15.6 (*)     Gran # (ANC) 8.8 (*)     Lymph # 0.5 (*)     Gran % 88.2 (*)     Lymph % 4.8 (*)     All other components within normal limits   COMPREHENSIVE METABOLIC PANEL - Abnormal; Notable for the following components:    Potassium 3.2 (*)     Glucose 120 (*)     Creatinine 1.9 (*)     eGFR if  34.9 (*)     eGFR if non  30.1 (*)     All other components within normal limits   OCCULT BLOOD X 1, STOOL - Abnormal; Notable for the following components:    Occult Blood Positive (*)     All other components within normal limits   SARS-COV-2 RNA AMPLIFICATION, QUAL   TYPE & SCREEN        ECG Results          EKG 12-lead (In process)  Result time 12/02/20 10:00:54    In process by Interface, Lab In [facility] (12/02/20 10:00:54)                 Narrative:    Test Reason : W19.XXXA,    Vent. Rate : 061 BPM     Atrial Rate : 061 BPM     P-R Int : 254 ms          QRS Dur : 092 ms      QT Int : 514 ms       P-R-T Axes : 064 024 090 degrees     QTc Int : 517 ms    Sinus rhythm with 1st degree A-V block  Nonspecific T wave abnormality  Prolonged QT  Abnormal ECG  When compared with ECG of 24-JAN-2020 06:33,  WV interval has increased  T wave inversion less evident in Anterior-lateral leads    Referred By: [email or web address]   SELF           Confirmed By:                             Imaging Results          X-Ray Thoracic Spine AP And Lateral (Final result)  Result time 12/02/20 09:01:37    Final result by Tierra Barraza MD  (12/02/20 09:01:37)                 Narrative:    3 views of the thoracic spine    Clinical history is back pain after fall    The thoracic spine is in satisfactory alignment. There is multilevel  disc space narrowing and anterior spurring. There are no fractures or  subluxation. The pedicles are symmetrical. The paraspinous soft  tissues are normal.    IMPRESSION: Degenerative changes of the thoracic spine without  evidence of compression fracture or subluxation    Electronically Signed by Tierra Barraza M.D. on 12/2/2020 9:09 AM                             X-Ray Lumbar Spine Complete 5 View (Final result)  Result time 12/02/20 09:00:36   Procedure changed from X-Ray Lumbar Spine Ap And Lateral     Final result by Tierra Barraza MD (12/02/20 09:00:36)                 Narrative:    5 views of the lumbar spine    Clinical history is back pain after fall    The lumbar spine is in satisfactory alignment. The vertebral bodies  are of normal height. There is multilevel disc space narrowing and  spondylosis. There is diffuse facet hypertrophy. There are no pars  defects. The paraspinous soft tissues are normal.    IMPRESSION: Degenerative changes of the lumbar spine without acute  osseous abnormality    Electronically Signed by Tierra Barraza M.D. on 12/2/2020 9:10 AM                             X-Ray Chest 1 View (Final result)  Result time 12/02/20 08:59:36    Final result by Tierra Barraza MD (12/02/20 08:59:36)                 Narrative:    Portable chest x-ray at 8:17 AM is compared to prior study dated  1/22/2020    Clinical history is pain after fall    The lungs are clear. The cardiomediastinal silhouette is normal in  size. There are degenerative changes of the spine.    IMPRESSION: No acute pulmonary process    Electronically Signed by Tierra Barraza M.D. on 12/2/2020 9:14 AM                                            Attending Attestation:             Attending ED Notes:   This 89-year-old male who  had a fall in his bathroom this morning with complaints of back pain, had x-rays of the chest thoracic and lumbar spine which did not reveal any obvious fracture.  The patient had no complaint of head or neck pain and no obvious trauma in that locale.  EKG obtained showed a sinus rhythm with first-degree AV block but otherwise no ectopy.  Labs showed a low H&H in when compared to his prior, 10 months ago had a hematocrit of 31 and today's crit is 23.  As a consequence a rectal exam was done in black stool was noted which was heme positive.  The patient was typed and screen, given Protonix.  Hospital Medicine was consulted and Dr. Biswas will be admitting the patient to a [facility] floor.  At the present time he is neurologically nonfocal    Included in the differential is stroke, syncope, seizure as a cause of his fall.  Additionally a severe anemia can account for syncope.                    Clinical Impression:     ICD-10-CM ICD-9-CM   1. Gastrointestinal hemorrhage, unspecified gastrointestinal hemorrhage type  K92.2 578.9   2. Fall  W19.XXXA E888.9   3. Anemia, unspecified type  D64.9 285.9                          ED Disposition Condition    Observation                             Dario Ng Jr., MD  12/02/20 1131

## 2020-12-02 NOTE — HPI
89 y.o. old male who  has a past medical history of Bladder stones, Cancer, Encounter for blood transfusion, Hypertension, Kidney stone, Prostate cancer (2004), Radiation (2005), and Stage 3 chronic kidney disease (1/11/2019).. The patient presented with a primary complaint of fall.  .  He was brought by his daughter .He was in his bathroom this morning and daughter heard the  thud as he had fallen to the floor..     Mechanism of fall is unsure and patient transient loss of consciousness..  He complained of pain at the back immediately after the fall but appear no head injury.  Blood work found to have severe anemia and also positive occult blood and admitted.  He denied chest pain is complaining of mild epigastric area discomfort. No nausea vomiting.  Radiology studies did not show any fracture bone and EKG is unchanged.    On examination patient is awake, alert ,disoriented may be prior dementia and old age.  Spoke with the daughter, generally he is doing well and he did not complain of any abdominal pain, nausea, vomiting but she did confirm the black stool.  She said he is at the baseline mental status .

## 2020-12-02 NOTE — CONSULTS
GASTROENTEROLOGY INPATIENT CONSULT NOTE  Patient Name: Troy Yuan Sr.  Patient MRN: [id number]  Patient : 1929    Admit Date: 2020  Service date: 2020    Reason for Consult:  Chronic anemia    PCP: Gentry Encinas MD    Chief Complaint   Patient presents with    Back Pain     Patient slipped when walking without walker in bathroom, hit his back on bath tub. Denies LOC.        HPI: Patient is a 89 y.o. male with PMHx kidney stones, HTN, HLD, CKD, diverticulosis, inguinal hernia, chronic anemia, prostate CA s/p XRT presents for evaluation of fall / weakness w/ anemia. In regards to anemia, chronic, constant, wax / wanes over past 6 years. No signs of bleeding but intermittent dark stools due to chronic iron    CHART REVIEW;   Hg 7.3 (low since at least ) w/ MCV 93; Cr 1.9  CT Abd 10//'20 - vascular disease; Nml liver, panc, GB; inguinal hernia w/ loop of colon; tics  EGD '15 - suspected sy's w/ small HH; gastritis  Colon '15 - tics; roids  Labs '15 - Normal B12 / folate; Low iron / iron sat    Past Medical History:  Past Medical History:   Diagnosis Date    Bladder stones     Cancer     Encounter for blood transfusion     Hypertension     Kidney stone     Prostate cancer     Radiation     prostate    Stage 3 chronic kidney disease 2019        Past Surgical History:  Past Surgical History:   Procedure Laterality Date    CYSTOSCOPY      KIDNEY STONE SURGERY  May 2014        Home Medications:  Medications Prior to Admission   Medication Sig Dispense Refill Last Dose    alfuzosin (UROXATRAL) 10 mg Tb24 Take 1 tablet (10 mg total) by mouth daily with breakfast. 90 tablet 3 2020 at 10:00    amlodipine (NORVASC) 10 MG tablet Take 10 mg by mouth once daily.     2020 at 10:00    atorvastatin (LIPITOR) 20 MG tablet Take 1 tablet (20 mg total) by mouth once daily. 30 tablet 1 2020 at 09:00    calcitRIOL (ROCALTROL) 0.25 MCG Cap Take 0.25 mcg by mouth once  daily.    12/1/2020 at 10:00    diphenhydrAMINE (BENADRYL) 25 mg capsule Take 50 mg by mouth nightly as needed for Itching.   12/1/2020 at 21:00    donepezil (ARICEPT) 5 MG tablet Take 5 mg by mouth once daily.    12/1/2020 at 21:00    ferrous sulfate 325 mg (65 mg iron) Tab tablet Take 325 mg by mouth once daily.   12/1/2020 at 10:00    furosemide (LASIX) 20 MG tablet Take 1 tablet (20 mg total) by mouth once daily. 30 tablet 0 12/1/2020 at 10:00    hydrALAZINE (APRESOLINE) 25 MG tablet Take 1 tablet (25 mg total) by mouth every 8 (eight) hours. 90 tablet 1 12/1/2020 at 21:00    isosorbide mononitrate (IMDUR) 60 MG 24 hr tablet Take 1 tablet (60 mg total) by mouth once daily. 30 tablet 11 12/1/2020 at 21:00    omeprazole (PRILOSEC) 20 MG capsule Take 20 mg by mouth once daily.    12/1/2020 at 10:00    hydroxychloroquine (PLAQUENIL) 200 mg tablet Take 1 tablet (200 mg total) by mouth once daily. 60 tablet 0 More than a month at Unknown time       Inpatient Medications:   amLODIPine  10 mg Oral Daily    atorvastatin  20 mg Oral Daily    donepeziL  5 mg Oral QHS    ferrous sulfate  325 mg Oral BID    hydrOXYchloroQUINE  200 mg Oral Daily    isosorbide mononitrate  60 mg Oral QHS    pantoprazole  40 mg Intravenous BID     sodium chloride, dextrose 50%, dextrose 50%, glucagon (human recombinant), glucose, glucose, magnesium oxide, sodium chloride 0.9%    Review of patient's allergies indicates:   Allergen Reactions    Soma [carisoprodol] Rash    Aspirin     Sulfa (sulfonamide antibiotics) Rash       Social History:   Social History     Occupational History    Not on file   Tobacco Use    Smoking status: Former Smoker     Years: 40.00     Types: Cigarettes     Start date: 9/26/1985    Smokeless tobacco: Never Used   Substance and Sexual Activity    Alcohol use: No    Drug use: No    Sexual activity: Never       Family History:   Family History   Problem Relation Age of Onset    Hypertension  "Daughter     Diabetes Daughter     Hypertension Son     Asthma Son        Review of Systems:  A 10 point review of systems was performed and was normal, except as mentioned in the HPI, including constitutional, HEENT, heme, lymph, cardiovascular, respiratory, gastrointestinal, genitourinary, neurologic, endocrine, psychiatric and musculoskeletal.      OBJECTIVE:    Physical Exam:  24 Hour Vital Sign Ranges: Temp:  [46.9 °F (8.3 °C)-97.9 °F (36.6 °C)] 46.9 °F (8.3 °C)  Pulse:  [56-68] 63  Resp:  [18] 18  SpO2:  [99 %-100 %] 99 %  BP: (179-220)/(64-84) 179/64  Most recent vitals: BP (!) 179/64 (BP Location: Right arm)   Pulse 63   Temp (!) 46.9 °F (8.3 °C)   Resp 18   Ht 5' 7" (1.702 m)   Wt 71 kg (156 lb 8.4 oz)   SpO2 99%   BMI 24.52 kg/m²    GEN: well-developed, well-nourished, awake and alert, non-toxic appearing adult  HEENT: PERRL, sclera anicteric, oral mucosa pink and moist without lesion  NECK: trachea midline; Good ROM  CV: regular rate and rhythm, no murmurs or gallops  RESP: clear to auscultation bilaterally, no wheezes, rhonci or rales  ABD: soft, non-tender, non-distended, normal bowel sounds  EXT: no swelling or edema, 2+ pulses distally  SKIN: no rashes or jaundice  PSYCH: normal affect    Labs:   Recent Labs     12/02/20  0823   WBC 9.96   MCV 93        Recent Labs     12/02/20  0823      K 3.2*      CO2 26   BUN 28   *     No results for input(s): ALB in the last 72 hours.    Invalid input(s): ALKP, SGOT, SGPT, TBIL, DBIL, TPRO  No results for input(s): PT, INR, PTT in the last 72 hours.      Radiology Review:  X-Ray Thoracic Spine AP And Lateral   Final Result      X-Ray Lumbar Spine Complete 5 View   Final Result      X-Ray Chest 1 View   Final Result            IMPRESSION / RECOMMENDATIONS:  89 y.o. male with PMHx kidney stones, HTN, HLD, CKD, diverticulosis, inguinal hernia, chronic anemia, prostate CA s/p XRT presents for evaluation of fall / weakness w/ " anemia. In regards to anemia has been present x years per chart review and discussions w/ daughter. No signs of GIB and risks, benefits, alternative discussed in detail with patient / family regarding anticipated procedure and possible complications. At this point, no procedures desired    -Tranfuse pRBCs as warranted  -IV Iron today  -Consider EPO per renal if appropriate given sig CKD  -If signs of GIB, endoscopy can be revisited    Thank you for this consult.    Ari Martinez III  12/2/2020  2:27 PM         Minoxidil Counseling: Minoxidil is a topical medication which can increase blood flow where it is applied. It is uncertain how this medication increases hair growth. Side effects are uncommon and include stinging and allergic reactions.

## 2020-12-02 NOTE — ASSESSMENT & PLAN NOTE
Possible upper GI bleeding    Plan   BP IV  Transfuse blood  GI consult hemoglobin dropped further despite transfusion

## 2020-12-02 NOTE — SUBJECTIVE & OBJECTIVE
Past Medical History:   Diagnosis Date    Bladder stones     Cancer     Encounter for blood transfusion     Hypertension     Kidney stone     Prostate cancer 2004    Radiation 2005    prostate    Stage 3 chronic kidney disease 1/11/2019       Past Surgical History:   Procedure Laterality Date    CYSTOSCOPY      KIDNEY STONE SURGERY  May 2014       Review of patient's allergies indicates:   Allergen Reactions    Soma [carisoprodol] Rash    Aspirin     Sulfa (sulfonamide antibiotics) Rash       No current facility-administered medications on file prior to encounter.      Current Outpatient Medications on File Prior to Encounter   Medication Sig    alfuzosin (UROXATRAL) 10 mg Tb24 Take 1 tablet (10 mg total) by mouth daily with breakfast.    amlodipine (NORVASC) 10 MG tablet Take 10 mg by mouth once daily.      atorvastatin (LIPITOR) 20 MG tablet Take 1 tablet (20 mg total) by mouth once daily.    calcitRIOL (ROCALTROL) 0.25 MCG Cap Take 0.25 mcg by mouth once daily.     diphenhydrAMINE (BENADRYL) 25 mg capsule Take 50 mg by mouth nightly as needed for Itching.    donepezil (ARICEPT) 5 MG tablet Take 5 mg by mouth once daily.     ferrous sulfate 325 mg (65 mg iron) Tab tablet Take 325 mg by mouth once daily.    furosemide (LASIX) 20 MG tablet Take 1 tablet (20 mg total) by mouth once daily.    hydrALAZINE (APRESOLINE) 25 MG tablet Take 1 tablet (25 mg total) by mouth every 8 (eight) hours.    isosorbide mononitrate (IMDUR) 60 MG 24 hr tablet Take 1 tablet (60 mg total) by mouth once daily.    omeprazole (PRILOSEC) 20 MG capsule Take 20 mg by mouth once daily.     hydroxychloroquine (PLAQUENIL) 200 mg tablet Take 1 tablet (200 mg total) by mouth once daily.    [DISCONTINUED] HYDROcodone-acetaminophen (NORCO) 5-325 mg per tablet Take 1 tablet by mouth every 4 (four) hours as needed for Pain.    [DISCONTINUED] traZODone (DESYREL) 50 MG tablet TK 1/2 T PO QHS     Family History     Problem  Relation (Age of Onset)    Asthma Son    Diabetes Daughter    Hypertension Daughter, Son        Tobacco Use    Smoking status: Former Smoker     Years: 40.00     Types: Cigarettes     Start date: 9/26/1985    Smokeless tobacco: Never Used   Substance and Sexual Activity    Alcohol use: No    Drug use: No    Sexual activity: Never     Review of Systems   Constitutional: Negative for fever.   HENT: Negative for congestion.    Respiratory: Negative for chest tightness.    Cardiovascular: Negative for chest pain.   Gastrointestinal: Positive for abdominal pain.   Genitourinary: Negative for difficulty urinating.   Neurological: Negative for dizziness.     Objective:     Vital Signs (Most Recent):  Temp: (!) 46.9 °F (8.3 °C) (12/02/20 1300)  Pulse: 63 (12/02/20 1300)  Resp: 18 (12/02/20 1300)  BP: (!) 179/64 (12/02/20 1300)  SpO2: 99 % (12/02/20 1300) Vital Signs (24h Range):  Temp:  [46.9 °F (8.3 °C)-97.9 °F (36.6 °C)] 46.9 °F (8.3 °C)  Pulse:  [56-68] 63  Resp:  [18] 18  SpO2:  [99 %-100 %] 99 %  BP: (179-220)/(64-84) 179/64     Weight: 71 kg (156 lb 8.4 oz)  Body mass index is 24.52 kg/m².    Physical Exam  Constitutional:       General: He is not in acute distress.     Appearance: He is well-developed.   HENT:      Head: Normocephalic.   Neck:      Musculoskeletal: Neck supple.   Cardiovascular:      Rate and Rhythm: Normal rate and regular rhythm.      Pulses: Normal pulses.   Pulmonary:      Effort: Pulmonary effort is normal. No respiratory distress.   Abdominal:      General: Abdomen is flat. There is no distension.      Tenderness: There is abdominal tenderness. There is no guarding.   Musculoskeletal: Normal range of motion.   Skin:     General: Skin is warm.      Findings: No rash.   Neurological:      Mental Status: He is alert and oriented to person, place, and time.   Psychiatric:         Mood and Affect: Mood normal.             Significant Labs:   BMP:   Recent Labs   Lab 12/02/20  0823   *       K 3.2*      CO2 26   BUN 28   CREATININE 1.9*   CALCIUM 8.7     CBC:   Recent Labs   Lab 12/02/20  0823   WBC 9.96   HGB 7.3*   HCT 23.3*        CMP:   Recent Labs   Lab 12/02/20  0823      K 3.2*      CO2 26   *   BUN 28   CREATININE 1.9*   CALCIUM 8.7   PROT 6.5   ALBUMIN 3.6   BILITOT 0.5   ALKPHOS 67   AST 21   ALT 21   ANIONGAP 12   EGFRNONAA 30.1*       Significant Imaging: I have reviewed and interpreted all pertinent imaging results/findings within the past 24 hours.

## 2020-12-02 NOTE — H&P
[facility]  History & Physical    Patient Name: Troy Yuan Sr.  MRN: [id number]  Admission Date: 12/2/2020  Attending Physician: Andi Biswas MD   Primary Care Provider: Gentry Encinas MD         Patient information was obtained from patient and ER records.     Subjective:     Principal Problem:Anemia    Chief Complaint:   Chief Complaint   Patient presents with    Back Pain     Patient slipped when walking without walker in bathroom, hit his back on bath tub. Denies LOC.         HPI:  89 y.o. old male who  has a past medical history of Bladder stones, Cancer, Encounter for blood transfusion, Hypertension, Kidney stone, Prostate cancer (2004), Radiation (2005), and Stage 3 chronic kidney disease (1/11/2019).. The patient presented with a primary complaint of fall.  .  He was brought by his daughter .He was in his bathroom this morning and daughter heard the  thud as he had fallen to the floor..     Mechanism of fall is unsure and patient transient loss of consciousness..  He complained of pain at the back immediately after the fall but appear no head injury.  Blood work found to have severe anemia and also positive occult blood and admitted.  He denied chest pain is complaining of mild epigastric area discomfort. No nausea vomiting.  Radiology studies did not show any fracture bone and EKG is unchanged.    On examination patient is awake, alert ,disoriented may be prior dementia and old age.  Spoke with the daughter, generally he is doing well and he did not complain of any abdominal pain, nausea, vomiting but she did confirm the black stool.  She said he is at the baseline mental status .    Past Medical History:   Diagnosis Date    Bladder stones     Cancer     Encounter for blood transfusion     Hypertension     Kidney stone     Prostate cancer 2004    Radiation 2005    prostate    Stage 3 chronic kidney disease 1/11/2019       Past Surgical History:   Procedure  Laterality Date    CYSTOSCOPY      KIDNEY STONE SURGERY  May 2014       Review of patient's allergies indicates:   Allergen Reactions    Soma [carisoprodol] Rash    Aspirin     Sulfa (sulfonamide antibiotics) Rash       No current facility-administered medications on file prior to encounter.      Current Outpatient Medications on File Prior to Encounter   Medication Sig    alfuzosin (UROXATRAL) 10 mg Tb24 Take 1 tablet (10 mg total) by mouth daily with breakfast.    amlodipine (NORVASC) 10 MG tablet Take 10 mg by mouth once daily.      atorvastatin (LIPITOR) 20 MG tablet Take 1 tablet (20 mg total) by mouth once daily.    calcitRIOL (ROCALTROL) 0.25 MCG Cap Take 0.25 mcg by mouth once daily.     diphenhydrAMINE (BENADRYL) 25 mg capsule Take 50 mg by mouth nightly as needed for Itching.    donepezil (ARICEPT) 5 MG tablet Take 5 mg by mouth once daily.     ferrous sulfate 325 mg (65 mg iron) Tab tablet Take 325 mg by mouth once daily.    furosemide (LASIX) 20 MG tablet Take 1 tablet (20 mg total) by mouth once daily.    hydrALAZINE (APRESOLINE) 25 MG tablet Take 1 tablet (25 mg total) by mouth every 8 (eight) hours.    isosorbide mononitrate (IMDUR) 60 MG 24 hr tablet Take 1 tablet (60 mg total) by mouth once daily.    omeprazole (PRILOSEC) 20 MG capsule Take 20 mg by mouth once daily.     hydroxychloroquine (PLAQUENIL) 200 mg tablet Take 1 tablet (200 mg total) by mouth once daily.    [DISCONTINUED] HYDROcodone-acetaminophen (NORCO) 5-325 mg per tablet Take 1 tablet by mouth every 4 (four) hours as needed for Pain.    [DISCONTINUED] traZODone (DESYREL) 50 MG tablet TK 1/2 T PO QHS     Family History     Problem Relation (Age of Onset)    Asthma Son    Diabetes Daughter    Hypertension Daughter, Son        Tobacco Use    Smoking status: Former Smoker     Years: 40.00     Types: Cigarettes     Start date: 9/26/1985    Smokeless tobacco: Never Used   Substance and Sexual Activity    Alcohol use: No     Drug use: No    Sexual activity: Never     Review of Systems   Constitutional: Negative for fever.   HENT: Negative for congestion.    Respiratory: Negative for chest tightness.    Cardiovascular: Negative for chest pain.   Gastrointestinal: Positive for abdominal pain.   Genitourinary: Negative for difficulty urinating.   Neurological: Negative for dizziness.     Objective:     Vital Signs (Most Recent):  Temp: (!) 46.9 °F (8.3 °C) (12/02/20 1300)  Pulse: 63 (12/02/20 1300)  Resp: 18 (12/02/20 1300)  BP: (!) 179/64 (12/02/20 1300)  SpO2: 99 % (12/02/20 1300) Vital Signs (24h Range):  Temp:  [46.9 °F (8.3 °C)-97.9 °F (36.6 °C)] 46.9 °F (8.3 °C)  Pulse:  [56-68] 63  Resp:  [18] 18  SpO2:  [99 %-100 %] 99 %  BP: (179-220)/(64-84) 179/64     Weight: 71 kg (156 lb 8.4 oz)  Body mass index is 24.52 kg/m².    Physical Exam  Constitutional:       General: He is not in acute distress.     Appearance: He is well-developed.   HENT:      Head: Normocephalic.   Neck:      Musculoskeletal: Neck supple.   Cardiovascular:      Rate and Rhythm: Normal rate and regular rhythm.      Pulses: Normal pulses.   Pulmonary:      Effort: Pulmonary effort is normal. No respiratory distress.   Abdominal:      General: Abdomen is flat. There is no distension.      Tenderness: There is abdominal tenderness. There is no guarding.   Musculoskeletal: Normal range of motion.   Skin:     General: Skin is warm.      Findings: No rash.   Neurological:      Mental Status: He is alert and oriented to person, place, and time.   Psychiatric:         Mood and Affect: Mood normal.             Significant Labs:   BMP:   Recent Labs   Lab 12/02/20  0823   *      K 3.2*      CO2 26   BUN 28   CREATININE 1.9*   CALCIUM 8.7     CBC:   Recent Labs   Lab 12/02/20  0823   WBC 9.96   HGB 7.3*   HCT 23.3*        CMP:   Recent Labs   Lab 12/02/20  0823      K 3.2*      CO2 26   *   BUN 28   CREATININE 1.9*   CALCIUM  8.7   PROT 6.5   ALBUMIN 3.6   BILITOT 0.5   ALKPHOS 67   AST 21   ALT 21   ANIONGAP 12   EGFRNONAA 30.1*       Significant Imaging: I have reviewed and interpreted all pertinent imaging results/findings within the past 24 hours.    Assessment/Plan:     * Anemia secondary to GIB  Transfuse 2 units  GI consult   PPI IV   Hb q6      Fall  Unsure detail mechanism  EKG negative, no fracture bone.  Mental status is baseline  Fall precautions      GI bleeding  Possible upper GI bleeding    Plan   BP IV  Transfuse blood  GI consult if  hemoglobin dropped further despite transfusion       Urinary retention due to benign prostatic hyperplasia  Aware . Continue home meds      CKD (chronic kidney disease), stage III  At baseline and monitor       Dementia without behavioral disturbance  aware      Hyperlipidemia  Home meds      Chronic combined systolic and diastolic heart failure  Aware. Home meds      Hypertension  Stable . Home meds        VTE Risk Mitigation (From admission, onward)         Ordered     IP VTE HIGH RISK PATIENT  Once      12/02/20 1334     Place sequential compression device  Until discontinued      12/02/20 1334                   Andi Biswas MD  Department of Hospital Medicine   [facility] [place]

## 2020-12-02 NOTE — ASSESSMENT & PLAN NOTE
Unsure detail mechanism  EKG negative, no fracture bone.  Mental status is baseline  Fall precautions

## 2020-12-03 VITALS
WEIGHT: 156.5 LBS | BODY MASS INDEX: 24.56 KG/M2 | DIASTOLIC BLOOD PRESSURE: 64 MMHG | HEIGHT: 67 IN | RESPIRATION RATE: 18 BRPM | SYSTOLIC BLOOD PRESSURE: 158 MMHG | TEMPERATURE: 99 F | HEART RATE: 69 BPM | OXYGEN SATURATION: 99 %

## 2020-12-03 LAB
ANION GAP SERPL CALC-SCNC: 14 MMOL/L (ref 8–16)
BUN SERPL-MCNC: 21 MG/DL (ref 10–30)
CALCIUM SERPL-MCNC: 9 MG/DL (ref 8.7–10.5)
CHLORIDE SERPL-SCNC: 103 MMOL/L (ref 95–110)
CO2 SERPL-SCNC: 25 MMOL/L (ref 23–29)
CREAT SERPL-MCNC: 1.8 MG/DL (ref 0.5–1.4)
ERYTHROCYTE [DISTWIDTH] IN BLOOD BY AUTOMATED COUNT: 15.7 % (ref 11.5–14.5)
ERYTHROCYTE [DISTWIDTH] IN BLOOD BY AUTOMATED COUNT: 15.9 % (ref 11.5–14.5)
EST. GFR  (AFRICAN AMERICAN): 37.2 ML/MIN/1.73 M^2
EST. GFR  (NON AFRICAN AMERICAN): 32.2 ML/MIN/1.73 M^2
FERRITIN SERPL-MCNC: 47 NG/ML (ref 20–300)
GLUCOSE SERPL-MCNC: 88 MG/DL (ref 70–110)
HCT VFR BLD AUTO: 26.3 % (ref 40–54)
HCT VFR BLD AUTO: 28.5 % (ref 40–54)
HGB BLD-MCNC: 8.3 G/DL (ref 14–18)
HGB BLD-MCNC: 8.8 G/DL (ref 14–18)
IRON SERPL-MCNC: 175 UG/DL (ref 45–160)
MCH RBC QN AUTO: 29.1 PG (ref 27–31)
MCH RBC QN AUTO: 29.6 PG (ref 27–31)
MCHC RBC AUTO-ENTMCNC: 30.9 G/DL (ref 32–36)
MCHC RBC AUTO-ENTMCNC: 31.6 G/DL (ref 32–36)
MCV RBC AUTO: 94 FL (ref 82–98)
MCV RBC AUTO: 94 FL (ref 82–98)
PLATELET # BLD AUTO: 312 K/UL (ref 150–350)
PLATELET # BLD AUTO: 342 K/UL (ref 150–350)
PMV BLD AUTO: 10.1 FL (ref 9.2–12.9)
PMV BLD AUTO: 10.4 FL (ref 9.2–12.9)
POTASSIUM SERPL-SCNC: 3.8 MMOL/L (ref 3.5–5.1)
RBC # BLD AUTO: 2.8 M/UL (ref 4.6–6.2)
RBC # BLD AUTO: 3.02 M/UL (ref 4.6–6.2)
SATURATED IRON: 64 % (ref 20–50)
SODIUM SERPL-SCNC: 142 MMOL/L (ref 136–145)
TOTAL IRON BINDING CAPACITY: 273 UG/DL (ref 250–450)
TRANSFERRIN SERPL-MCNC: 195 MG/DL (ref 200–375)
WBC # BLD AUTO: 6.75 K/UL (ref 3.9–12.7)
WBC # BLD AUTO: 7.48 K/UL (ref 3.9–12.7)

## 2020-12-03 PROCEDURE — 96375 TX/PRO/DX INJ NEW DRUG ADDON: CPT

## 2020-12-03 PROCEDURE — 96376 TX/PRO/DX INJ SAME DRUG ADON: CPT

## 2020-12-03 PROCEDURE — G0378 HOSPITAL OBSERVATION PER HR: HCPCS

## 2020-12-03 PROCEDURE — 25000003 PHARM REV CODE 250: Performed by: INTERNAL MEDICINE

## 2020-12-03 PROCEDURE — 36415 COLL VENOUS BLD VENIPUNCTURE: CPT

## 2020-12-03 PROCEDURE — 96361 HYDRATE IV INFUSION ADD-ON: CPT | Mod: GZ

## 2020-12-03 PROCEDURE — 63600175 PHARM REV CODE 636 W HCPCS: Performed by: INTERNAL MEDICINE

## 2020-12-03 PROCEDURE — C9113 INJ PANTOPRAZOLE SODIUM, VIA: HCPCS | Performed by: INTERNAL MEDICINE

## 2020-12-03 PROCEDURE — 85027 COMPLETE CBC AUTOMATED: CPT | Mod: 91

## 2020-12-03 PROCEDURE — 82728 ASSAY OF FERRITIN: CPT

## 2020-12-03 PROCEDURE — 80048 BASIC METABOLIC PNL TOTAL CA: CPT

## 2020-12-03 PROCEDURE — 83540 ASSAY OF IRON: CPT

## 2020-12-03 RX ORDER — FERROUS SULFATE 325(65) MG
325 TABLET ORAL 2 TIMES DAILY
Qty: 60 TABLET | Refills: 1 | Status: SHIPPED | OUTPATIENT
Start: 2020-12-03

## 2020-12-03 RX ORDER — HYDRALAZINE HYDROCHLORIDE 25 MG/1
25 TABLET, FILM COATED ORAL EVERY 8 HOURS
Status: DISCONTINUED | OUTPATIENT
Start: 2020-12-03 | End: 2020-12-03 | Stop reason: HOSPADM

## 2020-12-03 RX ORDER — OMEPRAZOLE 20 MG/1
20 CAPSULE, DELAYED RELEASE ORAL 2 TIMES DAILY
Qty: 28 CAPSULE | Refills: 0 | Status: SHIPPED | OUTPATIENT
Start: 2020-12-03 | End: 2022-04-20

## 2020-12-03 RX ORDER — FUROSEMIDE 20 MG/1
20 TABLET ORAL DAILY
Status: DISCONTINUED | OUTPATIENT
Start: 2020-12-03 | End: 2020-12-03 | Stop reason: HOSPADM

## 2020-12-03 RX ADMIN — ATORVASTATIN CALCIUM 20 MG: 20 TABLET, FILM COATED ORAL at 09:12

## 2020-12-03 RX ADMIN — AMLODIPINE BESYLATE 10 MG: 5 TABLET ORAL at 09:12

## 2020-12-03 RX ADMIN — HYDROXYCHLOROQUINE SULFATE 200 MG: 200 TABLET, FILM COATED ORAL at 09:12

## 2020-12-03 RX ADMIN — FERROUS SULFATE TAB 325 MG (65 MG ELEMENTAL FE) 325 MG: 325 (65 FE) TAB at 09:12

## 2020-12-03 RX ADMIN — LABETALOL HYDROCHLORIDE 10 MG: 5 INJECTION, SOLUTION INTRAVENOUS at 12:12

## 2020-12-03 RX ADMIN — HYDRALAZINE HYDROCHLORIDE 25 MG: 25 TABLET, FILM COATED ORAL at 01:12

## 2020-12-03 RX ADMIN — FUROSEMIDE 20 MG: 20 TABLET ORAL at 09:12

## 2020-12-03 RX ADMIN — PANTOPRAZOLE SODIUM 40 MG: 40 INJECTION, POWDER, LYOPHILIZED, FOR SOLUTION INTRAVENOUS at 09:12

## 2020-12-03 NOTE — CONSULTS
INPATIENT NEPHROLOGY CONSULT   [facility] NEPHROLOGY    Patient Name: Troy Yuan Sr.  Date: 12/03/2020    Reason for consultation: Anemia    Chief Complaint:   Chief Complaint   Patient presents with    Back Pain     Patient slipped when walking without walker in bathroom, hit his back on bath tub. Denies LOC.        History of Present Illness:  89 y.o. old male who  has a past medical history of Bladder stones, Cancer, Encounter for blood transfusion, Hypertension, Kidney stone, Prostate cancer (2004), Radiation (2005), and Stage 3 chronic kidney disease (1/11/2019).. The patient presented with a primary complaint of fall. He was brought by his daughter. He was in his bathroom this morning and daughter heard the  thud as he had fallen to the floor.  Mechanism of fall is unsure and patient transient loss of consciousness..  He complained of pain at the back immediately after the fall but appear no head injury. Blood work found to have severe anemia and also positive occult blood and admitted. He denied chest pain is complaining of mild epigastric area discomfort. No nausea vomiting. Radiology studies did not show any fracture bone and EKG is unchanged.  On examination patient is awake, alert ,disoriented may be prior dementia and old age. Spoke with the daughter, generally he is doing well and he did not complain of any abdominal pain, nausea, vomiting but she did confirm the black stool.  She said he is at the baseline mental status. GI evaluated patient and believes anemia is from CKD. We are consulted for management.    Plan of Care:    1. Stage III CKD- f/b Dr. Mckenzie  - renal function is at baseline    2. HTN  - BP is high; resumed hydralazine    3. Anemia of CKD  - ordered iron studies- c/w CKD but taken after got blood so will recheck as outpatient  - he got 1 unit of blood; he got IV iron; can continue PO iron at discharge  - will set up for outpatient HERBERT therapy- cannot give right now due to  elevated BP (spoke with office staff today to set this up)    4. Hypokalemia  - 2/2 diuretic- can have liberalized K diet    5. SHPT  - will check parameters as outpatient    He is stable for d/c home from a renal standpoint.  Updated family at bedside.    Thank you for allowing us to participate in this patient's care. We will continue to follow.    Vital Signs:  Temp Readings from Last 3 Encounters:   12/03/20 98.5 °F (36.9 °C) (Oral)   11/17/20 98.7 °F (37.1 °C) (Oral)   11/03/20 97.5 °F (36.4 °C) (Oral)       Pulse Readings from Last 3 Encounters:   12/03/20 68   11/17/20 69   11/03/20 64       BP Readings from Last 3 Encounters:   12/03/20 (!) 178/72   11/17/20 (!) 178/81   11/03/20 (!) 152/57       Weight:  Wt Readings from Last 3 Encounters:   12/02/20 71 kg (156 lb 8.4 oz)   11/17/20 66.7 kg (147 lb)   11/03/20 72.6 kg (160 lb 0.9 oz)       Past Medical & Surgical History:  Past Medical History:   Diagnosis Date    Bladder stones     Cancer     Encounter for blood transfusion     Hypertension     Kidney stone     Prostate cancer 2004    Radiation 2005    prostate    Stage 3 chronic kidney disease 1/11/2019       Past Surgical History:   Procedure Laterality Date    CYSTOSCOPY      KIDNEY STONE SURGERY  May 2014       Past Social History:  Social History     Socioeconomic History    Marital status:      Spouse name: Not on file    Number of children: Not on file    Years of education: Not on file    Highest education level: Not on file   Occupational History    Not on file   Social Needs    Financial resource strain: Not on file    Food insecurity     Worry: Not on file     Inability: Not on file    Transportation needs     Medical: Not on file     Non-medical: Not on file   Tobacco Use    Smoking status: Former Smoker     Years: 40.00     Types: Cigarettes     Start date: 9/26/1985    Smokeless tobacco: Never Used   Substance and Sexual Activity    Alcohol use: No    Drug use: No     Sexual activity: Never   Lifestyle    Physical activity     Days per week: Not on file     Minutes per session: Not on file    Stress: Not on file   Relationships    Social connections     Talks on phone: Not on file     Gets together: Not on file     Attends Sikh service: Not on file     Active member of club or organization: Not on file     Attends meetings of clubs or organizations: Not on file     Relationship status: Not on file   Other Topics Concern    Not on file   Social History Narrative    Not on file       Medications:  Scheduled Meds:   amLODIPine  10 mg Oral Daily    atorvastatin  20 mg Oral Daily    donepeziL  5 mg Oral QHS    ferrous sulfate  325 mg Oral BID    furosemide  20 mg Oral Daily    hydrOXYchloroQUINE  200 mg Oral Daily    isosorbide mononitrate  60 mg Oral QHS    pantoprazole  40 mg Intravenous BID     Continuous Infusions:   sodium chloride 0.9% 50 mL/hr (12/02/20 6122)     PRN Meds:.sodium chloride, dextrose 50%, dextrose 50%, glucagon (human recombinant), glucose, glucose, haloperidol lactate, labetalol, magnesium oxide, sodium chloride 0.9%  No current facility-administered medications on file prior to encounter.      Current Outpatient Medications on File Prior to Encounter   Medication Sig Dispense Refill    alfuzosin (UROXATRAL) 10 mg Tb24 Take 1 tablet (10 mg total) by mouth daily with breakfast. 90 tablet 3    amlodipine (NORVASC) 10 MG tablet Take 10 mg by mouth once daily.        atorvastatin (LIPITOR) 20 MG tablet Take 1 tablet (20 mg total) by mouth once daily. 30 tablet 1    calcitRIOL (ROCALTROL) 0.25 MCG Cap Take 0.25 mcg by mouth once daily.       diphenhydrAMINE (BENADRYL) 25 mg capsule Take 50 mg by mouth nightly as needed for Itching.      donepezil (ARICEPT) 5 MG tablet Take 5 mg by mouth once daily.       ferrous sulfate 325 mg (65 mg iron) Tab tablet Take 325 mg by mouth once daily.      furosemide (LASIX) 20 MG tablet Take 1 tablet (20  "mg total) by mouth once daily. 30 tablet 0    hydrALAZINE (APRESOLINE) 25 MG tablet Take 1 tablet (25 mg total) by mouth every 8 (eight) hours. 90 tablet 1    isosorbide mononitrate (IMDUR) 60 MG 24 hr tablet Take 1 tablet (60 mg total) by mouth once daily. 30 tablet 11    omeprazole (PRILOSEC) 20 MG capsule Take 20 mg by mouth once daily.       hydroxychloroquine (PLAQUENIL) 200 mg tablet Take 1 tablet (200 mg total) by mouth once daily. 60 tablet 0       Allergies:  Soma [carisoprodol], Aspirin, and Sulfa (sulfonamide antibiotics)    Past Family History:  Reviewed; refer to Hospitalist Admission Note    Review of Systems:  Review of Systems - All 14 systems reviewed and negative, except as noted in HPI    Physical Exam:  BP (!) 178/72   Pulse 68   Temp 98.5 °F (36.9 °C) (Oral)   Resp 18   Ht 5' 7" (1.702 m)   Wt 71 kg (156 lb 8.4 oz)   SpO2 98%   BMI 24.52 kg/m²     INS/OUTS:  I/O last 3 completed shifts:  In: 223.8 [Blood:223.8]  Out: -   No intake/output data recorded.    General Appearance:    NAD, AAO x 2, cooperative, appears stated age   Head:    Normocephalic, atraumatic   Eyes:    PER, EOMI, and conjunctiva/sclera clear bilaterally        Mouth:   Moist mucus membranes   Lungs:     Clear to auscultation bilaterally, no wheeze, crackles, rales      or rhonchi, symmetric air movement, respirations unlabored   Heart:    Regular rate and rhythm, S1 and S2 normal, no murmur, rub   or gallop   Abdomen:     Soft, non-tender, non-distended, bowel sounds active all four   quadrants, no RT or guarding, no masses, no organomegaly   Extremities:   Warm and well perfused, distal pulses intact, no cyanosis or    peripheral edema   MSK:   No joint or muscle swelling, tenderness or deformity   Skin:   Skin color, texture, turgor normal, no rashes or lesions   Neurologic/Psychiatric:   CNII-XII intact, chronic dementia     Results:  Lab Results   Component Value Date     12/03/2020    K 3.8 12/03/2020    "  12/03/2020    CO2 25 12/03/2020    BUN 21 12/03/2020    CREATININE 1.8 (H) 12/03/2020    CALCIUM 9.0 12/03/2020    ANIONGAP 14 12/03/2020    ESTGFRAFRICA 37.2 (A) 12/03/2020    EGFRNONAA 32.2 (A) 12/03/2020       Lab Results   Component Value Date    CALCIUM 9.0 12/03/2020    PHOS 3.7 01/24/2020       Recent Labs   Lab 12/03/20  0600   WBC 6.75   RBC 2.80*   HGB 8.3*   HCT 26.3*      MCV 94   MCH 29.6   MCHC 31.6*       I have personally reviewed pertinent radiological imaging and reports.    I have spent > 70 minutes providing care for this patient for the above diagnoses. These services have included chart/data/imaging review, evaluation, exam, formulation of plan, , note preparation, and discussions with staff involved in this patient's care.    Rose Junior MD MPH  [facility]  [address] [place] [id number]  [phone] (p)  [phone] (f)

## 2020-12-03 NOTE — NURSING
Patient is calm and cooperative after Haldol was given. He let us insert another IV. Patients son staying the night with the patient. Bed alarm on.

## 2020-12-03 NOTE — DISCHARGE SUMMARY
[facility] Medicine  Discharge Summary      Patient Name: Troy Yuan Sr.  MRN: [id number]  Admission Date: 12/2/2020  Hospital Length of Stay: 0 days  Discharge Date and Time:  12/03/2020 5:25 PM  Attending Physician: No att. providers found   Discharging Provider: Andi Biswas MD  Primary Care Provider: Gentry Encinas MD      HPI:    89 y.o. old male who  has a past medical history of Bladder stones, Cancer, Encounter for blood transfusion, Hypertension, Kidney stone, Prostate cancer (2004), Radiation (2005), and Stage 3 chronic kidney disease (1/11/2019).. The patient presented with a primary complaint of fall.  .  He was brought by his daughter .He was in his bathroom this morning and daughter heard the  thud as he had fallen to the floor..     Mechanism of fall is unsure and patient transient loss of consciousness..  He complained of pain at the back immediately after the fall but appear no head injury.  Blood work found to have severe anemia and also positive occult blood and admitted.  He denied chest pain is complaining of mild epigastric area discomfort. No nausea vomiting.  Radiology studies did not show any fracture bone and EKG is unchanged.    On examination patient is awake, alert ,disoriented may be prior dementia and old age.  Spoke with the daughter, generally he is doing well and he did not complain of any abdominal pain, nausea, vomiting but she did confirm the black stool.  She said he is at the baseline mental status .    * No surgery found *      Hospital Course:   Patient was admitted with possible GI bleeding.  Found to have black color  stool but there is no eyewitness and  brought to hospital .but stool occult blood was positive and he was on iron tablets as well.    He had chronic anemia secondary to renal failure and needed blood transfusions in the past.  Blood transfusion 1 unit was given and his hemoglobin improved and repeat blood work with stable  hemoglobin and discharged in stable condition.  He was reviewed by the Gastroenterology and gave  Blood work in  1 week follow-up with GI with report.He got IV iron therapy as well.     Consults:   Consults (From admission, onward)        Status Ordering Provider     Inpatient consult to Gastroenterology  Once     Provider:  Ari Martinez III, MD    Completed DAI RHODES     Inpatient consult to Nephrology  Once     Provider:  Rose Junior MD    Completed ARI MARTINEZ III     IP consult to case management  Once     Provider:  (Not yet assigned)    Acknowledged DAI RHODES          No new Assessment & Plan notes have been filed under this hospital service since the last note was generated.  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    Specialty: Family Medicine  Contact information:  [address]  [facility] [id number] [phone]             Ari Martinez III, MD In 1 week.    Specialty: Gastroenterology  Why: with repeat labs  Contact information:  [address]  [facility]  [facility] [address] [phone]                 Patient Instructions:      CBC Without Differential    Standing Status: Future Standing Exp. Date: 02/01/22   Order Comments: To show at  dr beaulieu office     Activity as tolerated       Significant Diagnostic Studies: Labs:   CMP   Recent Labs   Lab 12/02/20  0823 12/03/20  0600    142   K 3.2* 3.8    103   CO2 26 25   * 88   BUN 28 21   CREATININE 1.9* 1.8*   CALCIUM 8.7 9.0   PROT 6.5  --    ALBUMIN 3.6  --    BILITOT 0.5  --    ALKPHOS 67  --    AST 21  --    ALT 21  --    ANIONGAP 12 14   ESTGFRAFRICA 34.9* 37.2*   EGFRNONAA 30.1* 32.2*    and CBC   Recent Labs   Lab 12/02/20  2107 12/03/20  0600 12/03/20  1304   WBC 10.04 6.75 7.48   HGB 8.7* 8.3* 8.8*   HCT 28.0* 26.3* 28.5*    312 342       Pending Diagnostic Studies:     None         Medications:  Reconciled Home Medications:      Medication List      CHANGE how you take these medications    ferrous sulfate 325 mg (65 mg iron) Tab tablet  Commonly known as: FEOSOL  Take 1 tablet (325 mg total) by mouth 2 (two) times daily.  What changed: when to take this     omeprazole 20 MG capsule  Commonly known as: PRILOSEC  Take 1 capsule (20 mg total) by mouth 2 (two) times a day.  What changed: when to take this        CONTINUE taking these medications    alfuzosin 10 mg Tb24  Commonly known as: UROXATRAL  Take 1 tablet (10 mg total) by mouth daily with breakfast.     amLODIPine 10 MG tablet  Commonly known as: NORVASC  Take 10 mg by mouth once daily.     atorvastatin 20 MG tablet  Commonly known as: LIPITOR  Take 1 tablet (20 mg total) by mouth once daily.     calcitRIOL 0.25 MCG Cap  Commonly known as: ROCALTROL  Take 0.25 mcg by mouth once daily.     diphenhydrAMINE 25 mg capsule  Commonly known as: BENADRYL  Take 50 mg by mouth nightly as needed for Itching.     donepeziL 5 MG tablet  Commonly known as: ARICEPT  Take 5 mg by mouth once daily.     furosemide 20 MG tablet  Commonly known as: LASIX  Take 1 tablet (20 mg total) by mouth once daily.     hydrALAZINE 25 MG tablet  Commonly  known as: APRESOLINE  Take 1 tablet (25 mg total) by mouth every 8 (eight) hours.     hydrOXYchloroQUINE 200 mg tablet  Commonly known as: PLAQUENIL  Take 1 tablet (200 mg total) by mouth once daily.     isosorbide mononitrate 60 MG 24 hr tablet  Commonly known as: IMDUR  Take 1 tablet (60 mg total) by mouth once daily.            Indwelling Lines/Drains at time of discharge:   Lines/Drains/Airways     None                 Time spent on the discharge of patient: 23 minutes  Patient was seen and examined on the date of discharge and determined to be suitable for discharge.         Andi Biswas MD  Department of Hospital Medicine  [facility]

## 2020-12-03 NOTE — PLAN OF CARE
Medicare Outpatient Observation Notice was signed, explained and given to patient/caregiver on 12/03/2020 at 9:20am     answered all questions

## 2020-12-03 NOTE — NURSING
Patients IV infiltrated, he is refusing for me to start a new IV and is trying to leave out of his room. Daughter at bedside.

## 2020-12-03 NOTE — HOSPITAL COURSE
Patient was admitted with possible GI bleeding.  Found to have black color  stool but there is no eyewitness and  brought to hospital .but stool occult blood was positive and he was on iron tablets as well.    He had chronic anemia secondary to renal failure and needed blood transfusions in the past.  Blood transfusion 1 unit was given and his hemoglobin improved and repeat blood work with stable hemoglobin and discharged in stable condition.  He was reviewed by the Gastroenterology and gave  Blood work in  1 week follow-up with GI with report.He got IV iron therapy as well.

## 2020-12-07 PROBLEM — N18.9 ANEMIA OF CHRONIC RENAL FAILURE: Status: ACTIVE | Noted: 2020-12-07

## 2020-12-07 PROBLEM — N18.30 CHRONIC KIDNEY DISEASE, STAGE III (MODERATE): Status: ACTIVE | Noted: 2020-12-07

## 2020-12-07 PROBLEM — D63.1 ANEMIA OF CHRONIC RENAL FAILURE: Status: ACTIVE | Noted: 2020-12-07

## 2020-12-08 ENCOUNTER — INFUSION (OUTPATIENT)
Dept: INFUSION THERAPY | Facility: HOSPITAL | Age: 85
End: 2020-12-08
Attending: INTERNAL MEDICINE
Payer: MEDICARE

## 2020-12-08 VITALS
RESPIRATION RATE: 18 BRPM | TEMPERATURE: 98 F | OXYGEN SATURATION: 99 % | HEART RATE: 67 BPM | BODY MASS INDEX: 24.09 KG/M2 | DIASTOLIC BLOOD PRESSURE: 59 MMHG | WEIGHT: 153.81 LBS | SYSTOLIC BLOOD PRESSURE: 154 MMHG

## 2020-12-08 DIAGNOSIS — N18.30 STAGE 3 CHRONIC KIDNEY DISEASE, UNSPECIFIED WHETHER STAGE 3A OR 3B CKD: ICD-10-CM

## 2020-12-08 DIAGNOSIS — D63.1 ANEMIA OF CHRONIC RENAL FAILURE, UNSPECIFIED CKD STAGE: Primary | ICD-10-CM

## 2020-12-08 DIAGNOSIS — N18.9 ANEMIA OF CHRONIC RENAL FAILURE, UNSPECIFIED CKD STAGE: Primary | ICD-10-CM

## 2020-12-08 PROCEDURE — 63600175 PHARM REV CODE 636 W HCPCS: Mod: EC | Performed by: INTERNAL MEDICINE

## 2020-12-08 PROCEDURE — 96372 THER/PROPH/DIAG INJ SC/IM: CPT

## 2020-12-08 RX ADMIN — EPOETIN ALFA-EPBX 3000 UNITS: 3000 INJECTION, SOLUTION INTRAVENOUS; SUBCUTANEOUS at 03:12

## 2020-12-21 ENCOUNTER — TELEPHONE (OUTPATIENT)
Dept: INFUSION THERAPY | Facility: HOSPITAL | Age: 85
End: 2020-12-21

## 2020-12-21 NOTE — TELEPHONE ENCOUNTER
Spoke with patient daughter to get patient labs drawn. Reached out to Dr. Mckenzie's nurse cesario for updated H&P and orders. Letty to send lab orders for patient to [facility]. Patient daughter verbalized understanding. Patient rescheduled to Wednesday for Retacrit injection.

## 2020-12-23 ENCOUNTER — INFUSION (OUTPATIENT)
Dept: INFUSION THERAPY | Facility: HOSPITAL | Age: 85
End: 2020-12-23
Attending: INTERNAL MEDICINE
Payer: MEDICARE

## 2020-12-23 VITALS
DIASTOLIC BLOOD PRESSURE: 69 MMHG | TEMPERATURE: 98 F | OXYGEN SATURATION: 97 % | HEART RATE: 66 BPM | WEIGHT: 154.38 LBS | BODY MASS INDEX: 24.18 KG/M2 | RESPIRATION RATE: 18 BRPM | SYSTOLIC BLOOD PRESSURE: 161 MMHG

## 2020-12-23 DIAGNOSIS — D63.1 ANEMIA OF CHRONIC RENAL FAILURE, UNSPECIFIED CKD STAGE: Primary | ICD-10-CM

## 2020-12-23 DIAGNOSIS — N18.30 STAGE 3 CHRONIC KIDNEY DISEASE, UNSPECIFIED WHETHER STAGE 3A OR 3B CKD: ICD-10-CM

## 2020-12-23 DIAGNOSIS — N18.9 ANEMIA OF CHRONIC RENAL FAILURE, UNSPECIFIED CKD STAGE: Primary | ICD-10-CM

## 2020-12-23 PROCEDURE — 96372 THER/PROPH/DIAG INJ SC/IM: CPT

## 2020-12-23 PROCEDURE — 63600175 PHARM REV CODE 636 W HCPCS: Mod: EA | Performed by: INTERNAL MEDICINE

## 2020-12-23 RX ADMIN — EPOETIN ALFA-EPBX 3000 UNITS: 3000 INJECTION, SOLUTION INTRAVENOUS; SUBCUTANEOUS at 01:12

## 2021-01-08 ENCOUNTER — OFFICE VISIT (OUTPATIENT)
Dept: HEMATOLOGY/ONCOLOGY | Facility: CLINIC | Age: 86
End: 2021-01-08
Payer: MEDICARE

## 2021-01-08 ENCOUNTER — INFUSION (OUTPATIENT)
Dept: INFUSION THERAPY | Facility: HOSPITAL | Age: 86
End: 2021-01-08
Attending: INTERNAL MEDICINE
Payer: MEDICARE

## 2021-01-08 VITALS
WEIGHT: 149 LBS | HEART RATE: 65 BPM | RESPIRATION RATE: 17 BRPM | DIASTOLIC BLOOD PRESSURE: 75 MMHG | BODY MASS INDEX: 23.34 KG/M2 | SYSTOLIC BLOOD PRESSURE: 151 MMHG | TEMPERATURE: 97 F

## 2021-01-08 VITALS
BODY MASS INDEX: 23.42 KG/M2 | WEIGHT: 149.5 LBS | DIASTOLIC BLOOD PRESSURE: 71 MMHG | HEART RATE: 64 BPM | SYSTOLIC BLOOD PRESSURE: 172 MMHG | RESPIRATION RATE: 16 BRPM

## 2021-01-08 DIAGNOSIS — N18.31 STAGE 3A CHRONIC KIDNEY DISEASE: ICD-10-CM

## 2021-01-08 DIAGNOSIS — N18.9 ANEMIA OF CHRONIC RENAL FAILURE, UNSPECIFIED CKD STAGE: Primary | ICD-10-CM

## 2021-01-08 DIAGNOSIS — D63.1 ANEMIA OF CHRONIC RENAL FAILURE, UNSPECIFIED CKD STAGE: Primary | ICD-10-CM

## 2021-01-08 DIAGNOSIS — Z85.46 HISTORY OF PROSTATE CANCER: Primary | ICD-10-CM

## 2021-01-08 DIAGNOSIS — N18.30 ANEMIA ASSOCIATED WITH STAGE 3 CHRONIC RENAL FAILURE: ICD-10-CM

## 2021-01-08 DIAGNOSIS — D53.9 ANEMIA ASSOCIATED WITH NUTRITIONAL DEFICIENCY: ICD-10-CM

## 2021-01-08 DIAGNOSIS — N18.30 STAGE 3 CHRONIC KIDNEY DISEASE, UNSPECIFIED WHETHER STAGE 3A OR 3B CKD: ICD-10-CM

## 2021-01-08 DIAGNOSIS — E07.9 THYROID CONDITION: ICD-10-CM

## 2021-01-08 DIAGNOSIS — D63.1 ANEMIA ASSOCIATED WITH STAGE 3 CHRONIC RENAL FAILURE: ICD-10-CM

## 2021-01-08 PROCEDURE — 1126F AMNT PAIN NOTED NONE PRSNT: CPT | Mod: S$GLB,,, | Performed by: INTERNAL MEDICINE

## 2021-01-08 PROCEDURE — 99204 PR OFFICE/OUTPT VISIT, NEW, LEVL IV, 45-59 MIN: ICD-10-PCS | Mod: S$GLB,,, | Performed by: INTERNAL MEDICINE

## 2021-01-08 PROCEDURE — 1101F PT FALLS ASSESS-DOCD LE1/YR: CPT | Mod: S$GLB,,, | Performed by: INTERNAL MEDICINE

## 2021-01-08 PROCEDURE — 1126F PR PAIN SEVERITY QUANTIFIED, NO PAIN PRESENT: ICD-10-PCS | Mod: S$GLB,,, | Performed by: INTERNAL MEDICINE

## 2021-01-08 PROCEDURE — 99204 OFFICE O/P NEW MOD 45 MIN: CPT | Mod: S$GLB,,, | Performed by: INTERNAL MEDICINE

## 2021-01-08 PROCEDURE — 1159F MED LIST DOCD IN RCRD: CPT | Mod: S$GLB,,, | Performed by: INTERNAL MEDICINE

## 2021-01-08 PROCEDURE — 1101F PR PT FALLS ASSESS DOC 0-1 FALLS W/OUT INJ PAST YR: ICD-10-PCS | Mod: S$GLB,,, | Performed by: INTERNAL MEDICINE

## 2021-01-08 PROCEDURE — 96372 THER/PROPH/DIAG INJ SC/IM: CPT

## 2021-01-08 PROCEDURE — 63600175 PHARM REV CODE 636 W HCPCS: Performed by: INTERNAL MEDICINE

## 2021-01-08 PROCEDURE — 3288F FALL RISK ASSESSMENT DOCD: CPT | Mod: S$GLB,,, | Performed by: INTERNAL MEDICINE

## 2021-01-08 PROCEDURE — 3288F PR FALLS RISK ASSESSMENT DOCUMENTED: ICD-10-PCS | Mod: S$GLB,,, | Performed by: INTERNAL MEDICINE

## 2021-01-08 PROCEDURE — 1159F PR MEDICATION LIST DOCUMENTED IN MEDICAL RECORD: ICD-10-PCS | Mod: S$GLB,,, | Performed by: INTERNAL MEDICINE

## 2021-01-08 RX ADMIN — EPOETIN ALFA-EPBX 3000 UNITS: 3000 INJECTION, SOLUTION INTRAVENOUS; SUBCUTANEOUS at 02:01

## 2021-01-19 ENCOUNTER — INFUSION (OUTPATIENT)
Dept: INFUSION THERAPY | Facility: HOSPITAL | Age: 86
End: 2021-01-19
Attending: INTERNAL MEDICINE
Payer: MEDICARE

## 2021-01-19 VITALS
OXYGEN SATURATION: 96 % | TEMPERATURE: 98 F | HEART RATE: 68 BPM | WEIGHT: 147.31 LBS | BODY MASS INDEX: 23.07 KG/M2 | RESPIRATION RATE: 18 BRPM | DIASTOLIC BLOOD PRESSURE: 64 MMHG | SYSTOLIC BLOOD PRESSURE: 145 MMHG

## 2021-01-19 DIAGNOSIS — N18.9 ANEMIA OF CHRONIC RENAL FAILURE, UNSPECIFIED CKD STAGE: Primary | ICD-10-CM

## 2021-01-19 DIAGNOSIS — N18.30 STAGE 3 CHRONIC KIDNEY DISEASE, UNSPECIFIED WHETHER STAGE 3A OR 3B CKD: ICD-10-CM

## 2021-01-19 DIAGNOSIS — D63.1 ANEMIA OF CHRONIC RENAL FAILURE, UNSPECIFIED CKD STAGE: Primary | ICD-10-CM

## 2021-01-19 PROCEDURE — 63600175 PHARM REV CODE 636 W HCPCS: Performed by: INTERNAL MEDICINE

## 2021-01-19 PROCEDURE — 96372 THER/PROPH/DIAG INJ SC/IM: CPT

## 2021-01-19 RX ADMIN — EPOETIN ALFA-EPBX 3000 UNITS: 3000 INJECTION, SOLUTION INTRAVENOUS; SUBCUTANEOUS at 01:01

## 2021-01-22 ENCOUNTER — OFFICE VISIT (OUTPATIENT)
Dept: HEMATOLOGY/ONCOLOGY | Facility: CLINIC | Age: 86
End: 2021-01-22
Payer: MEDICARE

## 2021-01-22 VITALS
SYSTOLIC BLOOD PRESSURE: 180 MMHG | BODY MASS INDEX: 23.49 KG/M2 | DIASTOLIC BLOOD PRESSURE: 72 MMHG | HEART RATE: 62 BPM | RESPIRATION RATE: 16 BRPM | WEIGHT: 150 LBS

## 2021-01-22 DIAGNOSIS — N18.31 STAGE 3A CHRONIC KIDNEY DISEASE: Primary | ICD-10-CM

## 2021-01-22 DIAGNOSIS — N18.32 ANEMIA OF CHRONIC RENAL FAILURE, STAGE 3B: ICD-10-CM

## 2021-01-22 DIAGNOSIS — D63.1 ANEMIA OF CHRONIC RENAL FAILURE, STAGE 3B: ICD-10-CM

## 2021-01-22 LAB
ALBUMIN SERPL ELPH-MCNC: 3.9 G/DL (ref 3.8–4.8)
ALBUMIN SERPL-MCNC: 4.3 G/DL (ref 3.6–5.1)
ALBUMIN/GLOB SERPL: 1.4 (CALC) (ref 1–2.5)
ALP SERPL-CCNC: 73 U/L (ref 35–144)
ALPHA1 GLOB SERPL ELPH-MCNC: 0.4 G/DL (ref 0.2–0.3)
ALPHA2 GLOB SERPL ELPH-MCNC: 1.2 G/DL (ref 0.5–0.9)
ALT SERPL-CCNC: 7 U/L (ref 9–46)
AST SERPL-CCNC: 13 U/L (ref 10–35)
BASOPHILS # BLD AUTO: 21 CELLS/UL (ref 0–200)
BASOPHILS NFR BLD AUTO: 0.3 %
BETA1 GLOB SERPL ELPH-MCNC: 0.4 G/DL (ref 0.4–0.6)
BETA2 GLOB SERPL ELPH-MCNC: 0.4 G/DL (ref 0.2–0.5)
BILIRUB SERPL-MCNC: 0.4 MG/DL (ref 0.2–1.2)
BUN SERPL-MCNC: 32 MG/DL (ref 7–25)
BUN/CREAT SERPL: 13 (CALC) (ref 6–22)
CALCIUM SERPL-MCNC: 9.3 MG/DL (ref 8.6–10.3)
CHLORIDE SERPL-SCNC: 102 MMOL/L (ref 98–110)
CO2 SERPL-SCNC: 26 MMOL/L (ref 20–32)
CREAT SERPL-MCNC: 2.49 MG/DL (ref 0.7–1.11)
EOSINOPHIL # BLD AUTO: 170 CELLS/UL (ref 15–500)
EOSINOPHIL NFR BLD AUTO: 2.4 %
EPO SERPL-ACNC: 28.3 MIU/ML (ref 2.6–18.5)
ERYTHROCYTE [DISTWIDTH] IN BLOOD BY AUTOMATED COUNT: 13.8 % (ref 11–15)
FERRITIN SERPL-MCNC: 67 NG/ML (ref 24–380)
FOLATE SERPL-MCNC: 13 NG/ML
GAMMA GLOB SERPL ELPH-MCNC: 0.9 G/DL (ref 0.8–1.7)
GFRSERPLBLD MDRD-ARVRAT: 22 ML/MIN/1.73M2
GLOBULIN SER CALC-MCNC: 3 G/DL (CALC) (ref 1.9–3.7)
GLUCOSE SERPL-MCNC: 96 MG/DL (ref 65–99)
HCT VFR BLD AUTO: 29.4 % (ref 38.5–50)
HGB BLD-MCNC: 9.4 G/DL (ref 13.2–17.1)
IGA SERPL-MCNC: 151 MG/DL (ref 70–320)
IGG SERPL-MCNC: 992 MG/DL (ref 600–1540)
IGM SERPL-MCNC: 117 MG/DL (ref 50–300)
INTERPRETATION SERPL IFE-IMP: NORMAL
KAPPA LC SERPL-MCNC: 221 MG/DL (ref 176–443)
KAPPA LC/LAMBDA SER: 1.92 {RATIO} (ref 1.29–2.55)
LAMBDA LC SERPL-MCNC: 115 MG/DL (ref 91–240)
LYMPHOCYTES # BLD AUTO: 1150 CELLS/UL (ref 850–3900)
LYMPHOCYTES NFR BLD AUTO: 16.2 %
MCH RBC QN AUTO: 29.1 PG (ref 27–33)
MCHC RBC AUTO-ENTMCNC: 32 G/DL (ref 32–36)
MCV RBC AUTO: 91 FL (ref 80–100)
MONOCYTES # BLD AUTO: 490 CELLS/UL (ref 200–950)
MONOCYTES NFR BLD AUTO: 6.9 %
NEUTROPHILS # BLD AUTO: 5268 CELLS/UL (ref 1500–7800)
NEUTROPHILS NFR BLD AUTO: 74.2 %
PLATELET # BLD AUTO: 369 THOUSAND/UL (ref 140–400)
PMV BLD REES-ECKER: 10.2 FL (ref 7.5–12.5)
POTASSIUM SERPL-SCNC: 3.7 MMOL/L (ref 3.5–5.3)
PROT PATTERN SERPL ELPH-IMP: ABNORMAL
PROT SERPL-MCNC: 7.3 G/DL (ref 6.1–8.1)
PROT SERPL-MCNC: 7.3 G/DL (ref 6.1–8.1)
RBC # BLD AUTO: 3.23 MILLION/UL (ref 4.2–5.8)
RETICS #: NORMAL CELLS/UL (ref 25000–90000)
RETICS/RBC NFR AUTO: 1.5 %
SODIUM SERPL-SCNC: 141 MMOL/L (ref 135–146)
TESTOST FREE SERPL-MCNC: 23.8 PG/ML
TSH SERPL-ACNC: 1.26 MIU/L (ref 0.4–4.5)
VIT B12 SERPL-MCNC: 488 PG/ML (ref 200–1100)
VIT B6 SERPL-MCNC: 3.8 NG/ML (ref 2.1–21.7)
WBC # BLD AUTO: 7.1 THOUSAND/UL (ref 3.8–10.8)

## 2021-01-22 PROCEDURE — 1126F PR PAIN SEVERITY QUANTIFIED, NO PAIN PRESENT: ICD-10-PCS | Mod: S$GLB,,, | Performed by: INTERNAL MEDICINE

## 2021-01-22 PROCEDURE — 3288F FALL RISK ASSESSMENT DOCD: CPT | Mod: S$GLB,,, | Performed by: INTERNAL MEDICINE

## 2021-01-22 PROCEDURE — 3288F PR FALLS RISK ASSESSMENT DOCUMENTED: ICD-10-PCS | Mod: S$GLB,,, | Performed by: INTERNAL MEDICINE

## 2021-01-22 PROCEDURE — 1159F PR MEDICATION LIST DOCUMENTED IN MEDICAL RECORD: ICD-10-PCS | Mod: S$GLB,,, | Performed by: INTERNAL MEDICINE

## 2021-01-22 PROCEDURE — 1101F PT FALLS ASSESS-DOCD LE1/YR: CPT | Mod: S$GLB,,, | Performed by: INTERNAL MEDICINE

## 2021-01-22 PROCEDURE — 99213 PR OFFICE/OUTPT VISIT, EST, LEVL III, 20-29 MIN: ICD-10-PCS | Mod: S$GLB,,, | Performed by: INTERNAL MEDICINE

## 2021-01-22 PROCEDURE — 99213 OFFICE O/P EST LOW 20 MIN: CPT | Mod: S$GLB,,, | Performed by: INTERNAL MEDICINE

## 2021-01-22 PROCEDURE — 1126F AMNT PAIN NOTED NONE PRSNT: CPT | Mod: S$GLB,,, | Performed by: INTERNAL MEDICINE

## 2021-01-22 PROCEDURE — 1101F PR PT FALLS ASSESS DOC 0-1 FALLS W/OUT INJ PAST YR: ICD-10-PCS | Mod: S$GLB,,, | Performed by: INTERNAL MEDICINE

## 2021-01-22 PROCEDURE — 1159F MED LIST DOCD IN RCRD: CPT | Mod: S$GLB,,, | Performed by: INTERNAL MEDICINE

## 2021-01-29 LAB — PSA SERPL-MCNC: 1.2 NG/ML

## 2021-02-02 ENCOUNTER — TELEPHONE (OUTPATIENT)
Dept: HEMATOLOGY/ONCOLOGY | Facility: CLINIC | Age: 86
End: 2021-02-02

## 2021-02-02 DIAGNOSIS — D63.1 ANEMIA OF CHRONIC RENAL FAILURE, STAGE 3B: Primary | ICD-10-CM

## 2021-02-02 DIAGNOSIS — N18.32 ANEMIA OF CHRONIC RENAL FAILURE, STAGE 3B: Primary | ICD-10-CM

## 2021-02-08 ENCOUNTER — INFUSION (OUTPATIENT)
Dept: INFUSION THERAPY | Facility: HOSPITAL | Age: 86
End: 2021-02-08
Attending: INTERNAL MEDICINE
Payer: MEDICARE

## 2021-02-08 VITALS
BODY MASS INDEX: 23.96 KG/M2 | WEIGHT: 153 LBS | RESPIRATION RATE: 18 BRPM | DIASTOLIC BLOOD PRESSURE: 80 MMHG | SYSTOLIC BLOOD PRESSURE: 170 MMHG | HEART RATE: 71 BPM | TEMPERATURE: 99 F

## 2021-02-08 DIAGNOSIS — N18.30 STAGE 3 CHRONIC KIDNEY DISEASE, UNSPECIFIED WHETHER STAGE 3A OR 3B CKD: ICD-10-CM

## 2021-02-08 DIAGNOSIS — N18.9 ANEMIA OF CHRONIC RENAL FAILURE, UNSPECIFIED CKD STAGE: Primary | ICD-10-CM

## 2021-02-08 DIAGNOSIS — D63.1 ANEMIA OF CHRONIC RENAL FAILURE, UNSPECIFIED CKD STAGE: Primary | ICD-10-CM

## 2021-02-08 PROCEDURE — 63600175 PHARM REV CODE 636 W HCPCS: Performed by: INTERNAL MEDICINE

## 2021-02-08 PROCEDURE — 96372 THER/PROPH/DIAG INJ SC/IM: CPT

## 2021-02-08 RX ADMIN — EPOETIN ALFA-EPBX 20000 UNITS: 20000 INJECTION, SOLUTION INTRAVENOUS; SUBCUTANEOUS at 02:02

## 2021-02-22 ENCOUNTER — INFUSION (OUTPATIENT)
Dept: INFUSION THERAPY | Facility: HOSPITAL | Age: 86
End: 2021-02-22
Attending: INTERNAL MEDICINE
Payer: MEDICARE

## 2021-02-22 VITALS
HEART RATE: 63 BPM | OXYGEN SATURATION: 97 % | DIASTOLIC BLOOD PRESSURE: 68 MMHG | SYSTOLIC BLOOD PRESSURE: 176 MMHG | TEMPERATURE: 98 F | RESPIRATION RATE: 18 BRPM | WEIGHT: 153.69 LBS | BODY MASS INDEX: 24.07 KG/M2

## 2021-02-22 DIAGNOSIS — N18.9 ANEMIA OF CHRONIC RENAL FAILURE, UNSPECIFIED CKD STAGE: Primary | ICD-10-CM

## 2021-02-22 DIAGNOSIS — D63.1 ANEMIA OF CHRONIC RENAL FAILURE, UNSPECIFIED CKD STAGE: Primary | ICD-10-CM

## 2021-02-22 DIAGNOSIS — N18.30 STAGE 3 CHRONIC KIDNEY DISEASE, UNSPECIFIED WHETHER STAGE 3A OR 3B CKD: ICD-10-CM

## 2021-02-22 PROCEDURE — 63600175 PHARM REV CODE 636 W HCPCS: Mod: EA | Performed by: INTERNAL MEDICINE

## 2021-02-22 PROCEDURE — 96372 THER/PROPH/DIAG INJ SC/IM: CPT

## 2021-02-22 RX ADMIN — EPOETIN ALFA-EPBX 20000 UNITS: 20000 INJECTION, SOLUTION INTRAVENOUS; SUBCUTANEOUS at 10:02

## 2021-02-24 PROBLEM — N18.30 ANEMIA DUE TO STAGE 3 CHRONIC KIDNEY DISEASE TREATED WITH ERYTHROPOIETIN: Status: ACTIVE | Noted: 2021-02-24

## 2021-02-24 PROBLEM — D63.1 ANEMIA DUE TO STAGE 3 CHRONIC KIDNEY DISEASE TREATED WITH ERYTHROPOIETIN: Status: ACTIVE | Noted: 2021-02-24

## 2021-03-05 ENCOUNTER — OFFICE VISIT (OUTPATIENT)
Dept: UROLOGY | Facility: CLINIC | Age: 86
End: 2021-03-05
Payer: MEDICARE

## 2021-03-05 VITALS
HEIGHT: 67 IN | RESPIRATION RATE: 18 BRPM | DIASTOLIC BLOOD PRESSURE: 94 MMHG | BODY MASS INDEX: 24.12 KG/M2 | SYSTOLIC BLOOD PRESSURE: 187 MMHG | HEART RATE: 66 BPM | WEIGHT: 153.69 LBS

## 2021-03-05 DIAGNOSIS — C61 PROSTATE CANCER: Primary | ICD-10-CM

## 2021-03-05 DIAGNOSIS — K40.90 UNILATERAL INGUINAL HERNIA WITHOUT OBSTRUCTION OR GANGRENE, RECURRENCE NOT SPECIFIED: ICD-10-CM

## 2021-03-05 PROCEDURE — 3288F PR FALLS RISK ASSESSMENT DOCUMENTED: ICD-10-PCS | Mod: CPTII,S$GLB,, | Performed by: UROLOGY

## 2021-03-05 PROCEDURE — 1101F PT FALLS ASSESS-DOCD LE1/YR: CPT | Mod: CPTII,S$GLB,, | Performed by: UROLOGY

## 2021-03-05 PROCEDURE — 99999 PR PBB SHADOW E&M-EST. PATIENT-LVL III: CPT | Mod: PBBFAC,,, | Performed by: UROLOGY

## 2021-03-05 PROCEDURE — 99214 PR OFFICE/OUTPT VISIT, EST, LEVL IV, 30-39 MIN: ICD-10-PCS | Mod: S$GLB,,, | Performed by: UROLOGY

## 2021-03-05 PROCEDURE — 1126F PR PAIN SEVERITY QUANTIFIED, NO PAIN PRESENT: ICD-10-PCS | Mod: S$GLB,,, | Performed by: UROLOGY

## 2021-03-05 PROCEDURE — 1126F AMNT PAIN NOTED NONE PRSNT: CPT | Mod: S$GLB,,, | Performed by: UROLOGY

## 2021-03-05 PROCEDURE — 99214 OFFICE O/P EST MOD 30 MIN: CPT | Mod: S$GLB,,, | Performed by: UROLOGY

## 2021-03-05 PROCEDURE — 99999 PR PBB SHADOW E&M-EST. PATIENT-LVL III: ICD-10-PCS | Mod: PBBFAC,,, | Performed by: UROLOGY

## 2021-03-05 PROCEDURE — 3288F FALL RISK ASSESSMENT DOCD: CPT | Mod: CPTII,S$GLB,, | Performed by: UROLOGY

## 2021-03-05 PROCEDURE — 1159F MED LIST DOCD IN RCRD: CPT | Mod: S$GLB,,, | Performed by: UROLOGY

## 2021-03-05 PROCEDURE — 1159F PR MEDICATION LIST DOCUMENTED IN MEDICAL RECORD: ICD-10-PCS | Mod: S$GLB,,, | Performed by: UROLOGY

## 2021-03-05 PROCEDURE — 1101F PR PT FALLS ASSESS DOC 0-1 FALLS W/OUT INJ PAST YR: ICD-10-PCS | Mod: CPTII,S$GLB,, | Performed by: UROLOGY

## 2021-03-08 ENCOUNTER — INFUSION (OUTPATIENT)
Dept: INFUSION THERAPY | Facility: HOSPITAL | Age: 86
End: 2021-03-08
Attending: INTERNAL MEDICINE
Payer: MEDICARE

## 2021-03-08 VITALS
OXYGEN SATURATION: 100 % | WEIGHT: 157.19 LBS | HEART RATE: 60 BPM | TEMPERATURE: 99 F | BODY MASS INDEX: 24.62 KG/M2 | RESPIRATION RATE: 17 BRPM | DIASTOLIC BLOOD PRESSURE: 68 MMHG | SYSTOLIC BLOOD PRESSURE: 157 MMHG

## 2021-03-08 DIAGNOSIS — N18.30 ANEMIA DUE TO STAGE 3 CHRONIC KIDNEY DISEASE TREATED WITH ERYTHROPOIETIN: Primary | ICD-10-CM

## 2021-03-08 DIAGNOSIS — D63.1 ANEMIA OF CHRONIC RENAL FAILURE, UNSPECIFIED CKD STAGE: ICD-10-CM

## 2021-03-08 DIAGNOSIS — N18.9 ANEMIA OF CHRONIC RENAL FAILURE, UNSPECIFIED CKD STAGE: ICD-10-CM

## 2021-03-08 DIAGNOSIS — D63.1 ANEMIA DUE TO STAGE 3 CHRONIC KIDNEY DISEASE TREATED WITH ERYTHROPOIETIN: Primary | ICD-10-CM

## 2021-03-08 PROCEDURE — 63600175 PHARM REV CODE 636 W HCPCS: Performed by: INTERNAL MEDICINE

## 2021-03-08 PROCEDURE — 96372 THER/PROPH/DIAG INJ SC/IM: CPT

## 2021-03-08 RX ADMIN — EPOETIN ALFA-EPBX 3000 UNITS: 3000 INJECTION, SOLUTION INTRAVENOUS; SUBCUTANEOUS at 09:03

## 2021-03-22 ENCOUNTER — INFUSION (OUTPATIENT)
Dept: INFUSION THERAPY | Facility: HOSPITAL | Age: 86
End: 2021-03-22
Attending: INTERNAL MEDICINE
Payer: MEDICARE

## 2021-03-22 VITALS
HEART RATE: 66 BPM | OXYGEN SATURATION: 98 % | BODY MASS INDEX: 25.28 KG/M2 | RESPIRATION RATE: 18 BRPM | TEMPERATURE: 99 F | SYSTOLIC BLOOD PRESSURE: 132 MMHG | WEIGHT: 161.38 LBS | DIASTOLIC BLOOD PRESSURE: 63 MMHG

## 2021-03-22 DIAGNOSIS — N18.9 ANEMIA OF CHRONIC RENAL FAILURE, UNSPECIFIED CKD STAGE: ICD-10-CM

## 2021-03-22 DIAGNOSIS — D63.1 ANEMIA OF CHRONIC RENAL FAILURE, UNSPECIFIED CKD STAGE: ICD-10-CM

## 2021-03-22 DIAGNOSIS — N18.30 ANEMIA DUE TO STAGE 3 CHRONIC KIDNEY DISEASE TREATED WITH ERYTHROPOIETIN: Primary | ICD-10-CM

## 2021-03-22 DIAGNOSIS — D63.1 ANEMIA DUE TO STAGE 3 CHRONIC KIDNEY DISEASE TREATED WITH ERYTHROPOIETIN: Primary | ICD-10-CM

## 2021-03-22 PROCEDURE — 63600175 PHARM REV CODE 636 W HCPCS: Mod: EC | Performed by: INTERNAL MEDICINE

## 2021-03-22 PROCEDURE — 96372 THER/PROPH/DIAG INJ SC/IM: CPT

## 2021-03-22 RX ADMIN — EPOETIN ALFA-EPBX 3000 UNITS: 3000 INJECTION, SOLUTION INTRAVENOUS; SUBCUTANEOUS at 03:03

## 2021-04-05 ENCOUNTER — INFUSION (OUTPATIENT)
Dept: INFUSION THERAPY | Facility: HOSPITAL | Age: 86
End: 2021-04-05
Attending: INTERNAL MEDICINE
Payer: MEDICARE

## 2021-04-05 VITALS
HEART RATE: 71 BPM | TEMPERATURE: 98 F | DIASTOLIC BLOOD PRESSURE: 96 MMHG | WEIGHT: 159.5 LBS | RESPIRATION RATE: 17 BRPM | BODY MASS INDEX: 26.57 KG/M2 | HEIGHT: 65 IN | OXYGEN SATURATION: 98 % | SYSTOLIC BLOOD PRESSURE: 167 MMHG

## 2021-04-05 DIAGNOSIS — D63.1 ANEMIA OF CHRONIC RENAL FAILURE, UNSPECIFIED CKD STAGE: ICD-10-CM

## 2021-04-05 DIAGNOSIS — N18.30 ANEMIA DUE TO STAGE 3 CHRONIC KIDNEY DISEASE TREATED WITH ERYTHROPOIETIN: Primary | ICD-10-CM

## 2021-04-05 DIAGNOSIS — D63.1 ANEMIA DUE TO STAGE 3 CHRONIC KIDNEY DISEASE TREATED WITH ERYTHROPOIETIN: Primary | ICD-10-CM

## 2021-04-05 DIAGNOSIS — N18.9 ANEMIA OF CHRONIC RENAL FAILURE, UNSPECIFIED CKD STAGE: ICD-10-CM

## 2021-04-05 PROCEDURE — 63600175 PHARM REV CODE 636 W HCPCS: Mod: EC | Performed by: INTERNAL MEDICINE

## 2021-04-05 PROCEDURE — 96372 THER/PROPH/DIAG INJ SC/IM: CPT

## 2021-04-05 RX ADMIN — EPOETIN ALFA-EPBX 3000 UNITS: 3000 INJECTION, SOLUTION INTRAVENOUS; SUBCUTANEOUS at 11:04

## 2021-04-19 ENCOUNTER — INFUSION (OUTPATIENT)
Dept: INFUSION THERAPY | Facility: HOSPITAL | Age: 86
End: 2021-04-19
Attending: INTERNAL MEDICINE
Payer: MEDICARE

## 2021-04-19 ENCOUNTER — OFFICE VISIT (OUTPATIENT)
Dept: HEMATOLOGY/ONCOLOGY | Facility: CLINIC | Age: 86
End: 2021-04-19
Payer: MEDICARE

## 2021-04-19 VITALS
BODY MASS INDEX: 26.46 KG/M2 | BODY MASS INDEX: 26.52 KG/M2 | HEART RATE: 59 BPM | TEMPERATURE: 98 F | DIASTOLIC BLOOD PRESSURE: 66 MMHG | TEMPERATURE: 98 F | SYSTOLIC BLOOD PRESSURE: 174 MMHG | HEART RATE: 56 BPM | DIASTOLIC BLOOD PRESSURE: 75 MMHG | WEIGHT: 159.38 LBS | RESPIRATION RATE: 20 BRPM | SYSTOLIC BLOOD PRESSURE: 186 MMHG | WEIGHT: 159 LBS | OXYGEN SATURATION: 98 %

## 2021-04-19 DIAGNOSIS — D63.1 ANEMIA OF CHRONIC RENAL FAILURE, UNSPECIFIED CKD STAGE: ICD-10-CM

## 2021-04-19 DIAGNOSIS — D63.1 ANEMIA OF CHRONIC RENAL FAILURE, STAGE 3B: Primary | ICD-10-CM

## 2021-04-19 DIAGNOSIS — N18.32 ANEMIA OF CHRONIC RENAL FAILURE, STAGE 3B: Primary | ICD-10-CM

## 2021-04-19 DIAGNOSIS — N18.31 STAGE 3A CHRONIC KIDNEY DISEASE: ICD-10-CM

## 2021-04-19 DIAGNOSIS — N18.30 ANEMIA DUE TO STAGE 3 CHRONIC KIDNEY DISEASE TREATED WITH ERYTHROPOIETIN: Primary | ICD-10-CM

## 2021-04-19 DIAGNOSIS — D63.1 ANEMIA DUE TO STAGE 3 CHRONIC KIDNEY DISEASE TREATED WITH ERYTHROPOIETIN: Primary | ICD-10-CM

## 2021-04-19 DIAGNOSIS — N18.9 ANEMIA OF CHRONIC RENAL FAILURE, UNSPECIFIED CKD STAGE: ICD-10-CM

## 2021-04-19 PROCEDURE — 63600175 PHARM REV CODE 636 W HCPCS: Performed by: INTERNAL MEDICINE

## 2021-04-19 PROCEDURE — 96369 SC THER INFUSION UP TO 1 HR: CPT

## 2021-04-19 PROCEDURE — 99214 PR OFFICE/OUTPT VISIT, EST, LEVL IV, 30-39 MIN: ICD-10-PCS | Mod: S$GLB,,, | Performed by: INTERNAL MEDICINE

## 2021-04-19 PROCEDURE — 1126F PR PAIN SEVERITY QUANTIFIED, NO PAIN PRESENT: ICD-10-PCS | Mod: S$GLB,,, | Performed by: INTERNAL MEDICINE

## 2021-04-19 PROCEDURE — 1159F PR MEDICATION LIST DOCUMENTED IN MEDICAL RECORD: ICD-10-PCS | Mod: S$GLB,,, | Performed by: INTERNAL MEDICINE

## 2021-04-19 PROCEDURE — 1159F MED LIST DOCD IN RCRD: CPT | Mod: S$GLB,,, | Performed by: INTERNAL MEDICINE

## 2021-04-19 PROCEDURE — 96372 THER/PROPH/DIAG INJ SC/IM: CPT

## 2021-04-19 PROCEDURE — 99214 OFFICE O/P EST MOD 30 MIN: CPT | Mod: S$GLB,,, | Performed by: INTERNAL MEDICINE

## 2021-04-19 PROCEDURE — 1126F AMNT PAIN NOTED NONE PRSNT: CPT | Mod: S$GLB,,, | Performed by: INTERNAL MEDICINE

## 2021-04-19 RX ADMIN — EPOETIN ALFA-EPBX 3000 UNITS: 3000 INJECTION, SOLUTION INTRAVENOUS; SUBCUTANEOUS at 09:04

## 2021-04-30 ENCOUNTER — TELEPHONE (OUTPATIENT)
Dept: INFUSION THERAPY | Facility: HOSPITAL | Age: 86
End: 2021-04-30

## 2021-05-14 ENCOUNTER — TELEPHONE (OUTPATIENT)
Dept: INFUSION THERAPY | Facility: HOSPITAL | Age: 86
End: 2021-05-14

## 2021-06-14 ENCOUNTER — INFUSION (OUTPATIENT)
Dept: INFUSION THERAPY | Facility: HOSPITAL | Age: 86
End: 2021-06-14
Attending: INTERNAL MEDICINE
Payer: MEDICARE

## 2021-06-14 VITALS
SYSTOLIC BLOOD PRESSURE: 177 MMHG | TEMPERATURE: 98 F | DIASTOLIC BLOOD PRESSURE: 74 MMHG | BODY MASS INDEX: 25.59 KG/M2 | RESPIRATION RATE: 18 BRPM | OXYGEN SATURATION: 95 % | WEIGHT: 153.81 LBS | HEART RATE: 64 BPM

## 2021-06-14 DIAGNOSIS — D63.1 ANEMIA OF CHRONIC RENAL FAILURE, UNSPECIFIED CKD STAGE: ICD-10-CM

## 2021-06-14 DIAGNOSIS — N18.30 ANEMIA DUE TO STAGE 3 CHRONIC KIDNEY DISEASE TREATED WITH ERYTHROPOIETIN: Primary | ICD-10-CM

## 2021-06-14 DIAGNOSIS — D63.1 ANEMIA DUE TO STAGE 3 CHRONIC KIDNEY DISEASE TREATED WITH ERYTHROPOIETIN: Primary | ICD-10-CM

## 2021-06-14 DIAGNOSIS — N18.9 ANEMIA OF CHRONIC RENAL FAILURE, UNSPECIFIED CKD STAGE: ICD-10-CM

## 2021-06-14 PROCEDURE — 63600175 PHARM REV CODE 636 W HCPCS: Mod: TB | Performed by: INTERNAL MEDICINE

## 2021-06-14 PROCEDURE — 96372 THER/PROPH/DIAG INJ SC/IM: CPT

## 2021-06-14 RX ADMIN — EPOETIN ALFA-EPBX 3000 UNITS: 3000 INJECTION, SOLUTION INTRAVENOUS; SUBCUTANEOUS at 11:06

## 2021-07-06 ENCOUNTER — OFFICE VISIT (OUTPATIENT)
Dept: UROLOGY | Facility: CLINIC | Age: 86
End: 2021-07-06
Payer: MEDICARE

## 2021-07-06 VITALS
SYSTOLIC BLOOD PRESSURE: 135 MMHG | DIASTOLIC BLOOD PRESSURE: 61 MMHG | WEIGHT: 152.13 LBS | RESPIRATION RATE: 18 BRPM | HEART RATE: 63 BPM | HEIGHT: 65 IN | BODY MASS INDEX: 25.34 KG/M2

## 2021-07-06 DIAGNOSIS — N40.1 URINARY RETENTION DUE TO BENIGN PROSTATIC HYPERPLASIA: ICD-10-CM

## 2021-07-06 DIAGNOSIS — Z85.46 HISTORY OF PROSTATE CANCER: ICD-10-CM

## 2021-07-06 DIAGNOSIS — N18.4 CKD (CHRONIC KIDNEY DISEASE), STAGE IV: ICD-10-CM

## 2021-07-06 DIAGNOSIS — Z87.442 PERSONAL HISTORY OF KIDNEY STONES: Primary | ICD-10-CM

## 2021-07-06 DIAGNOSIS — R33.8 URINARY RETENTION DUE TO BENIGN PROSTATIC HYPERPLASIA: ICD-10-CM

## 2021-07-06 PROCEDURE — 1126F AMNT PAIN NOTED NONE PRSNT: CPT | Mod: S$GLB,,, | Performed by: NURSE PRACTITIONER

## 2021-07-06 PROCEDURE — 1101F PR PT FALLS ASSESS DOC 0-1 FALLS W/OUT INJ PAST YR: ICD-10-PCS | Mod: CPTII,S$GLB,, | Performed by: NURSE PRACTITIONER

## 2021-07-06 PROCEDURE — 99213 PR OFFICE/OUTPT VISIT, EST, LEVL III, 20-29 MIN: ICD-10-PCS | Mod: S$GLB,,, | Performed by: NURSE PRACTITIONER

## 2021-07-06 PROCEDURE — 3288F FALL RISK ASSESSMENT DOCD: CPT | Mod: CPTII,S$GLB,, | Performed by: NURSE PRACTITIONER

## 2021-07-06 PROCEDURE — 1159F PR MEDICATION LIST DOCUMENTED IN MEDICAL RECORD: ICD-10-PCS | Mod: S$GLB,,, | Performed by: NURSE PRACTITIONER

## 2021-07-06 PROCEDURE — 99999 PR PBB SHADOW E&M-EST. PATIENT-LVL IV: CPT | Mod: PBBFAC,,, | Performed by: NURSE PRACTITIONER

## 2021-07-06 PROCEDURE — 99213 OFFICE O/P EST LOW 20 MIN: CPT | Mod: S$GLB,,, | Performed by: NURSE PRACTITIONER

## 2021-07-06 PROCEDURE — 3288F PR FALLS RISK ASSESSMENT DOCUMENTED: ICD-10-PCS | Mod: CPTII,S$GLB,, | Performed by: NURSE PRACTITIONER

## 2021-07-06 PROCEDURE — 1126F PR PAIN SEVERITY QUANTIFIED, NO PAIN PRESENT: ICD-10-PCS | Mod: S$GLB,,, | Performed by: NURSE PRACTITIONER

## 2021-07-06 PROCEDURE — 1101F PT FALLS ASSESS-DOCD LE1/YR: CPT | Mod: CPTII,S$GLB,, | Performed by: NURSE PRACTITIONER

## 2021-07-06 PROCEDURE — 1159F MED LIST DOCD IN RCRD: CPT | Mod: S$GLB,,, | Performed by: NURSE PRACTITIONER

## 2021-07-06 PROCEDURE — 99999 PR PBB SHADOW E&M-EST. PATIENT-LVL IV: ICD-10-PCS | Mod: PBBFAC,,, | Performed by: NURSE PRACTITIONER

## 2021-07-06 RX ORDER — ALFUZOSIN HYDROCHLORIDE 10 MG/1
10 TABLET, EXTENDED RELEASE ORAL
Qty: 90 TABLET | Refills: 3 | Status: SHIPPED | OUTPATIENT
Start: 2021-07-06 | End: 2022-04-20

## 2021-07-08 ENCOUNTER — HOSPITAL ENCOUNTER (OUTPATIENT)
Dept: RADIOLOGY | Facility: HOSPITAL | Age: 86
Discharge: HOME | End: 2021-07-08
Attending: NURSE PRACTITIONER
Payer: MEDICARE

## 2021-07-08 DIAGNOSIS — Z87.442 PERSONAL HISTORY OF KIDNEY STONES: ICD-10-CM

## 2021-07-08 DIAGNOSIS — Z85.46 HISTORY OF PROSTATE CANCER: ICD-10-CM

## 2021-07-08 DIAGNOSIS — R33.8 URINARY RETENTION DUE TO BENIGN PROSTATIC HYPERPLASIA: ICD-10-CM

## 2021-07-08 DIAGNOSIS — N18.4 CKD (CHRONIC KIDNEY DISEASE), STAGE IV: ICD-10-CM

## 2021-07-08 DIAGNOSIS — N40.1 URINARY RETENTION DUE TO BENIGN PROSTATIC HYPERPLASIA: ICD-10-CM

## 2021-07-08 PROCEDURE — 74176 CT RENAL STONE STUDY ABD PELVIS WO: ICD-10-PCS | Mod: 26,,, | Performed by: RADIOLOGY

## 2021-07-08 PROCEDURE — 74176 CT ABD & PELVIS W/O CONTRAST: CPT | Mod: TC

## 2021-07-08 PROCEDURE — 74176 CT ABD & PELVIS W/O CONTRAST: CPT | Mod: 26,,, | Performed by: RADIOLOGY

## 2021-07-09 ENCOUNTER — TELEPHONE (OUTPATIENT)
Dept: UROLOGY | Facility: CLINIC | Age: 86
End: 2021-07-09

## 2021-07-13 LAB
BASOPHILS # BLD AUTO: 32 CELLS/UL (ref 0–200)
BASOPHILS NFR BLD AUTO: 0.5 %
EOSINOPHIL # BLD AUTO: 224 CELLS/UL (ref 15–500)
EOSINOPHIL NFR BLD AUTO: 3.5 %
ERYTHROCYTE [DISTWIDTH] IN BLOOD BY AUTOMATED COUNT: 14.5 % (ref 11–15)
HCT VFR BLD AUTO: 33.4 % (ref 38.5–50)
HGB BLD-MCNC: 10.7 G/DL (ref 13.2–17.1)
LYMPHOCYTES # BLD AUTO: 858 CELLS/UL (ref 850–3900)
LYMPHOCYTES NFR BLD AUTO: 13.4 %
MCH RBC QN AUTO: 28.2 PG (ref 27–33)
MCHC RBC AUTO-ENTMCNC: 32 G/DL (ref 32–36)
MCV RBC AUTO: 88.1 FL (ref 80–100)
MONOCYTES # BLD AUTO: 442 CELLS/UL (ref 200–950)
MONOCYTES NFR BLD AUTO: 6.9 %
NEUTROPHILS # BLD AUTO: 4845 CELLS/UL (ref 1500–7800)
NEUTROPHILS NFR BLD AUTO: 75.7 %
PLATELET # BLD AUTO: 319 THOUSAND/UL (ref 140–400)
PMV BLD REES-ECKER: 10.7 FL (ref 7.5–12.5)
RBC # BLD AUTO: 3.79 MILLION/UL (ref 4.2–5.8)
WBC # BLD AUTO: 6.4 THOUSAND/UL (ref 3.8–10.8)

## 2021-07-15 ENCOUNTER — TELEPHONE (OUTPATIENT)
Dept: INFUSION THERAPY | Facility: HOSPITAL | Age: 86
End: 2021-07-15

## 2021-08-04 ENCOUNTER — TELEPHONE (OUTPATIENT)
Dept: HEMATOLOGY/ONCOLOGY | Facility: CLINIC | Age: 86
End: 2021-08-04

## 2021-08-04 DIAGNOSIS — N18.32 ANEMIA OF CHRONIC RENAL FAILURE, STAGE 3B: Primary | ICD-10-CM

## 2021-08-04 DIAGNOSIS — D63.1 ANEMIA OF CHRONIC RENAL FAILURE, STAGE 3B: Primary | ICD-10-CM

## 2021-08-05 LAB
BASOPHILS # BLD AUTO: 24 CELLS/UL (ref 0–200)
BASOPHILS NFR BLD AUTO: 0.3 %
EOSINOPHIL # BLD AUTO: 280 CELLS/UL (ref 15–500)
EOSINOPHIL NFR BLD AUTO: 3.5 %
ERYTHROCYTE [DISTWIDTH] IN BLOOD BY AUTOMATED COUNT: 14.7 % (ref 11–15)
HCT VFR BLD AUTO: 29.6 % (ref 38.5–50)
HGB BLD-MCNC: 9.5 G/DL (ref 13.2–17.1)
LYMPHOCYTES # BLD AUTO: 1344 CELLS/UL (ref 850–3900)
LYMPHOCYTES NFR BLD AUTO: 16.8 %
MCH RBC QN AUTO: 28.7 PG (ref 27–33)
MCHC RBC AUTO-ENTMCNC: 32.1 G/DL (ref 32–36)
MCV RBC AUTO: 89.4 FL (ref 80–100)
MONOCYTES # BLD AUTO: 784 CELLS/UL (ref 200–950)
MONOCYTES NFR BLD AUTO: 9.8 %
NEUTROPHILS # BLD AUTO: 5568 CELLS/UL (ref 1500–7800)
NEUTROPHILS NFR BLD AUTO: 69.6 %
PLATELET # BLD AUTO: 260 THOUSAND/UL (ref 140–400)
PMV BLD REES-ECKER: 10.7 FL (ref 7.5–12.5)
RBC # BLD AUTO: 3.31 MILLION/UL (ref 4.2–5.8)
WBC # BLD AUTO: 8 THOUSAND/UL (ref 3.8–10.8)

## 2021-08-06 ENCOUNTER — INFUSION (OUTPATIENT)
Dept: INFUSION THERAPY | Facility: HOSPITAL | Age: 86
End: 2021-08-06
Attending: INTERNAL MEDICINE
Payer: MEDICARE

## 2021-08-06 VITALS
HEIGHT: 65 IN | HEART RATE: 67 BPM | RESPIRATION RATE: 18 BRPM | SYSTOLIC BLOOD PRESSURE: 145 MMHG | WEIGHT: 152.13 LBS | OXYGEN SATURATION: 97 % | DIASTOLIC BLOOD PRESSURE: 71 MMHG | BODY MASS INDEX: 25.34 KG/M2 | TEMPERATURE: 97 F

## 2021-08-06 DIAGNOSIS — N18.30 ANEMIA DUE TO STAGE 3 CHRONIC KIDNEY DISEASE TREATED WITH ERYTHROPOIETIN: Primary | ICD-10-CM

## 2021-08-06 DIAGNOSIS — D63.1 ANEMIA DUE TO STAGE 3 CHRONIC KIDNEY DISEASE TREATED WITH ERYTHROPOIETIN: Primary | ICD-10-CM

## 2021-08-06 DIAGNOSIS — D63.1 ANEMIA OF CHRONIC RENAL FAILURE, UNSPECIFIED CKD STAGE: ICD-10-CM

## 2021-08-06 DIAGNOSIS — N18.9 ANEMIA OF CHRONIC RENAL FAILURE, UNSPECIFIED CKD STAGE: ICD-10-CM

## 2021-08-06 PROCEDURE — 96372 THER/PROPH/DIAG INJ SC/IM: CPT

## 2021-08-06 PROCEDURE — 63600175 PHARM REV CODE 636 W HCPCS: Mod: TB,EC | Performed by: INTERNAL MEDICINE

## 2021-08-06 RX ADMIN — EPOETIN ALFA-EPBX 3000 UNITS: 3000 INJECTION, SOLUTION INTRAVENOUS; SUBCUTANEOUS at 02:08

## 2021-08-12 ENCOUNTER — TELEPHONE (OUTPATIENT)
Dept: HEMATOLOGY/ONCOLOGY | Facility: CLINIC | Age: 86
End: 2021-08-12

## 2021-08-12 DIAGNOSIS — D63.1 ANEMIA OF CHRONIC RENAL FAILURE, STAGE 3B: Primary | ICD-10-CM

## 2021-08-12 DIAGNOSIS — N18.32 ANEMIA OF CHRONIC RENAL FAILURE, STAGE 3B: Primary | ICD-10-CM

## 2021-08-13 LAB
BASOPHILS # BLD AUTO: 19 CELLS/UL (ref 0–200)
BASOPHILS NFR BLD AUTO: 0.3 %
EOSINOPHIL # BLD AUTO: 183 CELLS/UL (ref 15–500)
EOSINOPHIL NFR BLD AUTO: 2.9 %
ERYTHROCYTE [DISTWIDTH] IN BLOOD BY AUTOMATED COUNT: 15.3 % (ref 11–15)
HCT VFR BLD AUTO: 30.3 % (ref 38.5–50)
HGB BLD-MCNC: 9.9 G/DL (ref 13.2–17.1)
LYMPHOCYTES # BLD AUTO: 1014 CELLS/UL (ref 850–3900)
LYMPHOCYTES NFR BLD AUTO: 16.1 %
MCH RBC QN AUTO: 29.1 PG (ref 27–33)
MCHC RBC AUTO-ENTMCNC: 32.7 G/DL (ref 32–36)
MCV RBC AUTO: 89.1 FL (ref 80–100)
MONOCYTES # BLD AUTO: 573 CELLS/UL (ref 200–950)
MONOCYTES NFR BLD AUTO: 9.1 %
NEUTROPHILS # BLD AUTO: 4511 CELLS/UL (ref 1500–7800)
NEUTROPHILS NFR BLD AUTO: 71.6 %
PLATELET # BLD AUTO: 268 THOUSAND/UL (ref 140–400)
PMV BLD REES-ECKER: 10.6 FL (ref 7.5–12.5)
RBC # BLD AUTO: 3.4 MILLION/UL (ref 4.2–5.8)
WBC # BLD AUTO: 6.3 THOUSAND/UL (ref 3.8–10.8)

## 2021-08-16 ENCOUNTER — INFUSION (OUTPATIENT)
Dept: INFUSION THERAPY | Facility: HOSPITAL | Age: 86
End: 2021-08-16
Attending: INTERNAL MEDICINE
Payer: MEDICARE

## 2021-08-16 VITALS
WEIGHT: 157.63 LBS | BODY MASS INDEX: 26.26 KG/M2 | HEART RATE: 65 BPM | RESPIRATION RATE: 16 BRPM | SYSTOLIC BLOOD PRESSURE: 133 MMHG | HEIGHT: 65 IN | TEMPERATURE: 99 F | OXYGEN SATURATION: 96 % | DIASTOLIC BLOOD PRESSURE: 56 MMHG

## 2021-08-16 DIAGNOSIS — D63.1 ANEMIA OF CHRONIC RENAL FAILURE, UNSPECIFIED CKD STAGE: ICD-10-CM

## 2021-08-16 DIAGNOSIS — N18.9 ANEMIA OF CHRONIC RENAL FAILURE, UNSPECIFIED CKD STAGE: ICD-10-CM

## 2021-08-16 DIAGNOSIS — N18.30 ANEMIA DUE TO STAGE 3 CHRONIC KIDNEY DISEASE TREATED WITH ERYTHROPOIETIN: Primary | ICD-10-CM

## 2021-08-16 DIAGNOSIS — D63.1 ANEMIA DUE TO STAGE 3 CHRONIC KIDNEY DISEASE TREATED WITH ERYTHROPOIETIN: Primary | ICD-10-CM

## 2021-08-16 PROCEDURE — 63600175 PHARM REV CODE 636 W HCPCS: Mod: TB | Performed by: INTERNAL MEDICINE

## 2021-08-16 PROCEDURE — 96372 THER/PROPH/DIAG INJ SC/IM: CPT

## 2021-08-16 RX ADMIN — EPOETIN ALFA-EPBX 3000 UNITS: 3000 INJECTION, SOLUTION INTRAVENOUS; SUBCUTANEOUS at 03:08

## 2021-08-24 NOTE — PLAN OF CARE
Bed Search     [facility]- Reviewing    [facility] - No appropriate bed, Per [facility] (Resubmit tomorrow)  [facility] - No Bed, Per [facility] - No Answer 2x  [facility] - denied due to acuity  [facility] Per [facility] - No Beds, Per [facility] - Full, No [address] [facility] [place], Per Dasha Felipe Results for BERENICE DEL CID SR. (MRN [id number]) as of 12/12/2019 02:20   Ref. Range 12/12/2019 02:10   POC PH Latest Ref Range: 7.35 - 7.45  7.477 (H)   POC PCO2 Latest Ref Range: 35 - 45 mmHg 33.5 (L)   POC PO2 Latest Ref Range: 80 - 100 mmHg 51 (LL)   POC BE Latest Ref Range: -2 to 2 mmol/L 1   POC HCO3 Latest Ref Range: 24 - 28 mmol/L 24.8   POC SATURATED O2 Latest Ref Range: 95 - 100 % 89 (L)   POC TCO2 Latest Ref Range: 23 - 27 mmol/L 26   Sample Unknown ARTERIAL   DelSys Unknown NRB   Allens Test Unknown N/A   Site Unknown RR     Results given to Dr. Laguna, placed pt on venti mask

## 2021-09-09 ENCOUNTER — TELEPHONE (OUTPATIENT)
Dept: INFUSION THERAPY | Facility: HOSPITAL | Age: 86
End: 2021-09-09

## 2021-09-22 ENCOUNTER — INFUSION (OUTPATIENT)
Dept: INFUSION THERAPY | Facility: HOSPITAL | Age: 86
End: 2021-09-22
Attending: INTERNAL MEDICINE
Payer: MEDICARE

## 2021-09-22 VITALS
DIASTOLIC BLOOD PRESSURE: 53 MMHG | HEART RATE: 81 BPM | SYSTOLIC BLOOD PRESSURE: 99 MMHG | TEMPERATURE: 99 F | RESPIRATION RATE: 16 BRPM | HEIGHT: 65 IN | WEIGHT: 157 LBS | OXYGEN SATURATION: 96 % | BODY MASS INDEX: 26.16 KG/M2

## 2021-09-22 DIAGNOSIS — D63.1 ANEMIA DUE TO STAGE 3 CHRONIC KIDNEY DISEASE TREATED WITH ERYTHROPOIETIN: Primary | ICD-10-CM

## 2021-09-22 DIAGNOSIS — D63.1 ANEMIA OF CHRONIC RENAL FAILURE, UNSPECIFIED CKD STAGE: ICD-10-CM

## 2021-09-22 DIAGNOSIS — N18.9 ANEMIA OF CHRONIC RENAL FAILURE, UNSPECIFIED CKD STAGE: ICD-10-CM

## 2021-09-22 DIAGNOSIS — N18.30 ANEMIA DUE TO STAGE 3 CHRONIC KIDNEY DISEASE TREATED WITH ERYTHROPOIETIN: Primary | ICD-10-CM

## 2021-09-22 PROCEDURE — 96372 THER/PROPH/DIAG INJ SC/IM: CPT

## 2021-09-22 PROCEDURE — 63600175 PHARM REV CODE 636 W HCPCS: Mod: TB,EC | Performed by: INTERNAL MEDICINE

## 2021-09-22 RX ADMIN — EPOETIN ALFA-EPBX 3000 UNITS: 3000 INJECTION, SOLUTION INTRAVENOUS; SUBCUTANEOUS at 03:09

## 2021-10-02 ENCOUNTER — HOSPITAL ENCOUNTER (OUTPATIENT)
Facility: HOSPITAL | Age: 86
Discharge: HOME | End: 2021-10-03
Attending: EMERGENCY MEDICINE | Admitting: INTERNAL MEDICINE
Payer: MEDICARE

## 2021-10-02 DIAGNOSIS — I63.9 CVA (CEREBRAL VASCULAR ACCIDENT): ICD-10-CM

## 2021-10-02 DIAGNOSIS — R55 SYNCOPE: Primary | ICD-10-CM

## 2021-10-02 LAB
ALBUMIN SERPL BCP-MCNC: 3.3 G/DL (ref 3.5–5.2)
ALP SERPL-CCNC: 62 U/L (ref 55–135)
ALT SERPL W/O P-5'-P-CCNC: 11 U/L (ref 10–44)
ANION GAP SERPL CALC-SCNC: 15 MMOL/L (ref 8–16)
AST SERPL-CCNC: 17 U/L (ref 10–40)
BASOPHILS # BLD AUTO: 0.02 K/UL (ref 0–0.2)
BASOPHILS NFR BLD: 0.3 % (ref 0–1.9)
BILIRUB SERPL-MCNC: 0.4 MG/DL (ref 0.1–1)
BNP SERPL-MCNC: 651 PG/ML (ref 0–99)
BUN SERPL-MCNC: 55 MG/DL (ref 10–30)
CALCIUM SERPL-MCNC: 9.8 MG/DL (ref 8.7–10.5)
CHLORIDE SERPL-SCNC: 104 MMOL/L (ref 95–110)
CO2 SERPL-SCNC: 24 MMOL/L (ref 23–29)
CREAT SERPL-MCNC: 2.2 MG/DL (ref 0.5–1.4)
DIFFERENTIAL METHOD: ABNORMAL
EOSINOPHIL # BLD AUTO: 0.2 K/UL (ref 0–0.5)
EOSINOPHIL NFR BLD: 2.9 % (ref 0–8)
ERYTHROCYTE [DISTWIDTH] IN BLOOD BY AUTOMATED COUNT: 15.3 % (ref 11.5–14.5)
EST. GFR  (AFRICAN AMERICAN): 29 ML/MIN/1.73 M^2
EST. GFR  (NON AFRICAN AMERICAN): 25 ML/MIN/1.73 M^2
GLUCOSE SERPL-MCNC: 136 MG/DL (ref 70–110)
HCT VFR BLD AUTO: 33.1 % (ref 40–54)
HGB BLD-MCNC: 9.9 G/DL (ref 14–18)
IMM GRANULOCYTES # BLD AUTO: 0.02 K/UL (ref 0–0.04)
IMM GRANULOCYTES NFR BLD AUTO: 0.3 % (ref 0–0.5)
LYMPHOCYTES # BLD AUTO: 1 K/UL (ref 1–4.8)
LYMPHOCYTES NFR BLD: 13.2 % (ref 18–48)
MCH RBC QN AUTO: 29.1 PG (ref 27–31)
MCHC RBC AUTO-ENTMCNC: 29.9 G/DL (ref 32–36)
MCV RBC AUTO: 97 FL (ref 82–98)
MONOCYTES # BLD AUTO: 0.7 K/UL (ref 0.3–1)
MONOCYTES NFR BLD: 9.5 % (ref 4–15)
NEUTROPHILS # BLD AUTO: 5.4 K/UL (ref 1.8–7.7)
NEUTROPHILS NFR BLD: 73.8 % (ref 38–73)
NRBC BLD-RTO: 0 /100 WBC
PLATELET # BLD AUTO: 271 K/UL (ref 150–450)
PMV BLD AUTO: 10.1 FL (ref 9.2–12.9)
POTASSIUM SERPL-SCNC: 4.4 MMOL/L (ref 3.5–5.1)
PROT SERPL-MCNC: 7.6 G/DL (ref 6–8.4)
RBC # BLD AUTO: 3.4 M/UL (ref 4.6–6.2)
SARS-COV-2 RDRP RESP QL NAA+PROBE: NEGATIVE
SODIUM SERPL-SCNC: 143 MMOL/L (ref 136–145)
TROPONIN I SERPL DL<=0.01 NG/ML-MCNC: 0.04 NG/ML (ref 0–0.03)
WBC # BLD AUTO: 7.28 K/UL (ref 3.9–12.7)

## 2021-10-02 PROCEDURE — 99285 EMERGENCY DEPT VISIT HI MDM: CPT | Mod: 25

## 2021-10-02 PROCEDURE — G0378 HOSPITAL OBSERVATION PER HR: HCPCS

## 2021-10-02 PROCEDURE — 84484 ASSAY OF TROPONIN QUANT: CPT | Performed by: EMERGENCY MEDICINE

## 2021-10-02 PROCEDURE — 93005 ELECTROCARDIOGRAM TRACING: CPT

## 2021-10-02 PROCEDURE — 36415 COLL VENOUS BLD VENIPUNCTURE: CPT | Performed by: EMERGENCY MEDICINE

## 2021-10-02 PROCEDURE — 83880 ASSAY OF NATRIURETIC PEPTIDE: CPT | Performed by: EMERGENCY MEDICINE

## 2021-10-02 PROCEDURE — 85025 COMPLETE CBC W/AUTO DIFF WBC: CPT | Performed by: EMERGENCY MEDICINE

## 2021-10-02 PROCEDURE — 80053 COMPREHEN METABOLIC PANEL: CPT | Performed by: EMERGENCY MEDICINE

## 2021-10-02 PROCEDURE — 93010 EKG 12-LEAD: ICD-10-PCS | Mod: ,,, | Performed by: INTERNAL MEDICINE

## 2021-10-02 PROCEDURE — U0002 COVID-19 LAB TEST NON-CDC: HCPCS | Performed by: EMERGENCY MEDICINE

## 2021-10-02 PROCEDURE — 93010 ELECTROCARDIOGRAM REPORT: CPT | Mod: ,,, | Performed by: INTERNAL MEDICINE

## 2021-10-02 RX ORDER — ONDANSETRON 2 MG/ML
4 INJECTION INTRAMUSCULAR; INTRAVENOUS EVERY 8 HOURS PRN
Status: DISCONTINUED | OUTPATIENT
Start: 2021-10-02 | End: 2021-10-03 | Stop reason: HOSPADM

## 2021-10-02 RX ORDER — HYDRALAZINE HYDROCHLORIDE 25 MG/1
25 TABLET, FILM COATED ORAL EVERY 8 HOURS
Status: DISCONTINUED | OUTPATIENT
Start: 2021-10-02 | End: 2021-10-03 | Stop reason: HOSPADM

## 2021-10-02 RX ORDER — DONEPEZIL HYDROCHLORIDE 5 MG/1
5 TABLET, FILM COATED ORAL DAILY
Status: DISCONTINUED | OUTPATIENT
Start: 2021-10-03 | End: 2021-10-03 | Stop reason: HOSPADM

## 2021-10-02 RX ORDER — LANOLIN ALCOHOL/MO/W.PET/CERES
1 CREAM (GRAM) TOPICAL 2 TIMES DAILY
Status: DISCONTINUED | OUTPATIENT
Start: 2021-10-02 | End: 2021-10-03 | Stop reason: HOSPADM

## 2021-10-02 RX ORDER — SODIUM CHLORIDE 0.9 % (FLUSH) 0.9 %
10 SYRINGE (ML) INJECTION
Status: DISCONTINUED | OUTPATIENT
Start: 2021-10-02 | End: 2021-10-03 | Stop reason: HOSPADM

## 2021-10-02 RX ORDER — CALCITRIOL 0.25 UG/1
0.25 CAPSULE ORAL DAILY
Status: DISCONTINUED | OUTPATIENT
Start: 2021-10-03 | End: 2021-10-03 | Stop reason: HOSPADM

## 2021-10-02 RX ORDER — AMLODIPINE BESYLATE 5 MG/1
10 TABLET ORAL DAILY
Status: DISCONTINUED | OUTPATIENT
Start: 2021-10-03 | End: 2021-10-03 | Stop reason: HOSPADM

## 2021-10-02 RX ORDER — ATORVASTATIN CALCIUM 40 MG/1
40 TABLET, FILM COATED ORAL DAILY
Status: DISCONTINUED | OUTPATIENT
Start: 2021-10-03 | End: 2021-10-03 | Stop reason: HOSPADM

## 2021-10-02 RX ORDER — FAMOTIDINE 20 MG/1
20 TABLET, FILM COATED ORAL DAILY
Status: DISCONTINUED | OUTPATIENT
Start: 2021-10-02 | End: 2021-10-03 | Stop reason: HOSPADM

## 2021-10-02 RX ORDER — HEPARIN SODIUM 5000 [USP'U]/ML
5000 INJECTION, SOLUTION INTRAVENOUS; SUBCUTANEOUS EVERY 8 HOURS
Status: DISCONTINUED | OUTPATIENT
Start: 2021-10-02 | End: 2021-10-03 | Stop reason: HOSPADM

## 2021-10-03 VITALS
RESPIRATION RATE: 18 BRPM | WEIGHT: 156.94 LBS | HEART RATE: 73 BPM | TEMPERATURE: 98 F | BODY MASS INDEX: 26.15 KG/M2 | HEIGHT: 65 IN | SYSTOLIC BLOOD PRESSURE: 163 MMHG | DIASTOLIC BLOOD PRESSURE: 71 MMHG | OXYGEN SATURATION: 97 %

## 2021-10-03 LAB
ALBUMIN SERPL BCP-MCNC: 3.9 G/DL (ref 3.5–5.2)
ALP SERPL-CCNC: 75 U/L (ref 55–135)
ALT SERPL W/O P-5'-P-CCNC: 15 U/L (ref 10–44)
ANION GAP SERPL CALC-SCNC: 17 MMOL/L (ref 8–16)
APTT BLDCRRT: 29.9 SEC (ref 21–32)
AST SERPL-CCNC: 20 U/L (ref 10–40)
BASOPHILS # BLD AUTO: 0.02 K/UL (ref 0–0.2)
BASOPHILS NFR BLD: 0.2 % (ref 0–1.9)
BILIRUB SERPL-MCNC: 0.4 MG/DL (ref 0.1–1)
BUN SERPL-MCNC: 44 MG/DL (ref 10–30)
CALCIUM SERPL-MCNC: 10.5 MG/DL (ref 8.7–10.5)
CHLORIDE SERPL-SCNC: 103 MMOL/L (ref 95–110)
CHOLEST SERPL-MCNC: 170 MG/DL (ref 120–199)
CHOLEST/HDLC SERPL: 2.7 {RATIO} (ref 2–5)
CK MB SERPL-MCNC: 4.8 NG/ML (ref 0.1–6.5)
CK MB SERPL-RTO: 2.5 % (ref 0–5)
CK SERPL-CCNC: 194 U/L (ref 20–200)
CO2 SERPL-SCNC: 24 MMOL/L (ref 23–29)
CREAT SERPL-MCNC: 2.2 MG/DL (ref 0.5–1.4)
DIFFERENTIAL METHOD: ABNORMAL
EOSINOPHIL # BLD AUTO: 0.1 K/UL (ref 0–0.5)
EOSINOPHIL NFR BLD: 1.1 % (ref 0–8)
ERYTHROCYTE [DISTWIDTH] IN BLOOD BY AUTOMATED COUNT: 15.1 % (ref 11.5–14.5)
EST. GFR  (AFRICAN AMERICAN): 29 ML/MIN/1.73 M^2
EST. GFR  (NON AFRICAN AMERICAN): 25 ML/MIN/1.73 M^2
ESTIMATED AVG GLUCOSE: 117 MG/DL (ref 68–131)
GLUCOSE SERPL-MCNC: 199 MG/DL (ref 70–110)
HBA1C MFR BLD: 5.7 % (ref 4–5.6)
HCT VFR BLD AUTO: 39.4 % (ref 40–54)
HDLC SERPL-MCNC: 62 MG/DL (ref 40–75)
HDLC SERPL: 36.5 % (ref 20–50)
HGB BLD-MCNC: 12 G/DL (ref 14–18)
IMM GRANULOCYTES # BLD AUTO: 0.02 K/UL (ref 0–0.04)
IMM GRANULOCYTES NFR BLD AUTO: 0.2 % (ref 0–0.5)
INR PPP: 1 (ref 0.8–1.2)
LDLC SERPL CALC-MCNC: 100.2 MG/DL (ref 63–159)
LYMPHOCYTES # BLD AUTO: 0.8 K/UL (ref 1–4.8)
LYMPHOCYTES NFR BLD: 9.5 % (ref 18–48)
MAGNESIUM SERPL-MCNC: 2.3 MG/DL (ref 1.6–2.6)
MCH RBC QN AUTO: 29.6 PG (ref 27–31)
MCHC RBC AUTO-ENTMCNC: 30.5 G/DL (ref 32–36)
MCV RBC AUTO: 97 FL (ref 82–98)
MONOCYTES # BLD AUTO: 0.5 K/UL (ref 0.3–1)
MONOCYTES NFR BLD: 5.1 % (ref 4–15)
NEUTROPHILS # BLD AUTO: 7.4 K/UL (ref 1.8–7.7)
NEUTROPHILS NFR BLD: 83.9 % (ref 38–73)
NONHDLC SERPL-MCNC: 108 MG/DL
NRBC BLD-RTO: 0 /100 WBC
PHOSPHATE SERPL-MCNC: 3.1 MG/DL (ref 2.7–4.5)
PLATELET # BLD AUTO: 314 K/UL (ref 150–450)
PMV BLD AUTO: 10 FL (ref 9.2–12.9)
POTASSIUM SERPL-SCNC: 3.8 MMOL/L (ref 3.5–5.1)
PROT SERPL-MCNC: 8.9 G/DL (ref 6–8.4)
PROTHROMBIN TIME: 10.8 SEC (ref 9–12.5)
RBC # BLD AUTO: 4.06 M/UL (ref 4.6–6.2)
SODIUM SERPL-SCNC: 144 MMOL/L (ref 136–145)
TRIGL SERPL-MCNC: 39 MG/DL (ref 30–150)
TROPONIN I SERPL DL<=0.01 NG/ML-MCNC: 0.04 NG/ML (ref 0–0.03)
TSH SERPL DL<=0.005 MIU/L-ACNC: 1.28 UIU/ML (ref 0.4–4)
WBC # BLD AUTO: 8.8 K/UL (ref 3.9–12.7)

## 2021-10-03 PROCEDURE — 85610 PROTHROMBIN TIME: CPT | Performed by: NURSE PRACTITIONER

## 2021-10-03 PROCEDURE — 83036 HEMOGLOBIN GLYCOSYLATED A1C: CPT | Performed by: NURSE PRACTITIONER

## 2021-10-03 PROCEDURE — 85730 THROMBOPLASTIN TIME PARTIAL: CPT | Performed by: NURSE PRACTITIONER

## 2021-10-03 PROCEDURE — 25000003 PHARM REV CODE 250: Performed by: INTERNAL MEDICINE

## 2021-10-03 PROCEDURE — 84443 ASSAY THYROID STIM HORMONE: CPT | Performed by: NURSE PRACTITIONER

## 2021-10-03 PROCEDURE — 36415 COLL VENOUS BLD VENIPUNCTURE: CPT | Performed by: NURSE PRACTITIONER

## 2021-10-03 PROCEDURE — 25000003 PHARM REV CODE 250: Performed by: NURSE PRACTITIONER

## 2021-10-03 PROCEDURE — 85025 COMPLETE CBC W/AUTO DIFF WBC: CPT | Performed by: NURSE PRACTITIONER

## 2021-10-03 PROCEDURE — G0378 HOSPITAL OBSERVATION PER HR: HCPCS

## 2021-10-03 PROCEDURE — 83735 ASSAY OF MAGNESIUM: CPT | Performed by: NURSE PRACTITIONER

## 2021-10-03 PROCEDURE — 80053 COMPREHEN METABOLIC PANEL: CPT | Performed by: NURSE PRACTITIONER

## 2021-10-03 PROCEDURE — 84484 ASSAY OF TROPONIN QUANT: CPT | Performed by: NURSE PRACTITIONER

## 2021-10-03 PROCEDURE — 82553 CREATINE MB FRACTION: CPT | Performed by: NURSE PRACTITIONER

## 2021-10-03 PROCEDURE — 84100 ASSAY OF PHOSPHORUS: CPT | Performed by: NURSE PRACTITIONER

## 2021-10-03 PROCEDURE — 80061 LIPID PANEL: CPT | Performed by: NURSE PRACTITIONER

## 2021-10-03 RX ORDER — CLOPIDOGREL BISULFATE 75 MG/1
75 TABLET ORAL DAILY
Status: DISCONTINUED | OUTPATIENT
Start: 2021-10-03 | End: 2021-10-03 | Stop reason: HOSPADM

## 2021-10-03 RX ORDER — CLOPIDOGREL BISULFATE 75 MG/1
75 TABLET ORAL DAILY
Qty: 30 TABLET | Refills: 0 | Status: SHIPPED | OUTPATIENT
Start: 2021-10-04 | End: 2022-04-20

## 2021-10-03 RX ADMIN — CLOPIDOGREL BISULFATE 75 MG: 75 TABLET, FILM COATED ORAL at 12:10

## 2021-10-03 RX ADMIN — HYDRALAZINE HYDROCHLORIDE 25 MG: 25 TABLET, FILM COATED ORAL at 12:10

## 2021-10-03 RX ADMIN — FAMOTIDINE 20 MG: 20 TABLET, FILM COATED ORAL at 08:10

## 2021-10-03 RX ADMIN — FAMOTIDINE 20 MG: 20 TABLET, FILM COATED ORAL at 12:10

## 2021-10-03 RX ADMIN — CALCITRIOL CAPSULES 0.25 MCG 0.25 MCG: 0.25 CAPSULE ORAL at 08:10

## 2021-10-03 RX ADMIN — HYDRALAZINE HYDROCHLORIDE 25 MG: 25 TABLET, FILM COATED ORAL at 08:10

## 2021-10-03 RX ADMIN — DONEPEZIL HYDROCHLORIDE 5 MG: 5 TABLET, FILM COATED ORAL at 08:10

## 2021-10-03 RX ADMIN — ATORVASTATIN CALCIUM 40 MG: 40 TABLET, FILM COATED ORAL at 08:10

## 2021-10-03 RX ADMIN — FERROUS SULFATE TAB EC 325 MG (65 MG FE EQUIVALENT) 1 EACH: 325 (65 FE) TABLET DELAYED RESPONSE at 08:10

## 2021-10-03 RX ADMIN — AMLODIPINE BESYLATE 10 MG: 5 TABLET ORAL at 08:10

## 2021-10-03 RX ADMIN — FERROUS SULFATE TAB EC 325 MG (65 MG FE EQUIVALENT) 1 EACH: 325 (65 FE) TABLET DELAYED RESPONSE at 12:10

## 2021-10-04 ENCOUNTER — HOSPITAL ENCOUNTER (OUTPATIENT)
Facility: HOSPITAL | Age: 86
Discharge: HOME HEALTH | End: 2021-10-06
Attending: EMERGENCY MEDICINE | Admitting: HOSPITALIST
Payer: MEDICARE

## 2021-10-04 DIAGNOSIS — R55 SYNCOPE: Primary | ICD-10-CM

## 2021-10-04 DIAGNOSIS — R05.9 COUGH: ICD-10-CM

## 2021-10-04 DIAGNOSIS — G30.8 ALZHEIMER'S DEMENTIA OF OTHER ONSET WITHOUT BEHAVIORAL DISTURBANCE: ICD-10-CM

## 2021-10-04 DIAGNOSIS — N39.0 UTI (URINARY TRACT INFECTION): ICD-10-CM

## 2021-10-04 DIAGNOSIS — G93.41 ENCEPHALOPATHY, METABOLIC: ICD-10-CM

## 2021-10-04 DIAGNOSIS — G93.40 ENCEPHALOPATHY: ICD-10-CM

## 2021-10-04 DIAGNOSIS — F02.80 ALZHEIMER'S DEMENTIA OF OTHER ONSET WITHOUT BEHAVIORAL DISTURBANCE: ICD-10-CM

## 2021-10-04 LAB
ALBUMIN SERPL BCP-MCNC: 3.3 G/DL (ref 3.5–5.2)
ALP SERPL-CCNC: 62 U/L (ref 55–135)
ALT SERPL W/O P-5'-P-CCNC: 11 U/L (ref 10–44)
ANION GAP SERPL CALC-SCNC: 15 MMOL/L (ref 8–16)
AST SERPL-CCNC: 17 U/L (ref 10–40)
BACTERIA #/AREA URNS HPF: ABNORMAL /HPF
BASOPHILS # BLD AUTO: 0.02 K/UL (ref 0–0.2)
BASOPHILS NFR BLD: 0.1 % (ref 0–1.9)
BILIRUB SERPL-MCNC: 0.5 MG/DL (ref 0.1–1)
BILIRUB UR QL STRIP: NEGATIVE
BNP SERPL-MCNC: 776 PG/ML (ref 0–99)
BUN SERPL-MCNC: 50 MG/DL (ref 10–30)
CALCIUM SERPL-MCNC: 9.7 MG/DL (ref 8.7–10.5)
CHLORIDE SERPL-SCNC: 103 MMOL/L (ref 95–110)
CLARITY UR: ABNORMAL
CO2 SERPL-SCNC: 25 MMOL/L (ref 23–29)
COLOR UR: YELLOW
CREAT SERPL-MCNC: 2.6 MG/DL (ref 0.5–1.4)
DIFFERENTIAL METHOD: ABNORMAL
EOSINOPHIL # BLD AUTO: 0 K/UL (ref 0–0.5)
EOSINOPHIL NFR BLD: 0.1 % (ref 0–8)
ERYTHROCYTE [DISTWIDTH] IN BLOOD BY AUTOMATED COUNT: 15 % (ref 11.5–14.5)
EST. GFR  (AFRICAN AMERICAN): 24 ML/MIN/1.73 M^2
EST. GFR  (NON AFRICAN AMERICAN): 20 ML/MIN/1.73 M^2
GLUCOSE SERPL-MCNC: 159 MG/DL (ref 70–110)
GLUCOSE UR QL STRIP: NEGATIVE
HCT VFR BLD AUTO: 36.1 % (ref 40–54)
HGB BLD-MCNC: 11 G/DL (ref 14–18)
HGB UR QL STRIP: ABNORMAL
HYALINE CASTS #/AREA URNS LPF: 0 /LPF
IMM GRANULOCYTES # BLD AUTO: 0.05 K/UL (ref 0–0.04)
IMM GRANULOCYTES NFR BLD AUTO: 0.3 % (ref 0–0.5)
KETONES UR QL STRIP: ABNORMAL
LEUKOCYTE ESTERASE UR QL STRIP: ABNORMAL
LYMPHOCYTES # BLD AUTO: 0.5 K/UL (ref 1–4.8)
LYMPHOCYTES NFR BLD: 3.5 % (ref 18–48)
MCH RBC QN AUTO: 29.5 PG (ref 27–31)
MCHC RBC AUTO-ENTMCNC: 30.5 G/DL (ref 32–36)
MCV RBC AUTO: 97 FL (ref 82–98)
MICROSCOPIC COMMENT: ABNORMAL
MONOCYTES # BLD AUTO: 0.8 K/UL (ref 0.3–1)
MONOCYTES NFR BLD: 5.1 % (ref 4–15)
NEUTROPHILS # BLD AUTO: 13.4 K/UL (ref 1.8–7.7)
NEUTROPHILS NFR BLD: 90.9 % (ref 38–73)
NITRITE UR QL STRIP: NEGATIVE
NRBC BLD-RTO: 0 /100 WBC
PH UR STRIP: 7 [PH] (ref 5–8)
PLATELET # BLD AUTO: 318 K/UL (ref 150–450)
PMV BLD AUTO: 10.3 FL (ref 9.2–12.9)
POTASSIUM SERPL-SCNC: 4.9 MMOL/L (ref 3.5–5.1)
PROT SERPL-MCNC: 7.7 G/DL (ref 6–8.4)
PROT UR QL STRIP: ABNORMAL
RBC # BLD AUTO: 3.73 M/UL (ref 4.6–6.2)
RBC #/AREA URNS HPF: 0 /HPF (ref 0–4)
SARS-COV-2 RDRP RESP QL NAA+PROBE: NEGATIVE
SODIUM SERPL-SCNC: 143 MMOL/L (ref 136–145)
SP GR UR STRIP: 1.01 (ref 1–1.03)
SQUAMOUS #/AREA URNS HPF: 4 /HPF
TSH SERPL DL<=0.005 MIU/L-ACNC: 1.1 UIU/ML (ref 0.4–4)
URN SPEC COLLECT METH UR: ABNORMAL
UROBILINOGEN UR STRIP-ACNC: NEGATIVE EU/DL
WBC # BLD AUTO: 14.8 K/UL (ref 3.9–12.7)
WBC #/AREA URNS HPF: 42 /HPF (ref 0–5)

## 2021-10-04 PROCEDURE — 93010 EKG 12-LEAD: ICD-10-PCS | Mod: ,,, | Performed by: INTERNAL MEDICINE

## 2021-10-04 PROCEDURE — 80053 COMPREHEN METABOLIC PANEL: CPT | Performed by: EMERGENCY MEDICINE

## 2021-10-04 PROCEDURE — 93010 ELECTROCARDIOGRAM REPORT: CPT | Mod: ,,, | Performed by: INTERNAL MEDICINE

## 2021-10-04 PROCEDURE — 87186 SC STD MICRODIL/AGAR DIL: CPT | Performed by: EMERGENCY MEDICINE

## 2021-10-04 PROCEDURE — 96374 THER/PROPH/DIAG INJ IV PUSH: CPT

## 2021-10-04 PROCEDURE — 99285 EMERGENCY DEPT VISIT HI MDM: CPT | Mod: 25

## 2021-10-04 PROCEDURE — G0378 HOSPITAL OBSERVATION PER HR: HCPCS

## 2021-10-04 PROCEDURE — 85025 COMPLETE CBC W/AUTO DIFF WBC: CPT | Performed by: EMERGENCY MEDICINE

## 2021-10-04 PROCEDURE — 12000002 HC ACUTE/MED SURGE SEMI-PRIVATE ROOM

## 2021-10-04 PROCEDURE — 63600175 PHARM REV CODE 636 W HCPCS: Performed by: EMERGENCY MEDICINE

## 2021-10-04 PROCEDURE — 87077 CULTURE AEROBIC IDENTIFY: CPT | Performed by: EMERGENCY MEDICINE

## 2021-10-04 PROCEDURE — U0002 COVID-19 LAB TEST NON-CDC: HCPCS | Performed by: EMERGENCY MEDICINE

## 2021-10-04 PROCEDURE — 81000 URINALYSIS NONAUTO W/SCOPE: CPT | Performed by: EMERGENCY MEDICINE

## 2021-10-04 PROCEDURE — 83880 ASSAY OF NATRIURETIC PEPTIDE: CPT | Performed by: EMERGENCY MEDICINE

## 2021-10-04 PROCEDURE — 93005 ELECTROCARDIOGRAM TRACING: CPT

## 2021-10-04 PROCEDURE — 36415 COLL VENOUS BLD VENIPUNCTURE: CPT | Performed by: EMERGENCY MEDICINE

## 2021-10-04 PROCEDURE — 84443 ASSAY THYROID STIM HORMONE: CPT | Performed by: EMERGENCY MEDICINE

## 2021-10-04 PROCEDURE — 87088 URINE BACTERIA CULTURE: CPT | Performed by: EMERGENCY MEDICINE

## 2021-10-04 PROCEDURE — 87086 URINE CULTURE/COLONY COUNT: CPT | Performed by: EMERGENCY MEDICINE

## 2021-10-04 RX ORDER — AMLODIPINE BESYLATE 5 MG/1
10 TABLET ORAL DAILY
Status: DISCONTINUED | OUTPATIENT
Start: 2021-10-05 | End: 2021-10-05

## 2021-10-04 RX ORDER — ACETAMINOPHEN 325 MG/1
650 TABLET ORAL EVERY 6 HOURS PRN
Status: DISCONTINUED | OUTPATIENT
Start: 2021-10-05 | End: 2021-10-06 | Stop reason: HOSPADM

## 2021-10-04 RX ORDER — PANTOPRAZOLE SODIUM 40 MG/1
40 TABLET, DELAYED RELEASE ORAL DAILY
Refills: 0 | Status: DISCONTINUED | OUTPATIENT
Start: 2021-10-05 | End: 2021-10-06 | Stop reason: HOSPADM

## 2021-10-04 RX ORDER — LANOLIN ALCOHOL/MO/W.PET/CERES
800 CREAM (GRAM) TOPICAL
Status: DISCONTINUED | OUTPATIENT
Start: 2021-10-05 | End: 2021-10-06 | Stop reason: HOSPADM

## 2021-10-04 RX ORDER — IBUPROFEN 200 MG
16 TABLET ORAL
Status: DISCONTINUED | OUTPATIENT
Start: 2021-10-05 | End: 2021-10-06 | Stop reason: HOSPADM

## 2021-10-04 RX ORDER — HEPARIN SODIUM 5000 [USP'U]/ML
5000 INJECTION, SOLUTION INTRAVENOUS; SUBCUTANEOUS EVERY 8 HOURS
Status: DISCONTINUED | OUTPATIENT
Start: 2021-10-05 | End: 2021-10-04

## 2021-10-04 RX ORDER — ACETAMINOPHEN 325 MG/1
650 TABLET ORAL EVERY 4 HOURS PRN
Status: DISCONTINUED | OUTPATIENT
Start: 2021-10-05 | End: 2021-10-06 | Stop reason: HOSPADM

## 2021-10-04 RX ORDER — DROPERIDOL 2.5 MG/ML
0.62 INJECTION, SOLUTION INTRAMUSCULAR; INTRAVENOUS
Status: COMPLETED | OUTPATIENT
Start: 2021-10-04 | End: 2021-10-04

## 2021-10-04 RX ORDER — TALC
9 POWDER (GRAM) TOPICAL NIGHTLY PRN
Status: DISCONTINUED | OUTPATIENT
Start: 2021-10-05 | End: 2021-10-06 | Stop reason: HOSPADM

## 2021-10-04 RX ORDER — IBUPROFEN 200 MG
24 TABLET ORAL
Status: DISCONTINUED | OUTPATIENT
Start: 2021-10-05 | End: 2021-10-06 | Stop reason: HOSPADM

## 2021-10-04 RX ORDER — SODIUM CHLORIDE 0.9 % (FLUSH) 0.9 %
10 SYRINGE (ML) INJECTION EVERY 12 HOURS PRN
Status: DISCONTINUED | OUTPATIENT
Start: 2021-10-05 | End: 2021-10-06 | Stop reason: HOSPADM

## 2021-10-04 RX ORDER — ONDANSETRON 2 MG/ML
4 INJECTION INTRAMUSCULAR; INTRAVENOUS EVERY 8 HOURS PRN
Status: DISCONTINUED | OUTPATIENT
Start: 2021-10-05 | End: 2021-10-06 | Stop reason: HOSPADM

## 2021-10-04 RX ORDER — NALOXONE HCL 0.4 MG/ML
0.02 VIAL (ML) INJECTION
Status: DISCONTINUED | OUTPATIENT
Start: 2021-10-05 | End: 2021-10-06 | Stop reason: HOSPADM

## 2021-10-04 RX ORDER — GLUCAGON 1 MG
1 KIT INJECTION
Status: DISCONTINUED | OUTPATIENT
Start: 2021-10-05 | End: 2021-10-06 | Stop reason: HOSPADM

## 2021-10-04 RX ORDER — CALCITRIOL 0.25 UG/1
0.25 CAPSULE ORAL DAILY
Status: DISCONTINUED | OUTPATIENT
Start: 2021-10-05 | End: 2021-10-06 | Stop reason: HOSPADM

## 2021-10-04 RX ORDER — LANOLIN ALCOHOL/MO/W.PET/CERES
1 CREAM (GRAM) TOPICAL 2 TIMES DAILY
Status: DISCONTINUED | OUTPATIENT
Start: 2021-10-05 | End: 2021-10-06 | Stop reason: HOSPADM

## 2021-10-04 RX ORDER — DONEPEZIL HYDROCHLORIDE 5 MG/1
5 TABLET, FILM COATED ORAL DAILY
Status: DISCONTINUED | OUTPATIENT
Start: 2021-10-05 | End: 2021-10-06 | Stop reason: HOSPADM

## 2021-10-04 RX ORDER — CLOPIDOGREL BISULFATE 75 MG/1
75 TABLET ORAL DAILY
Status: DISCONTINUED | OUTPATIENT
Start: 2021-10-05 | End: 2021-10-06 | Stop reason: HOSPADM

## 2021-10-04 RX ADMIN — DROPERIDOL 0.62 MG: 2.5 INJECTION, SOLUTION INTRAMUSCULAR; INTRAVENOUS at 09:10

## 2021-10-05 ENCOUNTER — TELEPHONE (OUTPATIENT)
Dept: MEDSURG UNIT | Facility: HOSPITAL | Age: 86
End: 2021-10-05

## 2021-10-05 LAB
ALBUMIN SERPL BCP-MCNC: 3.2 G/DL (ref 3.5–5.2)
ALP SERPL-CCNC: 64 U/L (ref 55–135)
ALT SERPL W/O P-5'-P-CCNC: 13 U/L (ref 10–44)
ANION GAP SERPL CALC-SCNC: 13 MMOL/L (ref 8–16)
AORTIC ROOT ANNULUS: 2.91 CM
AORTIC VALVE CUSP SEPERATION: 1.72 CM
AST SERPL-CCNC: 16 U/L (ref 10–40)
AV INDEX (PROSTH): 0.58
AV MEAN GRADIENT: 7 MMHG
AV PEAK GRADIENT: 13 MMHG
AV VALVE AREA: 2.05 CM2
AV VELOCITY RATIO: 0.52
BASOPHILS # BLD AUTO: 0.02 K/UL (ref 0–0.2)
BASOPHILS NFR BLD: 0.2 % (ref 0–1.9)
BILIRUB SERPL-MCNC: 0.4 MG/DL (ref 0.1–1)
BSA FOR ECHO PROCEDURE: 1.8 M2
BUN SERPL-MCNC: 50 MG/DL (ref 10–30)
CALCIUM SERPL-MCNC: 9.9 MG/DL (ref 8.7–10.5)
CHLORIDE SERPL-SCNC: 104 MMOL/L (ref 95–110)
CO2 SERPL-SCNC: 27 MMOL/L (ref 23–29)
CREAT SERPL-MCNC: 2.3 MG/DL (ref 0.5–1.4)
CV ECHO LV RWT: 0.4 CM
DIFFERENTIAL METHOD: ABNORMAL
DOP CALC AO PEAK VEL: 1.81 M/S
DOP CALC AO VTI: 35.78 CM
DOP CALC LVOT AREA: 3.5 CM2
DOP CALC LVOT DIAMETER: 2.12 CM
DOP CALC LVOT PEAK VEL: 0.95 M/S
DOP CALC LVOT STROKE VOLUME: 73.38 CM3
DOP CALC MV VTI: 24.71 CM
DOP CALCLVOT PEAK VEL VTI: 20.8 CM
E WAVE DECELERATION TIME: 127.6 MSEC
E/A RATIO: 2.21
E/E' RATIO: 41.33 M/S
ECHO LV POSTERIOR WALL: 1.04 CM (ref 0.6–1.1)
EJECTION FRACTION: 45 %
EOSINOPHIL # BLD AUTO: 0.1 K/UL (ref 0–0.5)
EOSINOPHIL NFR BLD: 0.6 % (ref 0–8)
ERYTHROCYTE [DISTWIDTH] IN BLOOD BY AUTOMATED COUNT: 15 % (ref 11.5–14.5)
EST. GFR  (AFRICAN AMERICAN): 27 ML/MIN/1.73 M^2
EST. GFR  (NON AFRICAN AMERICAN): 24 ML/MIN/1.73 M^2
FRACTIONAL SHORTENING: 19 % (ref 28–44)
GLUCOSE SERPL-MCNC: 105 MG/DL (ref 70–110)
HCT VFR BLD AUTO: 34 % (ref 40–54)
HGB BLD-MCNC: 10.5 G/DL (ref 14–18)
IMM GRANULOCYTES # BLD AUTO: 0.04 K/UL (ref 0–0.04)
IMM GRANULOCYTES NFR BLD AUTO: 0.4 % (ref 0–0.5)
INTERVENTRICULAR SEPTUM: 1.28 CM (ref 0.6–1.1)
LA MAJOR: 3.76 CM
LA MINOR: 3.93 CM
LA WIDTH: 3.76 CM
LACTATE SERPL-SCNC: 1.2 MMOL/L (ref 0.5–2.2)
LEFT ATRIUM SIZE: 3.71 CM
LEFT ATRIUM VOLUME INDEX: 25.6 ML/M2
LEFT ATRIUM VOLUME: 45.57 CM3
LEFT INTERNAL DIMENSION IN SYSTOLE: 4.17 CM (ref 2.1–4)
LEFT VENTRICLE DIASTOLIC VOLUME INDEX: 71.97 ML/M2
LEFT VENTRICLE DIASTOLIC VOLUME: 128.1 ML
LEFT VENTRICLE MASS INDEX: 133 G/M2
LEFT VENTRICLE SYSTOLIC VOLUME INDEX: 43.4 ML/M2
LEFT VENTRICLE SYSTOLIC VOLUME: 77.2 ML
LEFT VENTRICULAR INTERNAL DIMENSION IN DIASTOLE: 5.18 CM (ref 3.5–6)
LEFT VENTRICULAR MASS: 235.96 G
LV LATERAL E/E' RATIO: 41.33 M/S
LV SEPTAL E/E' RATIO: 41.33 M/S
LYMPHOCYTES # BLD AUTO: 1.1 K/UL (ref 1–4.8)
LYMPHOCYTES NFR BLD: 10 % (ref 18–48)
MCH RBC QN AUTO: 29.3 PG (ref 27–31)
MCHC RBC AUTO-ENTMCNC: 30.9 G/DL (ref 32–36)
MCV RBC AUTO: 95 FL (ref 82–98)
MONOCYTES # BLD AUTO: 0.7 K/UL (ref 0.3–1)
MONOCYTES NFR BLD: 6.7 % (ref 4–15)
MV MEAN GRADIENT: 1 MMHG
MV PEAK A VEL: 0.56 M/S
MV PEAK E VEL: 1.24 M/S
MV PEAK GRADIENT: 10 MMHG
MV STENOSIS PRESSURE HALF TIME: 35.3 MS
MV VALVE AREA BY CONTINUITY EQUATION: 2.97 CM2
MV VALVE AREA P 1/2 METHOD: 6.23 CM2
NEUTROPHILS # BLD AUTO: 8.8 K/UL (ref 1.8–7.7)
NEUTROPHILS NFR BLD: 82.1 % (ref 38–73)
NRBC BLD-RTO: 0 /100 WBC
PLATELET # BLD AUTO: 306 K/UL (ref 150–450)
PMV BLD AUTO: 10.2 FL (ref 9.2–12.9)
POTASSIUM SERPL-SCNC: 3.9 MMOL/L (ref 3.5–5.1)
PROT SERPL-MCNC: 7.4 G/DL (ref 6–8.4)
PV PEAK VELOCITY: 1.1 CM/S
RA PRESSURE: 3 MMHG
RBC # BLD AUTO: 3.58 M/UL (ref 4.6–6.2)
SODIUM SERPL-SCNC: 144 MMOL/L (ref 136–145)
TDI LATERAL: 0.03 M/S
TDI SEPTAL: 0.03 M/S
TDI: 0.03 M/S
WBC # BLD AUTO: 10.73 K/UL (ref 3.9–12.7)

## 2021-10-05 PROCEDURE — 95816 EEG AWAKE AND DROWSY: CPT | Mod: 26,,, | Performed by: PSYCHIATRY & NEUROLOGY

## 2021-10-05 PROCEDURE — 25000003 PHARM REV CODE 250: Performed by: NURSE PRACTITIONER

## 2021-10-05 PROCEDURE — 97165 OT EVAL LOW COMPLEX 30 MIN: CPT

## 2021-10-05 PROCEDURE — 99220 PR INITIAL OBSERVATION CARE,LEVL III: CPT | Mod: 25,,, | Performed by: GENERAL PRACTICE

## 2021-10-05 PROCEDURE — 92610 EVALUATE SWALLOWING FUNCTION: CPT

## 2021-10-05 PROCEDURE — 99220 PR INITIAL OBSERVATION CARE,LEVL III: ICD-10-PCS | Mod: 25,,, | Performed by: GENERAL PRACTICE

## 2021-10-05 PROCEDURE — 36415 COLL VENOUS BLD VENIPUNCTURE: CPT | Performed by: NURSE PRACTITIONER

## 2021-10-05 PROCEDURE — G0378 HOSPITAL OBSERVATION PER HR: HCPCS

## 2021-10-05 PROCEDURE — 95819 EEG AWAKE AND ASLEEP: CPT

## 2021-10-05 PROCEDURE — 83605 ASSAY OF LACTIC ACID: CPT | Performed by: NURSE PRACTITIONER

## 2021-10-05 PROCEDURE — 80053 COMPREHEN METABOLIC PANEL: CPT | Performed by: NURSE PRACTITIONER

## 2021-10-05 PROCEDURE — 97530 THERAPEUTIC ACTIVITIES: CPT

## 2021-10-05 PROCEDURE — 85025 COMPLETE CBC W/AUTO DIFF WBC: CPT | Performed by: NURSE PRACTITIONER

## 2021-10-05 PROCEDURE — 96376 TX/PRO/DX INJ SAME DRUG ADON: CPT | Mod: 59

## 2021-10-05 PROCEDURE — 96375 TX/PRO/DX INJ NEW DRUG ADDON: CPT | Mod: 59

## 2021-10-05 PROCEDURE — 63600175 PHARM REV CODE 636 W HCPCS: Performed by: NURSE PRACTITIONER

## 2021-10-05 PROCEDURE — 97161 PT EVAL LOW COMPLEX 20 MIN: CPT

## 2021-10-05 PROCEDURE — 97116 GAIT TRAINING THERAPY: CPT

## 2021-10-05 PROCEDURE — 87040 BLOOD CULTURE FOR BACTERIA: CPT | Performed by: NURSE PRACTITIONER

## 2021-10-05 PROCEDURE — 95816 PR EEG,W/AWAKE & DROWSY RECORD: ICD-10-PCS | Mod: 26,,, | Performed by: PSYCHIATRY & NEUROLOGY

## 2021-10-05 RX ORDER — AMLODIPINE BESYLATE 5 MG/1
10 TABLET ORAL DAILY
Status: DISCONTINUED | OUTPATIENT
Start: 2021-10-05 | End: 2021-10-06 | Stop reason: HOSPADM

## 2021-10-05 RX ORDER — HYDRALAZINE HYDROCHLORIDE 20 MG/ML
10 INJECTION INTRAMUSCULAR; INTRAVENOUS EVERY 8 HOURS PRN
Status: DISCONTINUED | OUTPATIENT
Start: 2021-10-05 | End: 2021-10-06 | Stop reason: HOSPADM

## 2021-10-05 RX ADMIN — HYDRALAZINE HYDROCHLORIDE 10 MG: 20 INJECTION INTRAMUSCULAR; INTRAVENOUS at 04:10

## 2021-10-05 RX ADMIN — CALCITRIOL CAPSULES 0.25 MCG 0.25 MCG: 0.25 CAPSULE ORAL at 08:10

## 2021-10-05 RX ADMIN — AMLODIPINE BESYLATE 10 MG: 5 TABLET ORAL at 06:10

## 2021-10-05 RX ADMIN — DONEPEZIL HYDROCHLORIDE 5 MG: 5 TABLET, FILM COATED ORAL at 08:10

## 2021-10-05 RX ADMIN — FERROUS SULFATE TAB EC 325 MG (65 MG FE EQUIVALENT) 1 EACH: 325 (65 FE) TABLET DELAYED RESPONSE at 08:10

## 2021-10-05 RX ADMIN — CLOPIDOGREL 75 MG: 75 TABLET, FILM COATED ORAL at 08:10

## 2021-10-05 RX ADMIN — PANTOPRAZOLE SODIUM 40 MG: 40 TABLET, DELAYED RELEASE ORAL at 08:10

## 2021-10-05 RX ADMIN — CEFTRIAXONE SODIUM 1 G: 1 INJECTION, POWDER, FOR SOLUTION INTRAMUSCULAR; INTRAVENOUS at 12:10

## 2021-10-05 RX ADMIN — FERROUS SULFATE TAB EC 325 MG (65 MG FE EQUIVALENT) 1 EACH: 325 (65 FE) TABLET DELAYED RESPONSE at 09:10

## 2021-10-05 RX ADMIN — CEFTRIAXONE SODIUM 1 G: 1 INJECTION, POWDER, FOR SOLUTION INTRAMUSCULAR; INTRAVENOUS at 11:10

## 2021-10-06 VITALS
WEIGHT: 155 LBS | OXYGEN SATURATION: 98 % | DIASTOLIC BLOOD PRESSURE: 70 MMHG | TEMPERATURE: 96 F | SYSTOLIC BLOOD PRESSURE: 162 MMHG | HEART RATE: 74 BPM | RESPIRATION RATE: 17 BRPM | BODY MASS INDEX: 25.83 KG/M2 | HEIGHT: 65 IN

## 2021-10-06 LAB
ALBUMIN SERPL BCP-MCNC: 3.4 G/DL (ref 3.5–5.2)
ALP SERPL-CCNC: 71 U/L (ref 55–135)
ALT SERPL W/O P-5'-P-CCNC: 14 U/L (ref 10–44)
ANION GAP SERPL CALC-SCNC: 16 MMOL/L (ref 8–16)
AST SERPL-CCNC: 19 U/L (ref 10–40)
BASOPHILS # BLD AUTO: 0.01 K/UL (ref 0–0.2)
BASOPHILS NFR BLD: 0.1 % (ref 0–1.9)
BILIRUB SERPL-MCNC: 0.4 MG/DL (ref 0.1–1)
BUN SERPL-MCNC: 38 MG/DL (ref 10–30)
CALCIUM SERPL-MCNC: 10.3 MG/DL (ref 8.7–10.5)
CHLORIDE SERPL-SCNC: 105 MMOL/L (ref 95–110)
CO2 SERPL-SCNC: 24 MMOL/L (ref 23–29)
CREAT SERPL-MCNC: 2 MG/DL (ref 0.5–1.4)
DIFFERENTIAL METHOD: ABNORMAL
EOSINOPHIL # BLD AUTO: 0.1 K/UL (ref 0–0.5)
EOSINOPHIL NFR BLD: 1 % (ref 0–8)
ERYTHROCYTE [DISTWIDTH] IN BLOOD BY AUTOMATED COUNT: 14.8 % (ref 11.5–14.5)
EST. GFR  (AFRICAN AMERICAN): 33 ML/MIN/1.73 M^2
EST. GFR  (NON AFRICAN AMERICAN): 28 ML/MIN/1.73 M^2
GLUCOSE SERPL-MCNC: 92 MG/DL (ref 70–110)
HCT VFR BLD AUTO: 38 % (ref 40–54)
HGB BLD-MCNC: 11.8 G/DL (ref 14–18)
IMM GRANULOCYTES # BLD AUTO: 0.04 K/UL (ref 0–0.04)
IMM GRANULOCYTES NFR BLD AUTO: 0.5 % (ref 0–0.5)
LYMPHOCYTES # BLD AUTO: 1.2 K/UL (ref 1–4.8)
LYMPHOCYTES NFR BLD: 13.5 % (ref 18–48)
MCH RBC QN AUTO: 29.4 PG (ref 27–31)
MCHC RBC AUTO-ENTMCNC: 31.1 G/DL (ref 32–36)
MCV RBC AUTO: 95 FL (ref 82–98)
MONOCYTES # BLD AUTO: 0.7 K/UL (ref 0.3–1)
MONOCYTES NFR BLD: 8.2 % (ref 4–15)
NEUTROPHILS # BLD AUTO: 6.6 K/UL (ref 1.8–7.7)
NEUTROPHILS NFR BLD: 76.7 % (ref 38–73)
NRBC BLD-RTO: 0 /100 WBC
PLATELET # BLD AUTO: 321 K/UL (ref 150–450)
PMV BLD AUTO: 10.2 FL (ref 9.2–12.9)
POTASSIUM SERPL-SCNC: 3.8 MMOL/L (ref 3.5–5.1)
PROT SERPL-MCNC: 8.3 G/DL (ref 6–8.4)
RBC # BLD AUTO: 4.02 M/UL (ref 4.6–6.2)
SODIUM SERPL-SCNC: 145 MMOL/L (ref 136–145)
WBC # BLD AUTO: 8.65 K/UL (ref 3.9–12.7)

## 2021-10-06 PROCEDURE — 36415 COLL VENOUS BLD VENIPUNCTURE: CPT | Performed by: NURSE PRACTITIONER

## 2021-10-06 PROCEDURE — 96376 TX/PRO/DX INJ SAME DRUG ADON: CPT

## 2021-10-06 PROCEDURE — 63600175 PHARM REV CODE 636 W HCPCS: Performed by: NURSE PRACTITIONER

## 2021-10-06 PROCEDURE — 99225 PR SUBSEQUENT OBSERVATION CARE,LEVEL II: ICD-10-PCS | Mod: ,,, | Performed by: GENERAL PRACTICE

## 2021-10-06 PROCEDURE — 25000003 PHARM REV CODE 250: Performed by: NURSE PRACTITIONER

## 2021-10-06 PROCEDURE — 97116 GAIT TRAINING THERAPY: CPT

## 2021-10-06 PROCEDURE — 96372 THER/PROPH/DIAG INJ SC/IM: CPT

## 2021-10-06 PROCEDURE — 80053 COMPREHEN METABOLIC PANEL: CPT | Performed by: NURSE PRACTITIONER

## 2021-10-06 PROCEDURE — 99225 PR SUBSEQUENT OBSERVATION CARE,LEVEL II: CPT | Mod: ,,, | Performed by: GENERAL PRACTICE

## 2021-10-06 PROCEDURE — G0378 HOSPITAL OBSERVATION PER HR: HCPCS

## 2021-10-06 PROCEDURE — 25000003 PHARM REV CODE 250: Performed by: INTERNAL MEDICINE

## 2021-10-06 PROCEDURE — 97530 THERAPEUTIC ACTIVITIES: CPT

## 2021-10-06 PROCEDURE — 85025 COMPLETE CBC W/AUTO DIFF WBC: CPT | Performed by: NURSE PRACTITIONER

## 2021-10-06 RX ORDER — LEVETIRACETAM 250 MG/1
250 TABLET ORAL 2 TIMES DAILY
Status: DISCONTINUED | OUTPATIENT
Start: 2021-10-06 | End: 2021-10-06 | Stop reason: HOSPADM

## 2021-10-06 RX ORDER — AMOXICILLIN 500 MG/1
500 TABLET, FILM COATED ORAL 3 TIMES DAILY
Qty: 21 TABLET | Refills: 0 | Status: SHIPPED | OUTPATIENT
Start: 2021-10-06 | End: 2021-10-13

## 2021-10-06 RX ORDER — HYDRALAZINE HYDROCHLORIDE 25 MG/1
25 TABLET, FILM COATED ORAL EVERY 8 HOURS
Status: DISCONTINUED | OUTPATIENT
Start: 2021-10-06 | End: 2021-10-06

## 2021-10-06 RX ORDER — LOSARTAN POTASSIUM 25 MG/1
25 TABLET ORAL DAILY
Status: DISCONTINUED | OUTPATIENT
Start: 2021-10-06 | End: 2021-10-06 | Stop reason: HOSPADM

## 2021-10-06 RX ORDER — HALOPERIDOL LACTATE 5 MG/ML
2 INJECTION, SOLUTION INTRAMUSCULAR ONCE AS NEEDED
Status: COMPLETED | OUTPATIENT
Start: 2021-10-06 | End: 2021-10-06

## 2021-10-06 RX ORDER — LOSARTAN POTASSIUM 25 MG/1
25 TABLET ORAL DAILY
Qty: 30 TABLET | Refills: 1 | Status: SHIPPED | OUTPATIENT
Start: 2021-10-07 | End: 2021-10-08

## 2021-10-06 RX ADMIN — CALCITRIOL CAPSULES 0.25 MCG 0.25 MCG: 0.25 CAPSULE ORAL at 10:10

## 2021-10-06 RX ADMIN — DONEPEZIL HYDROCHLORIDE 5 MG: 5 TABLET, FILM COATED ORAL at 10:10

## 2021-10-06 RX ADMIN — HALOPERIDOL LACTATE 2 MG: 5 INJECTION, SOLUTION INTRAMUSCULAR at 12:10

## 2021-10-06 RX ADMIN — PANTOPRAZOLE SODIUM 40 MG: 40 TABLET, DELAYED RELEASE ORAL at 10:10

## 2021-10-06 RX ADMIN — AMLODIPINE BESYLATE 10 MG: 5 TABLET ORAL at 10:10

## 2021-10-06 RX ADMIN — CLOPIDOGREL 75 MG: 75 TABLET, FILM COATED ORAL at 10:10

## 2021-10-06 RX ADMIN — LEVETIRACETAM 250 MG: 250 TABLET ORAL at 10:10

## 2021-10-06 RX ADMIN — CEFTRIAXONE SODIUM 1 G: 1 INJECTION, POWDER, FOR SOLUTION INTRAMUSCULAR; INTRAVENOUS at 01:10

## 2021-10-06 RX ADMIN — HYDRALAZINE HYDROCHLORIDE 10 MG: 20 INJECTION INTRAMUSCULAR; INTRAVENOUS at 04:10

## 2021-10-06 RX ADMIN — LOSARTAN POTASSIUM 25 MG: 25 TABLET, FILM COATED ORAL at 01:10

## 2021-10-06 RX ADMIN — FERROUS SULFATE TAB EC 325 MG (65 MG FE EQUIVALENT) 1 EACH: 325 (65 FE) TABLET DELAYED RESPONSE at 10:10

## 2021-10-06 RX ADMIN — HYDRALAZINE HYDROCHLORIDE 25 MG: 25 TABLET, FILM COATED ORAL at 10:10

## 2021-10-07 LAB — BACTERIA UR CULT: ABNORMAL

## 2021-10-10 LAB
BACTERIA BLD CULT: NORMAL
BACTERIA BLD CULT: NORMAL

## 2021-10-11 ENCOUNTER — TELEPHONE (OUTPATIENT)
Dept: MEDSURG UNIT | Facility: HOSPITAL | Age: 86
End: 2021-10-11

## 2021-11-02 ENCOUNTER — TELEPHONE (OUTPATIENT)
Dept: INFUSION THERAPY | Facility: HOSPITAL | Age: 86
End: 2021-11-02
Payer: MEDICARE

## 2021-11-04 ENCOUNTER — TELEPHONE (OUTPATIENT)
Dept: HEMATOLOGY/ONCOLOGY | Facility: CLINIC | Age: 86
End: 2021-11-04
Payer: MEDICARE

## 2021-11-17 ENCOUNTER — INFUSION (OUTPATIENT)
Dept: INFUSION THERAPY | Facility: HOSPITAL | Age: 86
End: 2021-11-17
Attending: INTERNAL MEDICINE
Payer: MEDICARE

## 2021-11-17 VITALS
OXYGEN SATURATION: 98 % | WEIGHT: 150 LBS | TEMPERATURE: 98 F | BODY MASS INDEX: 24.96 KG/M2 | RESPIRATION RATE: 18 BRPM | HEART RATE: 67 BPM | SYSTOLIC BLOOD PRESSURE: 127 MMHG | DIASTOLIC BLOOD PRESSURE: 59 MMHG

## 2021-11-17 DIAGNOSIS — N18.9 ANEMIA OF CHRONIC RENAL FAILURE, UNSPECIFIED CKD STAGE: ICD-10-CM

## 2021-11-17 DIAGNOSIS — D63.1 ANEMIA OF CHRONIC RENAL FAILURE, UNSPECIFIED CKD STAGE: ICD-10-CM

## 2021-11-17 DIAGNOSIS — D63.1 ANEMIA DUE TO STAGE 3 CHRONIC KIDNEY DISEASE TREATED WITH ERYTHROPOIETIN: Primary | ICD-10-CM

## 2021-11-17 DIAGNOSIS — N18.30 ANEMIA DUE TO STAGE 3 CHRONIC KIDNEY DISEASE TREATED WITH ERYTHROPOIETIN: Primary | ICD-10-CM

## 2021-11-17 PROCEDURE — 63600175 PHARM REV CODE 636 W HCPCS: Mod: JG,EC | Performed by: INTERNAL MEDICINE

## 2021-11-17 PROCEDURE — 96372 THER/PROPH/DIAG INJ SC/IM: CPT

## 2021-11-17 RX ADMIN — EPOETIN ALFA-EPBX 3000 UNITS: 3000 INJECTION, SOLUTION INTRAVENOUS; SUBCUTANEOUS at 03:11

## 2021-12-01 ENCOUNTER — TELEPHONE (OUTPATIENT)
Dept: INFUSION THERAPY | Facility: HOSPITAL | Age: 86
End: 2021-12-01
Payer: MEDICARE

## 2021-12-14 ENCOUNTER — OFFICE VISIT (OUTPATIENT)
Dept: PODIATRY | Facility: CLINIC | Age: 86
End: 2021-12-14
Payer: MEDICARE

## 2021-12-14 VITALS
DIASTOLIC BLOOD PRESSURE: 61 MMHG | HEART RATE: 68 BPM | WEIGHT: 150 LBS | OXYGEN SATURATION: 97 % | HEIGHT: 65 IN | RESPIRATION RATE: 16 BRPM | SYSTOLIC BLOOD PRESSURE: 128 MMHG | BODY MASS INDEX: 24.99 KG/M2

## 2021-12-14 DIAGNOSIS — M79.675 GREAT TOE PAIN, LEFT: ICD-10-CM

## 2021-12-14 DIAGNOSIS — F02.80 ALZHEIMER'S DEMENTIA WITHOUT BEHAVIORAL DISTURBANCE, UNSPECIFIED TIMING OF DEMENTIA ONSET: ICD-10-CM

## 2021-12-14 DIAGNOSIS — G30.9 ALZHEIMER'S DEMENTIA WITHOUT BEHAVIORAL DISTURBANCE, UNSPECIFIED TIMING OF DEMENTIA ONSET: ICD-10-CM

## 2021-12-14 DIAGNOSIS — I70.213 ATHEROSCLER OF NATIVE ARTERY OF BOTH LEGS WITH INTERMIT CLAUDICATION: ICD-10-CM

## 2021-12-14 DIAGNOSIS — L60.0 INGROWN NAIL: Primary | ICD-10-CM

## 2021-12-14 DIAGNOSIS — M20.12 HALLUX VALGUS, BILATERAL: ICD-10-CM

## 2021-12-14 DIAGNOSIS — M20.11 HALLUX VALGUS, BILATERAL: ICD-10-CM

## 2021-12-14 DIAGNOSIS — M06.9 RHEUMATOID ARTHRITIS, INVOLVING UNSPECIFIED SITE, UNSPECIFIED WHETHER RHEUMATOID FACTOR PRESENT: ICD-10-CM

## 2021-12-14 PROCEDURE — 1157F PR ADVANCE CARE PLAN OR EQUIV PRESENT IN MEDICAL RECORD: ICD-10-PCS | Mod: CPTII,S$GLB,, | Performed by: PODIATRIST

## 2021-12-14 PROCEDURE — 99203 OFFICE O/P NEW LOW 30 MIN: CPT | Mod: 25,S$GLB,, | Performed by: PODIATRIST

## 2021-12-14 PROCEDURE — 11721 PR DEBRIDEMENT OF NAILS, 6 OR MORE: ICD-10-PCS | Mod: Q8,S$GLB,, | Performed by: PODIATRIST

## 2021-12-14 PROCEDURE — 99203 PR OFFICE/OUTPT VISIT, NEW, LEVL III, 30-44 MIN: ICD-10-PCS | Mod: 25,S$GLB,, | Performed by: PODIATRIST

## 2021-12-14 PROCEDURE — 1157F ADVNC CARE PLAN IN RCRD: CPT | Mod: CPTII,S$GLB,, | Performed by: PODIATRIST

## 2021-12-14 PROCEDURE — 11721 DEBRIDE NAIL 6 OR MORE: CPT | Mod: Q8,S$GLB,, | Performed by: PODIATRIST

## 2022-01-11 ENCOUNTER — LAB VISIT (OUTPATIENT)
Dept: PRIMARY CARE CLINIC | Facility: OTHER | Age: 87
End: 2022-01-11
Attending: INTERNAL MEDICINE
Payer: MEDICARE

## 2022-01-11 DIAGNOSIS — Z20.822 ENCOUNTER FOR LABORATORY TESTING FOR COVID-19 VIRUS: ICD-10-CM

## 2022-01-11 PROCEDURE — U0003 INFECTIOUS AGENT DETECTION BY NUCLEIC ACID (DNA OR RNA); SEVERE ACUTE RESPIRATORY SYNDROME CORONAVIRUS 2 (SARS-COV-2) (CORONAVIRUS DISEASE [COVID-19]), AMPLIFIED PROBE TECHNIQUE, MAKING USE OF HIGH THROUGHPUT TECHNOLOGIES AS DESCRIBED BY CMS-2020-01-R: HCPCS | Performed by: INTERNAL MEDICINE

## 2022-01-12 LAB
SARS-COV-2 RNA RESP QL NAA+PROBE: NOT DETECTED
SARS-COV-2- CYCLE NUMBER: NORMAL

## 2022-02-15 ENCOUNTER — INFUSION (OUTPATIENT)
Dept: INFUSION THERAPY | Facility: HOSPITAL | Age: 87
End: 2022-02-15
Attending: INTERNAL MEDICINE
Payer: MEDICARE

## 2022-02-15 VITALS
RESPIRATION RATE: 16 BRPM | SYSTOLIC BLOOD PRESSURE: 153 MMHG | BODY MASS INDEX: 24.68 KG/M2 | HEIGHT: 65 IN | HEART RATE: 61 BPM | DIASTOLIC BLOOD PRESSURE: 79 MMHG | WEIGHT: 148.13 LBS | TEMPERATURE: 98 F

## 2022-02-15 DIAGNOSIS — D63.1 ANEMIA DUE TO STAGE 3 CHRONIC KIDNEY DISEASE TREATED WITH ERYTHROPOIETIN: Primary | ICD-10-CM

## 2022-02-15 DIAGNOSIS — N18.30 ANEMIA DUE TO STAGE 3 CHRONIC KIDNEY DISEASE TREATED WITH ERYTHROPOIETIN: Primary | ICD-10-CM

## 2022-02-15 PROCEDURE — 96372 THER/PROPH/DIAG INJ SC/IM: CPT

## 2022-02-15 PROCEDURE — 63600175 PHARM REV CODE 636 W HCPCS: Mod: JG | Performed by: INTERNAL MEDICINE

## 2022-02-15 RX ADMIN — EPOETIN ALFA-EPBX 3000 UNITS: 3000 INJECTION, SOLUTION INTRAVENOUS; SUBCUTANEOUS at 02:02

## 2022-02-15 NOTE — PLAN OF CARE
Problem: Activity Intolerance  Goal: Enhanced Capacity and Energy  Outcome: Ongoing, Progressing  Intervention: Optimize Activity Tolerance  Flowsheets (Taken 2/15/2022 1405)  Self-Care Promotion: independence encouraged  Activity Management:   Ambulated -L4   Ambulated in perez - L4   Ambulated in room - L4   Up in chair - L1  Environmental Support:   calm environment promoted   distractions minimized   rest periods encouraged

## 2022-03-02 ENCOUNTER — TELEPHONE (OUTPATIENT)
Dept: INFUSION THERAPY | Facility: HOSPITAL | Age: 87
End: 2022-03-02
Payer: MEDICARE

## 2022-03-02 NOTE — TELEPHONE ENCOUNTER
Left voicemail to inform patient that he will be canceled for retacrit injection today. Hemoglobin 10. Patient to return for next injection on 3/16/22.

## 2022-03-24 ENCOUNTER — INFUSION (OUTPATIENT)
Dept: INFUSION THERAPY | Facility: HOSPITAL | Age: 87
End: 2022-03-24
Attending: INTERNAL MEDICINE
Payer: MEDICARE

## 2022-03-24 VITALS
TEMPERATURE: 98 F | DIASTOLIC BLOOD PRESSURE: 65 MMHG | WEIGHT: 148 LBS | RESPIRATION RATE: 18 BRPM | HEIGHT: 65 IN | SYSTOLIC BLOOD PRESSURE: 172 MMHG | HEART RATE: 67 BPM | BODY MASS INDEX: 24.66 KG/M2

## 2022-03-24 DIAGNOSIS — D63.1 ANEMIA DUE TO STAGE 3 CHRONIC KIDNEY DISEASE TREATED WITH ERYTHROPOIETIN: Primary | ICD-10-CM

## 2022-03-24 DIAGNOSIS — N18.30 ANEMIA DUE TO STAGE 3 CHRONIC KIDNEY DISEASE TREATED WITH ERYTHROPOIETIN: Primary | ICD-10-CM

## 2022-03-24 PROCEDURE — 63600175 PHARM REV CODE 636 W HCPCS: Mod: JG | Performed by: INTERNAL MEDICINE

## 2022-03-24 PROCEDURE — 96372 THER/PROPH/DIAG INJ SC/IM: CPT

## 2022-03-24 RX ADMIN — EPOETIN ALFA-EPBX 3000 UNITS: 3000 INJECTION, SOLUTION INTRAVENOUS; SUBCUTANEOUS at 02:03

## 2022-03-24 NOTE — PLAN OF CARE
Problem: Fatigue  Goal: Improved Activity Tolerance  Outcome: Ongoing, Progressing  Intervention: Promote Improved Energy  Flowsheets (Taken 3/24/2022 1406)  Fatigue Management:   frequent rest breaks encouraged   fatigue-related activity identified   paced activity encouraged  Sleep/Rest Enhancement:   regular sleep/rest pattern promoted   relaxation techniques promoted

## 2022-04-05 ENCOUNTER — TELEPHONE (OUTPATIENT)
Dept: INFUSION THERAPY | Facility: HOSPITAL | Age: 87
End: 2022-04-05
Payer: MEDICARE

## 2022-04-05 NOTE — TELEPHONE ENCOUNTER
Attempted to call patient in regards to needed blood work for   Retacrit injection with no answer. Unable to leave voicemail at this time.

## 2022-04-12 ENCOUNTER — TELEPHONE (OUTPATIENT)
Dept: NEUROLOGY | Facility: CLINIC | Age: 87
End: 2022-04-12
Payer: MEDICARE

## 2022-04-13 ENCOUNTER — OFFICE VISIT (OUTPATIENT)
Dept: NEUROLOGY | Facility: CLINIC | Age: 87
End: 2022-04-13
Payer: MEDICARE

## 2022-04-13 VITALS
HEIGHT: 65 IN | DIASTOLIC BLOOD PRESSURE: 59 MMHG | BODY MASS INDEX: 25.66 KG/M2 | HEART RATE: 64 BPM | WEIGHT: 154 LBS | SYSTOLIC BLOOD PRESSURE: 148 MMHG

## 2022-04-13 DIAGNOSIS — F02.80 ALZHEIMER'S DEMENTIA WITHOUT BEHAVIORAL DISTURBANCE, UNSPECIFIED TIMING OF DEMENTIA ONSET: Primary | ICD-10-CM

## 2022-04-13 DIAGNOSIS — G30.9 ALZHEIMER'S DEMENTIA WITHOUT BEHAVIORAL DISTURBANCE, UNSPECIFIED TIMING OF DEMENTIA ONSET: Primary | ICD-10-CM

## 2022-04-13 PROCEDURE — 3288F PR FALLS RISK ASSESSMENT DOCUMENTED: ICD-10-PCS | Mod: CPTII,S$GLB,, | Performed by: NURSE PRACTITIONER

## 2022-04-13 PROCEDURE — 1101F PR PT FALLS ASSESS DOC 0-1 FALLS W/OUT INJ PAST YR: ICD-10-PCS | Mod: CPTII,S$GLB,, | Performed by: NURSE PRACTITIONER

## 2022-04-13 PROCEDURE — 1157F ADVNC CARE PLAN IN RCRD: CPT | Mod: CPTII,S$GLB,, | Performed by: NURSE PRACTITIONER

## 2022-04-13 PROCEDURE — 3288F FALL RISK ASSESSMENT DOCD: CPT | Mod: CPTII,S$GLB,, | Performed by: NURSE PRACTITIONER

## 2022-04-13 PROCEDURE — 99999 PR PBB SHADOW E&M-EST. PATIENT-LVL III: ICD-10-PCS | Mod: PBBFAC,,, | Performed by: NURSE PRACTITIONER

## 2022-04-13 PROCEDURE — 1101F PT FALLS ASSESS-DOCD LE1/YR: CPT | Mod: CPTII,S$GLB,, | Performed by: NURSE PRACTITIONER

## 2022-04-13 PROCEDURE — 1126F PR PAIN SEVERITY QUANTIFIED, NO PAIN PRESENT: ICD-10-PCS | Mod: CPTII,S$GLB,, | Performed by: NURSE PRACTITIONER

## 2022-04-13 PROCEDURE — 1160F PR REVIEW ALL MEDS BY PRESCRIBER/CLIN PHARMACIST DOCUMENTED: ICD-10-PCS | Mod: CPTII,S$GLB,, | Performed by: NURSE PRACTITIONER

## 2022-04-13 PROCEDURE — 1157F PR ADVANCE CARE PLAN OR EQUIV PRESENT IN MEDICAL RECORD: ICD-10-PCS | Mod: CPTII,S$GLB,, | Performed by: NURSE PRACTITIONER

## 2022-04-13 PROCEDURE — 1160F RVW MEDS BY RX/DR IN RCRD: CPT | Mod: CPTII,S$GLB,, | Performed by: NURSE PRACTITIONER

## 2022-04-13 PROCEDURE — 99999 PR PBB SHADOW E&M-EST. PATIENT-LVL III: CPT | Mod: PBBFAC,,, | Performed by: NURSE PRACTITIONER

## 2022-04-13 PROCEDURE — 1159F MED LIST DOCD IN RCRD: CPT | Mod: CPTII,S$GLB,, | Performed by: NURSE PRACTITIONER

## 2022-04-13 PROCEDURE — 1159F PR MEDICATION LIST DOCUMENTED IN MEDICAL RECORD: ICD-10-PCS | Mod: CPTII,S$GLB,, | Performed by: NURSE PRACTITIONER

## 2022-04-13 PROCEDURE — 99204 OFFICE O/P NEW MOD 45 MIN: CPT | Mod: S$GLB,,, | Performed by: NURSE PRACTITIONER

## 2022-04-13 PROCEDURE — 1126F AMNT PAIN NOTED NONE PRSNT: CPT | Mod: CPTII,S$GLB,, | Performed by: NURSE PRACTITIONER

## 2022-04-13 PROCEDURE — 99204 PR OFFICE/OUTPT VISIT, NEW, LEVL IV, 45-59 MIN: ICD-10-PCS | Mod: S$GLB,,, | Performed by: NURSE PRACTITIONER

## 2022-04-13 RX ORDER — DONEPEZIL HYDROCHLORIDE 10 MG/1
10 TABLET, FILM COATED ORAL NIGHTLY
Qty: 90 TABLET | Refills: 3 | Status: SHIPPED | OUTPATIENT
Start: 2022-04-13 | End: 2023-04-13

## 2022-04-13 NOTE — PROGRESS NOTES
NEUROLOGY  Outpatient Follow Up    [facility] [place]  [address] [facility], [place] [address]  [phone] (office) / [phone] (fax)    Patient Name:  Troy Yuan Sr.  :  1929  MR #:  [id number]  Acct #:  [id number]    Date of Neurology Visit: 2022  Name of Provider: CHRISTY Duque    Other Physicians:  Gentry Encinas MD (Primary Care Physician); Self, Aaareferral (Referring)      Chief Complaint: Memory Loss and Dementia      History of Present Illness:  Patient is a 89 y/o male with a PMHX dementia, CKD, anemia, systolic/diastolic heart failure, hyperlipidemia, hypertension, prostate cancer, BPH, GI bleed           Interval Hx 2022:   Patient is here today for memory loss. He is accompanied by his daughter who helps supply information. He was diagnosed with Dementia by his PCP abut 1.5 years ago. He was started on Donepezil around that time. She states he is repetitious. He currently lives at home with his daughter.     Patient's highest level of education completed was highschool. 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 The onset of memory issues began about 2 years ago. He struggles with both short and long term memory. Daughter states he is argumentative and gets easily agitated. Daughter states she is able to divert him. There is no reported combativeness. He denies depression. There are no reported hallucinations but will often ask about family that has passed away. She states he will very seldom sleep through the night. He will sleep about 2-3 hours and gets up and gets dressed. He will take cat naps throughout the day. He is not currently taking anything to help him sleep. He has tried Melatonin and Z-quil. He does struggle with executive function. Daughter has been managing his finances and medications for the last 2 years. There are no issues with hygiene and is able to perfrom ADLs for the most part without assistnace. She reports that he does have word finding  "difficulty. He wears depends for urinary incontinence. This has been ongoing for about 3 years. There are no recent falls. He is no longer driving. He was previously taking Benadryl but was told to to stop about 6 mths ago. During the day he likes to tend to his cats and dogs and will sit on the porch outside.               Past Medical, Surgical, Family & Social History:   Reviewed and updated.    Home Medications:     Current Outpatient Medications:     alfuzosin (UROXATRAL) 10 mg Tb24, Take 1 tablet (10 mg total) by mouth daily with breakfast., Disp: 90 tablet, Rfl: 3    amlodipine (NORVASC) 10 MG tablet, Take 10 mg by mouth once daily., Disp: , Rfl:     atorvastatin (LIPITOR) 20 MG tablet, Take 1 tablet (20 mg total) by mouth once daily., Disp: 30 tablet, Rfl: 1    calcitRIOL (ROCALTROL) 0.25 MCG Cap, Take 0.25 mcg by mouth once daily. , Disp: , Rfl:     ferrous sulfate (FEOSOL) 325 mg (65 mg iron) Tab tablet, Take 1 tablet (325 mg total) by mouth 2 (two) times daily., Disp: 60 tablet, Rfl: 1    furosemide (LASIX) 20 MG tablet, Take 1 tablet (20 mg total) by mouth once daily., Disp: 30 tablet, Rfl: 0    losartan (COZAAR) 25 MG tablet, TAKE 1 TABLET(25 MG) BY MOUTH EVERY DAY, Disp: 90 tablet, Rfl: 0    omeprazole (PRILOSEC) 20 MG capsule, Take 1 capsule (20 mg total) by mouth 2 (two) times a day., Disp: 28 capsule, Rfl: 0    clopidogreL (PLAVIX) 75 mg tablet, Take 1 tablet (75 mg total) by mouth once daily. (Patient not taking: Reported on 4/13/2022), Disp: 30 tablet, Rfl: 0    donepeziL (ARICEPT) 10 MG tablet, Take 1 tablet (10 mg total) by mouth every evening., Disp: 90 tablet, Rfl: 3    hydroxychloroquine (PLAQUENIL) 200 mg tablet, Take 1 tablet (200 mg total) by mouth once daily. (Patient not taking: Reported on 4/13/2022), Disp: 60 tablet, Rfl: 0    Physical Examination:  BP (!) 148/59 (BP Location: Left arm, Patient Position: Sitting, BP Method: Large (Automatic))   Pulse 64   Ht 5' 5" (1.651 " "m)   Wt 69.9 kg (154 lb)   BMI 25.63 kg/m²     GENERAL:  General appearance: Well, non-toxic appearing.  No apparent distress.  Neck: supple.    MENTAL STATUS:  Alertness, attention span & concentration: normal.  Language: normal.  Orientation to self, place & time:  Oriented to self and place only.   Memory, recent & remote: limited  Fund of knowledge: normal.  MMSE:unable to fully perform   - knew the state, able to identify familiar objects but could not recall 3 words, spell the word "WORLD" frontwards or backwards, or write a sentence.     SPEECH:  Clear and fluent.  Follows complex commands.    CRANIAL NERVES:  Cranial Nerves II-XII were examined.  II - Visual fields: normal.  III, IV, VI: PERRL, EOMI, No ptosis, No nystagmus.  V - Facial sensation: normal.  VII - Face symmetry & mobility: Due to Covid-19 recommended precautions, patient wearing facial mask; mouth and nose not examined.  VIII - Hearing: normal.  IX, X - Palate: Due to Covid-19 recommended precautions, patient wearing facial mask; mouth and nose not examined.  XI - Shoulder shrug: normal.  XII - Tongue protrusion: Due to Covid-19 recommended precautions, patient wearing facial mask; mouth and nose not examined.    GROSS MOTOR:  Gait & station: able to stand and take a steps with x1 assist. Very unsteady; uses walker at home  Tone: normal.  Abnormal movements: none.  Finger-nose: normal.  Rapid alternating movements: normal.  Pronator drift: normal      MUSCLE STRENGTH:   Hand grasp:   - right:5/5   - left:5/5  generalized weakness throughout  4/5 symmetric        REFLEXES:    RIGHT Reflex   LEFT   1 Biceps 1   1 Brachiorad. 1        2 Patellar 2     SENSORY:  Light touch: Normal throughout.            Diagnostic Data Reviewed:      MRI brain 10/2021  FINDINGS:  Normal size ventricles. Mild involutional change. Scattered areas of white matter T2/FLAIR signal hyperintensity, nonspecific but most suggestive of chronic microvascular ischemic " "change.  There are 2 adjacent subcentimeter foci of encephalomalacia/gliosis inferior left cerebellum and right thalamus.  No acute infarct or hemorrhage. No mass mass effect or midline shift. Surgical changes of the globes. Trace effusions in both mastoid air cells.  Partial opacification right maxillary sinus with 2 cm mucous retention cyst or polyp, retained secretions and mucosal thickening.  Additional mild mucosal thickening left maxillary sinus.  Partial opacification bilateral ethmoid sinuses due to mucosal thickening.  Normal vascular flow voids. Prominent pannus posterior to the dens.     Impression:     No acute intracranial abnormality.  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  Moderate cerebral involutional change and white matter disease.                   Assessment and Plan:    Problem List Items Addressed This Visit        Neuro    Dementia without behavioral disturbance - Primary    Current Assessment & Plan     Patient is a 89 y/o male that presents with his daughter for further management of dementia. He was diagnosed about 1.5 years ago and started Donepezil at that time as well.   Daughter reports pt can be argumentative and have increased agitation at times but he can be diverted. There are no reported hallucinations, depression, or recent falls. He does not sleep much at night, ranging from 2-3 hours/night and will take small naps during the day.   Unable to perfrom full MMSE today   - pt was able to name correct state and place. He could not spell "WORLD" forwards or backwards. He was able to identify simple objects. He could not write a sentence or follow multi step commands  Recent MRI brain scan & serologies reviewed  Currently on Donepezil 5 mg QD   - increase to 10 mg QHS  Recommend pt to take Melatonin 10 mg QHS and discussed healthy sleep habits.    - stressed the importance of not taking meds like benadryl as he was taking this in the past.   Safety concerns " addressed  Daughter given info on caregiver support at time of appt.                                    Important to note, also  has a past medical history of Bladder stones, Cancer, Encounter for blood transfusion, Hypertension, Kidney stone, Prostate cancer (2004), Radiation (2005), and Stage 3 chronic kidney disease (1/11/2019).          The patient will return to clinic in 6 months.    All questions were answered and patient is comfortable with the plan.         Thank you very much for the opportunity to assist in this patient's care.    If you have any questions or concerns, please do not hesitate to contact me at any time.      Sincerely,     CHRISTY Duque  [facility] [place]         I spent a total of 55 minutes on the day of the visit.This includes face to face time and non-face to face time preparing to see the patient (eg, review of tests), Obtaining and/or reviewing separately obtained history, Documenting clinical information in the electronic or other health record, Independently interpreting resultsand communicating results to the patient/family/caregiver, or Care coordination.

## 2022-04-13 NOTE — ASSESSMENT & PLAN NOTE
"Patient is a 89 y/o male that presents with his daughter for further management of dementia. He was diagnosed about 1.5 years ago and started Donepezil at that time as well.   Daughter reports pt can be argumentative and have increased agitation at times but he can be diverted. There are no reported hallucinations, depression, or recent falls. He does not sleep much at night, ranging from 2-3 hours/night and will take small naps during the day.   Unable to perfrom full MMSE today   - pt was able to name correct state and place. He could not spell "WORLD" forwards or backwards. He was able to identify simple objects. He could not write a sentence or follow multi step commands  Recent MRI brain scan & serologies reviewed  Currently on Donepezil 5 mg QD   - increase to 10 mg QHS  Recommend pt to take Melatonin 10 mg QHS and discussed healthy sleep habits.    - stressed the importance of not taking meds like benadryl as he was taking this in the past.   Safety concerns addressed  Daughter given info on caregiver support at time of appt.   "

## 2022-04-16 ENCOUNTER — HOSPITAL ENCOUNTER (EMERGENCY)
Facility: HOSPITAL | Age: 87
Discharge: HOME | End: 2022-04-16
Attending: EMERGENCY MEDICINE
Payer: MEDICARE

## 2022-04-16 VITALS
HEIGHT: 66 IN | BODY MASS INDEX: 24.75 KG/M2 | WEIGHT: 154 LBS | RESPIRATION RATE: 18 BRPM | DIASTOLIC BLOOD PRESSURE: 92 MMHG | SYSTOLIC BLOOD PRESSURE: 176 MMHG | HEART RATE: 52 BPM | TEMPERATURE: 98 F | OXYGEN SATURATION: 100 %

## 2022-04-16 DIAGNOSIS — N30.01 ACUTE CYSTITIS WITH HEMATURIA: Primary | ICD-10-CM

## 2022-04-16 DIAGNOSIS — S90.129A CONTUSION OF TOE WITHOUT DAMAGE TO NAIL, UNSPECIFIED LATERALITY, UNSPECIFIED TOE, INITIAL ENCOUNTER: ICD-10-CM

## 2022-04-16 DIAGNOSIS — R93.5 ABNORMAL ABDOMINAL CT SCAN: ICD-10-CM

## 2022-04-16 DIAGNOSIS — T14.90XA TRAUMA: ICD-10-CM

## 2022-04-16 DIAGNOSIS — R31.9 HEMATURIA, UNSPECIFIED TYPE: ICD-10-CM

## 2022-04-16 LAB
ALBUMIN SERPL BCP-MCNC: 3.7 G/DL (ref 3.5–5.2)
ALP SERPL-CCNC: 79 U/L (ref 55–135)
ALT SERPL W/O P-5'-P-CCNC: 13 U/L (ref 10–44)
ANION GAP SERPL CALC-SCNC: 11 MMOL/L (ref 8–16)
AST SERPL-CCNC: 17 U/L (ref 10–40)
BACTERIA #/AREA URNS HPF: NEGATIVE /HPF
BASOPHILS # BLD AUTO: 0.02 K/UL (ref 0–0.2)
BASOPHILS NFR BLD: 0.3 % (ref 0–1.9)
BILIRUB SERPL-MCNC: 0.5 MG/DL (ref 0.1–1)
BILIRUB UR QL STRIP: NEGATIVE
BUN SERPL-MCNC: 54 MG/DL (ref 10–30)
CALCIUM SERPL-MCNC: 8.8 MG/DL (ref 8.7–10.5)
CHLORIDE SERPL-SCNC: 100 MMOL/L (ref 95–110)
CLARITY UR: ABNORMAL
CO2 SERPL-SCNC: 27 MMOL/L (ref 23–29)
COLOR UR: YELLOW
CREAT SERPL-MCNC: 2.5 MG/DL (ref 0.5–1.4)
DIFFERENTIAL METHOD: ABNORMAL
EOSINOPHIL # BLD AUTO: 0.3 K/UL (ref 0–0.5)
EOSINOPHIL NFR BLD: 4.3 % (ref 0–8)
ERYTHROCYTE [DISTWIDTH] IN BLOOD BY AUTOMATED COUNT: 15.3 % (ref 11.5–14.5)
EST. GFR  (AFRICAN AMERICAN): 24.7 ML/MIN/1.73 M^2
EST. GFR  (NON AFRICAN AMERICAN): 21.3 ML/MIN/1.73 M^2
GLUCOSE SERPL-MCNC: 104 MG/DL (ref 70–110)
GLUCOSE UR QL STRIP: NEGATIVE
HCT VFR BLD AUTO: 29.1 % (ref 40–54)
HGB BLD-MCNC: 9 G/DL (ref 14–18)
HGB UR QL STRIP: ABNORMAL
HYALINE CASTS #/AREA URNS LPF: 2 /LPF
IMM GRANULOCYTES # BLD AUTO: 0.04 K/UL (ref 0–0.04)
IMM GRANULOCYTES NFR BLD AUTO: 0.6 % (ref 0–0.5)
KETONES UR QL STRIP: NEGATIVE
LEUKOCYTE ESTERASE UR QL STRIP: ABNORMAL
LYMPHOCYTES # BLD AUTO: 0.9 K/UL (ref 1–4.8)
LYMPHOCYTES NFR BLD: 13 % (ref 18–48)
MCH RBC QN AUTO: 29 PG (ref 27–31)
MCHC RBC AUTO-ENTMCNC: 30.9 G/DL (ref 32–36)
MCV RBC AUTO: 94 FL (ref 82–98)
MICROSCOPIC COMMENT: ABNORMAL
MONOCYTES # BLD AUTO: 0.6 K/UL (ref 0.3–1)
MONOCYTES NFR BLD: 8.1 % (ref 4–15)
NEUTROPHILS # BLD AUTO: 5.3 K/UL (ref 1.8–7.7)
NEUTROPHILS NFR BLD: 73.7 % (ref 38–73)
NITRITE UR QL STRIP: NEGATIVE
NRBC BLD-RTO: 0 /100 WBC
PH UR STRIP: 7 [PH] (ref 5–8)
PLATELET # BLD AUTO: 312 K/UL (ref 150–450)
PMV BLD AUTO: 10 FL (ref 9.2–12.9)
POTASSIUM SERPL-SCNC: 4 MMOL/L (ref 3.5–5.1)
PROT SERPL-MCNC: 7.5 G/DL (ref 6–8.4)
PROT UR QL STRIP: ABNORMAL
RBC # BLD AUTO: 3.1 M/UL (ref 4.6–6.2)
RBC #/AREA URNS HPF: >100 /HPF (ref 0–4)
SODIUM SERPL-SCNC: 138 MMOL/L (ref 136–145)
SP GR UR STRIP: 1.01 (ref 1–1.03)
SQUAMOUS #/AREA URNS HPF: 0 /HPF
URN SPEC COLLECT METH UR: ABNORMAL
UROBILINOGEN UR STRIP-ACNC: NEGATIVE EU/DL
WBC # BLD AUTO: 7.18 K/UL (ref 3.9–12.7)
WBC #/AREA URNS HPF: >100 /HPF (ref 0–5)

## 2022-04-16 PROCEDURE — 87086 URINE CULTURE/COLONY COUNT: CPT | Performed by: EMERGENCY MEDICINE

## 2022-04-16 PROCEDURE — 63600175 PHARM REV CODE 636 W HCPCS: Performed by: EMERGENCY MEDICINE

## 2022-04-16 PROCEDURE — 99285 EMERGENCY DEPT VISIT HI MDM: CPT | Mod: 25

## 2022-04-16 PROCEDURE — 85025 COMPLETE CBC W/AUTO DIFF WBC: CPT | Performed by: EMERGENCY MEDICINE

## 2022-04-16 PROCEDURE — 80053 COMPREHEN METABOLIC PANEL: CPT | Performed by: EMERGENCY MEDICINE

## 2022-04-16 PROCEDURE — 81001 URINALYSIS AUTO W/SCOPE: CPT | Performed by: EMERGENCY MEDICINE

## 2022-04-16 PROCEDURE — 96374 THER/PROPH/DIAG INJ IV PUSH: CPT

## 2022-04-16 RX ORDER — CEFUROXIME AXETIL 500 MG/1
500 TABLET ORAL 2 TIMES DAILY
Qty: 14 TABLET | Refills: 0 | Status: ON HOLD | OUTPATIENT
Start: 2022-04-16 | End: 2022-04-20 | Stop reason: HOSPADM

## 2022-04-16 RX ADMIN — CEFTRIAXONE 2 G: 2 INJECTION, SOLUTION INTRAVENOUS at 06:04

## 2022-04-16 NOTE — ED PROVIDER NOTES
Encounter Date: 4/16/2022       History     Chief Complaint   Patient presents with    Hematuria     Pt daughter states there was leticia blood in pt urine starting tonight     This is a 89-year-old male with a past medical history of prostate cancer status post radiation, stage 3 kidney disease,, hypertension, who presents emergency department hematuria.  She reported that patient urinated bright red blood, no clots earlier tonight.  It was painless.  No trauma or injury reported to the penis or the flank region.  Patient has not had this issue in the past.  Just started on aspirin 2 days ago per the daughter.  No abdominal pain vomiting or any diarrhea reported.  Patient also stubbed his 2nd toe right foot on the way over here.  Did not fall.  Did not hit his head.        Review of patient's allergies indicates:   Allergen Reactions    Soma [carisoprodol] Rash    Aspirin     Sulfa (sulfonamide antibiotics) Rash     Past Medical History:   Diagnosis Date    Bladder stones     Cancer     Encounter for blood transfusion     Hypertension     Kidney stone     Prostate cancer 2004    Radiation 2005    prostate    Stage 3 chronic kidney disease 1/11/2019     Past Surgical History:   Procedure Laterality Date    CYSTOSCOPY      KIDNEY STONE SURGERY  May 2014     Family History   Problem Relation Age of Onset    Hypertension Daughter     Diabetes Daughter     Hypertension Son     Asthma Son      Social History     Tobacco Use    Smoking status: Former Smoker     Years: 40.00     Types: Cigarettes     Start date: 9/26/1985    Smokeless tobacco: Never Used   Substance Use Topics    Alcohol use: No    Drug use: No     Review of Systems   Unable to perform ROS: Dementia       Physical Exam     Initial Vitals [04/16/22 0330]   BP Pulse Resp Temp SpO2   (!) 192/81 60 18 98.4 °F (36.9 °C) 100 %      MAP       --         Physical Exam    Nursing note and vitals reviewed.  Constitutional: He appears well-developed  and well-nourished. He is not diaphoretic. No distress.   HENT:   Head: Normocephalic and atraumatic.   Mouth/Throat: Oropharynx is clear and moist. No oropharyngeal exudate.   Eyes: Conjunctivae and EOM are normal. Pupils are equal, round, and reactive to light.   Neck: Neck supple. No tracheal deviation present.   Normal range of motion.  Cardiovascular: Normal rate, regular rhythm, normal heart sounds and intact distal pulses.   No murmur heard.  Pulmonary/Chest: Breath sounds normal. No stridor. No respiratory distress. He has no wheezes. He has no rhonchi. He has no rales.   Abdominal: Abdomen is soft. Bowel sounds are normal. He exhibits no distension. There is no abdominal tenderness. There is no rebound and no guarding.   Genitourinary:    Penis normal.      Genitourinary Comments: Uncircumcised penis.  No active bleeding noted     Musculoskeletal:         General: No tenderness or edema. Normal range of motion.      Cervical back: Normal range of motion and neck supple.      Comments: Right 2nd toe:  Mild amount of dried blood from the nail.  Arthritic changes noted in the right foot.     Neurological: He is alert. He has normal strength. No cranial nerve deficit or sensory deficit. GCS eye subscore is 4. GCS verbal subscore is 5. GCS motor subscore is 6.   Skin: Skin is warm and dry. Capillary refill takes less than 2 seconds. No rash noted. No erythema. No pallor.   Psychiatric: He has a normal mood and affect. Thought content normal.         ED Course   Procedures  Labs Reviewed   CBC W/ AUTO DIFFERENTIAL - Abnormal; Notable for the following components:       Result Value    RBC 3.10 (*)     Hemoglobin 9.0 (*)     Hematocrit 29.1 (*)     MCHC 30.9 (*)     RDW 15.3 (*)     Immature Granulocytes 0.6 (*)     Lymph # 0.9 (*)     Gran % 73.7 (*)     Lymph % 13.0 (*)     All other components within normal limits   COMPREHENSIVE METABOLIC PANEL - Abnormal; Notable for the following components:    BUN 54 (*)      Creatinine 2.5 (*)     eGFR if  24.7 (*)     eGFR if non  21.3 (*)     All other components within normal limits   URINALYSIS, REFLEX TO URINE CULTURE   URINALYSIS MICROSCOPIC           Results for orders placed or performed during the hospital encounter of 04/16/22   CBC auto differential   Result Value Ref Range    WBC 7.18 3.90 - 12.70 K/uL    RBC 3.10 (L) 4.60 - 6.20 M/uL    Hemoglobin 9.0 (L) 14.0 - 18.0 g/dL    Hematocrit 29.1 (L) 40.0 - 54.0 %    MCV 94 82 - 98 fL    MCH 29.0 27.0 - 31.0 pg    MCHC 30.9 (L) 32.0 - 36.0 g/dL    RDW 15.3 (H) 11.5 - 14.5 %    Platelets 312 150 - 450 K/uL    MPV 10.0 9.2 - 12.9 fL    Immature Granulocytes 0.6 (H) 0.0 - 0.5 %    Gran # (ANC) 5.3 1.8 - 7.7 K/uL    Immature Grans (Abs) 0.04 0.00 - 0.04 K/uL    Lymph # 0.9 (L) 1.0 - 4.8 K/uL    Mono # 0.6 0.3 - 1.0 K/uL    Eos # 0.3 0.0 - 0.5 K/uL    Baso # 0.02 0.00 - 0.20 K/uL    nRBC 0 0 /100 WBC    Gran % 73.7 (H) 38.0 - 73.0 %    Lymph % 13.0 (L) 18.0 - 48.0 %    Mono % 8.1 4.0 - 15.0 %    Eosinophil % 4.3 0.0 - 8.0 %    Basophil % 0.3 0.0 - 1.9 %    Differential Method Automated    Comprehensive metabolic panel   Result Value Ref Range    Sodium 138 136 - 145 mmol/L    Potassium 4.0 3.5 - 5.1 mmol/L    Chloride 100 95 - 110 mmol/L    CO2 27 23 - 29 mmol/L    Glucose 104 70 - 110 mg/dL    BUN 54 (H) 10 - 30 mg/dL    Creatinine 2.5 (H) 0.5 - 1.4 mg/dL    Calcium 8.8 8.7 - 10.5 mg/dL    Total Protein 7.5 6.0 - 8.4 g/dL    Albumin 3.7 3.5 - 5.2 g/dL    Total Bilirubin 0.5 0.1 - 1.0 mg/dL    Alkaline Phosphatase 79 55 - 135 U/L    AST 17 10 - 40 U/L    ALT 13 10 - 44 U/L    Anion Gap 11 8 - 16 mmol/L    eGFR if African American 24.7 (A) >60 mL/min/1.73 m^2    eGFR if non  21.3 (A) >60 mL/min/1.73 m^2     CT Abdomen Pelvis  Without Contrast   Final Result      X-Ray Foot Complete Right    (Results Pending)       Imaging Results          CT Abdomen Pelvis  Without Contrast (Final  result)  Result time 04/16/22 05:37:43    Final result by Zara Shannon DO (04/16/22 05:37:43)                 Narrative:    CT ABDOMEN AND PELVIS WITHOUT INTRAVENOUS CONTRAST: 4/16/2022 5:32 AM CDT    HISTORY: 89 years  old Male with hematuria.    COMPARISON: CT of abdomen and pelvis from 11/14/2012, 5/17/2014    TECHNIQUE: Axial contiguous images were obtained from the lung bases to the proximal femurs without intravenous contrast administered. Sagittal and coronal reconstructions were also obtained and reviewed. This exam was performed according to our departmental dose-optimization program, which includes automated exposure control, adjustment of the mA and/or KV according to the patient's size and/or use of iterative reconstruction technique.    FINDINGS:  Evaluation of the upper abdomen is limited by the lack of intravenous contrast.    LUNG BASES: No focal consolidative airspace opacities are seen.  No discrete pleural effusions are seen.  The cardiac silhouette is enlarged. Coronary opacifications are seen.    LIVER: The visualized hepatic parenchyma demonstrates no focal abnormality.    GALLBLADDER: The gallbladder demonstrates no evidence of calcified gallstones.    SPLEEN: The spleen is normal in size.    PANCREAS: The pancreas is unremarkable.    ADRENAL GLANDS: The bilateral adrenal glands are unremarkable.    KIDNEYS: Both kidneys demonstrate no hydronephrosis. There bilateral renal calcifications seen at both kidneys which are mostly vascular. There may be a right ureteral calculus near the ureterovesicular junction measuring up to 2 to 3 mm. No gross left ureteral calculi are seen. There is a prominent left extrarenal pelvis. There are areas of increased density at both kidneys. These could represent hemorrhagic or proteinaceous cysts. Many of these appear more prominent than on prior CT imaging from 2012. Some of these appear smaller than on prior imaging.    PELVIS: The urinary bladder is mildly  distended. There is stranding seen around the urinary bladder.    STOMACH: The stomach is not well distended.    BOWEL: The small bowel loops appear unremarkable. No pericolonic inflammatory stranding is seen. There are bilateral inguinal hernias. There is a large portion of the cecum and terminal ileum and ascending colon seen at the right inguinal canal. Portions of the sigmoid colon are seen at the left inguinal canal. These are not fully visualized. There are multiple diverticula seen within the sigmoid and descending colon, without evidence to suggest diverticulitis. There is trace fluid seen extending into the right inguinal canal partially visualized.      APPENDIX: The appendix may partially visualized extending into the right inguinal canal.    PERITONEUM: There is no evidence of pneumoperitoneum or free fluid.    VESSELS: The IVC is unremarkable. The abdominal aorta is within normal limits of size. There is extensive atherosclerotic calcification of aorta and branch vasculature. There is diffuse narrowing at the bilateral common iliac and external iliac vasculature which may be contributing to diffuse stenosis.    LYMPH NODES: No significantly enlarged lymph nodes are seen in the abdomen or pelvis.    BONES AND SOFT TISSUES: No acute osseous abnormality is seen. Multilevel degenerative changes are seen at the lumbar spine.    IMPRESSION: Both kidneys demonstrate no hydronephrosis. There bilateral renal calcifications seen at both kidneys which are mostly vascular. There may be a right ureteral calculus near the ureterovesicular junction measuring up to 2 to 3 mm.        There is stranding seen around the urinary bladder which can be seen with cystitis.    The bilateral inguinal hernias containing portions of the sigmoid colon and cecum.  There is trace fluid seen extending into the right inguinal canal partially visualized.  There is extensive atherosclerotic calcification seen at the aorta and branch  vasculature. There is suggestive of disuse stenosis at the iliac arterial vasculature.    There are multiple hypodense lesion seen at both kidneys. These could represent hemorrhagic cysts or proteinaceous cysts. Many of these appear more pronounced than on the 2014 and 2012 imaging. Renal mass protocol imaging could be considered. MRI imaging could also be considered.      Electronically signed by:  Zara Shannon DO  4/16/2022 5:37 AM CDT Workstation: [id number]                             X-Ray Foot Complete Right (In process)                  Medications - No data to display  Medical Decision Making:   ED Management:  This is a 89-year-old male who presents emergency department with blood in his urine.  He has CKD in his kidney function is around baseline.  Unfortunate not able to give him IV contrast on the CT scan.  Radiology study is limited the fact that radiologist cannot her leg ureterolithiasis right ureter versus renal mass.  Patient may have cystitis as well.  Patient who treated with antibiotics.  I discussed in detail with the patient's daughter who stated that patient would not want to be treated if he did have kidney cancer would recommend supportive care.  Will treat for more than likely urinary tract infection/cystitis with antibiotics and he will be discharged home follow-up with urology and PCP on an outpatient basis.    I had a detailed discussion with the patient and/or guardian regarding: The historical points, exam findings, and diagnostic results supporting the discharge diagnosis, lab results, pertinent radiology results, and the need for outpatient follow-up, for definitive care with a family practitioner and to return to the emergency department if symptoms worsen or persist or if there are any questions or concerns that arise at home. All questions have been answered in detail. Strict return to Emergency Department precautions have been provided    A dictation software program was used  for this note.  Please expect some simple typographical  errors in this note.    This patient was seen during the context of the Covid 19 global pandemic where local, state, hospital guidelines, were followed to the best of ability given the circumstances of the pandemic.                        Clinical Impression:   Final diagnoses:  [T14.90XA] Trauma  [R31.9] Hematuria, unspecified type  [R93.5] Abnormal abdominal CT scan  [N30.01] Acute cystitis with hematuria (Primary)  [S90.129A] Contusion of toe without damage to nail, unspecified laterality, unspecified toe, initial encounter          ED Disposition Condition    Discharge Stable        ED Prescriptions     Medication Sig Dispense Start Date End Date Auth. Provider    cefUROXime (CEFTIN) 500 MG tablet Take 1 tablet (500 mg total) by mouth 2 (two) times daily. for 7 days 14 tablet 4/16/2022 4/23/2022 Chang Smallwood,         Follow-up Information     Follow up With Specialties Details Why Contact Info Additional Information    Gentry Encians MD Family Medicine In 3 days  [address] [address]  [phone]       [facility] - Emergency Dept Emergency Medicine  If symptoms worsen [address] [address] [id number]  [phone] 1st floor    Andrew Rios MD Urology In 3 days  [address]  [address]  [facility] [address]  [phone]              Chang Smallwood DO  04/16/22 0505

## 2022-04-16 NOTE — DISCHARGE INSTRUCTIONS
RETURN TO EMERGENCY DEPARTMENT WITHOUT FAIL, IF YOUR SYMPTOMS WORSEN, IF YOU GET NEW OR DIFFERENT SYMPTOMS, IF YOU ARE UNABLE TO FOLLOW UP AS DIRECTED, OR IF YOU HAVE ANY CONCERNS OR WORRIES.

## 2022-04-18 ENCOUNTER — HOSPITAL ENCOUNTER (INPATIENT)
Facility: HOSPITAL | Age: 87
LOS: 1 days | Discharge: HOME HEALTH | DRG: 377 | End: 2022-04-20
Attending: EMERGENCY MEDICINE | Admitting: INTERNAL MEDICINE
Payer: MEDICARE

## 2022-04-18 DIAGNOSIS — K92.2 UPPER GI BLEED: Primary | ICD-10-CM

## 2022-04-18 DIAGNOSIS — R33.9 URINARY RETENTION: Chronic | ICD-10-CM

## 2022-04-18 DIAGNOSIS — R31.29 MICROSCOPIC HEMATURIA: ICD-10-CM

## 2022-04-18 DIAGNOSIS — J18.9 PNEUMONIA DUE TO INFECTIOUS ORGANISM, UNSPECIFIED LATERALITY, UNSPECIFIED PART OF LUNG: ICD-10-CM

## 2022-04-18 DIAGNOSIS — R31.9 HEMATURIA, UNSPECIFIED TYPE: ICD-10-CM

## 2022-04-18 DIAGNOSIS — R55 SYNCOPE: ICD-10-CM

## 2022-04-18 DIAGNOSIS — K92.0 HEMATEMESIS, PRESENCE OF NAUSEA NOT SPECIFIED: ICD-10-CM

## 2022-04-18 LAB
ABO + RH BLD: NORMAL
ALBUMIN SERPL BCP-MCNC: 3.1 G/DL (ref 3.5–5.2)
ALP SERPL-CCNC: 70 U/L (ref 55–135)
ALT SERPL W/O P-5'-P-CCNC: 12 U/L (ref 10–44)
ANION GAP SERPL CALC-SCNC: 16 MMOL/L (ref 8–16)
APTT BLDCRRT: 22.1 SEC (ref 21–32)
AST SERPL-CCNC: 15 U/L (ref 10–40)
BACTERIA #/AREA URNS HPF: ABNORMAL /HPF
BACTERIA UR CULT: NO GROWTH
BASOPHILS # BLD AUTO: 0.02 K/UL (ref 0–0.2)
BASOPHILS NFR BLD: 0.1 % (ref 0–1.9)
BILIRUB SERPL-MCNC: 0.2 MG/DL (ref 0.1–1)
BILIRUB UR QL STRIP: ABNORMAL
BLD GP AB SCN CELLS X3 SERPL QL: NORMAL
BLD PROD TYP BPU: NORMAL
BLOOD UNIT EXPIRATION DATE: NORMAL
BLOOD UNIT TYPE CODE: 6200
BLOOD UNIT TYPE: NORMAL
BUN SERPL-MCNC: 86 MG/DL (ref 10–30)
CALCIUM SERPL-MCNC: 9 MG/DL (ref 8.7–10.5)
CHLORIDE SERPL-SCNC: 101 MMOL/L (ref 95–110)
CLARITY UR: ABNORMAL
CO2 SERPL-SCNC: 23 MMOL/L (ref 23–29)
CODING SYSTEM: NORMAL
COLOR UR: ABNORMAL
CREAT SERPL-MCNC: 2.6 MG/DL (ref 0.5–1.4)
DIFFERENTIAL METHOD: ABNORMAL
DISPENSE STATUS: NORMAL
EOSINOPHIL # BLD AUTO: 0.1 K/UL (ref 0–0.5)
EOSINOPHIL NFR BLD: 0.6 % (ref 0–8)
ERYTHROCYTE [DISTWIDTH] IN BLOOD BY AUTOMATED COUNT: 15.5 % (ref 11.5–14.5)
EST. GFR  (AFRICAN AMERICAN): 24 ML/MIN/1.73 M^2
EST. GFR  (NON AFRICAN AMERICAN): 20 ML/MIN/1.73 M^2
GLUCOSE SERPL-MCNC: 134 MG/DL (ref 70–110)
GLUCOSE UR QL STRIP: ABNORMAL
HCT VFR BLD AUTO: 25 % (ref 40–54)
HGB BLD-MCNC: 7.8 G/DL (ref 14–18)
HGB BLD-MCNC: 8.5 G/DL (ref 14–18)
HGB UR QL STRIP: ABNORMAL
IMM GRANULOCYTES # BLD AUTO: 0.08 K/UL (ref 0–0.04)
IMM GRANULOCYTES NFR BLD AUTO: 0.5 % (ref 0–0.5)
INR PPP: 1 (ref 0.8–1.2)
KETONES UR QL STRIP: ABNORMAL
LEUKOCYTE ESTERASE UR QL STRIP: ABNORMAL
LYMPHOCYTES # BLD AUTO: 0.7 K/UL (ref 1–4.8)
LYMPHOCYTES NFR BLD: 4.2 % (ref 18–48)
MCH RBC QN AUTO: 29.8 PG (ref 27–31)
MCHC RBC AUTO-ENTMCNC: 31.2 G/DL (ref 32–36)
MCV RBC AUTO: 95 FL (ref 82–98)
MICROSCOPIC COMMENT: ABNORMAL
MONOCYTES # BLD AUTO: 0.7 K/UL (ref 0.3–1)
MONOCYTES NFR BLD: 4.4 % (ref 4–15)
NEUTROPHILS # BLD AUTO: 15.1 K/UL (ref 1.8–7.7)
NEUTROPHILS NFR BLD: 90.2 % (ref 38–73)
NITRITE UR QL STRIP: ABNORMAL
NRBC BLD-RTO: 0 /100 WBC
NUM UNITS TRANS PACKED RBC: NORMAL
PH UR STRIP: ABNORMAL [PH] (ref 5–8)
PLATELET # BLD AUTO: 284 K/UL (ref 150–450)
PMV BLD AUTO: 10.2 FL (ref 9.2–12.9)
POCT GLUCOSE: 131 MG/DL (ref 70–110)
POTASSIUM SERPL-SCNC: 4.3 MMOL/L (ref 3.5–5.1)
PROT SERPL-MCNC: 6.6 G/DL (ref 6–8.4)
PROT UR QL STRIP: ABNORMAL
PROTHROMBIN TIME: 10.4 SEC (ref 9–12.5)
RBC # BLD AUTO: 2.62 M/UL (ref 4.6–6.2)
RBC #/AREA URNS HPF: >100 /HPF (ref 0–4)
SARS-COV-2 RDRP RESP QL NAA+PROBE: NEGATIVE
SODIUM SERPL-SCNC: 140 MMOL/L (ref 136–145)
SP GR UR STRIP: ABNORMAL (ref 1–1.03)
URN SPEC COLLECT METH UR: ABNORMAL
UROBILINOGEN UR STRIP-ACNC: ABNORMAL EU/DL
WBC # BLD AUTO: 16.74 K/UL (ref 3.9–12.7)
WBC #/AREA URNS HPF: 18 /HPF (ref 0–5)

## 2022-04-18 PROCEDURE — 85610 PROTHROMBIN TIME: CPT | Performed by: INTERNAL MEDICINE

## 2022-04-18 PROCEDURE — 86920 COMPATIBILITY TEST SPIN: CPT | Performed by: EMERGENCY MEDICINE

## 2022-04-18 PROCEDURE — 93005 ELECTROCARDIOGRAM TRACING: CPT

## 2022-04-18 PROCEDURE — 96375 TX/PRO/DX INJ NEW DRUG ADDON: CPT

## 2022-04-18 PROCEDURE — U0002 COVID-19 LAB TEST NON-CDC: HCPCS | Performed by: EMERGENCY MEDICINE

## 2022-04-18 PROCEDURE — 96366 THER/PROPH/DIAG IV INF ADDON: CPT

## 2022-04-18 PROCEDURE — 85025 COMPLETE CBC W/AUTO DIFF WBC: CPT | Performed by: EMERGENCY MEDICINE

## 2022-04-18 PROCEDURE — 63600175 PHARM REV CODE 636 W HCPCS: Performed by: INTERNAL MEDICINE

## 2022-04-18 PROCEDURE — G0378 HOSPITAL OBSERVATION PER HR: HCPCS

## 2022-04-18 PROCEDURE — 85018 HEMOGLOBIN: CPT | Performed by: INTERNAL MEDICINE

## 2022-04-18 PROCEDURE — 96365 THER/PROPH/DIAG IV INF INIT: CPT | Mod: 59

## 2022-04-18 PROCEDURE — 87389 HIV-1 AG W/HIV-1&-2 AB AG IA: CPT | Performed by: EMERGENCY MEDICINE

## 2022-04-18 PROCEDURE — 80053 COMPREHEN METABOLIC PANEL: CPT | Performed by: EMERGENCY MEDICINE

## 2022-04-18 PROCEDURE — C9113 INJ PANTOPRAZOLE SODIUM, VIA: HCPCS | Performed by: EMERGENCY MEDICINE

## 2022-04-18 PROCEDURE — 81000 URINALYSIS NONAUTO W/SCOPE: CPT | Performed by: EMERGENCY MEDICINE

## 2022-04-18 PROCEDURE — 99292 CRITICAL CARE ADDL 30 MIN: CPT

## 2022-04-18 PROCEDURE — 86850 RBC ANTIBODY SCREEN: CPT | Performed by: EMERGENCY MEDICINE

## 2022-04-18 PROCEDURE — 85730 THROMBOPLASTIN TIME PARTIAL: CPT | Performed by: INTERNAL MEDICINE

## 2022-04-18 PROCEDURE — 96376 TX/PRO/DX INJ SAME DRUG ADON: CPT

## 2022-04-18 PROCEDURE — 25000003 PHARM REV CODE 250: Performed by: EMERGENCY MEDICINE

## 2022-04-18 PROCEDURE — 25000003 PHARM REV CODE 250: Performed by: INTERNAL MEDICINE

## 2022-04-18 PROCEDURE — 36415 COLL VENOUS BLD VENIPUNCTURE: CPT | Performed by: EMERGENCY MEDICINE

## 2022-04-18 PROCEDURE — 93010 EKG 12-LEAD: ICD-10-PCS | Mod: ,,, | Performed by: INTERNAL MEDICINE

## 2022-04-18 PROCEDURE — 85384 FIBRINOGEN ACTIVITY: CPT | Performed by: INTERNAL MEDICINE

## 2022-04-18 PROCEDURE — C9113 INJ PANTOPRAZOLE SODIUM, VIA: HCPCS | Performed by: INTERNAL MEDICINE

## 2022-04-18 PROCEDURE — 93010 ELECTROCARDIOGRAM REPORT: CPT | Mod: ,,, | Performed by: INTERNAL MEDICINE

## 2022-04-18 PROCEDURE — 63600175 PHARM REV CODE 636 W HCPCS: Performed by: EMERGENCY MEDICINE

## 2022-04-18 PROCEDURE — P9016 RBC LEUKOCYTES REDUCED: HCPCS | Performed by: EMERGENCY MEDICINE

## 2022-04-18 PROCEDURE — 86803 HEPATITIS C AB TEST: CPT | Performed by: EMERGENCY MEDICINE

## 2022-04-18 PROCEDURE — 96365 THER/PROPH/DIAG IV INF INIT: CPT

## 2022-04-18 PROCEDURE — 51702 INSERT TEMP BLADDER CATH: CPT

## 2022-04-18 PROCEDURE — 99291 CRITICAL CARE FIRST HOUR: CPT | Mod: 25

## 2022-04-18 PROCEDURE — 87086 URINE CULTURE/COLONY COUNT: CPT | Performed by: EMERGENCY MEDICINE

## 2022-04-18 PROCEDURE — 36430 TRANSFUSION BLD/BLD COMPNT: CPT

## 2022-04-18 PROCEDURE — 36415 COLL VENOUS BLD VENIPUNCTURE: CPT | Performed by: INTERNAL MEDICINE

## 2022-04-18 RX ORDER — PANTOPRAZOLE SODIUM 40 MG/10ML
80 INJECTION, POWDER, LYOPHILIZED, FOR SOLUTION INTRAVENOUS
Status: COMPLETED | OUTPATIENT
Start: 2022-04-18 | End: 2022-04-18

## 2022-04-18 RX ORDER — HYDROCODONE BITARTRATE AND ACETAMINOPHEN 500; 5 MG/1; MG/1
TABLET ORAL
Status: DISCONTINUED | OUTPATIENT
Start: 2022-04-18 | End: 2022-04-20 | Stop reason: HOSPADM

## 2022-04-18 RX ORDER — SODIUM CHLORIDE 9 MG/ML
INJECTION, SOLUTION INTRAVENOUS
Status: DISCONTINUED | OUTPATIENT
Start: 2022-04-18 | End: 2022-04-20 | Stop reason: HOSPADM

## 2022-04-18 RX ORDER — NALOXONE HCL 0.4 MG/ML
0.02 VIAL (ML) INJECTION
Status: DISCONTINUED | OUTPATIENT
Start: 2022-04-18 | End: 2022-04-20 | Stop reason: HOSPADM

## 2022-04-18 RX ORDER — ONDANSETRON 2 MG/ML
4 INJECTION INTRAMUSCULAR; INTRAVENOUS
Status: COMPLETED | OUTPATIENT
Start: 2022-04-18 | End: 2022-04-18

## 2022-04-18 RX ORDER — ONDANSETRON 2 MG/ML
4 INJECTION INTRAMUSCULAR; INTRAVENOUS EVERY 8 HOURS PRN
Status: DISCONTINUED | OUTPATIENT
Start: 2022-04-18 | End: 2022-04-20 | Stop reason: HOSPADM

## 2022-04-18 RX ORDER — TALC
6 POWDER (GRAM) TOPICAL NIGHTLY PRN
Status: DISCONTINUED | OUTPATIENT
Start: 2022-04-18 | End: 2022-04-20 | Stop reason: HOSPADM

## 2022-04-18 RX ORDER — ACETAMINOPHEN 325 MG/1
650 TABLET ORAL EVERY 4 HOURS PRN
Status: DISCONTINUED | OUTPATIENT
Start: 2022-04-18 | End: 2022-04-20 | Stop reason: HOSPADM

## 2022-04-18 RX ORDER — DEXTROSE MONOHYDRATE, SODIUM CHLORIDE, AND POTASSIUM CHLORIDE 50; 1.49; 4.5 G/1000ML; G/1000ML; G/1000ML
INJECTION, SOLUTION INTRAVENOUS CONTINUOUS
Status: DISCONTINUED | OUTPATIENT
Start: 2022-04-18 | End: 2022-04-19

## 2022-04-18 RX ORDER — IBUPROFEN 200 MG
24 TABLET ORAL
Status: DISCONTINUED | OUTPATIENT
Start: 2022-04-18 | End: 2022-04-20 | Stop reason: HOSPADM

## 2022-04-18 RX ORDER — GLUCAGON 1 MG
1 KIT INJECTION
Status: DISCONTINUED | OUTPATIENT
Start: 2022-04-18 | End: 2022-04-20 | Stop reason: HOSPADM

## 2022-04-18 RX ORDER — IBUPROFEN 200 MG
16 TABLET ORAL
Status: DISCONTINUED | OUTPATIENT
Start: 2022-04-18 | End: 2022-04-20 | Stop reason: HOSPADM

## 2022-04-18 RX ORDER — ACETAMINOPHEN, DIPHENHYDRAMINE HCL, PHENYLEPHRINE HCL 325; 25; 5 MG/1; MG/1; MG/1
10 TABLET ORAL NIGHTLY
COMMUNITY

## 2022-04-18 RX ADMIN — MELATONIN 3 MG ORAL TABLET 6 MG: 3 TABLET ORAL at 10:04

## 2022-04-18 RX ADMIN — PIPERACILLIN AND TAZOBACTAM 3.38 G: 3; .375 INJECTION, POWDER, LYOPHILIZED, FOR SOLUTION INTRAVENOUS; PARENTERAL at 10:04

## 2022-04-18 RX ADMIN — SODIUM CHLORIDE 8 MG/HR: 9 INJECTION, SOLUTION INTRAVENOUS at 10:04

## 2022-04-18 RX ADMIN — SODIUM CHLORIDE: 0.9 INJECTION, SOLUTION INTRAVENOUS at 10:04

## 2022-04-18 RX ADMIN — PANTOPRAZOLE SODIUM 80 MG: 40 INJECTION, POWDER, FOR SOLUTION INTRAVENOUS at 03:04

## 2022-04-18 RX ADMIN — ONDANSETRON 4 MG: 2 INJECTION INTRAMUSCULAR; INTRAVENOUS at 03:04

## 2022-04-18 RX ADMIN — SODIUM CHLORIDE 8 MG/HR: 9 INJECTION, SOLUTION INTRAVENOUS at 05:04

## 2022-04-18 NOTE — ED PROVIDER NOTES
Encounter Date: 4/18/2022       History     Chief Complaint   Patient presents with    Hematemesis     With LOC at 2 pm today     89-year-old with dementia presents for hematemesis and syncope.  Family also reports recent hematuria.  All history is from EMS.  No family is here at this time.  Patient has dementia and cannot give any history.  EMS reports the patient threw up significant amount of blood today and then passed out.  Family reports no melanotic stool.  No other history is obtainable.    The history is provided by the EMS personnel. The history is limited by the condition of the patient.     Review of patient's allergies indicates:   Allergen Reactions    Soma [carisoprodol] Rash    Aspirin     Sulfa (sulfonamide antibiotics) Rash     Past Medical History:   Diagnosis Date    Bladder stones     Cancer     Encounter for blood transfusion     Hypertension     Kidney stone     Prostate cancer 2004    Radiation 2005    prostate    Stage 3 chronic kidney disease 1/11/2019     Past Surgical History:   Procedure Laterality Date    CYSTOSCOPY      KIDNEY STONE SURGERY  May 2014     Family History   Problem Relation Age of Onset    Hypertension Daughter     Diabetes Daughter     Hypertension Son     Asthma Son      Social History     Tobacco Use    Smoking status: Former Smoker     Years: 40.00     Types: Cigarettes     Start date: 9/26/1985    Smokeless tobacco: Never Used   Substance Use Topics    Alcohol use: No    Drug use: No     Review of Systems   Unable to perform ROS: Dementia   Gastrointestinal: Positive for vomiting.        Hematemesis   Genitourinary: Positive for hematuria.   Neurological: Positive for syncope.       Physical Exam     Initial Vitals [04/18/22 1501]   BP Pulse Resp Temp SpO2   (!) 120/49 61 20 98.2 °F (36.8 °C) 95 %      MAP       --         Physical Exam    Nursing note and vitals reviewed.  Constitutional: He appears well-developed and well-nourished. He is not  diaphoretic.  Non-toxic appearance. He does not have a sickly appearance. He appears ill. No distress.   Chronically ill, bloody vomit on shirt   HENT:   Head: Normocephalic and atraumatic.   Eyes: EOM are normal.   Neck: Neck supple.   Normal range of motion.  Cardiovascular: Normal rate, regular rhythm and normal heart sounds. Exam reveals no gallop and no friction rub.    No murmur heard.  Pulmonary/Chest: Breath sounds normal. No respiratory distress. He has no wheezes. He has no rhonchi. He has no rales.   Abdominal: There is no abdominal tenderness.   Musculoskeletal:         General: Normal range of motion.      Cervical back: Normal range of motion and neck supple. No rigidity. Normal range of motion.     Neurological: He is alert.   Follows commands   Skin: Skin is warm and dry. No rash noted.   Psychiatric: He has a normal mood and affect. His behavior is normal. Judgment and thought content normal.         ED Course   Critical Care    Date/Time: 4/18/2022 6:18 PM  Performed by: Hair Bell MD  Authorized by: Hair Bell MD   Direct patient critical care time: 65 minutes  Additional history critical care time: 15 minutes  Ordering / reviewing critical care time: 13 minutes  Documentation critical care time: 10 minutes  Consulting other physicians critical care time: 10 (GI, HM) minutes  Consult with family critical care time: 5 minutes  Total critical care time (exclusive of procedural time) : 118 minutes  Critical care was necessary to treat or prevent imminent or life-threatening deterioration of the following conditions: GIB, transfusion.  Critical care was time spent personally by me on the following activities: discussions with consultants, evaluation of patient's response to treatment, obtaining history from patient or surrogate, ordering and review of laboratory studies, pulse oximetry, review of old charts, examination of patient, ordering and performing treatments and interventions,  ordering and review of radiographic studies and re-evaluation of patient's condition.        Labs Reviewed   CBC W/ AUTO DIFFERENTIAL - Abnormal; Notable for the following components:       Result Value    WBC 16.74 (*)     RBC 2.62 (*)     Hemoglobin 7.8 (*)     Hematocrit 25.0 (*)     MCHC 31.2 (*)     RDW 15.5 (*)     Gran # (ANC) 15.1 (*)     Immature Grans (Abs) 0.08 (*)     Lymph # 0.7 (*)     Gran % 90.2 (*)     Lymph % 4.2 (*)     All other components within normal limits    Narrative:     Release to patient->Immediate   COMPREHENSIVE METABOLIC PANEL - Abnormal; Notable for the following components:    Glucose 134 (*)     BUN 86 (*)     Creatinine 2.6 (*)     Albumin 3.1 (*)     eGFR if  24 (*)     eGFR if non  20 (*)     All other components within normal limits    Narrative:     Release to patient->Immediate   URINALYSIS, REFLEX TO URINE CULTURE - Abnormal; Notable for the following components:    Color, UA Red (*)     Appearance, UA Cloudy (*)     All other components within normal limits    Narrative:     Specimen Source->Urine   URINALYSIS MICROSCOPIC - Abnormal; Notable for the following components:    RBC, UA >100 (*)     WBC, UA 18 (*)     Bacteria Moderate (*)     All other components within normal limits    Narrative:     Specimen Source->Urine   CULTURE, URINE   SARS-COV-2 RNA AMPLIFICATION, QUAL   HIV 1 / 2 ANTIBODY   HEPATITIS C ANTIBODY   TYPE & SCREEN   PREPARE RBC SOFT          Imaging Results          X-Ray Chest AP Portable (Final result)  Result time 04/18/22 15:51:54    Final result by Edilson Almazan Jr., MD (04/18/22 15:51:54)                 Impression:      A left lower lobe retrocardiac infiltrate is noted.  Otherwise negative portable chest.      Electronically signed by: Edilson Almazan MD  Date:    04/18/2022  Time:    15:51             Narrative:    EXAMINATION:  XR CHEST AP PORTABLE    CLINICAL HISTORY:  Syncope and  collapse    TECHNIQUE:  Single frontal view of the chest was performed.    COMPARISON:  Chest of October 4, 2021    FINDINGS:  The mediastinal and cardiac size and contours are within normal limits.  There is an indistinct infiltrate at the left lung base behind the heart.  The right lung is clear.  There is no pneumothorax or pleural effusion.                                 Medications   0.9%  NaCl infusion (for blood administration) (has no administration in time range)   pantoprazole injection 80 mg (80 mg Intravenous Given 4/18/22 1518)   ondansetron injection 4 mg (4 mg Intravenous Given 4/18/22 1517)   pantoprazole 40 mg in  mL infusion (ready to mix system) (8 mg/hr Intravenous New Bag 4/18/22 1740)                 ED Course as of 04/18/22 1819   Mon Apr 18, 2022   1502 Findings:        Distal esophageal Thomas's esophagus extending about 2 cm above GEJ        Sliding hiatal hernia        Diffuse gastritis        Several antral erosions and small ulcers suggestive of NSAID        gastropathy        Erosions in ampulla with prominence of ampulla cold bx #1        Bx of antrum#2, Esophagus distal in region of Thomas's#3  [EF]   1555 X-Ray Chest AP Portable [EF]   1559 Sinus rhythm first-degree AV block 69 beats per minute normal axis no ST elevation or depression or T-wave inversion independently interpreted. [EF]   1636 RBC, UA(!): >100 [EF]   1636 WBC, UA(!): 18 [EF]   1636 Bacteria, UA(!): Moderate [EF]   1652 Hemoglobin(!): 7.8 [EF]   1652 Family reports not on plavix [EF]   1653 Urinalysis 2 days ago [facility] no growth [EF]   1654 Hemoglobin is below baseline [EF]   1722 Case d/w dr wheat, recommends protonix gtt, npo and 1 u prbc [EF]   1738 Stefan to admit [EF]   1740 89-year-old male with a history of prostate cancer, dementia presents to the ER with hematemesis and syncope.  He has not had any syncope or hematemesis in the ER.  His hemoglobin is 7.8 which is below his baseline.   I spoke with Gastroenterology.  Will transfuse him and start him on a Protonix drip and admit him to the hospital.  Hospital Medicine consulted for admission. [EF]      ED Course User Index  [EF] Hair Bell MD             Clinical Impression:   Final diagnoses:  [R55] Syncope  [K92.2] Upper GI bleed (Primary)  [K92.0] Hematemesis, presence of nausea not specified          ED Disposition Condition    Observation               Hair Bell MD  04/18/22 7556

## 2022-04-18 NOTE — PHARMACY MED REC
"Admission Medication History     The home medication history was taken by Lulu Cochran CPhT.    Medication history obtained from Daughter    You may go to "Admission" then "Reconcile Home Medications" tabs to review and/or act upon these items.      The home medication list has been updated by the Pharmacy department.    Please read ALL comments highlighted in yellow.    Please address this information as you see fit.     Feel free to contact us if you have any questions or require assistance.      The medications listed below were removed from the home medication list.  Please reorder if appropriate:  Patient reports no longer taking the following medication(s):   Clopidogrel 75mg   Hydroxychloroquine 200    Lulu Cochran CPhT.  [phone]      .          "

## 2022-04-18 NOTE — ED NOTES
Transfusion of 1 unit PRBCs began via pump with normal saline backup. Afebrile, Hx of previous transfusion. Here with active hematuria and at least one episode of hematemesis PTA

## 2022-04-18 NOTE — ED NOTES
Daughter now reports to bedside. No active retching. Pt is pleasant/cooperative/sociable but confused (thought daughter was wife)

## 2022-04-18 NOTE — H&P
History and Physical for Admission  [facility]  .     Troy Yuan Sr. (MRN: [id number])  Admitting Service: Hospital Medicine  Date of Admission: 4/18/2022   Primary Care Provider: Gentry Encinas MD    ?  Chief complaint: Hematemesis   ?  History of Present Illness:     89-year-old male with history of prostate cancer and CKD stage 3 was recently seen for hematuria days before with subsequently she a shin of antibiotics who presents today after gross hematemesis found to be anemic.?    On history SP obtained from family given patient's current cognitive status and baseline dementia.  Family feels patient may have had some abdominal discomfort over the past couple days but is unclear to them.  He was recently started on aspirin.    At the time of interview the patient denies pain says he feels great.  ?  Review of Systems:   Unable to obtain given current cognitive status  ?  Medical History    PAST MEDICAL HISTORY:  has a past medical history of Bladder stones, Cancer, Encounter for blood transfusion, Hypertension, Kidney stone, Prostate cancer (2004), Radiation (2005), and Stage 3 chronic kidney disease (1/11/2019).    PAST SURGICAL HISTORY:  has a past surgical history that includes Cystoscopy and Kidney stone surgery (May 2014).  ?  PAST SOCIAL HISTORY:  reports that he has quit smoking. His smoking use included cigarettes. He started smoking about 36 years ago. He quit after 40.00 years of use. He has never used smokeless tobacco. He reports that he does not drink alcohol and does not use drugs.    FAMILY HISTORY: family history includes Asthma in his son; Diabetes in his daughter; Hypertension in his daughter and son.    ?  CURRENT OUTPATIENT MEDS  Allergies:   Review of patient's allergies indicates:   Allergen Reactions    Soma [carisoprodol] Rash    Aspirin     Sulfa (sulfonamide antibiotics) Rash     ?    ?  PHYSICAL EXAM  Vitals: /63   Pulse 68   Temp 98.3 °F (36.8 °C) (Oral)    "Resp 18   Ht 5' 6" (1.676 m)   Wt 72.6 kg (160 lb)   SpO2 100%   BMI 25.82 kg/m²  Body mass index is 25.82 kg/m².    General: NAD, alert and interactive.  HEENT: PERRL, EOMI.   Cardiovascular: RRR  Respiratory: lungs CTAB  GI: abdomen S/NT/ND, +BS  Extremities: no edema  MSK:  No gross joint abnormalities   Neuro/Psych CNII-XII grossly intact.      EKG and radiographs from the past 24h: reviewed and personally interpreted if available.      LABS    Recent Labs     04/18/22  1613   WBC 16.74*   HGB 7.8*        ?  Recent Labs     04/18/22  1613      K 4.3   CO2 23   BUN 86*   CREATININE 2.6*     ?  Recent Labs     04/18/22  1613   BILITOT 0.2   AST 15   ALT 12   ALKPHOS 70   PROT 6.6     ?  No results for input(s): INR, PT, PTT in the last 72 hours.  ?    ASSESSMENT & PLAN    89-year-old male with history of prostate cancer and CKD stage 3 was recently seen for hematuria days before with subsequently she a shin of antibiotics who presents today after gross hematemesis found to be anemic.?    1. Acute blood loss anemia secondary to GI hemorrhage, hematuria  -NPO, ppi drip, trend hemoglobin  -transfuse as required  -GI consulted    2. Hematuria  -Neumann placed  -coagulopathic workup started giving blood in urine and GI system.    3. UTI and acute bacterial pneumonia secondary to unknown organism/aspiration pneumonia  -Zosyn    4. Hypertension  -home meds held  -home Lasix also held; watch volume status    SCDs ordered; chemical DVT prophylaxis contraindicated secondary to GI bleed.      Highly complex medical decision making, case discussed with ER physician, chart reviewed, EKG reviewed           Code Status/Dispo: Full Code.   ?  Becca Aviles    Date: 4/18/2022  Time: 6:54 PM            "

## 2022-04-19 ENCOUNTER — ANESTHESIA (OUTPATIENT)
Dept: ENDOSCOPY | Facility: HOSPITAL | Age: 87
DRG: 377 | End: 2022-04-19
Payer: MEDICARE

## 2022-04-19 ENCOUNTER — ANESTHESIA EVENT (OUTPATIENT)
Dept: ENDOSCOPY | Facility: HOSPITAL | Age: 87
DRG: 377 | End: 2022-04-19
Payer: MEDICARE

## 2022-04-19 LAB
ALBUMIN SERPL BCP-MCNC: 2.8 G/DL (ref 3.5–5.2)
ALP SERPL-CCNC: 64 U/L (ref 55–135)
ALT SERPL W/O P-5'-P-CCNC: 12 U/L (ref 10–44)
ANION GAP SERPL CALC-SCNC: 13 MMOL/L (ref 8–16)
AST SERPL-CCNC: 23 U/L (ref 10–40)
BASOPHILS # BLD AUTO: 0.02 K/UL (ref 0–0.2)
BASOPHILS NFR BLD: 0.2 % (ref 0–1.9)
BILIRUB SERPL-MCNC: 0.6 MG/DL (ref 0.1–1)
BUN SERPL-MCNC: 77 MG/DL (ref 10–30)
CALCIUM SERPL-MCNC: 8.5 MG/DL (ref 8.7–10.5)
CHLORIDE SERPL-SCNC: 104 MMOL/L (ref 95–110)
CO2 SERPL-SCNC: 24 MMOL/L (ref 23–29)
CREAT SERPL-MCNC: 2.6 MG/DL (ref 0.5–1.4)
DIFFERENTIAL METHOD: ABNORMAL
EOSINOPHIL # BLD AUTO: 0.4 K/UL (ref 0–0.5)
EOSINOPHIL NFR BLD: 3.2 % (ref 0–8)
ERYTHROCYTE [DISTWIDTH] IN BLOOD BY AUTOMATED COUNT: 18.3 % (ref 11.5–14.5)
EST. GFR  (AFRICAN AMERICAN): 24 ML/MIN/1.73 M^2
EST. GFR  (NON AFRICAN AMERICAN): 20 ML/MIN/1.73 M^2
FIBRINOGEN PPP-MCNC: 417 MG/DL (ref 182–400)
GLUCOSE SERPL-MCNC: 89 MG/DL (ref 70–110)
HCT VFR BLD AUTO: 24.7 % (ref 40–54)
HCV AB SERPL QL IA: NEGATIVE
HGB BLD-MCNC: 8 G/DL (ref 14–18)
HGB BLD-MCNC: 8.2 G/DL (ref 14–18)
HGB BLD-MCNC: 8.4 G/DL (ref 14–18)
HIV 1+2 AB+HIV1 P24 AG SERPL QL IA: NEGATIVE
IMM GRANULOCYTES # BLD AUTO: 0.04 K/UL (ref 0–0.04)
IMM GRANULOCYTES NFR BLD AUTO: 0.4 % (ref 0–0.5)
LYMPHOCYTES # BLD AUTO: 1.1 K/UL (ref 1–4.8)
LYMPHOCYTES NFR BLD: 10.1 % (ref 18–48)
MAGNESIUM SERPL-MCNC: 2 MG/DL (ref 1.6–2.6)
MCH RBC QN AUTO: 29.1 PG (ref 27–31)
MCHC RBC AUTO-ENTMCNC: 32.4 G/DL (ref 32–36)
MCV RBC AUTO: 90 FL (ref 82–98)
MONOCYTES # BLD AUTO: 0.9 K/UL (ref 0.3–1)
MONOCYTES NFR BLD: 8.2 % (ref 4–15)
NEUTROPHILS # BLD AUTO: 8.7 K/UL (ref 1.8–7.7)
NEUTROPHILS NFR BLD: 77.9 % (ref 38–73)
NRBC BLD-RTO: 0 /100 WBC
PHOSPHATE SERPL-MCNC: 2.9 MG/DL (ref 2.7–4.5)
PLATELET # BLD AUTO: 247 K/UL (ref 150–450)
PMV BLD AUTO: 10.4 FL (ref 9.2–12.9)
POTASSIUM SERPL-SCNC: 4 MMOL/L (ref 3.5–5.1)
PROT SERPL-MCNC: 5.8 G/DL (ref 6–8.4)
RBC # BLD AUTO: 2.75 M/UL (ref 4.6–6.2)
SODIUM SERPL-SCNC: 141 MMOL/L (ref 136–145)
WBC # BLD AUTO: 11.1 K/UL (ref 3.9–12.7)

## 2022-04-19 PROCEDURE — D9220A PRA ANESTHESIA: ICD-10-PCS | Mod: CRNA,,, | Performed by: NURSE ANESTHETIST, CERTIFIED REGISTERED

## 2022-04-19 PROCEDURE — 43239 EGD BIOPSY SINGLE/MULTIPLE: CPT | Mod: ,,, | Performed by: INTERNAL MEDICINE

## 2022-04-19 PROCEDURE — 88305 TISSUE EXAM BY PATHOLOGIST: CPT | Mod: 59 | Performed by: PATHOLOGY

## 2022-04-19 PROCEDURE — 27201012 HC FORCEPS, HOT/COLD, DISP: Performed by: INTERNAL MEDICINE

## 2022-04-19 PROCEDURE — D9220A PRA ANESTHESIA: ICD-10-PCS | Mod: ANES,,, | Performed by: ANESTHESIOLOGY

## 2022-04-19 PROCEDURE — 84100 ASSAY OF PHOSPHORUS: CPT | Performed by: INTERNAL MEDICINE

## 2022-04-19 PROCEDURE — 83735 ASSAY OF MAGNESIUM: CPT | Performed by: INTERNAL MEDICINE

## 2022-04-19 PROCEDURE — 25000003 PHARM REV CODE 250: Performed by: NURSE ANESTHETIST, CERTIFIED REGISTERED

## 2022-04-19 PROCEDURE — 88305 TISSUE EXAM BY PATHOLOGIST: CPT | Mod: 26,,, | Performed by: PATHOLOGY

## 2022-04-19 PROCEDURE — 25000003 PHARM REV CODE 250: Performed by: INTERNAL MEDICINE

## 2022-04-19 PROCEDURE — 85025 COMPLETE CBC W/AUTO DIFF WBC: CPT | Performed by: INTERNAL MEDICINE

## 2022-04-19 PROCEDURE — 85018 HEMOGLOBIN: CPT | Performed by: INTERNAL MEDICINE

## 2022-04-19 PROCEDURE — 12000002 HC ACUTE/MED SURGE SEMI-PRIVATE ROOM

## 2022-04-19 PROCEDURE — 96366 THER/PROPH/DIAG IV INF ADDON: CPT

## 2022-04-19 PROCEDURE — 88341 IMHCHEM/IMCYTCHM EA ADD ANTB: CPT | Mod: 59 | Performed by: PATHOLOGY

## 2022-04-19 PROCEDURE — 88305 TISSUE EXAM BY PATHOLOGIST: ICD-10-PCS | Mod: 26,,, | Performed by: PATHOLOGY

## 2022-04-19 PROCEDURE — 43239 PR EGD, FLEX, W/BIOPSY, SGL/MULTI: ICD-10-PCS | Mod: ,,, | Performed by: INTERNAL MEDICINE

## 2022-04-19 PROCEDURE — D9220A PRA ANESTHESIA: Mod: ANES,,, | Performed by: ANESTHESIOLOGY

## 2022-04-19 PROCEDURE — 37000008 HC ANESTHESIA 1ST 15 MINUTES: Performed by: INTERNAL MEDICINE

## 2022-04-19 PROCEDURE — 94761 N-INVAS EAR/PLS OXIMETRY MLT: CPT

## 2022-04-19 PROCEDURE — 88341 IMHCHEM/IMCYTCHM EA ADD ANTB: CPT | Mod: 26,,, | Performed by: PATHOLOGY

## 2022-04-19 PROCEDURE — 96361 HYDRATE IV INFUSION ADD-ON: CPT

## 2022-04-19 PROCEDURE — 36415 COLL VENOUS BLD VENIPUNCTURE: CPT | Performed by: INTERNAL MEDICINE

## 2022-04-19 PROCEDURE — 63600175 PHARM REV CODE 636 W HCPCS: Performed by: NURSE ANESTHETIST, CERTIFIED REGISTERED

## 2022-04-19 PROCEDURE — 88342 IMHCHEM/IMCYTCHM 1ST ANTB: CPT | Mod: 26,,, | Performed by: PATHOLOGY

## 2022-04-19 PROCEDURE — D9220A PRA ANESTHESIA: Mod: CRNA,,, | Performed by: NURSE ANESTHETIST, CERTIFIED REGISTERED

## 2022-04-19 PROCEDURE — 88342 CHG IMMUNOCYTOCHEMISTRY: ICD-10-PCS | Mod: 26,,, | Performed by: PATHOLOGY

## 2022-04-19 PROCEDURE — 43239 EGD BIOPSY SINGLE/MULTIPLE: CPT | Performed by: INTERNAL MEDICINE

## 2022-04-19 PROCEDURE — 63600175 PHARM REV CODE 636 W HCPCS: Performed by: INTERNAL MEDICINE

## 2022-04-19 PROCEDURE — 99223 PR INITIAL HOSPITAL CARE,LEVL III: ICD-10-PCS | Mod: 25,,, | Performed by: INTERNAL MEDICINE

## 2022-04-19 PROCEDURE — 88341 PR IHC OR ICC EACH ADD'L SINGLE ANTIBODY  STAINPR: ICD-10-PCS | Mod: 26,,, | Performed by: PATHOLOGY

## 2022-04-19 PROCEDURE — 88342 IMHCHEM/IMCYTCHM 1ST ANTB: CPT | Mod: 59 | Performed by: PATHOLOGY

## 2022-04-19 PROCEDURE — 37000009 HC ANESTHESIA EA ADD 15 MINS: Performed by: INTERNAL MEDICINE

## 2022-04-19 PROCEDURE — 80053 COMPREHEN METABOLIC PANEL: CPT | Performed by: INTERNAL MEDICINE

## 2022-04-19 PROCEDURE — 99223 1ST HOSP IP/OBS HIGH 75: CPT | Mod: 25,,, | Performed by: INTERNAL MEDICINE

## 2022-04-19 PROCEDURE — C9113 INJ PANTOPRAZOLE SODIUM, VIA: HCPCS | Performed by: INTERNAL MEDICINE

## 2022-04-19 PROCEDURE — 94760 N-INVAS EAR/PLS OXIMETRY 1: CPT

## 2022-04-19 RX ORDER — MUPIROCIN 20 MG/G
OINTMENT TOPICAL 2 TIMES DAILY
Status: DISCONTINUED | OUTPATIENT
Start: 2022-04-19 | End: 2022-04-20 | Stop reason: HOSPADM

## 2022-04-19 RX ORDER — SODIUM CHLORIDE 9 MG/ML
INJECTION, SOLUTION INTRAVENOUS CONTINUOUS
Status: DISCONTINUED | OUTPATIENT
Start: 2022-04-19 | End: 2022-04-20 | Stop reason: HOSPADM

## 2022-04-19 RX ORDER — PROPOFOL 10 MG/ML
VIAL (ML) INTRAVENOUS
Status: DISCONTINUED | OUTPATIENT
Start: 2022-04-19 | End: 2022-04-19

## 2022-04-19 RX ORDER — LIDOCAINE HCL/PF 100 MG/5ML
SYRINGE (ML) INTRAVENOUS
Status: DISCONTINUED | OUTPATIENT
Start: 2022-04-19 | End: 2022-04-19

## 2022-04-19 RX ADMIN — MELATONIN 3 MG ORAL TABLET 6 MG: 3 TABLET ORAL at 08:04

## 2022-04-19 RX ADMIN — SODIUM CHLORIDE 8 MG/HR: 9 INJECTION, SOLUTION INTRAVENOUS at 04:04

## 2022-04-19 RX ADMIN — LIDOCAINE HYDROCHLORIDE 100 MG: 20 INJECTION INTRAVENOUS at 02:04

## 2022-04-19 RX ADMIN — PROPOFOL 20 MG: 10 INJECTION, EMULSION INTRAVENOUS at 02:04

## 2022-04-19 RX ADMIN — SODIUM CHLORIDE 3 G: 9 INJECTION, SOLUTION INTRAVENOUS at 04:04

## 2022-04-19 RX ADMIN — PROPOFOL 50 MG: 10 INJECTION, EMULSION INTRAVENOUS at 02:04

## 2022-04-19 RX ADMIN — SODIUM CHLORIDE: 0.9 INJECTION, SOLUTION INTRAVENOUS at 01:04

## 2022-04-19 RX ADMIN — MUPIROCIN: 20 OINTMENT TOPICAL at 08:04

## 2022-04-19 RX ADMIN — PROPOFOL 40 MG: 10 INJECTION, EMULSION INTRAVENOUS at 02:04

## 2022-04-19 RX ADMIN — PIPERACILLIN AND TAZOBACTAM 3.38 G: 3; .375 INJECTION, POWDER, LYOPHILIZED, FOR SOLUTION INTRAVENOUS; PARENTERAL at 03:04

## 2022-04-19 NOTE — CONSULTS
"CC: hematemesis    HPI 89 y.o. male with history of kidney stones, HTN, HLD, CKD, diverticulosis, inguinal hernia, chronic anemia, prostate CA s/p XRT here with acute onset dark hematemesis that was noted prior to admission without associated abdominal pain. Reported to have gross hematemesis and found to be anemic. H/H on admission 7.8/25 from 9.7/30.6. INR 1.0. Today H/H 8.0/24.7 after 1 unit transfusion. History obtained from medical record because patient unable to due to patient's current cognitive status and baseline dementia. Son at bedside to give consent for patient's procedure.     Medical records reviewed. Additional history supplemented by nursing.     Past Medical History:   Diagnosis Date    Bladder stones     Cancer     Encounter for blood transfusion     Hypertension     Kidney stone     Prostate cancer 2004    Radiation 2005    prostate    Stage 3 chronic kidney disease 1/11/2019     Past Surgical History:   Procedure Laterality Date    CYSTOSCOPY      KIDNEY STONE SURGERY  May 2014     Social History  Social History     Tobacco Use    Smoking status: Former Smoker     Years: 40.00     Types: Cigarettes     Start date: 9/26/1985    Smokeless tobacco: Never Used   Substance Use Topics    Alcohol use: No    Drug use: No         Family History   Problem Relation Age of Onset    Hypertension Daughter     Diabetes Daughter     Hypertension Son     Asthma Son        Review of Systems  Unable to obtain due to dementia    Physical Examination  BP (!) 169/70   Pulse 61   Temp 97.6 °F (36.4 °C)   Resp 20   Ht 5' 6" (1.676 m)   Wt 72.3 kg (159 lb 6.3 oz)   SpO2 97%   BMI 25.73 kg/m²   General appearance: confused, no distress  HENT: Normocephalic, atraumatic, neck symmetrical, no nasal discharge   Eyes: conjunctivae/corneas clear, PERRL, EOM's intact  Lungs: no labored breathing, symmetric chest wall expansion bilaterally  Heart: regular rate and rhythm without rub; no displacement of " the PMI   Abdomen: soft, non-tender; bowel sounds normoactive; no organomegaly  Extremities: extremities symmetric; no clubbing, cyanosis, or edema  Integument: Skin color, texture, turgor normal; no rashes; hair distrubution normal  Neurologic: pleasantly confused, does not respond appropriately to questions due to dementia  Psychiatric: + evidence of impaired cognition     Labs:  Lab Results   Component Value Date    WBC 11.10 04/19/2022    HGB 8.0 (L) 04/19/2022    HCT 24.7 (L) 04/19/2022    MCV 90 04/19/2022     04/19/2022     CMP  Sodium   Date Value Ref Range Status   04/19/2022 141 136 - 145 mmol/L Final     Potassium   Date Value Ref Range Status   04/19/2022 4.0 3.5 - 5.1 mmol/L Final     Chloride   Date Value Ref Range Status   04/19/2022 104 95 - 110 mmol/L Final     CO2   Date Value Ref Range Status   04/19/2022 24 23 - 29 mmol/L Final     Glucose   Date Value Ref Range Status   04/19/2022 89 70 - 110 mg/dL Final     BUN   Date Value Ref Range Status   04/19/2022 77 (H) 10 - 30 mg/dL Final     Creatinine   Date Value Ref Range Status   04/19/2022 2.6 (H) 0.5 - 1.4 mg/dL Final   11/14/2012 1.3 0.5 - 1.4 mg/dL Final     Calcium   Date Value Ref Range Status   04/19/2022 8.5 (L) 8.7 - 10.5 mg/dL Final   11/14/2012 9.3 8.7 - 10.5 mg/dL Final     Total Protein   Date Value Ref Range Status   04/19/2022 5.8 (L) 6.0 - 8.4 g/dL Final     Albumin   Date Value Ref Range Status   04/19/2022 2.8 (L) 3.5 - 5.2 g/dL Final     Total Bilirubin   Date Value Ref Range Status   04/19/2022 0.6 0.1 - 1.0 mg/dL Final     Comment:     For infants and newborns, interpretation of results should be based  on gestational age, weight and in agreement with clinical  observations.    Premature Infant recommended reference ranges:  Up to 24 hours.............<8.0 mg/dL  Up to 48 hours............<12.0 mg/dL  3-5 days..................<15.0 mg/dL  6-29 days.................<15.0 mg/dL       Alkaline Phosphatase   Date Value Ref  Range Status   04/19/2022 64 55 - 135 U/L Final   11/14/2012 54 (L) 55 - 135 U/L Final     AST   Date Value Ref Range Status   04/19/2022 23 10 - 40 U/L Final   11/14/2012 14 10 - 40 U/L Final     ALT   Date Value Ref Range Status   04/19/2022 12 10 - 44 U/L Final     Anion Gap   Date Value Ref Range Status   04/19/2022 13 8 - 16 mmol/L Final   11/14/2012 11 5 - 15 meq/L Final     eGFR if    Date Value Ref Range Status   04/19/2022 24 (A) >60 mL/min/1.73 m^2 Final     eGFR if non    Date Value Ref Range Status   04/19/2022 20 (A) >60 mL/min/1.73 m^2 Final     Comment:     Calculation used to obtain the estimated glomerular filtration  rate (eGFR) is the CKD-EPI equation.        Imaging:  CXR: A left lower lobe retrocardiac infiltrate is noted. Otherwise negative portable chest.     Independently reviewed    Assessment:   89 y.o. male with history of kidney stones, HTN, HLD, CKD, diverticulosis, inguinal hernia, chronic anemia, prostate CA s/p XRT here with gross hematemesis and found to be anemic. H/H on admission 7.8/25 from 9.7/30.6 previously. Planning endoscopic evaluation of UGIB.    Plan:   -NPO, IVFs  -Hold anticoagulation  -EGD today  -Protonix IV gtt  -Recommendations to follow endoscopy    Sandor Sewell MD  [facility]  [address] [place] [address]  Office: [phone]  Fax: [phone]

## 2022-04-19 NOTE — PLAN OF CARE
04/19/22 0945   BEE Message   Medicare Outpatient and Observation Notification regarding financial responsibility Explained to patient/caregiver;Signed/date by patient/caregiver   Date BEE was signed 04/19/22   Time BEE was signed 0945

## 2022-04-19 NOTE — PLAN OF CARE
[facility]  Initial Discharge Assessment       Primary Care Provider: Gentry Encinas MD    Admission Diagnosis: Syncope [R55]  Upper GI bleed [K92.2]  Hematemesis, presence of nausea not specified [K92.0]    Admission Date: 4/18/2022  Expected Discharge Date:     Discharge Barriers Identified: None    Payor: [employer] / Plan: [employer] 65 / Product Type: Medicare Advantage /     Extended Emergency Contact Information  Primary Emergency Contact: aLkia Yuan  Address: [address] [place] [address]  Mobile Phone: [phone]  Relation: Daughter  Preferred language: English   needed? No  Secondary Emergency Contact: Sharif Bonillaia  Mobile Phone: [phone]  Relation: Daughter  Preferred language: English   needed? No    Discharge Plan A: Home Health  Discharge Plan B: Home      [id number] [address] [place] [address] & [facility]  [address] [place] [address]  Phone: [phone] Fax: [phone]    SW met with patient and patient's daughter Lakia Yuan at bedside to complete discharge planning assessment.  Patient alert and oriented xs 4.  Patient verified all demographic information on facesheet is correct.  Patient verified PCP is Dr. Encinas.  Patient verified primary health insurance is [employer].  Patient with NO home health but has DME.  Patient choice signed for home health services with no preference for agency.  Patient with NO POA or Living Will.  Patient not on dialysis or medication coumadin.  Patient with no 30 day admission.  Patient with no financial issues at this time.  Patient family will provide transportation upon discharge from facility.  Patient independent with ADLs, live with adult daughter adult great grandson and great great grandson, family drives.      Initial Assessment (most recent)     Adult Discharge Assessment -  04/19/22 0945        Discharge Assessment    Assessment Type Discharge Planning Assessment     Confirmed/corrected address, phone number and insurance Yes     Confirmed Demographics Correct on Facesheet     Source of Information family;patient     Does patient/caregiver understand observation status Yes     Communicated CHRIS with patient/caregiver Yes     Lives With child(rian), adult     Facility Arrived From: home     Do you expect to return to your current living situation? Yes     Do you have help at home or someone to help you manage your care at home? Yes     Who are your caregiver(s) and their phone number(s)? daughters     Prior to hospitilization cognitive status: Alert/Oriented     Current cognitive status: Alert/Oriented     Walking or Climbing Stairs Difficulty none     Dressing/Bathing Difficulty none     Equipment Currently Used at Home walker, rolling     Readmission within 30 days? No     Patient currently being followed by outpatient case management? No     Do you currently have service(s) that help you manage your care at home? No     Do you take prescription medications? Yes     Do you have prescription coverage? Yes     Do you have any problems affording any of your prescribed medications? No     Is the patient taking medications as prescribed? yes     Who is going to help you get home at discharge? daughters     How do you get to doctors appointments? family or friend will provide     Are you on dialysis? No     Do you take coumadin? No     Discharge Plan A Home Health     Discharge Plan B Home     DME Needed Upon Discharge  none     Discharge Plan discussed with: Patient;Adult children     Discharge Barriers Identified None

## 2022-04-19 NOTE — ANESTHESIA PREPROCEDURE EVALUATION
04/19/2022  Troy Yuan Sr. is a 89 y.o., male.      Pre-op Assessment    I have reviewed the Patient Summary Reports.     I have reviewed the Nursing Notes. I have reviewed the NPO Status.   I have reviewed the Medications.     Review of Systems  Anesthesia Hx:  No problems with previous Anesthesia    Social:  Former Smoker    Hematology/Oncology:         -- Anemia: --  Cancer in past history (prostate CA):    Cardiovascular:   Hypertension, well controlled hyperlipidemia Vasovagal syncope   Pulmonary:  Pulmonary Normal    Renal/:   Chronic Renal Disease (stage 3), CRI renal calculi    Neurological:  Neurology Normal Dementia - but Pt AAO today and very appropriate   Endocrine:  Endocrine Normal    Psych:   Psychiatric History          Physical Exam  General: Well nourished, Cooperative, Alert and Oriented    Airway:  Mallampati: I   Mouth Opening: Normal  Neck ROM: Normal ROM        Anesthesia Plan  Type of Anesthesia, risks & benefits discussed:    Anesthesia Type: Gen ETT, Gen Supraglottic Airway, Gen Natural Airway, MAC  Intra-op Monitoring Plan: Standard ASA Monitors  Post Op Pain Control Plan: multimodal analgesia  Induction:  IV  Airway Plan: Direct, Video and Fiberoptic, Post-Induction  Informed Consent: Informed consent signed with the Patient and all parties understand the risks and agree with anesthesia plan.  All questions answered.   ASA Score: 3    Ready For Surgery From Anesthesia Perspective.     .

## 2022-04-19 NOTE — NURSING
Bladder scan 50 ml Disconected Yost catheter from Drainage bag Using Betadyne soaked 4X4 Irrigated yost catheter with 60 ml Normal saline irrigated with no resistance met Aspirated 60 ml normal saline with slight redness noted Tolerated well no clots noted at this time

## 2022-04-19 NOTE — ANESTHESIA POSTPROCEDURE EVALUATION
Anesthesia Post Evaluation    Patient: Troy Yuan Sr.    Procedure(s) Performed: Procedure(s) (LRB):  EGD (ESOPHAGOGASTRODUODENOSCOPY) (N/A)    Final Anesthesia Type: general      Patient location during evaluation: PACU  Patient participation: Yes- Able to Participate  Level of consciousness: awake and alert  Post-procedure vital signs: reviewed and stable  Pain management: adequate  Airway patency: patent    PONV status at discharge: No PONV  Anesthetic complications: no      Cardiovascular status: hemodynamically stable  Respiratory status: unassisted and room air  Hydration status: euvolemic  Follow-up not needed.          Vitals Value Taken Time   /70 04/19/22 1641   Temp 36.2 °C (97.2 °F) 04/19/22 1641   Pulse 57 04/19/22 1641   Resp 19 04/19/22 1641   SpO2 98 % 04/19/22 1641         Event Time   Out of Recovery 04/19/2022 15:16:00         Pain/Camacho Score: Camacho Score: 10 (4/19/2022  3:07 PM)

## 2022-04-19 NOTE — PROVATION PATIENT INSTRUCTIONS
Discharge Summary/Instructions after an Endoscopic Procedure  Patient Name: Troy Yuan  Patient MRN: [id number]  Patient YOB: 1929 Tuesday, April 19, 2022  Sandor Sewell MD  Dear patient,  As a result of recent federal legislation (The Federal Cures Act), you may   receive lab or pathology results from your procedure in your [facility]   account before your physician is able to contact you. Your physician or   their representative will relay the results to you with their   recommendations at their soonest availability.  Thank you,  RESTRICTIONS:  During your procedure today, you received medications for sedation.  These   medications may affect your judgment, balance and coordination.  Therefore,   for 24 hours, you have the following restrictions:   - DO NOT drive a car, operate machinery, make legal/financial decisions,   sign important papers or drink alcohol.    ACTIVITY:  Today: no heavy lifting, straining or running due to procedural   sedation/anesthesia.  The following day: return to full activity including work.  DIET:  Eat and drink normally unless instructed otherwise.     TREATMENT FOR COMMON SIDE EFFECTS:  - Mild abdominal pain, nausea, belching, bloating or excessive gas:  rest,   eat lightly and use a heating pad.  - Sore Throat: treat with throat lozenges and/or gargle with warm salt   water.  - Because air was used during the procedure, expelling large amounts of air   from your rectum or belching is normal.  - If a bowel prep was taken, you may not have a bowel movement for 1-3 days.    This is normal.  SYMPTOMS TO WATCH FOR AND REPORT TO YOUR PHYSICIAN:  1. Abdominal pain or bloating, other than gas cramps.  2. Chest pain.  3. Back pain.  4. Signs of infection such as: chills or fever occurring within 24 hours   after the procedure.  5. Rectal bleeding, which would show as bright red, maroon, or black stools.   (A tablespoon of blood from the rectum is not serious, especially  if   hemorrhoids are present.)  6. Vomiting.  7. Weakness or dizziness.  GO DIRECTLY TO THE NEAREST EMERGENCY ROOM IF YOU HAVE ANY OF THE FOLLOWING:      Difficulty breathing              Chills and/or fever over 101 F   Persistent vomiting and/or vomiting blood   Severe abdominal pain   Severe chest pain   Black, tarry stools   Bleeding- more than one tablespoon   Any other symptom or condition that you feel may need urgent attention  Your doctor recommends these additional instructions:  If any biopsies were taken, your doctors clinic will contact you in 1 to 2   weeks with any results.  - Advance diet as tolerated.   - Continue present medications including PPI daily.   - Await pathology results.   - Return patient to hospital [facility] for ongoing care.  For questions, problems or results please call your physician - Sandor Sewell MD at Work:  [phone].  [facility], EMERGENCY ROOM PHONE NUMBER: [phone]  IF A COMPLICATION OR EMERGENCY SITUATION ARISES AND YOU ARE UNABLE TO REACH   YOUR PHYSICIAN - GO DIRECTLY TO THE EMERGENCY ROOM.  Sandor Sewell MD  4/19/2022 2:59:47 PM  This report has been verified and signed electronically.  Dear patient,  As a result of recent federal legislation (The Federal Cures Act), you may   receive lab or pathology results from your procedure in your [facility]   account before your physician is able to contact you. Your physician or   their representative will relay the results to you with their   recommendations at their soonest availability.  Thank you,  PROVATION

## 2022-04-19 NOTE — TRANSFER OF CARE
"Anesthesia Transfer of Care Note    Patient: Troy Yuan Sr.    Procedure(s) Performed: Procedure(s) (LRB):  EGD (ESOPHAGOGASTRODUODENOSCOPY) (N/A)    Patient location: PACU    Anesthesia Type: general    Transport from OR: Transported from OR on 2-3 L/min O2 by NC with adequate spontaneous ventilation    Post pain: adequate analgesia    Post assessment: no apparent anesthetic complications and tolerated procedure well    Post vital signs: stable    Level of consciousness: sedated    Nausea/Vomiting: no nausea/vomiting    Complications: none    Transfer of care protocol was followed      Last vitals:   Visit Vitals  BP (!) 187/74 (BP Location: Left arm)   Pulse (!) 55   Temp 37 °C (98.6 °F) (Skin)   Resp 16   Ht 5' 6" (1.676 m)   Wt 72.1 kg (159 lb)   SpO2 97%   BMI 25.66 kg/m²     "

## 2022-04-19 NOTE — PLAN OF CARE
EGD:  Tortuous esophagus  Mild esophagitis  Multiple gastric polyps with polypoid lesion in stomach; biopsies taken for evaluation  Medium hiatal hernia  No stigmata of active blood loss    Recommendations:  Continue present medications including PPI daily  Will follow up biopsy results and repeat EGD as necessary while off Plavix  Ok to advance diet  Ok to restart anticoagulation  GI to sign off, please call if any questions    Sandor Sewell MD  [facility]  [address] [facility] [address] [place] [address]  Office: [phone]  Fax: [phone]

## 2022-04-19 NOTE — PLAN OF CARE
Problem: Adult Inpatient Plan of Care  Goal: Plan of Care Review  Outcome: Ongoing, Progressing     Problem: Adult Inpatient Plan of Care  Goal: Patient-Specific Goal (Individualized)  Outcome: Ongoing, Progressing     Problem: Adult Inpatient Plan of Care  Goal: Optimal Comfort and Wellbeing  Outcome: Ongoing, Progressing     Patient arrived to the unit via stretcher assisted by ED staff with daughter at bedside. Patient is alert and oriented to self. Patient received 1 unit of blood in ED that was completing on transfer. PIV patent, telemetry in place. Patient and family educated on patient's NPO status, verbalized understanding.  Patient denies nausea or vomiting, denies pain. Neumann patent, draining red, blood tinged urine in urimeter bag to gravity. Mepilex placed on heels for protection. Patient and family oriented to patient's room, call light use and bed controls. Educated on the use of the bed alarm. Patient currently in bed with eyes closed, NAD noted. Call light and personal items within reach, bed locked in lowest position, will continue to monitor.

## 2022-04-20 ENCOUNTER — TELEPHONE (OUTPATIENT)
Dept: PHYSICAL MEDICINE AND REHAB | Facility: CLINIC | Age: 87
End: 2022-04-20
Payer: MEDICARE

## 2022-04-20 VITALS
BODY MASS INDEX: 25.55 KG/M2 | WEIGHT: 159 LBS | OXYGEN SATURATION: 96 % | SYSTOLIC BLOOD PRESSURE: 165 MMHG | DIASTOLIC BLOOD PRESSURE: 70 MMHG | HEIGHT: 66 IN | HEART RATE: 66 BPM | TEMPERATURE: 98 F | RESPIRATION RATE: 19 BRPM

## 2022-04-20 DIAGNOSIS — C61 PROSTATE CANCER: ICD-10-CM

## 2022-04-20 DIAGNOSIS — R33.9 URINARY RETENTION: Primary | ICD-10-CM

## 2022-04-20 LAB
BACTERIA UR CULT: NO GROWTH
HGB BLD-MCNC: 8.4 G/DL (ref 14–18)

## 2022-04-20 PROCEDURE — C9113 INJ PANTOPRAZOLE SODIUM, VIA: HCPCS | Performed by: INTERNAL MEDICINE

## 2022-04-20 PROCEDURE — 99449 PR INTERPROF, PHONE/INTERNET/EHR, CONSULT, >= 31 MINS: ICD-10-PCS | Mod: ,,, | Performed by: UROLOGY

## 2022-04-20 PROCEDURE — 99449 NTRPROF PH1/NTRNET/EHR 31/>: CPT | Mod: ,,, | Performed by: UROLOGY

## 2022-04-20 PROCEDURE — 25000003 PHARM REV CODE 250: Performed by: INTERNAL MEDICINE

## 2022-04-20 PROCEDURE — 63600175 PHARM REV CODE 636 W HCPCS: Performed by: INTERNAL MEDICINE

## 2022-04-20 PROCEDURE — 94761 N-INVAS EAR/PLS OXIMETRY MLT: CPT

## 2022-04-20 PROCEDURE — 51798 US URINE CAPACITY MEASURE: CPT

## 2022-04-20 RX ORDER — PANTOPRAZOLE SODIUM 40 MG/1
40 TABLET, DELAYED RELEASE ORAL 2 TIMES DAILY
Qty: 30 TABLET | Refills: 2 | Status: SHIPPED | OUTPATIENT
Start: 2022-04-20

## 2022-04-20 RX ORDER — ALFUZOSIN HYDROCHLORIDE 10 MG/1
10 TABLET, EXTENDED RELEASE ORAL NIGHTLY
Qty: 90 TABLET | Refills: 0 | Status: SHIPPED | OUTPATIENT
Start: 2022-04-20 | End: 2022-04-20

## 2022-04-20 RX ORDER — PANTOPRAZOLE SODIUM 40 MG/1
40 TABLET, DELAYED RELEASE ORAL 2 TIMES DAILY
Status: DISCONTINUED | OUTPATIENT
Start: 2022-04-20 | End: 2022-04-20 | Stop reason: HOSPADM

## 2022-04-20 RX ORDER — LOSARTAN POTASSIUM 25 MG/1
25 TABLET ORAL DAILY
Status: DISCONTINUED | OUTPATIENT
Start: 2022-04-20 | End: 2022-04-20 | Stop reason: HOSPADM

## 2022-04-20 RX ORDER — AMOXICILLIN AND CLAVULANATE POTASSIUM 500; 125 MG/1; MG/1
1 TABLET, FILM COATED ORAL 2 TIMES DAILY
Qty: 10 TABLET | Refills: 0 | Status: SHIPPED | OUTPATIENT
Start: 2022-04-20 | End: 2022-04-25

## 2022-04-20 RX ORDER — CALCITRIOL 0.25 UG/1
0.25 CAPSULE ORAL DAILY
Status: DISCONTINUED | OUTPATIENT
Start: 2022-04-20 | End: 2022-04-20 | Stop reason: HOSPADM

## 2022-04-20 RX ORDER — AMLODIPINE BESYLATE 5 MG/1
10 TABLET ORAL DAILY
Status: DISCONTINUED | OUTPATIENT
Start: 2022-04-20 | End: 2022-04-20 | Stop reason: HOSPADM

## 2022-04-20 RX ADMIN — LOSARTAN POTASSIUM 25 MG: 25 TABLET, FILM COATED ORAL at 10:04

## 2022-04-20 RX ADMIN — CALCITRIOL CAPSULES 0.25 MCG 0.25 MCG: 0.25 CAPSULE ORAL at 10:04

## 2022-04-20 RX ADMIN — SODIUM CHLORIDE 8 MG/HR: 9 INJECTION, SOLUTION INTRAVENOUS at 12:04

## 2022-04-20 RX ADMIN — AMLODIPINE BESYLATE 10 MG: 5 TABLET ORAL at 10:04

## 2022-04-20 RX ADMIN — MUPIROCIN: 20 OINTMENT TOPICAL at 08:04

## 2022-04-20 RX ADMIN — SODIUM CHLORIDE 3 G: 9 INJECTION, SOLUTION INTRAVENOUS at 04:04

## 2022-04-20 RX ADMIN — SODIUM CHLORIDE 3 G: 9 INJECTION, SOLUTION INTRAVENOUS at 03:04

## 2022-04-20 NOTE — PROGRESS NOTES
"Daily progress Note  [facility]  .     Troy Yuan Sr. (MRN: [id number])  Admitting Service: Hospital Medicine  Date of Admission: 4/18/2022   Primary Care Provider: Gentry Encinas MD    ?  Chief complaint: Hematemesis   ?  History of Present Illness:     89-year-old male with history of prostate cancer and CKD stage 3 was recently seen for hematuria days before with subsequently she a shin of antibiotics who presents today after gross hematemesis found to be anemic.?    On history SP obtained from family given patient's current cognitive status and baseline dementia.  Family feels patient may have had some abdominal discomfort over the past couple days but is unclear to them.  He was recently started on aspirin.    At the time of interview the patient denies pain says he feels great.  ??Subjective     Denies chest pain, SOB, lightheadedness. No recurrence of vomiting   ?  PHYSICAL EXAM  Vitals: BP (!) 159/68   Pulse 63   Temp 98.7 °F (37.1 °C)   Resp 18   Ht 5' 6" (1.676 m)   Wt 72.1 kg (159 lb)   SpO2 99%   BMI 25.66 kg/m²  Body mass index is 25.66 kg/m².    General: NAD, alert and interactive.  HEENT: PERRL, EOMI.   Cardiovascular: RRR  Respiratory: lungs CTAB  GI: abdomen S/NT/ND, +BS  Extremities: no edema  MSK:  No gross joint abnormalities   Neuro/Psych CNII-XII grossly intact.      EKG and radiographs from the past 24h: reviewed and personally interpreted if available.      LABS    Recent Labs     04/19/22  0506 04/19/22  1309   WBC 11.10  --    HGB 8.0* 8.2*     --      ?  Recent Labs     04/19/22  0505      K 4.0   CO2 24   BUN 77*   CREATININE 2.6*   MG 2.0   PHOS 2.9     ?  Recent Labs     04/19/22  0505   BILITOT 0.6   AST 23   ALT 12   ALKPHOS 64   PROT 5.8*     ?  Recent Labs     04/18/22  2235   INR 1.0     ?    ASSESSMENT & PLAN    89-year-old male with history of prostate cancer and CKD stage 3 was recently seen for hematuria days before with subsequently she a " shin of antibiotics who presents today after gross hematemesis found to be anemic.?    1. Acute blood loss anemia secondary to GI hemorrhage, hematuria  -s/p EGD demonstrating esophagitis   -transfuse as required  _PPI  -GI consulted    2. Hematuria  -Neumann placed  -coagulopathic workup started giving blood in urine and GI system.  -urology consulted     3. UTI and acute bacterial pneumonia secondary to unknown organism/aspiration pneumonia  -unasyn     4. Hypertension  -home meds held  -home Lasix also held; watch volume status    SCDs ordered; chemical DVT prophylaxis contraindicated secondary to GI bleed/hematuria .               Code Status/Dispo: Full Code.   ?  Becca Aviles    Date: 4/19/2022  Time: 6:54 PM

## 2022-04-20 NOTE — PLAN OF CARE
04/20/22 0640   Patient Assessment/Suction   Level of Consciousness (AVPU) alert   Respiratory Effort Normal;Unlabored   Expansion/Accessory Muscles/Retractions no retractions;no use of accessory muscles   Rhythm/Pattern, Respiratory depth regular;pattern regular   Cough Frequency no cough   PRE-TX-O2   O2 Device (Oxygen Therapy) room air   SpO2 97 %   Pulse Oximetry Type Intermittent   $ Pulse Oximetry - Multiple Charge Pulse Oximetry - Multiple   Pulse 63   Resp 18

## 2022-04-20 NOTE — DISCHARGE SUMMARY
[facility] Hospital Medicine  Discharge Summary      Patient Name: Troy Yuan Sr.  MRN: [id number]  Admission Date: 4/18/2022  Hospital Length of Stay: 1 days  Discharge Date and Time:  04/20/2022 5:13 PM  Attending Physician: Becca Aviles MD   Discharging Provider: Becca Aviles MD  Primary Care Provider: Genrty Encinas MD        HPI:     89-year-old male with history of prostate cancer and CKD stage 3 was recently seen for hematuria days before with subsequently she a shin of antibiotics who presents today after gross hematemesis found to be anemic.?     On history SP obtained from family given patient's current cognitive status and baseline dementia.  Family feels patient may have had some abdominal discomfort over the past couple days but is unclear to them.  He was recently started on aspirin.     At the time of interview the patient denies pain says he feels great.  ?          Procedure(s) (LRB):  EGD (ESOPHAGOGASTRODUODENOSCOPY) (N/A)      Hospital Course:     The patient was admitted  following hematemesis  after which he experienced syncope and likely aspiration leading to pneumonia.    Regarding his hematemesis, hewas started on a PPI drip.  EGD occurred and demonstrated  mild esophagitis in addition to gastric polyps which were biopsied.  He will bedischarged on twice daily PPI. he is to continue  to hold his Plavix  until he sees his  PCP  at which time resumption can bediscussed further.    Regarding his pneumonia, hewas started on Unasyn. at discharge she is comfortable room air and will bedischarged on Augmentin to complete his treatment course.    Course was complicated by the development of urinary retention and hematuria. urology was consulted and has recommended placement of a Neumann with outpatient follow-up.  By the time of discharge  gross hematuria had resolved                                                        1. Acute blood loss anemia secondary to GI  hemorrhage, hematuria  -follow with GI in one month  -follow with GI for biopsy results  -as above     2. Hematuria with urinary retention   -urology consulted      3. UTI and acute bacterial pneumonia secondary to unknown organism/aspiration pneumonia  -augmentin on discharge     4. 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    Specialty: Family Medicine  Why: Vitals and labs (CBC, CMP, Mg, Phos)  Contact information:  [address] [place] [address]  [phone]                       Patient Instructions:      Ambulatory referral/consult to Urology   Standing Status: Future   Referral Priority: Routine Referral Type: Consultation   Referral Reason: Specialty Services Required   Requested Specialty: Urology     Ambulatory referral/consult to Gastroenterology   Standing Status: Future   Referral Priority: Routine Referral Type: Consultation   Referral Reason: Specialty Services Required   Requested Specialty: Gastroenterology     Ambulatory referral/consult to Home Health   Standing Status: Future   Referral Priority: Routine Referral Type: Home Health   Referral Reason: Specialty Services Required   Requested Specialty: Home Health Services   Number of Visits Requested: 1     Notify your health care provider if you experience any of the following:  temperature >100.4      Notify your health care provider if you experience any of the following:  persistent nausea and vomiting or diarrhea     Notify your health care provider if you experience any of the following:  severe uncontrolled pain     Notify your health care provider if you experience any of the following:  difficulty breathing or increased cough     Notify your health care provider if you experience any of the following:  severe persistent headache     Notify your health care provider if you experience any of the following:  persistent dizziness, light-headedness, or visual disturbances     Notify your health care provider if you experience any of the following:  increased confusion or weakness     Activity as tolerated     Medications:  Reconciled Home Medications:      Medication List      START taking these medications    amoxicillin-clavulanate 500-125mg 500-125 mg Tab  Commonly known as: AUGMENTIN  Take 1 tablet (500 mg total) by mouth 2 (two) times daily. for 5 days     pantoprazole 40 MG tablet  Commonly known as: PROTONIX  Take 1 tablet (40 mg total) by mouth 2 (two) times daily.        CONTINUE taking these medications    alfuzosin 10 mg Tb24  Commonly known as: UROXATRAL  Take 1 tablet (10 mg total) by mouth daily with breakfast.     amLODIPine 10 MG tablet  Commonly known as: NORVASC  Take 10 mg by mouth once daily.     atorvastatin 20 MG tablet  Commonly known as: LIPITOR  Take 1 tablet (20 mg total) by mouth once daily.     calcitRIOL 0.25 MCG Cap  Commonly known as: ROCALTROL  Take 0.25 mcg by mouth once daily.     donepeziL 10 MG tablet  Commonly known as: ARICEPT  Take 1 tablet (10 mg total) by mouth every evening.     epoetin radha 3,000 unit/mL injection  Commonly known as: PROCRIT  Inject 3,000 Units into the skin every 14 (fourteen) days. Per Dr. Hernandez Mcknezie     ferrous sulfate 325 mg (65 mg iron) Tab tablet  Commonly known as: FEOSOL  Take 1 tablet (325 mg total) by mouth 2 (two) times daily.      furosemide 20 MG tablet  Commonly known as: LASIX  Take 1 tablet (20 mg total) by mouth once daily.     losartan 25 MG tablet  Commonly known as: COZAAR  TAKE 1 TABLET(25 MG) BY MOUTH EVERY DAY     melatonin 10 mg Tab  Take 10 mg by mouth every evening.        STOP taking these medications    cefUROXime 500 MG tablet  Commonly known as: CEFTIN     clopidogreL 75 mg tablet  Commonly known as: PLAVIX     omeprazole 20 MG capsule  Commonly known as: PRILOSEC              Pending Diagnostic Studies:     Procedure Component Value Units Date/Time    Specimen to Pathology, Surgery Gastrointestinal tract [[id number]] Collected: 04/19/22 1456    Order Status: Sent Lab Status: In process Updated: 04/19/22 1530    Specimen: Tissue     US Retroperitoneal Limited [[id number]] Resulted: 04/20/22 1710    Order Status: Sent Lab Status: In process Updated: 04/20/22 1703        Indwelling Lines/Drains at time of discharge:   Lines/Drains/Airways     None                 Time spent on the discharge of patient: 35 minutes         Becca Aviles MD  Department of Hospital Medicine  [facility]

## 2022-04-20 NOTE — CARE UPDATE
Interprofessional Telephone Consult/Progress Note  Date of Service: 04/20/2022  Patient's location: [facility]     Specialty Consulted: Urology  Staff Consulted: Dr.Jennifer Jay  Reason for Consult: hematuria, retention    Time spent reviewing records, making plan and formulating plan: 40 min. More than half the time was devoted to medical consultative verbal/internet discussion. >50% in discussion with provider about recommendations, provided verbally and in wiriting, and pt gave verbal consent to discuss care with subsepcialists    Patient has given consent to discuss care with sub-specialist. This service was not originating from a related E/M service provided within the previous 7 days nor will  to an E/M service or procedure within the next 24 hours or my soonest available appointment.      Both a written plan and a verbal/internet discussion were discussed with the Consulting physician: Dr.Huley Stefan MD  76717 5-10 min  20233 11 to 20 min  51218 21 to 34 min  93530 35 min            Consulted for hematuria and retention  Had them irrigate, urine cleared up   Ct had shown b hemhorragic cysts  rbus done here - will review at outpt.   H/o prostate cancer - next psa due in a few weeks.  Yost removed today but failed voiding trial. 434 drained when 16fr coude placed. No urge to void.    Pt can f/u in 4 weeks for voiding trial with diagnostic psa prior . Yost to be removed night before. Can see me in clinic that monring. Set a reminder to niotify family to remove yost at home night before visit with me.     Make sure pt is taking uroxatral. I can refill if not.         rbus 4/20/22:  Right kidney is measured at 10.3 cm in length.  Resistive index is 0.84.  Slight loss of the corticomedullary junction.  2.5 cm complex cyst upper pole of the right kidney and 1.4 cm 9 mm simple cyst in the lower pole.  No hydronephrosis.  No stone evident     Left kidney measures 9.3 cm.  Resistive index 0.81.  There is  some loss of the corticomedullary junction.  There is 2.1 cm hypoechoic mass most likely complex cyst cyst.  No hydronephrosis.  No stone     The urinary bladder was image and distended at time of the exam measuring 12.7 x 8.4 x 7.8 cm for a volume of 434 cc.     Resistive index of the spleen 0.80     Impression:     Findings consistent with medical renal disease with increased renal cortical echogenicity and elevated resistive indices.  Bilateral renal cysts some complex.  No hydronephrosis.  Urinary bladder distended at time of the exam with volume of 434 cc.

## 2022-04-20 NOTE — NURSING
Per Dr. Jay, 50 cc normal saline instilled into bladder (patient felt uncomfortable at that point), Neumann catheter pulled. Educated patient and daughter about urinating in urinal and to call nurse. Awaiting patient to void, will bladder scan.

## 2022-04-20 NOTE — TELEPHONE ENCOUNTER
Consulted for hematuria and retention  Had them irrigate, urine cleared up   Ct had shown b hemhorragic cysts  rbus done here - will review at outpt.   H/o prostate cancer - next psa due in a few weeks.  Yost removed today but failed voiding trial. 434 drained when 16fr coude placed. No urge to void.    Pt can f/u in 4 weeks for voiding trial with diagnostic psa prior . Yost to be removed night before. Can see me in clinic that fredy. Set a reminder to ileana family to remove yost at home night before visit with me.     Make sure pt is taking uroxatral. I can refill if not. unles they asked him to discontinue.

## 2022-04-20 NOTE — PLAN OF CARE
Client admitted with syncope, upper GI bleed, hematemesis.  Client with a history of prostate cancer and CKD stage 3 hematuria.  Client's bladder scan q2 hours with zero residual this shift.  Urine is pink tinged in color.  Client with confusion, pulled out 2 IVS with attempts made to pull at yost.  Family currently at bedside.  Continue to scan bladder q2 hours and follow orders if residual is noted. Client is SR with 1st AV block and pvcs on tele #[id number].

## 2022-04-20 NOTE — NURSING
16 Fr Coude cath inserted per Dr. Jay. 490 scanned just prior; 465 pale yellow urine returned. Dr. Jay notified. Patient will discharge home with Neumann.

## 2022-04-20 NOTE — PLAN OF CARE
Patient is alert and oriented to self.PIV patent, telemetry in place. Patient and family educated on patient's NPO status, verbalized understanding.  Patient denies nausea or vomiting, denies pain. Neumann patent, draining red, blood tinged urine in urimeter bag to gravity. Mepilex placed on heels for protection. Patient and family oriented to patient's room, call light use and bed controls. Educated on the use of the bed alarm. Patient currently in bed with eyes closed, NAD noted. Call light and personal items within reach, bed locked in lowest position, will continue to monitor.

## 2022-04-21 ENCOUNTER — TELEPHONE (OUTPATIENT)
Dept: MEDSURG UNIT | Facility: HOSPITAL | Age: 87
End: 2022-04-21
Payer: MEDICARE

## 2022-04-21 ENCOUNTER — PATIENT OUTREACH (OUTPATIENT)
Dept: ADMINISTRATIVE | Facility: CLINIC | Age: 87
End: 2022-04-21
Payer: MEDICARE

## 2022-04-21 ENCOUNTER — NURSE TRIAGE (OUTPATIENT)
Dept: ADMINISTRATIVE | Facility: CLINIC | Age: 87
End: 2022-04-21
Payer: MEDICARE

## 2022-04-21 NOTE — PROGRESS NOTES
C3 nurse spoke with Troy Yuan Sr. and daughter for a TCC post hospital discharge follow up call. The patient has a scheduled HOSFU appointment with Gentry Encinas MD  on 4/25/22 @ 1030.

## 2022-04-21 NOTE — TELEPHONE ENCOUNTER
GI referral: Phone & portal message for return call to schedule appointment. EGD 4/19/2022 while in hospital.  
no

## 2022-04-21 NOTE — PLAN OF CARE
JCARLOS faxed patient information to [facility] for home health services [phone].  Fax confirmation received.       04/21/22 8164   Post-Acute Status   Post-Acute Authorization Home Health   Home Health Status Referrals Sent   Patient choice form signed by patient/caregiver List with quality metrics by geographic area provided

## 2022-04-22 ENCOUNTER — HOSPITAL ENCOUNTER (INPATIENT)
Facility: HOSPITAL | Age: 87
LOS: 3 days | Discharge: HOSPICE | DRG: 394 | End: 2022-04-26
Attending: EMERGENCY MEDICINE | Admitting: HOSPITALIST
Payer: MEDICARE

## 2022-04-22 DIAGNOSIS — K92.2 UPPER GI BLEED: Primary | ICD-10-CM

## 2022-04-22 DIAGNOSIS — K31.7 GASTRIC POLYPS: ICD-10-CM

## 2022-04-22 LAB
ABO + RH BLD: NORMAL
ANION GAP SERPL CALC-SCNC: 12 MMOL/L (ref 8–16)
BASOPHILS # BLD AUTO: 0.02 K/UL (ref 0–0.2)
BASOPHILS NFR BLD: 0.2 % (ref 0–1.9)
BLD GP AB SCN CELLS X3 SERPL QL: NORMAL
BUN SERPL-MCNC: 53 MG/DL (ref 10–30)
CALCIUM SERPL-MCNC: 9 MG/DL (ref 8.7–10.5)
CHLORIDE SERPL-SCNC: 101 MMOL/L (ref 95–110)
CO2 SERPL-SCNC: 27 MMOL/L (ref 23–29)
CREAT SERPL-MCNC: 2.2 MG/DL (ref 0.5–1.4)
DIFFERENTIAL METHOD: ABNORMAL
EOSINOPHIL # BLD AUTO: 0 K/UL (ref 0–0.5)
EOSINOPHIL NFR BLD: 0.4 % (ref 0–8)
ERYTHROCYTE [DISTWIDTH] IN BLOOD BY AUTOMATED COUNT: 17.2 % (ref 11.5–14.5)
EST. GFR  (AFRICAN AMERICAN): 29 ML/MIN/1.73 M^2
EST. GFR  (NON AFRICAN AMERICAN): 25 ML/MIN/1.73 M^2
GLUCOSE SERPL-MCNC: 134 MG/DL (ref 70–110)
HCT VFR BLD AUTO: 24.7 % (ref 40–54)
HGB BLD-MCNC: 7.6 G/DL (ref 14–18)
IMM GRANULOCYTES # BLD AUTO: 0.05 K/UL (ref 0–0.04)
IMM GRANULOCYTES NFR BLD AUTO: 0.4 % (ref 0–0.5)
LYMPHOCYTES # BLD AUTO: 0.5 K/UL (ref 1–4.8)
LYMPHOCYTES NFR BLD: 4.7 % (ref 18–48)
MCH RBC QN AUTO: 28.3 PG (ref 27–31)
MCHC RBC AUTO-ENTMCNC: 30.8 G/DL (ref 32–36)
MCV RBC AUTO: 92 FL (ref 82–98)
MONOCYTES # BLD AUTO: 0.4 K/UL (ref 0.3–1)
MONOCYTES NFR BLD: 3.9 % (ref 4–15)
NEUTROPHILS # BLD AUTO: 10.2 K/UL (ref 1.8–7.7)
NEUTROPHILS NFR BLD: 90.4 % (ref 38–73)
NRBC BLD-RTO: 0 /100 WBC
PLATELET # BLD AUTO: 256 K/UL (ref 150–450)
PMV BLD AUTO: 11 FL (ref 9.2–12.9)
POTASSIUM SERPL-SCNC: 4.8 MMOL/L (ref 3.5–5.1)
RBC # BLD AUTO: 2.69 M/UL (ref 4.6–6.2)
SARS-COV-2 RDRP RESP QL NAA+PROBE: NEGATIVE
SODIUM SERPL-SCNC: 140 MMOL/L (ref 136–145)
WBC # BLD AUTO: 11.24 K/UL (ref 3.9–12.7)

## 2022-04-22 PROCEDURE — 86920 COMPATIBILITY TEST SPIN: CPT | Performed by: HOSPITALIST

## 2022-04-22 PROCEDURE — 25000003 PHARM REV CODE 250: Performed by: HOSPITALIST

## 2022-04-22 PROCEDURE — U0002 COVID-19 LAB TEST NON-CDC: HCPCS | Performed by: EMERGENCY MEDICINE

## 2022-04-22 PROCEDURE — C9113 INJ PANTOPRAZOLE SODIUM, VIA: HCPCS | Performed by: HOSPITALIST

## 2022-04-22 PROCEDURE — 80048 BASIC METABOLIC PNL TOTAL CA: CPT | Performed by: EMERGENCY MEDICINE

## 2022-04-22 PROCEDURE — G0378 HOSPITAL OBSERVATION PER HR: HCPCS

## 2022-04-22 PROCEDURE — 96366 THER/PROPH/DIAG IV INF ADDON: CPT | Performed by: EMERGENCY MEDICINE

## 2022-04-22 PROCEDURE — 85025 COMPLETE CBC W/AUTO DIFF WBC: CPT | Performed by: EMERGENCY MEDICINE

## 2022-04-22 PROCEDURE — 96365 THER/PROPH/DIAG IV INF INIT: CPT | Performed by: EMERGENCY MEDICINE

## 2022-04-22 PROCEDURE — 86850 RBC ANTIBODY SCREEN: CPT | Performed by: EMERGENCY MEDICINE

## 2022-04-22 PROCEDURE — 99285 EMERGENCY DEPT VISIT HI MDM: CPT

## 2022-04-22 PROCEDURE — 63600175 PHARM REV CODE 636 W HCPCS: Performed by: HOSPITALIST

## 2022-04-22 RX ORDER — ATORVASTATIN CALCIUM 20 MG/1
20 TABLET, FILM COATED ORAL DAILY
Status: DISCONTINUED | OUTPATIENT
Start: 2022-04-23 | End: 2022-04-26 | Stop reason: HOSPADM

## 2022-04-22 RX ORDER — AMOXICILLIN AND CLAVULANATE POTASSIUM 500; 125 MG/1; MG/1
1 TABLET, FILM COATED ORAL 2 TIMES DAILY
Status: DISCONTINUED | OUTPATIENT
Start: 2022-04-22 | End: 2022-04-26 | Stop reason: HOSPADM

## 2022-04-22 RX ORDER — DONEPEZIL HYDROCHLORIDE 5 MG/1
10 TABLET, FILM COATED ORAL NIGHTLY
Status: DISCONTINUED | OUTPATIENT
Start: 2022-04-22 | End: 2022-04-26 | Stop reason: HOSPADM

## 2022-04-22 RX ADMIN — SODIUM CHLORIDE 8 MG/HR: 9 INJECTION, SOLUTION INTRAVENOUS at 10:04

## 2022-04-22 RX ADMIN — DONEPEZIL HYDROCHLORIDE 10 MG: 5 TABLET ORAL at 09:04

## 2022-04-22 RX ADMIN — AMOXICILLIN AND CLAVULANATE POTASSIUM 500 MG: 500; 125 TABLET, FILM COATED ORAL at 10:04

## 2022-04-22 NOTE — HPI
Patient is a 89-year-old male with a history of prostate cancer , CKD 3, dementia who presents today accompanied by his son for hematemesis.  Patient had an admission a few days ago at this hospital for the same.  During that admission he had an EGD which demonstrated mild esophagitis as well as gastric polyps which were biopsied.  He was discharged home on b.i.d. PPI and his Plavix was held.  During that hospital stay he also had urinary retention and hematuria for which Neumann catheter was placed and outpatient Urology follow-up was arranged.  He also had pneumonia and was treated with IV antibiotics and is currently on Augmentin for the same.  Patient's son reports that this morning he complained of some shortness of breath.  This seemed to resolved after patient belched multiple times.  He was doing well the rest of the morning and then had 2 episodes of hematemesis this afternoon.  He had another episode in the emergency department.  No loss of consciousness, no weakness, no lethargy.  Patient is acting like his usual self per his son.  Hemoglobin in the ED is 7.6, it was 8.4 on discharge.  Creatinine is 2.2.  This is improved from his discharge creatinine.  History is provided by his son given patient's dementia.

## 2022-04-22 NOTE — H&P
[facility] Medicine  History & Physical    Patient Name: Troy Yuan Sr.  MRN: [id number]  Patient Class: OP- Observation  Admission Date: 4/22/2022  Attending Physician: Danay Farmer MD  Primary Care Provider: Gentry Encinas MD         Patient information was obtained from patient, relative(s), past medical records and ER records.     Subjective:     Principal Problem:Upper GI bleed    Chief Complaint:   Chief Complaint   Patient presents with    Hematemesis     Pt to ER with c/o hematemesis starting back up this morning. Pt reports discharged from hospital on Wednesday for same complaint.         HPI: Patient is a 89-year-old male with a history of prostate cancer , CKD 3, dementia who presents today accompanied by his son for hematemesis.  Patient had an admission a few days ago at this hospital for the same.  During that admission he had an EGD which demonstrated mild esophagitis as well as gastric polyps which were biopsied.  He was discharged home on b.i.d. PPI and his Plavix was held.  During that hospital stay he also had urinary retention and hematuria for which Neumann catheter was placed and outpatient Urology follow-up was arranged.  He also had pneumonia and was treated with IV antibiotics and is currently on Augmentin for the same.  Patient's son reports that this morning he complained of some shortness of breath.  This seemed to resolved after patient belched multiple times.  He was doing well the rest of the morning and then had 2 episodes of hematemesis this afternoon.  He had another episode in the emergency department.  No loss of consciousness, no weakness, no lethargy.  Patient is acting like his usual self per his son.  Hemoglobin in the ED is 7.6, it was 8.4 on discharge.  Creatinine is 2.2.  This is improved from his discharge creatinine.  History is provided by his son given patient's dementia.      Past Medical History:   Diagnosis Date    Bladder stones      Cancer     Encounter for blood transfusion     Hypertension     Kidney stone     Prostate cancer 2004    Radiation 2005    prostate    Stage 3 chronic kidney disease 1/11/2019       Past Surgical History:   Procedure Laterality Date    CYSTOSCOPY      ESOPHAGOGASTRODUODENOSCOPY N/A 4/19/2022    Procedure: EGD (ESOPHAGOGASTRODUODENOSCOPY);  Surgeon: Sandor Sewell MD;  Location: [facility];  Service: Endoscopy;  Laterality: N/A;    KIDNEY STONE SURGERY  May 2014       Review of patient's allergies indicates:   Allergen Reactions    Soma [carisoprodol] Rash    Aspirin     Sulfa (sulfonamide antibiotics) Rash       No current facility-administered medications on file prior to encounter.     Current Outpatient Medications on File Prior to Encounter   Medication Sig    amlodipine (NORVASC) 10 MG tablet Take 10 mg by mouth once daily.    amoxicillin-clavulanate 500-125mg (AUGMENTIN) 500-125 mg Tab Take 1 tablet (500 mg total) by mouth 2 (two) times daily. for 5 days    atorvastatin (LIPITOR) 20 MG tablet Take 1 tablet (20 mg total) by mouth once daily.    calcitRIOL (ROCALTROL) 0.25 MCG Cap Take 0.25 mcg by mouth once daily.     donepeziL (ARICEPT) 10 MG tablet Take 1 tablet (10 mg total) by mouth every evening.    epoetin radha (PROCRIT) 3,000 unit/mL injection Inject 3,000 Units into the skin every 14 (fourteen) days. Per Dr. Hernandez Mckenzie    ferrous sulfate (FEOSOL) 325 mg (65 mg iron) Tab tablet Take 1 tablet (325 mg total) by mouth 2 (two) times daily.    furosemide (LASIX) 20 MG tablet Take 1 tablet (20 mg total) by mouth once daily.    losartan (COZAAR) 25 MG tablet TAKE 1 TABLET(25 MG) BY MOUTH EVERY DAY    melatonin 10 mg Tab Take 10 mg by mouth every evening.    pantoprazole (PROTONIX) 40 MG tablet Take 1 tablet (40 mg total) by mouth 2 (two) times daily.    [DISCONTINUED] clopidogreL (PLAVIX) 75 mg tablet Take 1 tablet (75 mg total) by mouth once daily. (Patient not taking: No sig  reported)    [DISCONTINUED] omeprazole (PRILOSEC) 20 MG capsule Take 1 capsule (20 mg total) by mouth 2 (two) times a day.     Family History       Problem Relation (Age of Onset)    Asthma Son    Diabetes Daughter    Hypertension Daughter, Son          Tobacco Use    Smoking status: Former Smoker     Years: 40.00     Types: Cigarettes     Start date: 9/26/1985    Smokeless tobacco: Never Used   Substance and Sexual Activity    Alcohol use: No    Drug use: No    Sexual activity: Never     Review of Systems   Unable to perform ROS: Dementia   Objective:     Vital Signs (Most Recent):  Temp: 99.1 °F (37.3 °C) (04/22/22 1340)  Pulse: 72 (04/22/22 1340)  Resp: 18 (04/22/22 1340)  BP: (!) 120/59 (04/22/22 1340)  SpO2: 98 % (04/22/22 1340)   Vital Signs (24h Range):  Temp:  [99.1 °F (37.3 °C)] 99.1 °F (37.3 °C)  Pulse:  [72] 72  Resp:  [18] 18  SpO2:  [98 %] 98 %  BP: (120)/(59) 120/59     Weight: 66.7 kg (147 lb)  Body mass index is 23.73 kg/m².    Physical Exam  Constitutional:       General: He is not in acute distress.     Appearance: He is well-developed.   HENT:      Head: Normocephalic and atraumatic.   Eyes:      Pupils: Pupils are equal, round, and reactive to light.   Cardiovascular:      Rate and Rhythm: Normal rate and regular rhythm.      Heart sounds: No murmur heard.  Pulmonary:      Effort: Pulmonary effort is normal. No respiratory distress.      Breath sounds: Normal breath sounds. No wheezing or rales.   Abdominal:      General: Bowel sounds are normal. There is no distension.      Palpations: Abdomen is soft.      Tenderness: There is no abdominal tenderness.   Genitourinary:     Comments: Foely in place.  Musculoskeletal:         General: Normal range of motion.   Skin:     General: Skin is warm and dry.      Findings: No rash.   Neurological:      Mental Status: He is alert.      Cranial Nerves: No cranial nerve deficit.      Comments: dementia   Psychiatric:         Behavior: Behavior normal.  "        CRANIAL NERVES     CN III, IV, VI   Pupils are equal, round, and reactive to light.     Significant Labs: All pertinent labs within the past 24 hours have been reviewed.    Significant Imaging: I have reviewed all pertinent imaging results/findings within the past 24 hours.    Assessment/Plan:     * Upper GI bleed  GI bleed pathway  NPO  Protonix gtt  GI consulted  Trend CBC      CKD (chronic kidney disease), stage III  Creatine stable for now. BMP reviewed- noted Estimated Creatinine Clearance: 18.9 mL/min (A) (based on SCr of 2.2 mg/dL (H)). according to latest data. Monitor UOP and serial BMP and adjust therapy as needed. Renally dose meds.            Urinary retention  Continue yost  Outpatient urology follow up      Dementia without behavioral disturbance  Dementia is controlled currently. Continue home dementia meds and non-pharmacologic interventions to prevent delirium (No VS between 11PM-5AM, activity during day, opening blinds, providing glasses/hearing aids, and up in chair during daytime). Use PRN anti-psychotics to prevent behavior of self harm during sundowning, and avoid narcotics and benzos unless absolutely necessary. PRN anti-psychotics prescribed to avoid self harm behaviors.        Chronic combined systolic and diastolic heart failure  Appears euvolemic at this time. Monitor volume status closely      Anemia secondary to GIB  Follow CBC   See "upper GI bleed"        VTE Risk Mitigation (From admission, onward)    None             Danay Farmer MD  Department of Hospital Medicine   [facility] - Emergency Dept  "

## 2022-04-22 NOTE — ASSESSMENT & PLAN NOTE
Creatine stable for now. BMP reviewed- noted Estimated Creatinine Clearance: 18.9 mL/min (A) (based on SCr of 2.2 mg/dL (H)). according to latest data. Monitor UOP and serial BMP and adjust therapy as needed. Renally dose meds.

## 2022-04-22 NOTE — SUBJECTIVE & OBJECTIVE
Past Medical History:   Diagnosis Date    Bladder stones     Cancer     Encounter for blood transfusion     Hypertension     Kidney stone     Prostate cancer 2004    Radiation 2005    prostate    Stage 3 chronic kidney disease 1/11/2019       Past Surgical History:   Procedure Laterality Date    CYSTOSCOPY      ESOPHAGOGASTRODUODENOSCOPY N/A 4/19/2022    Procedure: EGD (ESOPHAGOGASTRODUODENOSCOPY);  Surgeon: Sandor Sewell MD;  Location: [facility];  Service: Endoscopy;  Laterality: N/A;    KIDNEY STONE SURGERY  May 2014       Review of patient's allergies indicates:   Allergen Reactions    Soma [carisoprodol] Rash    Aspirin     Sulfa (sulfonamide antibiotics) Rash       No current facility-administered medications on file prior to encounter.     Current Outpatient Medications on File Prior to Encounter   Medication Sig    amlodipine (NORVASC) 10 MG tablet Take 10 mg by mouth once daily.    amoxicillin-clavulanate 500-125mg (AUGMENTIN) 500-125 mg Tab Take 1 tablet (500 mg total) by mouth 2 (two) times daily. for 5 days    atorvastatin (LIPITOR) 20 MG tablet Take 1 tablet (20 mg total) by mouth once daily.    calcitRIOL (ROCALTROL) 0.25 MCG Cap Take 0.25 mcg by mouth once daily.     donepeziL (ARICEPT) 10 MG tablet Take 1 tablet (10 mg total) by mouth every evening.    epoetin radha (PROCRIT) 3,000 unit/mL injection Inject 3,000 Units into the skin every 14 (fourteen) days. Per Dr. Hernandez Mckenzie    ferrous sulfate (FEOSOL) 325 mg (65 mg iron) Tab tablet Take 1 tablet (325 mg total) by mouth 2 (two) times daily.    furosemide (LASIX) 20 MG tablet Take 1 tablet (20 mg total) by mouth once daily.    losartan (COZAAR) 25 MG tablet TAKE 1 TABLET(25 MG) BY MOUTH EVERY DAY    melatonin 10 mg Tab Take 10 mg by mouth every evening.    pantoprazole (PROTONIX) 40 MG tablet Take 1 tablet (40 mg total) by mouth 2 (two) times daily.    [DISCONTINUED] clopidogreL (PLAVIX) 75 mg tablet Take 1 tablet (75 mg total) by mouth once  daily. (Patient not taking: No sig reported)    [DISCONTINUED] omeprazole (PRILOSEC) 20 MG capsule Take 1 capsule (20 mg total) by mouth 2 (two) times a day.     Family History       Problem Relation (Age of Onset)    Asthma Son    Diabetes Daughter    Hypertension Daughter, Son          Tobacco Use    Smoking status: Former Smoker     Years: 40.00     Types: Cigarettes     Start date: 9/26/1985    Smokeless tobacco: Never Used   Substance and Sexual Activity    Alcohol use: No    Drug use: No    Sexual activity: Never     Review of Systems   Unable to perform ROS: Dementia   Objective:     Vital Signs (Most Recent):  Temp: 99.1 °F (37.3 °C) (04/22/22 1340)  Pulse: 72 (04/22/22 1340)  Resp: 18 (04/22/22 1340)  BP: (!) 120/59 (04/22/22 1340)  SpO2: 98 % (04/22/22 1340)   Vital Signs (24h Range):  Temp:  [99.1 °F (37.3 °C)] 99.1 °F (37.3 °C)  Pulse:  [72] 72  Resp:  [18] 18  SpO2:  [98 %] 98 %  BP: (120)/(59) 120/59     Weight: 66.7 kg (147 lb)  Body mass index is 23.73 kg/m².    Physical Exam  Constitutional:       General: He is not in acute distress.     Appearance: He is well-developed.   HENT:      Head: Normocephalic and atraumatic.   Eyes:      Pupils: Pupils are equal, round, and reactive to light.   Cardiovascular:      Rate and Rhythm: Normal rate and regular rhythm.      Heart sounds: No murmur heard.  Pulmonary:      Effort: Pulmonary effort is normal. No respiratory distress.      Breath sounds: Normal breath sounds. No wheezing or rales.   Abdominal:      General: Bowel sounds are normal. There is no distension.      Palpations: Abdomen is soft.      Tenderness: There is no abdominal tenderness.   Genitourinary:     Comments: Foely in place.  Musculoskeletal:         General: Normal range of motion.   Skin:     General: Skin is warm and dry.      Findings: No rash.   Neurological:      Mental Status: He is alert.      Cranial Nerves: No cranial nerve deficit.      Comments: dementia   Psychiatric:          Behavior: Behavior normal.         CRANIAL NERVES     CN III, IV, VI   Pupils are equal, round, and reactive to light.     Significant Labs: All pertinent labs within the past 24 hours have been reviewed.    Significant Imaging: I have reviewed all pertinent imaging results/findings within the past 24 hours.

## 2022-04-22 NOTE — TELEPHONE ENCOUNTER
"Caller states that pt has heartburn, caller denies heart burn complaint longer than 10 minutes and states that symptoms are improving. Caller has questions about OTC medication for reflux. Caller also has questions about yost catheter being replace with leg bag, caller told to contact PCP. Please contact pt daughter for further assistance.  Pt advised per protocol and verbalized understanding.    Reason for Disposition   Abdominal pain (all triage questions negative)    Additional Information   Negative: Severe difficulty breathing (e.g., struggling for each breath, speaks in single words)   Negative: Shock suspected (e.g., cold/pale/clammy skin, too weak to stand, low BP, rapid pulse)   Negative: Difficult to awaken or acting confused  (e.g., disoriented, slurred speech)   Negative: Passed out (i.e., lost consciousness, collapsed and was not responding)   Negative: Visible sweat on face or sweat dripping down face   Negative: Sounds like a life-threatening emergency to the triager   Negative: [1] SEVERE pain (e.g., excruciating) AND [2] present > 1 hour   Negative: [1] Pain lasts > 10 minutes AND [2] age > 50   Negative: [1] Pain lasts > 10 minutes AND [2] age > 40 AND [3] associated chest, arm, neck, upper back or jaw pain   Negative: [1] Pain lasts > 10 minutes AND [2] age > 35 AND [3] at least one cardiac risk factor (i.e., hypertension, diabetes, obesity, smoker or strong family history of heart disease)   Negative: [1] Pain lasts > 10 minutes AND [2] history of heart disease (i.e., heart attack, bypass surgery, angina, angioplasty, CHF; not just a heart murmur)   Negative: [1] Pain lasts > 10 minutes AND [2] difficulty breathing   Negative: [1] Vomiting AND [2] contains red blood  (Exception: few streaks and only occurred once)   Negative: [1] Vomiting AND [2] contains black ("coffee ground") material   Negative: Blood in bowel movements  (Exception: Blood on surface of BM with " constipation)   Negative: Black or tarry bowel movements  (Exception: chronic-unchanged  black-grey bowel movements AND is taking iron pills or Pepto-bismol)   Negative: [1] Pregnant > 24 weeks AND [2] hand or face swelling   Negative: Patient sounds very sick or weak to the triager   Negative: [1] MILD-MODERATE pain AND [2] constant AND [3] present > 2 hours   Negative: [1] MILD-MODERATE pain AND [2] not relieved by antacids   Negative: [1] Vomiting AND [2] contains bile (green color)   Negative: [1] Vomiting AND [2] abdomen looks much more swollen than usual   Negative: White of the eyes have turned yellow (i.e., jaundice)   Negative: Fever > 103 F (39.4 C)   Negative: [1] Fever > 101 F (38.3 C) AND [2] age > 60   Negative: [1] Fever > 101 F (38.3 C) AND [2] bedridden (e.g., nursing home patient, CVA, chronic illness, recovering from surgery)   Negative: [1] Fever > 100.5 F (38.1 C) AND [2] diabetes mellitus or weak immune system (e.g., HIV positive, cancer chemo, splenectomy, organ transplant, chronic steroids)   Negative: [1] MODERATE pain (e.g., interferes with normal activities) AND [2] comes and goes (cramps) AND [3] present > 24 hours  (Exception: pain with Vomiting or Diarrhea - see that Guideline)   Negative: [1] MILD pain (e.g., does not interfere with normal activities) AND [2] comes and goes (cramps) AND [3] present > 72 hours   Negative: Alcohol abuse known or suspected   Negative: [1] Abdominal pain is intermittent AND [2] shoots into chest, with sour taste in mouth   Negative: Abdominal pain is a chronic symptom (recurrent or ongoing AND present > 4 weeks)   Negative: Abdominal pains regularly occur about 1 hour after meals    Protocols used: ST ABDOMINAL PAIN - [facility]-

## 2022-04-22 NOTE — ED PROVIDER NOTES
Chief complaint:  Hematemesis (Pt to ER with c/o hematemesis starting back up this morning. Pt reports discharged from hospital on Wednesday for same complaint. )      HPI:  Troy Yuan Sr. is a 89 y.o. male with hx CHF, CKD, dementia, and prostate CA previously on clopidogrel and aspirin (currently held), recently admitted for hematemesis with EGD 4/19/22 showing no active bleeding with esophagitis and biopsied gastric polyps presenting with recurrent hematemesis.  Patient d/c on PPI as well as augmentin for aspiration pna.  Also d/c with Neumann due to urinary retention.  Patient has been out of the hospital several days with 1 episode of recurrent, dark emesis prior to arrival as well as subsequent single episode since arrival.  He has no other complaints.  He continues on PPI.  No persistent hematuria.    ROS: As per HPI and below:  No change in mental status, headache, chest pain, dyspnea, abdominal pain, fever, dysuria, swelling. The patient/family denies blurry vision, dysphagia, joint swelling, easy bruising.    Review of patient's allergies indicates:   Allergen Reactions    Soma [carisoprodol] Rash    Aspirin     Sulfa (sulfonamide antibiotics) Rash       Current Discharge Medication List      CONTINUE these medications which have NOT CHANGED    Details   amlodipine (NORVASC) 10 MG tablet Take 10 mg by mouth once daily.      amoxicillin-clavulanate 500-125mg (AUGMENTIN) 500-125 mg Tab Take 1 tablet (500 mg total) by mouth 2 (two) times daily. for 5 days  Qty: 10 tablet, Refills: 0      atorvastatin (LIPITOR) 20 MG tablet Take 1 tablet (20 mg total) by mouth once daily.  Qty: 30 tablet, Refills: 1      calcitRIOL (ROCALTROL) 0.25 MCG Cap Take 0.25 mcg by mouth once daily.       donepeziL (ARICEPT) 10 MG tablet Take 1 tablet (10 mg total) by mouth every evening.  Qty: 90 tablet, Refills: 3    Associated Diagnoses: Alzheimer's dementia without behavioral disturbance, unspecified timing of dementia onset       ferrous sulfate (FEOSOL) 325 mg (65 mg iron) Tab tablet Take 1 tablet (325 mg total) by mouth 2 (two) times daily.  Qty: 60 tablet, Refills: 1      furosemide (LASIX) 20 MG tablet Take 1 tablet (20 mg total) by mouth once daily.  Qty: 30 tablet, Refills: 0      losartan (COZAAR) 25 MG tablet TAKE 1 TABLET(25 MG) BY MOUTH EVERY DAY  Qty: 90 tablet, Refills: 0    Comments: **Patient requests 90 days supply**      melatonin 10 mg Tab Take 10 mg by mouth every evening.      pantoprazole (PROTONIX) 40 MG tablet Take 1 tablet (40 mg total) by mouth 2 (two) times daily.  Qty: 30 tablet, Refills: 2      epoetin radha (PROCRIT) 3,000 unit/mL injection Inject 3,000 Units into the skin every 14 (fourteen) days. Per Dr. Hernandez Mckenzie         STOP taking these medications       clopidogreL (PLAVIX) 75 mg tablet Comments:   Reason for Stopping:         omeprazole (PRILOSEC) 20 MG capsule Comments:   Reason for Stopping:               PMH:  As per HPI and below:  Past Medical History:   Diagnosis Date    Bladder stones     Cancer     Encounter for blood transfusion     Hypertension     Kidney stone     Prostate cancer 2004    Radiation 2005    prostate    Stage 3 chronic kidney disease 1/11/2019     Past Surgical History:   Procedure Laterality Date    CYSTOSCOPY      ESOPHAGOGASTRODUODENOSCOPY N/A 4/19/2022    Procedure: EGD (ESOPHAGOGASTRODUODENOSCOPY);  Surgeon: Sandor Sewell MD;  Location: [facility];  Service: Endoscopy;  Laterality: N/A;    KIDNEY STONE SURGERY  May 2014       Social History     Socioeconomic History    Marital status:    Tobacco Use    Smoking status: Former Smoker     Years: 40.00     Types: Cigarettes     Start date: 9/26/1985    Smokeless tobacco: Never Used   Substance and Sexual Activity    Alcohol use: No    Drug use: No    Sexual activity: Never       Family History   Problem Relation Age of Onset    Hypertension Daughter     Diabetes Daughter     Hypertension Son      Asthma Son        Physical Exam:    Vitals:    04/22/22 2038   BP: (!) 184/74   Pulse: 71   Resp: 18   Temp: 98.6 °F (37 °C)     GENERAL:  No apparent distress.  Alert.    HEENT:  Moist mucous membranes.  Normocephalic and atraumatic.  Dark blood noted on patient's tongue.  NECK:  No swelling.  Midline trachea.   CARDIOVASCULAR:  Regular rate and rhythm.  2+ radial pulses.    PULMONARY:  Lungs clear to auscultation bilaterally.  No wheezes, rales, or rhonci.  Unlabored respirations.  ABDOMEN:  Non-tender and non-distended.    EXTREMITIES:  Warm and well perfused.  Brisk capillary refill.  No edema.  NEUROLOGICAL:  Normal mental status.  Appropriate and conversant.    SKIN:  No rashes or ecchymoses.    BACK:  Atraumatic.  No CVA tenderness to palpation.      Labs Reviewed   CBC W/ AUTO DIFFERENTIAL - Abnormal; Notable for the following components:       Result Value    RBC 2.69 (*)     Hemoglobin 7.6 (*)     Hematocrit 24.7 (*)     MCHC 30.8 (*)     RDW 17.2 (*)     Gran # (ANC) 10.2 (*)     Immature Grans (Abs) 0.05 (*)     Lymph # 0.5 (*)     Gran % 90.4 (*)     Lymph % 4.7 (*)     Mono % 3.9 (*)     All other components within normal limits   BASIC METABOLIC PANEL - Abnormal; Notable for the following components:    Glucose 134 (*)     BUN 53 (*)     Creatinine 2.2 (*)     eGFR if  29 (*)     eGFR if non  25 (*)     All other components within normal limits   SARS-COV-2 RNA AMPLIFICATION, QUAL   SARS-COV-2 RDRP GENE   TYPE & SCREEN       Current Discharge Medication List      CONTINUE these medications which have NOT CHANGED    Details   amlodipine (NORVASC) 10 MG tablet Take 10 mg by mouth once daily.      amoxicillin-clavulanate 500-125mg (AUGMENTIN) 500-125 mg Tab Take 1 tablet (500 mg total) by mouth 2 (two) times daily. for 5 days  Qty: 10 tablet, Refills: 0      atorvastatin (LIPITOR) 20 MG tablet Take 1 tablet (20 mg total) by mouth once daily.  Qty: 30 tablet, Refills:  1      calcitRIOL (ROCALTROL) 0.25 MCG Cap Take 0.25 mcg by mouth once daily.       donepeziL (ARICEPT) 10 MG tablet Take 1 tablet (10 mg total) by mouth every evening.  Qty: 90 tablet, Refills: 3    Associated Diagnoses: Alzheimer's dementia without behavioral disturbance, unspecified timing of dementia onset      ferrous sulfate (FEOSOL) 325 mg (65 mg iron) Tab tablet Take 1 tablet (325 mg total) by mouth 2 (two) times daily.  Qty: 60 tablet, Refills: 1      furosemide (LASIX) 20 MG tablet Take 1 tablet (20 mg total) by mouth once daily.  Qty: 30 tablet, Refills: 0      losartan (COZAAR) 25 MG tablet TAKE 1 TABLET(25 MG) BY MOUTH EVERY DAY  Qty: 90 tablet, Refills: 0    Comments: **Patient requests 90 days supply**      melatonin 10 mg Tab Take 10 mg by mouth every evening.      pantoprazole (PROTONIX) 40 MG tablet Take 1 tablet (40 mg total) by mouth 2 (two) times daily.  Qty: 30 tablet, Refills: 2      epoetin radha (PROCRIT) 3,000 unit/mL injection Inject 3,000 Units into the skin every 14 (fourteen) days. Per Dr. Hernandez Mckenzie         STOP taking these medications       clopidogreL (PLAVIX) 75 mg tablet Comments:   Reason for Stopping:         omeprazole (PRILOSEC) 20 MG capsule Comments:   Reason for Stopping:               Orders Placed This Encounter   Procedures    CBC Auto Differential    Basic Metabolic Panel    CBC auto differential    CBC auto differential    Basic metabolic panel    COVID-19 Rapid Screening    Diet NPO Except for: Sips with Medication    Tele: Maintain IV access while on telemetry - Adult    Cardiac Monitoring - Adult    Vital signs    Orthostatic blood pressure With vital signs    Check Type and Screen complete    Place sequential compression device    Check consent to blood transfusion    Full code    Inpatient consult to Gastroenterology    POCT COVID-19 Rapid Screening    Type & Screen    Insert peripheral IV    Large Bore IV Access    Place in Observation        Imaging Results    None              MDM:    89 y.o. male with recurrent hematemesis marked by coffee-ground material.  He is not in any distress.  I will reassess hemoglobin to determine need for transfusion and monitor further volume losses.  He will likely need to be admitted for observation due to significant comorbidities to ensure no progression of GI bleed.  He is already on PPI.  Recent endoscopy did not show active bleeding with gastric polyps and esophagitis both noted.    Patient had no further hematemesis in the emergency department.  I will admit for serial hemoglobin and monitoring.  I do not he requires emergent endoscopy from the ED.  I have discussed with hospital medicine who will assume care.    Diagnoses:    1. Upper GI bleed     Sadi Chapa MD  04/22/22 5947

## 2022-04-23 LAB
ANION GAP SERPL CALC-SCNC: 9 MMOL/L (ref 8–16)
BASOPHILS # BLD AUTO: 0.01 K/UL (ref 0–0.2)
BASOPHILS # BLD AUTO: 0.01 K/UL (ref 0–0.2)
BASOPHILS NFR BLD: 0.1 % (ref 0–1.9)
BASOPHILS NFR BLD: 0.1 % (ref 0–1.9)
BLD PROD TYP BPU: NORMAL
BLD PROD TYP BPU: NORMAL
BLOOD UNIT EXPIRATION DATE: NORMAL
BLOOD UNIT EXPIRATION DATE: NORMAL
BLOOD UNIT TYPE CODE: 600
BLOOD UNIT TYPE CODE: 6200
BLOOD UNIT TYPE: NORMAL
BLOOD UNIT TYPE: NORMAL
BUN SERPL-MCNC: 57 MG/DL (ref 10–30)
CALCIUM SERPL-MCNC: 8.7 MG/DL (ref 8.7–10.5)
CHLORIDE SERPL-SCNC: 105 MMOL/L (ref 95–110)
CO2 SERPL-SCNC: 27 MMOL/L (ref 23–29)
CODING SYSTEM: NORMAL
CODING SYSTEM: NORMAL
CREAT SERPL-MCNC: 2.4 MG/DL (ref 0.5–1.4)
DIFFERENTIAL METHOD: ABNORMAL
DIFFERENTIAL METHOD: ABNORMAL
DISPENSE STATUS: NORMAL
DISPENSE STATUS: NORMAL
EOSINOPHIL # BLD AUTO: 0.3 K/UL (ref 0–0.5)
EOSINOPHIL # BLD AUTO: 0.5 K/UL (ref 0–0.5)
EOSINOPHIL NFR BLD: 3.3 % (ref 0–8)
EOSINOPHIL NFR BLD: 4.9 % (ref 0–8)
ERYTHROCYTE [DISTWIDTH] IN BLOOD BY AUTOMATED COUNT: 17 % (ref 11.5–14.5)
ERYTHROCYTE [DISTWIDTH] IN BLOOD BY AUTOMATED COUNT: 17.2 % (ref 11.5–14.5)
EST. GFR  (AFRICAN AMERICAN): 26 ML/MIN/1.73 M^2
EST. GFR  (NON AFRICAN AMERICAN): 22 ML/MIN/1.73 M^2
GLUCOSE SERPL-MCNC: 105 MG/DL (ref 70–110)
HCT VFR BLD AUTO: 19.4 % (ref 40–54)
HCT VFR BLD AUTO: 26.5 % (ref 40–54)
HGB BLD-MCNC: 6.2 G/DL (ref 14–18)
HGB BLD-MCNC: 8.6 G/DL (ref 14–18)
IMM GRANULOCYTES # BLD AUTO: 0.03 K/UL (ref 0–0.04)
IMM GRANULOCYTES # BLD AUTO: 0.07 K/UL (ref 0–0.04)
IMM GRANULOCYTES NFR BLD AUTO: 0.4 % (ref 0–0.5)
IMM GRANULOCYTES NFR BLD AUTO: 0.8 % (ref 0–0.5)
LYMPHOCYTES # BLD AUTO: 1 K/UL (ref 1–4.8)
LYMPHOCYTES # BLD AUTO: 1.1 K/UL (ref 1–4.8)
LYMPHOCYTES NFR BLD: 10.8 % (ref 18–48)
LYMPHOCYTES NFR BLD: 13.8 % (ref 18–48)
MCH RBC QN AUTO: 28.8 PG (ref 27–31)
MCH RBC QN AUTO: 29.1 PG (ref 27–31)
MCHC RBC AUTO-ENTMCNC: 32 G/DL (ref 32–36)
MCHC RBC AUTO-ENTMCNC: 32.5 G/DL (ref 32–36)
MCV RBC AUTO: 89 FL (ref 82–98)
MCV RBC AUTO: 91 FL (ref 82–98)
MONOCYTES # BLD AUTO: 0.7 K/UL (ref 0.3–1)
MONOCYTES # BLD AUTO: 0.9 K/UL (ref 0.3–1)
MONOCYTES NFR BLD: 8.5 % (ref 4–15)
MONOCYTES NFR BLD: 9.3 % (ref 4–15)
NEUTROPHILS # BLD AUTO: 5.9 K/UL (ref 1.8–7.7)
NEUTROPHILS # BLD AUTO: 6.8 K/UL (ref 1.8–7.7)
NEUTROPHILS NFR BLD: 73.9 % (ref 38–73)
NEUTROPHILS NFR BLD: 74.1 % (ref 38–73)
NRBC BLD-RTO: 0 /100 WBC
NRBC BLD-RTO: 0 /100 WBC
NUM UNITS TRANS PACKED RBC: NORMAL
NUM UNITS TRANS PACKED RBC: NORMAL
PLATELET # BLD AUTO: 208 K/UL (ref 150–450)
PLATELET # BLD AUTO: 217 K/UL (ref 150–450)
PMV BLD AUTO: 10.8 FL (ref 9.2–12.9)
PMV BLD AUTO: 9.8 FL (ref 9.2–12.9)
POTASSIUM SERPL-SCNC: 3.9 MMOL/L (ref 3.5–5.1)
RBC # BLD AUTO: 2.13 M/UL (ref 4.6–6.2)
RBC # BLD AUTO: 2.99 M/UL (ref 4.6–6.2)
SODIUM SERPL-SCNC: 141 MMOL/L (ref 136–145)
WBC # BLD AUTO: 7.91 K/UL (ref 3.9–12.7)
WBC # BLD AUTO: 9.18 K/UL (ref 3.9–12.7)

## 2022-04-23 PROCEDURE — 63600175 PHARM REV CODE 636 W HCPCS: Performed by: HOSPITALIST

## 2022-04-23 PROCEDURE — 25000003 PHARM REV CODE 250: Performed by: NURSE PRACTITIONER

## 2022-04-23 PROCEDURE — 36430 TRANSFUSION BLD/BLD COMPNT: CPT

## 2022-04-23 PROCEDURE — 99223 1ST HOSP IP/OBS HIGH 75: CPT | Mod: ,,, | Performed by: INTERNAL MEDICINE

## 2022-04-23 PROCEDURE — 63600175 PHARM REV CODE 636 W HCPCS

## 2022-04-23 PROCEDURE — 11000001 HC ACUTE MED/SURG PRIVATE ROOM

## 2022-04-23 PROCEDURE — 85025 COMPLETE CBC W/AUTO DIFF WBC: CPT | Mod: 91 | Performed by: HOSPITALIST

## 2022-04-23 PROCEDURE — 96366 THER/PROPH/DIAG IV INF ADDON: CPT | Performed by: EMERGENCY MEDICINE

## 2022-04-23 PROCEDURE — 96375 TX/PRO/DX INJ NEW DRUG ADDON: CPT | Performed by: EMERGENCY MEDICINE

## 2022-04-23 PROCEDURE — 99223 PR INITIAL HOSPITAL CARE,LEVL III: ICD-10-PCS | Mod: ,,, | Performed by: INTERNAL MEDICINE

## 2022-04-23 PROCEDURE — 36415 COLL VENOUS BLD VENIPUNCTURE: CPT | Performed by: HOSPITALIST

## 2022-04-23 PROCEDURE — 80048 BASIC METABOLIC PNL TOTAL CA: CPT | Performed by: HOSPITALIST

## 2022-04-23 PROCEDURE — C9113 INJ PANTOPRAZOLE SODIUM, VIA: HCPCS | Performed by: HOSPITALIST

## 2022-04-23 PROCEDURE — 25000003 PHARM REV CODE 250: Performed by: HOSPITALIST

## 2022-04-23 PROCEDURE — P9016 RBC LEUKOCYTES REDUCED: HCPCS | Performed by: HOSPITALIST

## 2022-04-23 PROCEDURE — 63600175 PHARM REV CODE 636 W HCPCS: Performed by: NURSE PRACTITIONER

## 2022-04-23 RX ORDER — HYDRALAZINE HYDROCHLORIDE 20 MG/ML
10 INJECTION INTRAMUSCULAR; INTRAVENOUS EVERY 6 HOURS PRN
Status: DISCONTINUED | OUTPATIENT
Start: 2022-04-23 | End: 2022-04-26 | Stop reason: HOSPADM

## 2022-04-23 RX ORDER — OLANZAPINE 5 MG/1
10 TABLET, ORALLY DISINTEGRATING ORAL NIGHTLY PRN
Status: DISCONTINUED | OUTPATIENT
Start: 2022-04-23 | End: 2022-04-26

## 2022-04-23 RX ORDER — HYDROCODONE BITARTRATE AND ACETAMINOPHEN 500; 5 MG/1; MG/1
TABLET ORAL
Status: DISCONTINUED | OUTPATIENT
Start: 2022-04-23 | End: 2022-04-26 | Stop reason: HOSPADM

## 2022-04-23 RX ORDER — TALC
9 POWDER (GRAM) TOPICAL NIGHTLY PRN
Status: DISCONTINUED | OUTPATIENT
Start: 2022-04-23 | End: 2022-04-26 | Stop reason: HOSPADM

## 2022-04-23 RX ORDER — HYDRALAZINE HYDROCHLORIDE 20 MG/ML
5 INJECTION INTRAMUSCULAR; INTRAVENOUS ONCE
Status: COMPLETED | OUTPATIENT
Start: 2022-04-23 | End: 2022-04-23

## 2022-04-23 RX ADMIN — AMOXICILLIN AND CLAVULANATE POTASSIUM 500 MG: 500; 125 TABLET, FILM COATED ORAL at 11:04

## 2022-04-23 RX ADMIN — HYDRALAZINE HYDROCHLORIDE 10 MG: 20 INJECTION, SOLUTION INTRAMUSCULAR; INTRAVENOUS at 10:04

## 2022-04-23 RX ADMIN — HYDRALAZINE HYDROCHLORIDE 5 MG: 20 INJECTION, SOLUTION INTRAMUSCULAR; INTRAVENOUS at 12:04

## 2022-04-23 RX ADMIN — OLANZAPINE 10 MG: 5 TABLET, ORALLY DISINTEGRATING ORAL at 11:04

## 2022-04-23 RX ADMIN — AMOXICILLIN AND CLAVULANATE POTASSIUM 500 MG: 500; 125 TABLET, FILM COATED ORAL at 09:04

## 2022-04-23 RX ADMIN — SODIUM CHLORIDE 8 MG/HR: 9 INJECTION, SOLUTION INTRAVENOUS at 02:04

## 2022-04-23 RX ADMIN — DONEPEZIL HYDROCHLORIDE 10 MG: 5 TABLET ORAL at 09:04

## 2022-04-23 RX ADMIN — SODIUM CHLORIDE 8 MG/HR: 9 INJECTION, SOLUTION INTRAVENOUS at 06:04

## 2022-04-23 RX ADMIN — ATORVASTATIN CALCIUM 20 MG: 20 TABLET, FILM COATED ORAL at 11:04

## 2022-04-23 RX ADMIN — SODIUM CHLORIDE 8 MG/HR: 9 INJECTION, SOLUTION INTRAVENOUS at 11:04

## 2022-04-23 NOTE — CONSULTS
"CC: emesis    HPI 89 y.o. male with history of kidney stones, HTN, HLD, CKD, diverticulosis, inguinal hernia, chronic anemia, prostate CA s/p XRT here with acute onset dark hematemesis without associated abdominal pain. He has had recent admission for similar presentation where EGD which demonstrated mild esophagitis as well as gastric polyps which were biopsied. He was discharged home on b.i.d. PPI and his Plavix was held. After he went home patient's son reported 2 episodes of hematemesis afternoon prior to admission. He had another episode in the emergency department. No loss of consciousness, no weakness, no lethargy. H/H 7.6/24.7 on admission from  hgb 8.4 on last d/c. Overnight declined to 6.2/19.4.    Medical records reviewed. Additional history supplemented by nursing.     Past Medical History:   Diagnosis Date    Bladder stones     Cancer     Encounter for blood transfusion     Hypertension     Kidney stone     Prostate cancer 2004    Radiation 2005    prostate    Stage 3 chronic kidney disease 1/11/2019     Past Surgical History:   Procedure Laterality Date    CYSTOSCOPY      ESOPHAGOGASTRODUODENOSCOPY N/A 4/19/2022    Procedure: EGD (ESOPHAGOGASTRODUODENOSCOPY);  Surgeon: Sandor Sewell MD;  Location: [facility];  Service: Endoscopy;  Laterality: N/A;    KIDNEY STONE SURGERY  May 2014     Social History  Social History     Tobacco Use    Smoking status: Former Smoker     Years: 40.00     Types: Cigarettes     Start date: 9/26/1985    Smokeless tobacco: Never Used   Substance Use Topics    Alcohol use: No    Drug use: No     Family History   Problem Relation Age of Onset    Hypertension Daughter     Diabetes Daughter     Hypertension Son     Asthma Son      Review of Systems  Unable to obtain due to dementia    Physical Examination  BP (!) 167/67   Pulse 66   Temp 97.2 °F (36.2 °C) (Oral)   Resp 18   Ht 5' 6" (1.676 m)   Wt 68.3 kg (150 lb 9.2 oz)   SpO2 99%   BMI 24.30 kg/m² "   General appearance: confused, no distress  HENT: Normocephalic, atraumatic, neck symmetrical, no nasal discharge   Eyes: conjunctivae/corneas clear, PERRL, EOM's intact  Lungs: no labored breathing, symmetric chest wall expansion bilaterally  Heart: regular rate and rhythm without rub; no displacement of the PMI   Abdomen: soft, non-tender; bowel sounds normoactive; no organomegaly  Extremities: extremities symmetric; no clubbing, cyanosis, or edema  Integument: Skin color, texture, turgor normal; no rashes; hair distrubution normal  Neurologic: pleasantly confused, does not respond appropriately to questions due to dementia  Psychiatric: + evidence of impaired cognition     Labs:  Lab Results   Component Value Date    WBC 7.91 04/23/2022    HGB 6.2 (L) 04/23/2022    HCT 19.4 (LL) 04/23/2022    MCV 91 04/23/2022     04/23/2022     CMP  Sodium   Date Value Ref Range Status   04/23/2022 141 136 - 145 mmol/L Final     Potassium   Date Value Ref Range Status   04/23/2022 3.9 3.5 - 5.1 mmol/L Final     Chloride   Date Value Ref Range Status   04/23/2022 105 95 - 110 mmol/L Final     CO2   Date Value Ref Range Status   04/23/2022 27 23 - 29 mmol/L Final     Glucose   Date Value Ref Range Status   04/23/2022 105 70 - 110 mg/dL Final     BUN   Date Value Ref Range Status   04/23/2022 57 (H) 10 - 30 mg/dL Final     Creatinine   Date Value Ref Range Status   04/23/2022 2.4 (H) 0.5 - 1.4 mg/dL Final   11/14/2012 1.3 0.5 - 1.4 mg/dL Final     Calcium   Date Value Ref Range Status   04/23/2022 8.7 8.7 - 10.5 mg/dL Final   11/14/2012 9.3 8.7 - 10.5 mg/dL Final     Total Protein   Date Value Ref Range Status   04/19/2022 5.8 (L) 6.0 - 8.4 g/dL Final     Albumin   Date Value Ref Range Status   04/19/2022 2.8 (L) 3.5 - 5.2 g/dL Final     Total Bilirubin   Date Value Ref Range Status   04/19/2022 0.6 0.1 - 1.0 mg/dL Final     Comment:     For infants and newborns, interpretation of results should be based  on gestational  age, weight and in agreement with clinical  observations.    Premature Infant recommended reference ranges:  Up to 24 hours.............<8.0 mg/dL  Up to 48 hours............<12.0 mg/dL  3-5 days..................<15.0 mg/dL  6-29 days.................<15.0 mg/dL       Alkaline Phosphatase   Date Value Ref Range Status   04/19/2022 64 55 - 135 U/L Final   11/14/2012 54 (L) 55 - 135 U/L Final     AST   Date Value Ref Range Status   04/19/2022 23 10 - 40 U/L Final   11/14/2012 14 10 - 40 U/L Final     ALT   Date Value Ref Range Status   04/19/2022 12 10 - 44 U/L Final     Anion Gap   Date Value Ref Range Status   04/23/2022 9 8 - 16 mmol/L Final   11/14/2012 11 5 - 15 meq/L Final     eGFR if    Date Value Ref Range Status   04/23/2022 26 (A) >60 mL/min/1.73 m^2 Final     eGFR if non    Date Value Ref Range Status   04/23/2022 22 (A) >60 mL/min/1.73 m^2 Final     Comment:     Calculation used to obtain the estimated glomerular filtration  rate (eGFR) is the CKD-EPI equation.        Imaging:  US retroperitoneal:  Findings consistent with medical renal disease with increased renal cortical echogenicity and elevated resistive indices.  Bilateral renal cysts some complex. No hydronephrosis. Urinary bladder distended at time of the exam with volume of 434 cc.     Independently reviewed    Assessment:   89 y.o. male with history of kidney stones, HTN, HLD, CKD, diverticulosis, inguinal hernia, chronic anemia, prostate CA s/p XRT here with acute onset dark hematemesis without associated abdominal pain. He has had recent admission for similar presentation where EGD which demonstrated mild esophagitis as well as gastric polyps which were biopsied. He was discharged home on b.i.d. PPI and his Plavix was held but returned with recurrent dark emesis. H/H 7.6/24.7 on admission from  hgb 8.4 on last d/c. Overnight declined to 6.2/19.4. Suspect chronic GI blood loss from polyps that are oozing in the  setting of plavix use    Plan:   -Hold plavix  -Serial H/H   -Transfusion as necessary  -Diet as tolerates today  -Plan EGD likely Monday, suspect polyps are oozing in the setting of plavix use  -Depending on findings will consider colonoscopy this hospitalization      Sandor Sewell MD  [facility]  [address] [place] [address]  Office: [phone]  Fax: [phone]

## 2022-04-23 NOTE — PLAN OF CARE
Pt in bed awake and alert, no distress noted and daughter at bedside. Pt's BP is elevated, the hospitalist on call was noted and order for Hydralazine IV X 1 was received and administered. AM nurse is givern report to follower up for PRN BP med. Pt was pleasantly  Problem: Adjustment to Illness (Gastrointestinal Bleeding)  Goal: Optimal Coping with Acute Illness  Outcome: Ongoing, Progressing     Problem: Bleeding (Gastrointestinal Bleeding)  Goal: Hemostasis  Outcome: Ongoing, Progressing     Problem: Adult Inpatient Plan of Care  Goal: Plan of Care Review  Outcome: Ongoing, Progressing  Goal: Patient-Specific Goal (Individualized)  Outcome: Ongoing, Progressing  Goal: Absence of Hospital-Acquired Illness or Injury  Outcome: Ongoing, Progressing  Goal: Optimal Comfort and Wellbeing  Outcome: Ongoing, Progressing  Goal: Readiness for Transition of Care  Outcome: Ongoing, Progressing     Problem: Fluid and Electrolyte Imbalance (Acute Kidney Injury/Impairment)  Goal: Fluid and Electrolyte Balance  Outcome: Ongoing, Progressing     Problem: Oral Intake Inadequate (Acute Kidney Injury/Impairment)  Goal: Optimal Nutrition Intake  Outcome: Ongoing, Progressing     Problem: Renal Function Impairment (Acute Kidney Injury/Impairment)  Goal: Effective Renal Function  Outcome: Ongoing, Progressing     Problem: Skin Injury Risk Increased  Goal: Skin Health and Integrity  Outcome: Ongoing, Progressing     Problem: Fall Injury Risk  Goal: Absence of Fall and Fall-Related Injury  Outcome: Ongoing, Progressing    confused this early morning.

## 2022-04-23 NOTE — HOSPITAL COURSE
Patient with recent admission for hematemesis during which EGD demonstrated esophagitis and gastric polyps admitted with repeat hematemesis.  He was placed on GI bleed pathway and hemoglobin closely monitored.  He was placed on a PPI drip.  He was kept NPO.  His hemoglobin dropped to 6.2 so he was transfused 2 units PRBCs.  GI was consulted  Patient had a Neumann placed during last hospital stay with plan for outpatient Urology follow-up for urinary retention.  Neumann remained in place.  Patient had EGD which showed evidence of some polyps and bleeding mucosa.  Repeat CBC showed stable hemoglobin.  Patient was stopped from PPI drip and transition to oral PPI.  Unfortunately, patient developed worsening delirium while in the hospital.  Upon conversation with the patient's family, they were comfortable with discharging the patient home to hospice care.  Patient was discharged home to hospice.

## 2022-04-23 NOTE — ASSESSMENT & PLAN NOTE
Creatine stable for now. BMP reviewed- noted Estimated Creatinine Clearance: 17.4 mL/min (A) (based on SCr of 2.4 mg/dL (H)). according to latest data. Monitor UOP and serial BMP and adjust therapy as needed. Renally dose meds.

## 2022-04-23 NOTE — SUBJECTIVE & OBJECTIVE
Interval History: Patient seen and examined. No further hematemesis reported per nursing. Hgb down to 6.2, will transfuse 2 units PRBCs. GI to see today.     Review of Systems   Unable to perform ROS: Dementia   Objective:     Vital Signs (Most Recent):  Temp: 97.6 °F (36.4 °C) (04/23/22 0738)  Pulse: 76 (04/23/22 0738)  Resp: 17 (04/23/22 0738)  BP: (!) 169/67 (04/23/22 0738)  SpO2: 99 % (04/23/22 0738)   Vital Signs (24h Range):  Temp:  [97.6 °F (36.4 °C)-99.1 °F (37.3 °C)] 97.6 °F (36.4 °C)  Pulse:  [57-87] 76  Resp:  [17-20] 17  SpO2:  [97 %-99 %] 99 %  BP: (120-188)/() 169/67     Weight: 68.3 kg (150 lb 9.2 oz)  Body mass index is 24.3 kg/m².    Intake/Output Summary (Last 24 hours) at 4/23/2022 1027  Last data filed at 4/22/2022 2230  Gross per 24 hour   Intake 120 ml   Output --   Net 120 ml      Physical Exam  Constitutional:       General: He is not in acute distress.     Appearance: He is well-developed.   HENT:      Head: Normocephalic and atraumatic.   Eyes:      Pupils: Pupils are equal, round, and reactive to light.   Cardiovascular:      Rate and Rhythm: Normal rate and regular rhythm.      Heart sounds: No murmur heard.  Pulmonary:      Effort: Pulmonary effort is normal. No respiratory distress.      Breath sounds: Normal breath sounds. No wheezing or rales.   Abdominal:      General: Bowel sounds are normal. There is no distension.      Palpations: Abdomen is soft.      Tenderness: There is no abdominal tenderness.   Genitourinary:     Comments: Foely in place.  Musculoskeletal:         General: Normal range of motion.   Skin:     General: Skin is warm and dry.      Findings: No rash.   Neurological:      Mental Status: He is alert.      Cranial Nerves: No cranial nerve deficit.      Comments: dementia   Psychiatric:         Behavior: Behavior normal.       Significant Labs: All pertinent labs within the past 24 hours have been reviewed.    Significant Imaging: I have reviewed all pertinent  imaging results/findings within the past 24 hours.

## 2022-04-23 NOTE — PROGRESS NOTES
[facility] Hospital Medicine  Progress Note    Patient Name: Troy Yuan Sr.  MRN: [id number]  Patient Class: IP- Inpatient   Admission Date: 4/22/2022  Length of Stay: 0 days  Attending Physician: Danay Farmer MD  Primary Care Provider: Gentry Encinas MD        Subjective:     Principal Problem:Upper GI bleed        HPI:  Patient is a 89-year-old male with a history of prostate cancer , CKD 3, dementia who presents today accompanied by his son for hematemesis.  Patient had an admission a few days ago at this hospital for the same.  During that admission he had an EGD which demonstrated mild esophagitis as well as gastric polyps which were biopsied.  He was discharged home on b.i.d. PPI and his Plavix was held.  During that hospital stay he also had urinary retention and hematuria for which Neumann catheter was placed and outpatient Urology follow-up was arranged.  He also had pneumonia and was treated with IV antibiotics and is currently on Augmentin for the same.  Patient's son reports that this morning he complained of some shortness of breath.  This seemed to resolved after patient belched multiple times.  He was doing well the rest of the morning and then had 2 episodes of hematemesis this afternoon.  He had another episode in the emergency department.  No loss of consciousness, no weakness, no lethargy.  Patient is acting like his usual self per his son.  Hemoglobin in the ED is 7.6, it was 8.4 on discharge.  Creatinine is 2.2.  This is improved from his discharge creatinine.  History is provided by his son given patient's dementia.      Overview/Hospital Course:  No notes on file    Interval History: Patient seen and examined. No further hematemesis reported per nursing. Hgb down to 6.2, will transfuse 2 units PRBCs. GI to see today.     Review of Systems   Unable to perform ROS: Dementia   Objective:     Vital Signs (Most Recent):  Temp: 97.6 °F (36.4 °C) (04/23/22 0738)  Pulse: 76  (04/23/22 0738)  Resp: 17 (04/23/22 0738)  BP: (!) 169/67 (04/23/22 0738)  SpO2: 99 % (04/23/22 0738)   Vital Signs (24h Range):  Temp:  [97.6 °F (36.4 °C)-99.1 °F (37.3 °C)] 97.6 °F (36.4 °C)  Pulse:  [57-87] 76  Resp:  [17-20] 17  SpO2:  [97 %-99 %] 99 %  BP: (120-188)/() 169/67     Weight: 68.3 kg (150 lb 9.2 oz)  Body mass index is 24.3 kg/m².    Intake/Output Summary (Last 24 hours) at 4/23/2022 1027  Last data filed at 4/22/2022 2230  Gross per 24 hour   Intake 120 ml   Output --   Net 120 ml      Physical Exam  Constitutional:       General: He is not in acute distress.     Appearance: He is well-developed.   HENT:      Head: Normocephalic and atraumatic.   Eyes:      Pupils: Pupils are equal, round, and reactive to light.   Cardiovascular:      Rate and Rhythm: Normal rate and regular rhythm.      Heart sounds: No murmur heard.  Pulmonary:      Effort: Pulmonary effort is normal. No respiratory distress.      Breath sounds: Normal breath sounds. No wheezing or rales.   Abdominal:      General: Bowel sounds are normal. There is no distension.      Palpations: Abdomen is soft.      Tenderness: There is no abdominal tenderness.   Genitourinary:     Comments: Foely in place.  Musculoskeletal:         General: Normal range of motion.   Skin:     General: Skin is warm and dry.      Findings: No rash.   Neurological:      Mental Status: He is alert.      Cranial Nerves: No cranial nerve deficit.      Comments: dementia   Psychiatric:         Behavior: Behavior normal.       Significant Labs: All pertinent labs within the past 24 hours have been reviewed.    Significant Imaging: I have reviewed all pertinent imaging results/findings within the past 24 hours.        Assessment/Plan:      * Upper GI bleed  GI bleed pathway  NPO  Protonix gtt  GI consulted  Trend CBC and transfuse PRN      CKD (chronic kidney disease), stage III  Creatine stable for now. BMP reviewed- noted Estimated Creatinine Clearance: 17.4  "mL/min (A) (based on SCr of 2.4 mg/dL (H)). according to latest data. Monitor UOP and serial BMP and adjust therapy as needed. Renally dose meds.            Urinary retention  Continue yost  Outpatient urology follow up      Dementia without behavioral disturbance  Dementia is controlled currently. Continue home dementia meds and non-pharmacologic interventions to prevent delirium (No VS between 11PM-5AM, activity during day, opening blinds, providing glasses/hearing aids, and up in chair during daytime). Use PRN anti-psychotics to prevent behavior of self harm during sundowning, and avoid narcotics and benzos unless absolutely necessary. PRN anti-psychotics prescribed to avoid self harm behaviors.        Chronic combined systolic and diastolic heart failure  Appears euvolemic at this time. Monitor volume status closely      Anemia secondary to GIB  Follow CBC   See "upper GI bleed"  4/23- transfuse 2 units PRBCs      VTE Risk Mitigation (From admission, onward)         Ordered     IP VTE HIGH RISK PATIENT  Once         04/22/22 2031     Place sequential compression device  Until discontinued         04/22/22 2031                Discharge Planning   CHRIS:      Code Status: Full Code   Is the patient medically ready for discharge?:     Reason for patient still in hospital (select all that apply): Patient trending condition, Treatment and Consult recommendations                     Danay Farmer MD  Department of Hospital Medicine   [facility]  "

## 2022-04-24 LAB
ANION GAP SERPL CALC-SCNC: 12 MMOL/L (ref 8–16)
BASOPHILS # BLD AUTO: 0.02 K/UL (ref 0–0.2)
BASOPHILS # BLD AUTO: 0.03 K/UL (ref 0–0.2)
BASOPHILS NFR BLD: 0.2 % (ref 0–1.9)
BASOPHILS NFR BLD: 0.4 % (ref 0–1.9)
BUN SERPL-MCNC: 44 MG/DL (ref 10–30)
CALCIUM SERPL-MCNC: 8.7 MG/DL (ref 8.7–10.5)
CHLORIDE SERPL-SCNC: 108 MMOL/L (ref 95–110)
CO2 SERPL-SCNC: 23 MMOL/L (ref 23–29)
CREAT SERPL-MCNC: 2.2 MG/DL (ref 0.5–1.4)
DIFFERENTIAL METHOD: ABNORMAL
DIFFERENTIAL METHOD: ABNORMAL
EOSINOPHIL # BLD AUTO: 0.4 K/UL (ref 0–0.5)
EOSINOPHIL # BLD AUTO: 0.5 K/UL (ref 0–0.5)
EOSINOPHIL NFR BLD: 4.3 % (ref 0–8)
EOSINOPHIL NFR BLD: 6.8 % (ref 0–8)
ERYTHROCYTE [DISTWIDTH] IN BLOOD BY AUTOMATED COUNT: 17.5 % (ref 11.5–14.5)
ERYTHROCYTE [DISTWIDTH] IN BLOOD BY AUTOMATED COUNT: 17.7 % (ref 11.5–14.5)
EST. GFR  (AFRICAN AMERICAN): 29 ML/MIN/1.73 M^2
EST. GFR  (NON AFRICAN AMERICAN): 25 ML/MIN/1.73 M^2
GLUCOSE SERPL-MCNC: 87 MG/DL (ref 70–110)
HCT VFR BLD AUTO: 25.8 % (ref 40–54)
HCT VFR BLD AUTO: 26.7 % (ref 40–54)
HGB BLD-MCNC: 8.5 G/DL (ref 14–18)
HGB BLD-MCNC: 8.5 G/DL (ref 14–18)
IMM GRANULOCYTES # BLD AUTO: 0.04 K/UL (ref 0–0.04)
IMM GRANULOCYTES # BLD AUTO: 0.05 K/UL (ref 0–0.04)
IMM GRANULOCYTES NFR BLD AUTO: 0.5 % (ref 0–0.5)
IMM GRANULOCYTES NFR BLD AUTO: 0.5 % (ref 0–0.5)
LYMPHOCYTES # BLD AUTO: 0.6 K/UL (ref 1–4.8)
LYMPHOCYTES # BLD AUTO: 1.2 K/UL (ref 1–4.8)
LYMPHOCYTES NFR BLD: 15.3 % (ref 18–48)
LYMPHOCYTES NFR BLD: 7 % (ref 18–48)
MCH RBC QN AUTO: 28.2 PG (ref 27–31)
MCH RBC QN AUTO: 29.3 PG (ref 27–31)
MCHC RBC AUTO-ENTMCNC: 31.8 G/DL (ref 32–36)
MCHC RBC AUTO-ENTMCNC: 32.9 G/DL (ref 32–36)
MCV RBC AUTO: 89 FL (ref 82–98)
MCV RBC AUTO: 89 FL (ref 82–98)
MONOCYTES # BLD AUTO: 0.8 K/UL (ref 0.3–1)
MONOCYTES # BLD AUTO: 0.8 K/UL (ref 0.3–1)
MONOCYTES NFR BLD: 10.5 % (ref 4–15)
MONOCYTES NFR BLD: 8.5 % (ref 4–15)
NEUTROPHILS # BLD AUTO: 5 K/UL (ref 1.8–7.7)
NEUTROPHILS # BLD AUTO: 7.2 K/UL (ref 1.8–7.7)
NEUTROPHILS NFR BLD: 66.5 % (ref 38–73)
NEUTROPHILS NFR BLD: 79.5 % (ref 38–73)
NRBC BLD-RTO: 0 /100 WBC
NRBC BLD-RTO: 0 /100 WBC
PLATELET # BLD AUTO: 219 K/UL (ref 150–450)
PLATELET # BLD AUTO: 227 K/UL (ref 150–450)
PMV BLD AUTO: 10.2 FL (ref 9.2–12.9)
PMV BLD AUTO: 9.9 FL (ref 9.2–12.9)
POTASSIUM SERPL-SCNC: 4.1 MMOL/L (ref 3.5–5.1)
RBC # BLD AUTO: 2.9 M/UL (ref 4.6–6.2)
RBC # BLD AUTO: 3.01 M/UL (ref 4.6–6.2)
SODIUM SERPL-SCNC: 143 MMOL/L (ref 136–145)
WBC # BLD AUTO: 7.53 K/UL (ref 3.9–12.7)
WBC # BLD AUTO: 9.11 K/UL (ref 3.9–12.7)

## 2022-04-24 PROCEDURE — 85025 COMPLETE CBC W/AUTO DIFF WBC: CPT | Mod: 91 | Performed by: NURSE PRACTITIONER

## 2022-04-24 PROCEDURE — 63600175 PHARM REV CODE 636 W HCPCS: Performed by: HOSPITALIST

## 2022-04-24 PROCEDURE — 36415 COLL VENOUS BLD VENIPUNCTURE: CPT | Performed by: HOSPITALIST

## 2022-04-24 PROCEDURE — 25000003 PHARM REV CODE 250: Performed by: NURSE PRACTITIONER

## 2022-04-24 PROCEDURE — 80048 BASIC METABOLIC PNL TOTAL CA: CPT | Performed by: HOSPITALIST

## 2022-04-24 PROCEDURE — 11000001 HC ACUTE MED/SURG PRIVATE ROOM

## 2022-04-24 PROCEDURE — 25000003 PHARM REV CODE 250: Performed by: HOSPITALIST

## 2022-04-24 PROCEDURE — 25000003 PHARM REV CODE 250

## 2022-04-24 PROCEDURE — C9113 INJ PANTOPRAZOLE SODIUM, VIA: HCPCS | Performed by: HOSPITALIST

## 2022-04-24 PROCEDURE — 99232 SBSQ HOSP IP/OBS MODERATE 35: CPT | Mod: ,,, | Performed by: INTERNAL MEDICINE

## 2022-04-24 PROCEDURE — 25000003 PHARM REV CODE 250: Performed by: INTERNAL MEDICINE

## 2022-04-24 PROCEDURE — 85025 COMPLETE CBC W/AUTO DIFF WBC: CPT | Performed by: HOSPITALIST

## 2022-04-24 PROCEDURE — S0166 INJ OLANZAPINE 2.5MG: HCPCS | Performed by: NURSE PRACTITIONER

## 2022-04-24 PROCEDURE — 36415 COLL VENOUS BLD VENIPUNCTURE: CPT | Performed by: NURSE PRACTITIONER

## 2022-04-24 PROCEDURE — 99232 PR SUBSEQUENT HOSPITAL CARE,LEVL II: ICD-10-PCS | Mod: ,,, | Performed by: INTERNAL MEDICINE

## 2022-04-24 RX ORDER — MUPIROCIN 20 MG/G
OINTMENT TOPICAL 2 TIMES DAILY
Status: DISCONTINUED | OUTPATIENT
Start: 2022-04-24 | End: 2022-04-26 | Stop reason: HOSPADM

## 2022-04-24 RX ORDER — OLANZAPINE 10 MG/2ML
5 INJECTION, POWDER, FOR SOLUTION INTRAMUSCULAR ONCE
Status: COMPLETED | OUTPATIENT
Start: 2022-04-24 | End: 2022-04-24

## 2022-04-24 RX ORDER — AMLODIPINE BESYLATE 5 MG/1
10 TABLET ORAL DAILY
Status: DISCONTINUED | OUTPATIENT
Start: 2022-04-24 | End: 2022-04-26 | Stop reason: HOSPADM

## 2022-04-24 RX ORDER — OLANZAPINE 5 MG/1
10 TABLET, ORALLY DISINTEGRATING ORAL DAILY PRN
Status: DISCONTINUED | OUTPATIENT
Start: 2022-04-24 | End: 2022-04-26

## 2022-04-24 RX ORDER — LOSARTAN POTASSIUM 25 MG/1
25 TABLET ORAL DAILY
Status: DISCONTINUED | OUTPATIENT
Start: 2022-04-24 | End: 2022-04-26 | Stop reason: HOSPADM

## 2022-04-24 RX ADMIN — AMLODIPINE BESYLATE 10 MG: 5 TABLET ORAL at 09:04

## 2022-04-24 RX ADMIN — Medication 9 MG: at 12:04

## 2022-04-24 RX ADMIN — SODIUM CHLORIDE 8 MG/HR: 9 INJECTION, SOLUTION INTRAVENOUS at 05:04

## 2022-04-24 RX ADMIN — MUPIROCIN: 20 OINTMENT TOPICAL at 08:04

## 2022-04-24 RX ADMIN — SODIUM CHLORIDE 8 MG/HR: 9 INJECTION, SOLUTION INTRAVENOUS at 11:04

## 2022-04-24 RX ADMIN — OLANZAPINE 5 MG: 10 INJECTION, POWDER, FOR SOLUTION INTRAMUSCULAR at 05:04

## 2022-04-24 RX ADMIN — DONEPEZIL HYDROCHLORIDE 10 MG: 5 TABLET ORAL at 08:04

## 2022-04-24 RX ADMIN — SODIUM CHLORIDE 8 MG/HR: 9 INJECTION, SOLUTION INTRAVENOUS at 10:04

## 2022-04-24 RX ADMIN — LOSARTAN POTASSIUM 25 MG: 25 TABLET, FILM COATED ORAL at 09:04

## 2022-04-24 RX ADMIN — AMOXICILLIN AND CLAVULANATE POTASSIUM 500 MG: 500; 125 TABLET, FILM COATED ORAL at 08:04

## 2022-04-24 RX ADMIN — OLANZAPINE 5 MG: 10 INJECTION, POWDER, FOR SOLUTION INTRAMUSCULAR at 01:04

## 2022-04-24 RX ADMIN — ATORVASTATIN CALCIUM 20 MG: 20 TABLET, FILM COATED ORAL at 11:04

## 2022-04-24 RX ADMIN — SODIUM CHLORIDE 8 MG/HR: 9 INJECTION, SOLUTION INTRAVENOUS at 12:04

## 2022-04-24 RX ADMIN — AMOXICILLIN AND CLAVULANATE POTASSIUM 500 MG: 500; 125 TABLET, FILM COATED ORAL at 11:04

## 2022-04-24 RX ADMIN — SODIUM CHLORIDE 8 MG/HR: 9 INJECTION, SOLUTION INTRAVENOUS at 06:04

## 2022-04-24 NOTE — PLAN OF CARE
[facility]  Initial Discharge Assessment       Primary Care Provider: Gentry Encinas MD    Admission Diagnosis: Upper GI bleed [K92.2]    Admission Date: 4/22/2022  Expected Discharge Date:       met with patient and son Troy Thomas at bedside to complete assessment. Demographics, PCP and insurance verified.  Patient has RW, BSC and SC at home. [facility] Patient ambulates with RW. No dialysis. Patient lives with his daughter Lakia. Case management to assist with any additional needs upon discharge. No further needs to address at this time.    Discharge Barriers Identified: None    Payor: [employer]ARE / Plan: [employer] CHOICES 65 / Product Type: Medicare Advantage /     Extended Emergency Contact Information  Primary Emergency Contact: Lakia Yuan  Address: [address] [place] [address]  Mobile Phone: [phone]  Relation: Daughter  Preferred language: English   needed? No  Secondary Emergency Contact: Cecilia Bonilla  Mobile Phone: [phone]  Relation: Daughter  Preferred language: English   needed? No    Discharge Plan A: Home Health  Discharge Plan B: Home with family      [id number] [address] & [address] [address]  Phone: [phone] Fax: [phone]      Initial Assessment (most recent)     Adult Discharge Assessment - 04/23/22 0809        Discharge Assessment    Assessment Type Discharge Planning Assessment     Confirmed/corrected address, phone number and insurance Yes     Confirmed Demographics Correct on Facesheet     Source of Information patient;family     Does patient/caregiver understand observation status Yes     Lives With child(rian), adult     Do you expect to return to your current living situation? Yes     Do you have help at home or someone to  help you manage your care at home? Yes     Who are your caregiver(s) and their phone number(s)? KwabenaLakia AMckenzie (Daughter)   [phone] (Mobile)     Prior to hospitilization cognitive status: Alert/Oriented     Current cognitive status: Alert/Oriented     Walking or Climbing Stairs Difficulty ambulation difficulty, requires equipment     Dressing/Bathing Difficulty bathing difficulty, assistance 1 person     Equipment Currently Used at Home walker, rolling;bedside commode;shower chair     Patient currently being followed by outpatient case management? No     Do you currently have service(s) that help you manage your care at home? Yes     Name and Contact number of agency Home Health     Is the pt/caregiver preference to resume services with current agency Yes     Do you take prescription medications? Yes     Do you have prescription coverage? Yes     Do you have any problems affording any of your prescribed medications? No     Is the patient taking medications as prescribed? yes     Who is going to help you get home at discharge? KwabenaLakia AMckenzie (Daughter)   [phone] (Mobile)     How do you get to doctors appointments? family or friend will provide     Are you on dialysis? No     Do you take coumadin? No     Discharge Plan A Home Health     Discharge Plan B Home with family     DME Needed Upon Discharge  none     Discharge Plan discussed with: Patient;Adult children     Discharge Barriers Identified None        Relationship/Environment    Name(s) of Who Lives With Patient KawbenaLakia (Daughter)   [phone] (Mobile)

## 2022-04-24 NOTE — PLAN OF CARE
met with patient at bedside to explain the MOON. Patient verbalized his/her understanding of the MOON and signed.       04/23/22 2043   BEE Message   Medicare Outpatient and Observation Notification regarding financial responsibility Explained to patient/caregiver;Signed/date by patient/caregiver   Date BEE was signed 04/23/22   Time BEE was signed [id number]

## 2022-04-24 NOTE — ASSESSMENT & PLAN NOTE
"Follow CBC   See "upper GI bleed"  4/23- transfuse 2 units PRBCs  " Consent: The risks of atrophy were reviewed with the patient.

## 2022-04-24 NOTE — PLAN OF CARE
30 readmit completed with patient and son Troy Cuevas at bedside       04/24/22 1441   Readmission   Why were you hospitalized in the last 30 days? anemia-blood   Why were you readmitted? Related to previous admission   When you left the hospital how did you feel? good   When you left the hospital where did you go? Home with Home Health   Did patient/caregiver refused recommended DC plan? No   Tell me about what happened between when you left the hospital and the day you returned. started throwing up blood again   When did you start not feeling well? Friday   Did you try to manage your symptoms your self? No   Did you call anyone? No   Why? Came to hospital   Did you try to see or did see a doctor or nurse before you came? No   Why? recently discharged   Did you have  a follow-up appointment on discharge? Yes   Did you go? No   Why?   (came back to hospital)   Was this a planned readmission? No

## 2022-04-24 NOTE — PROGRESS NOTES
[facility] Hospital Medicine  Progress Note    Patient Name: Troy Yuan Sr.  MRN: [id number]  Patient Class: IP- Inpatient   Admission Date: 4/22/2022  Length of Stay: 1 days  Attending Physician: Lisbeth Melchor MD  Primary Care Provider: Gentry Encinas MD        Subjective:     Principal Problem:Upper GI bleed        HPI:  Patient is a 89-year-old male with a history of prostate cancer , CKD 3, dementia who presents today accompanied by his son for hematemesis.  Patient had an admission a few days ago at this hospital for the same.  During that admission he had an EGD which demonstrated mild esophagitis as well as gastric polyps which were biopsied.  He was discharged home on b.i.d. PPI and his Plavix was held.  During that hospital stay he also had urinary retention and hematuria for which Neumann catheter was placed and outpatient Urology follow-up was arranged.  He also had pneumonia and was treated with IV antibiotics and is currently on Augmentin for the same.  Patient's son reports that this morning he complained of some shortness of breath.  This seemed to resolved after patient belched multiple times.  He was doing well the rest of the morning and then had 2 episodes of hematemesis this afternoon.  He had another episode in the emergency department.  No loss of consciousness, no weakness, no lethargy.  Patient is acting like his usual self per his son.  Hemoglobin in the ED is 7.6, it was 8.4 on discharge.  Creatinine is 2.2.  This is improved from his discharge creatinine.  History is provided by his son given patient's dementia.      Overview/Hospital Course:  Patient with recent admission for hematemesis during which EGD demonstrated esophagitis and gastric polyps admitted with repeat hematemesis.  He was placed on GI bleed pathway and hemoglobin closely monitored.  He was placed on a PPI drip.  He was kept NPO.  His hemoglobin dropped to 6.2 so he was transfused 2 units PRBCs.  GI  was consulted  Patient had a Neumann placed during last hospital stay with plan for outpatient Urology follow-up for urinary retention.  Neumann remained in place.      Interval History: Patient seen and examined. No further episodes of hematemesis per nursing and family. Patient agitated at times & requires PRN medication to prevent self harm. H/H stable.     Review of Systems   Unable to perform ROS: Dementia   Objective:     Vital Signs (Most Recent):  Temp: 97.8 °F (36.6 °C) (04/24/22 1138)  Pulse: (!) 51 (04/24/22 1138)  Resp: 18 (04/24/22 1138)  BP: (!) 163/72 (04/24/22 1138)  SpO2: 97 % (04/24/22 1138)   Vital Signs (24h Range):  Temp:  [97.1 °F (36.2 °C)-98.3 °F (36.8 °C)] 97.8 °F (36.6 °C)  Pulse:  [51-77] 51  Resp:  [16-18] 18  SpO2:  [96 %-100 %] 97 %  BP: (154-220)/() 163/72     Weight: 68.3 kg (150 lb 9.2 oz)  Body mass index is 24.3 kg/m².    Intake/Output Summary (Last 24 hours) at 4/24/2022 1154  Last data filed at 4/24/2022 0600  Gross per 24 hour   Intake 725 ml   Output 2850 ml   Net -2125 ml      Physical Exam  Constitutional:       General: He is not in acute distress.     Appearance: He is well-developed.   HENT:      Head: Normocephalic and atraumatic.   Eyes:      Pupils: Pupils are equal, round, and reactive to light.   Cardiovascular:      Rate and Rhythm: Normal rate and regular rhythm.      Heart sounds: No murmur heard.  Pulmonary:      Effort: Pulmonary effort is normal. No respiratory distress.      Breath sounds: Normal breath sounds. No wheezing or rales.   Abdominal:      General: Bowel sounds are normal. There is no distension.      Palpations: Abdomen is soft.      Tenderness: There is no abdominal tenderness.   Genitourinary:     Comments: Neumann in place.  Musculoskeletal:         General: Normal range of motion.   Skin:     General: Skin is warm and dry.      Findings: No rash.   Neurological:      Mental Status: He is alert. Mental status is at baseline.      Cranial Nerves:  "No cranial nerve deficit.      Comments: dementia   Psychiatric:         Speech: Speech normal.         Behavior: Behavior is agitated.       Significant Labs: All pertinent labs within the past 24 hours have been reviewed.  CBC:   Recent Labs   Lab 04/23/22  0849 04/23/22  2217 04/24/22  0810   WBC 7.91 9.18 7.53   HGB 6.2* 8.6* 8.5*   HCT 19.4* 26.5* 26.7*    208 227     CMP:   Recent Labs   Lab 04/22/22  1607 04/23/22  0849 04/24/22  0810    141 143   K 4.8 3.9 4.1    105 108   CO2 27 27 23   * 105 87   BUN 53* 57* 44*   CREATININE 2.2* 2.4* 2.2*   CALCIUM 9.0 8.7 8.7   ANIONGAP 12 9 12   EGFRNONAA 25* 22* 25*       Significant Imaging: I have reviewed all pertinent imaging results/findings within the past 24 hours.      Assessment/Plan:      * Upper GI bleed  GI bleed pathway  NPO  Protonix gtt  GI consulted & per GI, EGD scheduled 4/25  Trend CBC and transfuse PRN      CKD (chronic kidney disease), stage III  Creatine stable for now. BMP reviewed- noted Estimated Creatinine Clearance: 18.9 mL/min (A) (based on SCr of 2.2 mg/dL (H)). according to latest data. Monitor UOP and serial BMP and adjust therapy as needed. Renally dose meds.            Urinary retention  Continue yost  Outpatient urology follow up      Dementia without behavioral disturbance  Dementia is controlled currently. Continue home dementia meds and non-pharmacologic interventions to prevent delirium (No VS between 11PM-5AM, activity during day, opening blinds, providing glasses/hearing aids, and up in chair during daytime). Use PRN anti-psychotics to prevent behavior of self harm during sundowning, and avoid narcotics and benzos unless absolutely necessary. PRN anti-psychotics prescribed to avoid self harm behaviors.        Chronic combined systolic and diastolic heart failure  Appears euvolemic at this time. Monitor volume status closely      Anemia secondary to GIB  Follow CBC   See "upper GI bleed"  4/23- transfuse " 2 units PRBCs      VTE Risk Mitigation (From admission, onward)         Ordered     IP VTE HIGH RISK PATIENT  Once         04/22/22 2031     Place sequential compression device  Until discontinued         04/22/22 2031                Discharge Planning   CHRIS:      Code Status: Full Code   Is the patient medically ready for discharge?:     Reason for patient still in hospital (select all that apply): Patient trending condition and Consult recommendations  Discharge Plan A: Home Health                  Rosalia Vila NP  Department of Hospital Medicine   [facility]

## 2022-04-24 NOTE — SUBJECTIVE & OBJECTIVE
Interval History: Patient seen and examined. No further episodes of hematemesis per nursing and family. Patient agitated at times & requires PRN medication to prevent self harm. H/H stable.     Review of Systems   Unable to perform ROS: Dementia   Objective:     Vital Signs (Most Recent):  Temp: 97.8 °F (36.6 °C) (04/24/22 1138)  Pulse: (!) 51 (04/24/22 1138)  Resp: 18 (04/24/22 1138)  BP: (!) 163/72 (04/24/22 1138)  SpO2: 97 % (04/24/22 1138)   Vital Signs (24h Range):  Temp:  [97.1 °F (36.2 °C)-98.3 °F (36.8 °C)] 97.8 °F (36.6 °C)  Pulse:  [51-77] 51  Resp:  [16-18] 18  SpO2:  [96 %-100 %] 97 %  BP: (154-220)/() 163/72     Weight: 68.3 kg (150 lb 9.2 oz)  Body mass index is 24.3 kg/m².    Intake/Output Summary (Last 24 hours) at 4/24/2022 1154  Last data filed at 4/24/2022 0600  Gross per 24 hour   Intake 725 ml   Output 2850 ml   Net -2125 ml      Physical Exam  Constitutional:       General: He is not in acute distress.     Appearance: He is well-developed.   HENT:      Head: Normocephalic and atraumatic.   Eyes:      Pupils: Pupils are equal, round, and reactive to light.   Cardiovascular:      Rate and Rhythm: Normal rate and regular rhythm.      Heart sounds: No murmur heard.  Pulmonary:      Effort: Pulmonary effort is normal. No respiratory distress.      Breath sounds: Normal breath sounds. No wheezing or rales.   Abdominal:      General: Bowel sounds are normal. There is no distension.      Palpations: Abdomen is soft.      Tenderness: There is no abdominal tenderness.   Genitourinary:     Comments: Neumann in place.  Musculoskeletal:         General: Normal range of motion.   Skin:     General: Skin is warm and dry.      Findings: No rash.   Neurological:      Mental Status: He is alert. Mental status is at baseline.      Cranial Nerves: No cranial nerve deficit.      Comments: dementia   Psychiatric:         Speech: Speech normal.         Behavior: Behavior is agitated.       Significant Labs: All  pertinent labs within the past 24 hours have been reviewed.  CBC:   Recent Labs   Lab 04/23/22  0849 04/23/22  2217 04/24/22  0810   WBC 7.91 9.18 7.53   HGB 6.2* 8.6* 8.5*   HCT 19.4* 26.5* 26.7*    208 227     CMP:   Recent Labs   Lab 04/22/22  1607 04/23/22  0849 04/24/22  0810    141 143   K 4.8 3.9 4.1    105 108   CO2 27 27 23   * 105 87   BUN 53* 57* 44*   CREATININE 2.2* 2.4* 2.2*   CALCIUM 9.0 8.7 8.7   ANIONGAP 12 9 12   EGFRNONAA 25* 22* 25*       Significant Imaging: I have reviewed all pertinent imaging results/findings within the past 24 hours.

## 2022-04-24 NOTE — NURSING
NP notified patient would not take Zyprexa by mouth. Patient was visibly seen climbing out the bed, seeing things at his bedside and being irate. NP ordered IM injection. Injection given within 30 mintues patient calmed down. Now asleep. Grandson at the bedside.

## 2022-04-24 NOTE — PLAN OF CARE
Problem: Adjustment to Illness (Gastrointestinal Bleeding)  Goal: Optimal Coping with Acute Illness  Outcome: Ongoing, Progressing     Problem: Bleeding (Gastrointestinal Bleeding)  Goal: Hemostasis  Outcome: Ongoing, Progressing     Problem: Adult Inpatient Plan of Care  Goal: Plan of Care Review  Outcome: Ongoing, Progressing  Goal: Patient-Specific Goal (Individualized)  Outcome: Ongoing, Progressing  Goal: Absence of Hospital-Acquired Illness or Injury  Outcome: Ongoing, Progressing  Goal: Optimal Comfort and Wellbeing  Outcome: Ongoing, Progressing  Goal: Readiness for Transition of Care  Outcome: Ongoing, Progressing     Problem: Fluid and Electrolyte Imbalance (Acute Kidney Injury/Impairment)  Goal: Fluid and Electrolyte Balance  Outcome: Ongoing, Progressing     Problem: Oral Intake Inadequate (Acute Kidney Injury/Impairment)  Goal: Optimal Nutrition Intake  Outcome: Ongoing, Progressing     Problem: Renal Function Impairment (Acute Kidney Injury/Impairment)  Goal: Effective Renal Function  Outcome: Ongoing, Progressing     Problem: Skin Injury Risk Increased  Goal: Skin Health and Integrity  Outcome: Ongoing, Progressing     Problem: Fall Injury Risk  Goal: Absence of Fall and Fall-Related Injury  Outcome: Ongoing, Progressing

## 2022-04-24 NOTE — PROGRESS NOTES
"CC: emesis    HPI 89 y.o. male with history of kidney stones, HTN, HLD, CKD, diverticulosis, inguinal hernia, chronic anemia, prostate CA s/p XRT here with acute onset dark hematemesis without associated abdominal pain. He has had recent admission for similar presentation where EGD which demonstrated mild esophagitis as well as gastric polyps which were biopsied. He was discharged home on b.i.d. PPI and his Plavix was held. After he went home patient's son reported 2 episodes of hematemesis afternoon prior to admission. He had another episode in the emergency department. No loss of consciousness, no weakness, no lethargy. H/H 7.6/24.7 on admission from  hgb 8.4 on last d/c. Overnight declined to 6.2/19.4.    Interval hx:  Patient agitated this morning, argumentative and combative. Had to administer Olanzapine disintegrating tablet with nurse. H/H 8.5/26.7. No signs of blood loss.     Past Medical History:   Diagnosis Date    Bladder stones     Cancer     Encounter for blood transfusion     Hypertension     Kidney stone     Prostate cancer 2004    Radiation 2005    prostate    Stage 3 chronic kidney disease 1/11/2019     Review of Systems  Unable to obtain due to dementia    Physical Examination  BP (!) 163/72 (BP Location: Right arm, Patient Position: Sitting)   Pulse (!) 51   Temp 97.8 °F (36.6 °C) (Oral)   Resp 18   Ht 5' 6" (1.676 m)   Wt 68.3 kg (150 lb 9.2 oz)   SpO2 97%   BMI 24.30 kg/m²   General appearance: confused, no distress  HENT: Normocephalic, atraumatic, neck symmetrical, no nasal discharge   Eyes: conjunctivae/corneas clear, PERRL, EOM's intact  Lungs: no labored breathing, symmetric chest wall expansion bilaterally  Heart: regular rate and rhythm without rub; no displacement of the PMI   Abdomen: soft, non-tender; bowel sounds normoactive; no organomegaly  Extremities: extremities symmetric; no clubbing, cyanosis, or edema  Integument: Skin color, texture, turgor normal; no rashes; " hair distrubution normal  Neurologic: pleasantly confused, does not respond appropriately to questions due to dementia  Psychiatric: + agitated, +evidence of impaired cognition     Labs:  Lab Results   Component Value Date    WBC 7.53 04/24/2022    HGB 8.5 (L) 04/24/2022    HCT 26.7 (L) 04/24/2022    MCV 89 04/24/2022     04/24/2022     CMP  Sodium   Date Value Ref Range Status   04/24/2022 143 136 - 145 mmol/L Final     Potassium   Date Value Ref Range Status   04/24/2022 4.1 3.5 - 5.1 mmol/L Final     Chloride   Date Value Ref Range Status   04/24/2022 108 95 - 110 mmol/L Final     CO2   Date Value Ref Range Status   04/24/2022 23 23 - 29 mmol/L Final     Glucose   Date Value Ref Range Status   04/24/2022 87 70 - 110 mg/dL Final     BUN   Date Value Ref Range Status   04/24/2022 44 (H) 10 - 30 mg/dL Final     Creatinine   Date Value Ref Range Status   04/24/2022 2.2 (H) 0.5 - 1.4 mg/dL Final   11/14/2012 1.3 0.5 - 1.4 mg/dL Final     Calcium   Date Value Ref Range Status   04/24/2022 8.7 8.7 - 10.5 mg/dL Final   11/14/2012 9.3 8.7 - 10.5 mg/dL Final     Total Protein   Date Value Ref Range Status   04/19/2022 5.8 (L) 6.0 - 8.4 g/dL Final     Albumin   Date Value Ref Range Status   04/19/2022 2.8 (L) 3.5 - 5.2 g/dL Final     Total Bilirubin   Date Value Ref Range Status   04/19/2022 0.6 0.1 - 1.0 mg/dL Final     Comment:     For infants and newborns, interpretation of results should be based  on gestational age, weight and in agreement with clinical  observations.    Premature Infant recommended reference ranges:  Up to 24 hours.............<8.0 mg/dL  Up to 48 hours............<12.0 mg/dL  3-5 days..................<15.0 mg/dL  6-29 days.................<15.0 mg/dL       Alkaline Phosphatase   Date Value Ref Range Status   04/19/2022 64 55 - 135 U/L Final   11/14/2012 54 (L) 55 - 135 U/L Final     AST   Date Value Ref Range Status   04/19/2022 23 10 - 40 U/L Final   11/14/2012 14 10 - 40 U/L Final     ALT    Date Value Ref Range Status   04/19/2022 12 10 - 44 U/L Final     Anion Gap   Date Value Ref Range Status   04/24/2022 12 8 - 16 mmol/L Final   11/14/2012 11 5 - 15 meq/L Final     eGFR if    Date Value Ref Range Status   04/24/2022 29 (A) >60 mL/min/1.73 m^2 Final     eGFR if non    Date Value Ref Range Status   04/24/2022 25 (A) >60 mL/min/1.73 m^2 Final     Comment:     Calculation used to obtain the estimated glomerular filtration  rate (eGFR) is the CKD-EPI equation.        Imaging:  US retroperitoneal:  Findings consistent with medical renal disease with increased renal cortical echogenicity and elevated resistive indices.  Bilateral renal cysts some complex. No hydronephrosis. Urinary bladder distended at time of the exam with volume of 434 cc.     Independently reviewed    Assessment:   89 y.o. male with history of kidney stones, HTN, HLD, CKD, diverticulosis, inguinal hernia, chronic anemia, prostate CA s/p XRT here with acute onset dark hematemesis without associated abdominal pain. He has had recent admission for similar presentation where EGD which demonstrated mild esophagitis as well as gastric polyps which were biopsied. He was discharged home on b.i.d. PPI and his Plavix was held but returned with recurrent dark emesis. H/H 7.6/24.7 on admission from  hgb 8.4 on last d/c. Overnight declined to 6.2/19.4. Suspect chronic GI blood loss from polyps that are oozing in the setting of plavix use    Plan:   -Hold plavix  -Serial H/H   -Transfusion as necessary  -Diet as tolerates today  -Plan EGD tomorrow  -NPO after midnight  -Depending on findings will discuss colonoscopy this hospitalization      Sandor Sewell MD  [place] [facility]  [address] [place] [address]  Office: [phone]  Fax: [phone]

## 2022-04-24 NOTE — ASSESSMENT & PLAN NOTE
GI bleed pathway  NPO  Protonix gtt  GI consulted & per GI, EGD scheduled 4/25  Trend CBC and transfuse PRN

## 2022-04-24 NOTE — PLAN OF CARE
Pt in bed asleep, no distress noted. Pt was very confused last night, Jaci Phillip was notified and order received for Olanzapine. Med was not effective but pt was later given PRN Melatonin and pt finally got some sleep.  Problem: Adjustment to Illness (Gastrointestinal Bleeding)  Goal: Optimal Coping with Acute Illness  Outcome: Ongoing, Progressing     Problem: Bleeding (Gastrointestinal Bleeding)  Goal: Hemostasis  Outcome: Ongoing, Progressing     Problem: Adult Inpatient Plan of Care  Goal: Plan of Care Review  Outcome: Ongoing, Progressing  Goal: Patient-Specific Goal (Individualized)  Outcome: Ongoing, Progressing  Goal: Absence of Hospital-Acquired Illness or Injury  Outcome: Ongoing, Progressing  Goal: Optimal Comfort and Wellbeing  Outcome: Ongoing, Progressing  Goal: Readiness for Transition of Care  Outcome: Ongoing, Progressing     Problem: Fluid and Electrolyte Imbalance (Acute Kidney Injury/Impairment)  Goal: Fluid and Electrolyte Balance  Outcome: Ongoing, Progressing     Problem: Oral Intake Inadequate (Acute Kidney Injury/Impairment)  Goal: Optimal Nutrition Intake  Outcome: Ongoing, Progressing     Problem: Renal Function Impairment (Acute Kidney Injury/Impairment)  Goal: Effective Renal Function  Outcome: Ongoing, Progressing     Problem: Skin Injury Risk Increased  Goal: Skin Health and Integrity  Outcome: Ongoing, Progressing     Problem: Fall Injury Risk  Goal: Absence of Fall and Fall-Related Injury  Outcome: Ongoing, Progressing

## 2022-04-25 ENCOUNTER — ANESTHESIA EVENT (OUTPATIENT)
Dept: ENDOSCOPY | Facility: HOSPITAL | Age: 87
DRG: 394 | End: 2022-04-25
Payer: MEDICARE

## 2022-04-25 ENCOUNTER — ANESTHESIA (OUTPATIENT)
Dept: ENDOSCOPY | Facility: HOSPITAL | Age: 87
DRG: 394 | End: 2022-04-25
Payer: MEDICARE

## 2022-04-25 PROBLEM — N40.1 URINARY RETENTION DUE TO BENIGN PROSTATIC HYPERPLASIA: Chronic | Status: RESOLVED | Noted: 2020-01-23 | Resolved: 2022-04-25

## 2022-04-25 PROBLEM — R00.1 BRADYCARDIA: Status: RESOLVED | Noted: 2019-12-17 | Resolved: 2022-04-25

## 2022-04-25 PROBLEM — R33.8 URINARY RETENTION DUE TO BENIGN PROSTATIC HYPERPLASIA: Chronic | Status: RESOLVED | Noted: 2020-01-23 | Resolved: 2022-04-25

## 2022-04-25 PROBLEM — N18.4 ACUTE RENAL FAILURE SUPERIMPOSED ON STAGE 4 CHRONIC KIDNEY DISEASE: Status: RESOLVED | Noted: 2019-12-16 | Resolved: 2022-04-25

## 2022-04-25 PROBLEM — I16.0 HYPERTENSIVE URGENCY: Status: RESOLVED | Noted: 2019-12-12 | Resolved: 2022-04-25

## 2022-04-25 PROBLEM — R55 VASOVAGAL NEAR SYNCOPE: Status: RESOLVED | Noted: 2019-01-11 | Resolved: 2022-04-25
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
## 2022-04-25 PROBLEM — N18.9 ANEMIA OF CHRONIC RENAL FAILURE: Status: RESOLVED | Noted: 2020-12-07 | Resolved: 2022-04-25

## 2022-04-25 PROBLEM — N17.9 ACUTE RENAL FAILURE SUPERIMPOSED ON STAGE 4 CHRONIC KIDNEY DISEASE: Status: RESOLVED | Noted: 2019-12-16 | Resolved: 2022-04-25

## 2022-04-25 PROBLEM — Z85.46 HISTORY OF PROSTATE CANCER: Status: RESOLVED | Noted: 2019-01-11 | Resolved: 2022-04-25

## 2022-04-25 PROBLEM — N18.30 CHRONIC KIDNEY DISEASE, STAGE III (MODERATE): Status: RESOLVED | Noted: 2020-12-07 | Resolved: 2022-04-25

## 2022-04-25 PROBLEM — I63.81 MULTIPLE LACUNAR INFARCTS: Status: RESOLVED | Noted: 2019-12-13 | Resolved: 2022-04-25

## 2022-04-25 PROBLEM — R79.89 TROPONIN LEVEL ELEVATED: Status: RESOLVED | Noted: 2020-01-23 | Resolved: 2022-04-25

## 2022-04-25 PROBLEM — E87.70 HYPERVOLEMIA: Status: RESOLVED | Noted: 2019-01-11 | Resolved: 2022-04-25

## 2022-04-25 PROBLEM — N39.0 UTI (URINARY TRACT INFECTION): Status: RESOLVED | Noted: 2021-10-04 | Resolved: 2022-04-25

## 2022-04-25 PROBLEM — G93.41 ENCEPHALOPATHY, METABOLIC: Status: RESOLVED | Noted: 2019-12-13 | Resolved: 2022-04-25

## 2022-04-25 PROBLEM — D63.1 ANEMIA OF CHRONIC RENAL FAILURE: Status: RESOLVED | Noted: 2020-12-07 | Resolved: 2022-04-25

## 2022-04-25 LAB
ANION GAP SERPL CALC-SCNC: 10 MMOL/L (ref 8–16)
BASOPHILS # BLD AUTO: 0.04 K/UL (ref 0–0.2)
BASOPHILS NFR BLD: 0.5 % (ref 0–1.9)
BUN SERPL-MCNC: 37 MG/DL (ref 10–30)
CALCIUM SERPL-MCNC: 8.4 MG/DL (ref 8.7–10.5)
CHLORIDE SERPL-SCNC: 111 MMOL/L (ref 95–110)
CO2 SERPL-SCNC: 23 MMOL/L (ref 23–29)
CREAT SERPL-MCNC: 2.2 MG/DL (ref 0.5–1.4)
DIFFERENTIAL METHOD: ABNORMAL
EOSINOPHIL # BLD AUTO: 0.4 K/UL (ref 0–0.5)
EOSINOPHIL NFR BLD: 4.8 % (ref 0–8)
ERYTHROCYTE [DISTWIDTH] IN BLOOD BY AUTOMATED COUNT: 17.4 % (ref 11.5–14.5)
EST. GFR  (AFRICAN AMERICAN): 29 ML/MIN/1.73 M^2
EST. GFR  (NON AFRICAN AMERICAN): 25 ML/MIN/1.73 M^2
GLUCOSE SERPL-MCNC: 87 MG/DL (ref 70–110)
HCT VFR BLD AUTO: 27.5 % (ref 40–54)
HGB BLD-MCNC: 8.9 G/DL (ref 14–18)
IMM GRANULOCYTES # BLD AUTO: 0.05 K/UL (ref 0–0.04)
IMM GRANULOCYTES NFR BLD AUTO: 0.6 % (ref 0–0.5)
LYMPHOCYTES # BLD AUTO: 1.1 K/UL (ref 1–4.8)
LYMPHOCYTES NFR BLD: 12.4 % (ref 18–48)
MCH RBC QN AUTO: 29.1 PG (ref 27–31)
MCHC RBC AUTO-ENTMCNC: 32.4 G/DL (ref 32–36)
MCV RBC AUTO: 90 FL (ref 82–98)
MONOCYTES # BLD AUTO: 0.8 K/UL (ref 0.3–1)
MONOCYTES NFR BLD: 9.3 % (ref 4–15)
NEUTROPHILS # BLD AUTO: 6.2 K/UL (ref 1.8–7.7)
NEUTROPHILS NFR BLD: 72.4 % (ref 38–73)
NRBC BLD-RTO: 0 /100 WBC
PLATELET # BLD AUTO: 237 K/UL (ref 150–450)
PMV BLD AUTO: 10.3 FL (ref 9.2–12.9)
POTASSIUM SERPL-SCNC: 4 MMOL/L (ref 3.5–5.1)
RBC # BLD AUTO: 3.06 M/UL (ref 4.6–6.2)
SODIUM SERPL-SCNC: 144 MMOL/L (ref 136–145)
WBC # BLD AUTO: 8.57 K/UL (ref 3.9–12.7)

## 2022-04-25 PROCEDURE — 43239 EGD BIOPSY SINGLE/MULTIPLE: CPT | Mod: 59,,, | Performed by: INTERNAL MEDICINE

## 2022-04-25 PROCEDURE — 88305 TISSUE EXAM BY PATHOLOGIST: ICD-10-PCS | Mod: 26,,, | Performed by: PATHOLOGY

## 2022-04-25 PROCEDURE — 80048 BASIC METABOLIC PNL TOTAL CA: CPT | Performed by: HOSPITALIST

## 2022-04-25 PROCEDURE — 11000001 HC ACUTE MED/SURG PRIVATE ROOM

## 2022-04-25 PROCEDURE — 88342 CHG IMMUNOCYTOCHEMISTRY: ICD-10-PCS | Mod: 26,,, | Performed by: PATHOLOGY

## 2022-04-25 PROCEDURE — 27201012 HC FORCEPS, HOT/COLD, DISP: Performed by: INTERNAL MEDICINE

## 2022-04-25 PROCEDURE — 63600175 PHARM REV CODE 636 W HCPCS: Performed by: HOSPITALIST

## 2022-04-25 PROCEDURE — 63600175 PHARM REV CODE 636 W HCPCS: Performed by: NURSE ANESTHETIST, CERTIFIED REGISTERED

## 2022-04-25 PROCEDURE — 43251 PR EGD, FLEX, W/REMOVAL, TUMOR/POLYP/LESION(S), SNARE: ICD-10-PCS | Mod: ,,, | Performed by: INTERNAL MEDICINE

## 2022-04-25 PROCEDURE — 25000003 PHARM REV CODE 250: Performed by: NURSE ANESTHETIST, CERTIFIED REGISTERED

## 2022-04-25 PROCEDURE — 25000003 PHARM REV CODE 250: Performed by: INTERNAL MEDICINE

## 2022-04-25 PROCEDURE — D9220A PRA ANESTHESIA: Mod: CRNA,,, | Performed by: NURSE ANESTHETIST, CERTIFIED REGISTERED

## 2022-04-25 PROCEDURE — D9220A PRA ANESTHESIA: ICD-10-PCS | Mod: CRNA,,, | Performed by: NURSE ANESTHETIST, CERTIFIED REGISTERED

## 2022-04-25 PROCEDURE — D9220A PRA ANESTHESIA: Mod: ANES,,, | Performed by: ANESTHESIOLOGY

## 2022-04-25 PROCEDURE — 43251 EGD REMOVE LESION SNARE: CPT | Mod: ,,, | Performed by: INTERNAL MEDICINE

## 2022-04-25 PROCEDURE — C9113 INJ PANTOPRAZOLE SODIUM, VIA: HCPCS | Performed by: HOSPITALIST

## 2022-04-25 PROCEDURE — 43239 PR EGD, FLEX, W/BIOPSY, SGL/MULTI: ICD-10-PCS | Mod: 59,,, | Performed by: INTERNAL MEDICINE

## 2022-04-25 PROCEDURE — D9220A PRA ANESTHESIA: ICD-10-PCS | Mod: ANES,,, | Performed by: ANESTHESIOLOGY

## 2022-04-25 PROCEDURE — 25000003 PHARM REV CODE 250: Performed by: HOSPITALIST

## 2022-04-25 PROCEDURE — 37000008 HC ANESTHESIA 1ST 15 MINUTES: Performed by: INTERNAL MEDICINE

## 2022-04-25 PROCEDURE — 88305 TISSUE EXAM BY PATHOLOGIST: CPT | Mod: 26,,, | Performed by: PATHOLOGY

## 2022-04-25 PROCEDURE — 88342 IMHCHEM/IMCYTCHM 1ST ANTB: CPT | Mod: 59 | Performed by: PATHOLOGY

## 2022-04-25 PROCEDURE — 63600175 PHARM REV CODE 636 W HCPCS: Performed by: NURSE PRACTITIONER

## 2022-04-25 PROCEDURE — 88305 TISSUE EXAM BY PATHOLOGIST: CPT | Mod: 59 | Performed by: PATHOLOGY

## 2022-04-25 PROCEDURE — 88342 IMHCHEM/IMCYTCHM 1ST ANTB: CPT | Mod: 26,,, | Performed by: PATHOLOGY

## 2022-04-25 PROCEDURE — 43239 EGD BIOPSY SINGLE/MULTIPLE: CPT | Performed by: INTERNAL MEDICINE

## 2022-04-25 PROCEDURE — 36415 COLL VENOUS BLD VENIPUNCTURE: CPT | Performed by: HOSPITALIST

## 2022-04-25 PROCEDURE — 37000009 HC ANESTHESIA EA ADD 15 MINS: Performed by: INTERNAL MEDICINE

## 2022-04-25 PROCEDURE — 43251 EGD REMOVE LESION SNARE: CPT | Performed by: INTERNAL MEDICINE

## 2022-04-25 PROCEDURE — 27201089 HC SNARE, DISP (ANY): Performed by: INTERNAL MEDICINE

## 2022-04-25 PROCEDURE — 25000003 PHARM REV CODE 250

## 2022-04-25 RX ORDER — PROPOFOL 10 MG/ML
VIAL (ML) INTRAVENOUS
Status: DISCONTINUED | OUTPATIENT
Start: 2022-04-25 | End: 2022-04-25

## 2022-04-25 RX ORDER — PANTOPRAZOLE SODIUM 40 MG/10ML
40 INJECTION, POWDER, LYOPHILIZED, FOR SOLUTION INTRAVENOUS 2 TIMES DAILY
Status: DISCONTINUED | OUTPATIENT
Start: 2022-04-25 | End: 2022-04-26 | Stop reason: HOSPADM

## 2022-04-25 RX ORDER — LIDOCAINE HCL/PF 100 MG/5ML
SYRINGE (ML) INTRAVENOUS
Status: DISCONTINUED | OUTPATIENT
Start: 2022-04-25 | End: 2022-04-25

## 2022-04-25 RX ORDER — HALOPERIDOL 1 MG/1
2 TABLET ORAL
Status: DISCONTINUED | OUTPATIENT
Start: 2022-04-25 | End: 2022-04-26 | Stop reason: HOSPADM

## 2022-04-25 RX ORDER — SODIUM CHLORIDE 9 MG/ML
INJECTION, SOLUTION INTRAVENOUS CONTINUOUS
Status: DISCONTINUED | OUTPATIENT
Start: 2022-04-25 | End: 2022-04-25

## 2022-04-25 RX ORDER — ACETAMINOPHEN 325 MG/1
650 TABLET ORAL EVERY 6 HOURS PRN
Status: DISCONTINUED | OUTPATIENT
Start: 2022-04-25 | End: 2022-04-26 | Stop reason: HOSPADM

## 2022-04-25 RX ADMIN — SODIUM CHLORIDE: 0.9 INJECTION, SOLUTION INTRAVENOUS at 04:04

## 2022-04-25 RX ADMIN — ACETAMINOPHEN 650 MG: 325 TABLET ORAL at 12:04

## 2022-04-25 RX ADMIN — PROPOFOL 25 MG: 10 INJECTION, EMULSION INTRAVENOUS at 04:04

## 2022-04-25 RX ADMIN — SODIUM CHLORIDE 8 MG/HR: 9 INJECTION, SOLUTION INTRAVENOUS at 02:04

## 2022-04-25 RX ADMIN — MUPIROCIN: 20 OINTMENT TOPICAL at 09:04

## 2022-04-25 RX ADMIN — AMLODIPINE BESYLATE 10 MG: 5 TABLET ORAL at 08:04

## 2022-04-25 RX ADMIN — HYDRALAZINE HYDROCHLORIDE 10 MG: 20 INJECTION, SOLUTION INTRAMUSCULAR; INTRAVENOUS at 11:04

## 2022-04-25 RX ADMIN — SODIUM CHLORIDE 8 MG/HR: 9 INJECTION, SOLUTION INTRAVENOUS at 07:04

## 2022-04-25 RX ADMIN — DONEPEZIL HYDROCHLORIDE 10 MG: 5 TABLET ORAL at 08:04

## 2022-04-25 RX ADMIN — PROPOFOL 25 MG: 10 INJECTION, EMULSION INTRAVENOUS at 05:04

## 2022-04-25 RX ADMIN — PANTOPRAZOLE SODIUM 40 MG: 40 INJECTION, POWDER, FOR SOLUTION INTRAVENOUS at 08:04

## 2022-04-25 RX ADMIN — MUPIROCIN: 20 OINTMENT TOPICAL at 08:04

## 2022-04-25 RX ADMIN — LIDOCAINE HYDROCHLORIDE 50 MG: 20 INJECTION INTRAVENOUS at 04:04

## 2022-04-25 RX ADMIN — PROPOFOL 50 MG: 10 INJECTION, EMULSION INTRAVENOUS at 04:04

## 2022-04-25 RX ADMIN — SODIUM CHLORIDE 8 MG/HR: 9 INJECTION, SOLUTION INTRAVENOUS at 03:04

## 2022-04-25 RX ADMIN — AMOXICILLIN AND CLAVULANATE POTASSIUM 500 MG: 500; 125 TABLET, FILM COATED ORAL at 08:04

## 2022-04-25 RX ADMIN — LOSARTAN POTASSIUM 25 MG: 25 TABLET, FILM COATED ORAL at 08:04

## 2022-04-25 NOTE — PROGRESS NOTES
EGD done.  Two polyps removed.  Polypoid lesion at lesser curvature noted and biopsied as was prominent ampulla.  No obvious bleeding but may have chronic oozing from polypoid lesions.  Will discuss possibility of colonoscopy with family but this would be difficult given his advanced age and frailty.  Will follow.

## 2022-04-25 NOTE — PROVATION PATIENT INSTRUCTIONS
Discharge Summary/Instructions after an Endoscopic Procedure  Patient Name: Troy Yuan  Patient MRN: [id number]  Patient YOB: 1929 Monday, April 25, 2022  Hugo Villatoro MD  Dear patient,  As a result of recent federal legislation (The Federal Cures Act), you may   receive lab or pathology results from your procedure in your [facility]   account before your physician is able to contact you. Your physician or   their representative will relay the results to you with their   recommendations at their soonest availability.  Thank you,  RESTRICTIONS:  During your procedure today, you received medications for sedation.  These   medications may affect your judgment, balance and coordination.  Therefore,   for 24 hours, you have the following restrictions:   - DO NOT drive a car, operate machinery, make legal/financial decisions,   sign important papers or drink alcohol.    ACTIVITY:  Today: no heavy lifting, straining or running due to procedural   sedation/anesthesia.  The following day: return to full activity including work.  DIET:  Eat and drink normally unless instructed otherwise.     TREATMENT FOR COMMON SIDE EFFECTS:  - Mild abdominal pain, nausea, belching, bloating or excessive gas:  rest,   eat lightly and use a heating pad.  - Sore Throat: treat with throat lozenges and/or gargle with warm salt   water.  - Because air was used during the procedure, expelling large amounts of air   from your rectum or belching is normal.  - If a bowel prep was taken, you may not have a bowel movement for 1-3 days.    This is normal.  SYMPTOMS TO WATCH FOR AND REPORT TO YOUR PHYSICIAN:  1. Abdominal pain or bloating, other than gas cramps.  2. Chest pain.  3. Back pain.  4. Signs of infection such as: chills or fever occurring within 24 hours   after the procedure.  5. Rectal bleeding, which would show as bright red, maroon, or black stools.   (A tablespoon of blood from the rectum is not serious, especially if    hemorrhoids are present.)  6. Vomiting.  7. Weakness or dizziness.  GO DIRECTLY TO THE NEAREST EMERGENCY ROOM IF YOU HAVE ANY OF THE FOLLOWING:      Difficulty breathing              Chills and/or fever over 101 F   Persistent vomiting and/or vomiting blood   Severe abdominal pain   Severe chest pain   Black, tarry stools   Bleeding- more than one tablespoon   Any other symptom or condition that you feel may need urgent attention  Your doctor recommends these additional instructions:  If any biopsies were taken, your doctors clinic will contact you in 1 to 2   weeks with any results.  - Return patient to [facility] for ongoing care.   - Resume previous diet.   - Continue present medications.   - No aspirin, ibuprofen, naproxen, or other non-steroidal anti-inflammatory   drugs.   - Await pathology results.   - Follow an antireflux regimen.   - Use Protonix (pantoprazole) 40 mg PO daily.  For questions, problems or results please call your physician - Hugo Villatoro MD at Work:  [phone].  [facility], EMERGENCY ROOM PHONE NUMBER: [phone]  IF A COMPLICATION OR EMERGENCY SITUATION ARISES AND YOU ARE UNABLE TO REACH   YOUR PHYSICIAN - GO DIRECTLY TO THE EMERGENCY ROOM.  Hugo Villatoro MD  4/25/2022 5:10:26 PM  This report has been verified and signed electronically.  Dear patient,  As a result of recent federal legislation (The Federal Cures Act), you may   receive lab or pathology results from your procedure in your [facility]   account before your physician is able to contact you. Your physician or   their representative will relay the results to you with their   recommendations at their soonest availability.  Thank you,  PROVATION

## 2022-04-25 NOTE — ANESTHESIA PREPROCEDURE EVALUATION
04/25/2022  Troy Yuan Sr. is a 89 y.o., male.    Pre-op Assessment    I have reviewed the Patient Summary Reports.    I have reviewed the Nursing Notes. I have reviewed the NPO Status.   I have reviewed the Medications.     Review of Systems  Anesthesia Hx:  No problems with previous Anesthesia    Social:  Ex tobacco    Hematology/Oncology:         -- Anemia: --  Cancer in past history:    EENT/Dental:   Edentulous   Cardiovascular:   Hypertension    Pulmonary:  Pulmonary Normal    Renal/:   Chronic Renal Disease renal calculi    Hepatic/GI:   Upper GI bleed    Musculoskeletal:  Musculoskeletal Normal    Neurological:  Dementia    Endocrine:  Endocrine Normal    Dermatological:  Skin Normal    Psych:   Psychiatric History          Physical Exam  General:  Well nourished      Airway/Jaw/Neck:  Airway Findings: Mouth Opening: Normal   Tongue: Normal   General Airway Assessment: Adult Mallampati: I  TM Distance: Normal, at least 6 cm         Eyes/Ears/Nose:  EYES/EARS/NOSE FINDINGS: Normal   Dental:  Dental Findings: Edentulous     Chest/Lungs:  Chest/Lungs Findings: Clear to auscultation      Heart/Vascular:  Heart Findings: Rate: Normal  Rhythm: Regular Rhythm  Sounds: Normal  Heart murmur: negative Vascular Findings: Normal       Mental Status:  Mental Status Findings: Normal        Anesthesia Plan  Type of Anesthesia, risks & benefits discussed:  Anesthesia Type:  Gen Natural Airway    Patient's Preference:   Plan Factors:          Intra-op Monitoring Plan: Standard ASA Monitors  Intra-op Monitoring Plan Comments:   Post Op Pain Control Plan:   Post Op Pain Control Plan Comments:     Induction:   IV  Beta Blocker:  Patient is on a Beta-Blocker and has received one dose within the past 24 hours (No further documentation required).       Informed Consent: Informed consent signed with the Patient  representative and all parties understand the risks and agree with anesthesia plan.  All questions answered.  Anesthesia consent signed with patient representative.  ASA Score: 3     Day of Surgery Review of History & Physical:    H&P Update referred to the surgeon/provider.          Ready For Surgery From Anesthesia Perspective.           Physical Exam  General: Well nourished    Airway:  Mallampati: I   Mouth Opening: Normal  TM Distance: Normal, at least 6 cm  Tongue: Normal    Dental:  Edentulous    Chest/Lungs:  Clear to auscultation    Heart:  Rate: Normal  Rhythm: Regular Rhythm  Sounds: Normal          Anesthesia Plan  Type of Anesthesia, risks & benefits discussed:    Anesthesia Type: Gen Natural Airway  Intra-op Monitoring Plan: Standard ASA Monitors  Induction:  IV  Informed Consent: Informed consent signed with the Patient representative and all parties understand the risks and agree with anesthesia plan.  All questions answered.   ASA Score: 3  Day of Surgery Review of History & Physical: H&P Update referred to the surgeon/provider.    Ready For Surgery From Anesthesia Perspective.       .

## 2022-04-25 NOTE — SUBJECTIVE & OBJECTIVE
Interval History:  Patient seen and examined.  Overnight, had some episodes of agitation associated with confusion likely sundowning.  Plan of care discussed with the patient and his daughter at bedside this morning.  Patient underwent EGD with results shown below.    Review of Systems   Constitutional:  Positive for fatigue. Negative for chills and fever.   Respiratory:  Negative for cough and shortness of breath.    Cardiovascular:  Negative for chest pain and leg swelling.   Gastrointestinal:  Negative for abdominal pain, nausea and vomiting.   Musculoskeletal:  Negative for back pain.   Neurological:  Negative for weakness.   Psychiatric/Behavioral:  Positive for agitation and confusion. The patient is not nervous/anxious.    All other systems reviewed and are negative.  Objective:     Vital Signs (Most Recent):  Temp: 98.6 °F (37 °C) (04/25/22 1709)  Pulse: 63 (04/25/22 1718)  Resp: 20 (04/25/22 1718)  BP: (!) 170/75 (04/25/22 1718)  SpO2: 100 % (04/25/22 1718)   Vital Signs (24h Range):  Temp:  [98.1 °F (36.7 °C)-98.7 °F (37.1 °C)] 98.6 °F (37 °C)  Pulse:  [52-64] 63  Resp:  [16-24] 20  SpO2:  [95 %-100 %] 100 %  BP: ()/(56-99) 170/75     Weight: 68.3 kg (150 lb 9.2 oz)  Body mass index is 24.3 kg/m².    Intake/Output Summary (Last 24 hours) at 4/25/2022 1855  Last data filed at 4/25/2022 1800  Gross per 24 hour   Intake 120 ml   Output 1500 ml   Net -1380 ml      Physical Exam  Vitals and nursing note reviewed.   Constitutional:       Comments: Elderly  male in no acute distress   Eyes:      Pupils: Pupils are equal, round, and reactive to light.   Neck:      Vascular: No JVD.   Cardiovascular:      Rate and Rhythm: Normal rate and regular rhythm.      Heart sounds: Normal heart sounds.   Pulmonary:      Effort: Pulmonary effort is normal.      Breath sounds: Normal breath sounds.   Abdominal:      General: Bowel sounds are normal. There is no distension.      Palpations: Abdomen is  soft.      Tenderness: There is no abdominal tenderness.   Musculoskeletal:         General: No deformity.   Lymphadenopathy:      Cervical: No cervical adenopathy.   Skin:     Coloration: Skin is not pale.      Findings: No rash.       Significant Labs: All pertinent labs within the past 24 hours have been reviewed.    Significant Imaging: I have reviewed all pertinent imaging results/findings within the past 24 hours.

## 2022-04-25 NOTE — ASSESSMENT & PLAN NOTE
"Patient on GI bleed pathway.  Continue Protonix for GI recommendations.  EGD completed today- GI note; " Two polyps removed.  Polypoid lesion at lesser curvature noted and biopsied as was prominent ampulla.  No obvious bleeding but may have chronic oozing from polypoid lesions.  Will discuss possibility of colonoscopy with family but this would be difficult given his advanced age and frailty.  Will follow"  Discuss with Gastroenterology about potential colonoscopy in follow-up with hemoglobin tomorrow morning.    "

## 2022-04-25 NOTE — TRANSFER OF CARE
"Anesthesia Transfer of Care Note    Patient: Troy Yuan Sr.    Procedure(s) Performed: Procedure(s) (LRB):  EGD (ESOPHAGOGASTRODUODENOSCOPY) (N/A)    Patient location: PACU    Anesthesia Type: general    Transport from OR: Transported from OR on 2-3 L/min O2 by NC with adequate spontaneous ventilation    Post pain: adequate analgesia    Post assessment: no apparent anesthetic complications and tolerated procedure well    Post vital signs: stable    Level of consciousness: sedated and responds to stimulation    Nausea/Vomiting: no nausea/vomiting    Complications: none    Transfer of care protocol was followed      Last vitals:   Visit Vitals  BP (!) 190/80 (BP Location: Right arm, Patient Position: Lying)   Pulse (!) 53   Temp 36.7 °C (98.1 °F) (Skin)   Resp 18   Ht 5' 6" (1.676 m)   Wt 68.3 kg (150 lb 9.2 oz)   SpO2 99%   BMI 24.30 kg/m²     "

## 2022-04-26 VITALS
BODY MASS INDEX: 24.2 KG/M2 | OXYGEN SATURATION: 94 % | TEMPERATURE: 98 F | DIASTOLIC BLOOD PRESSURE: 81 MMHG | SYSTOLIC BLOOD PRESSURE: 126 MMHG | HEIGHT: 66 IN | HEART RATE: 77 BPM | WEIGHT: 150.56 LBS | RESPIRATION RATE: 18 BRPM

## 2022-04-26 LAB
ANION GAP SERPL CALC-SCNC: 18 MMOL/L (ref 8–16)
BASOPHILS # BLD AUTO: 0.04 K/UL (ref 0–0.2)
BASOPHILS NFR BLD: 0.3 % (ref 0–1.9)
BUN SERPL-MCNC: 33 MG/DL (ref 10–30)
CALCIUM SERPL-MCNC: 9 MG/DL (ref 8.7–10.5)
CHLORIDE SERPL-SCNC: 111 MMOL/L (ref 95–110)
CO2 SERPL-SCNC: 15 MMOL/L (ref 23–29)
CREAT SERPL-MCNC: 2.2 MG/DL (ref 0.5–1.4)
DIFFERENTIAL METHOD: ABNORMAL
EOSINOPHIL # BLD AUTO: 0.2 K/UL (ref 0–0.5)
EOSINOPHIL NFR BLD: 1.3 % (ref 0–8)
ERYTHROCYTE [DISTWIDTH] IN BLOOD BY AUTOMATED COUNT: 17.4 % (ref 11.5–14.5)
EST. GFR  (AFRICAN AMERICAN): 29 ML/MIN/1.73 M^2
EST. GFR  (NON AFRICAN AMERICAN): 25 ML/MIN/1.73 M^2
GLUCOSE SERPL-MCNC: 88 MG/DL (ref 70–110)
HCT VFR BLD AUTO: 34.9 % (ref 40–54)
HGB BLD-MCNC: 10.7 G/DL (ref 14–18)
IMM GRANULOCYTES # BLD AUTO: 0.05 K/UL (ref 0–0.04)
IMM GRANULOCYTES NFR BLD AUTO: 0.4 % (ref 0–0.5)
LYMPHOCYTES # BLD AUTO: 0.8 K/UL (ref 1–4.8)
LYMPHOCYTES NFR BLD: 6.8 % (ref 18–48)
MCH RBC QN AUTO: 29.3 PG (ref 27–31)
MCHC RBC AUTO-ENTMCNC: 30.7 G/DL (ref 32–36)
MCV RBC AUTO: 96 FL (ref 82–98)
MONOCYTES # BLD AUTO: 1.1 K/UL (ref 0.3–1)
MONOCYTES NFR BLD: 9.5 % (ref 4–15)
NEUTROPHILS # BLD AUTO: 9.4 K/UL (ref 1.8–7.7)
NEUTROPHILS NFR BLD: 81.7 % (ref 38–73)
NRBC BLD-RTO: 0 /100 WBC
PLATELET # BLD AUTO: 199 K/UL (ref 150–450)
PMV BLD AUTO: 11.3 FL (ref 9.2–12.9)
POTASSIUM SERPL-SCNC: 4.2 MMOL/L (ref 3.5–5.1)
RBC # BLD AUTO: 3.65 M/UL (ref 4.6–6.2)
SODIUM SERPL-SCNC: 144 MMOL/L (ref 136–145)
WBC # BLD AUTO: 11.53 K/UL (ref 3.9–12.7)

## 2022-04-26 PROCEDURE — 25000003 PHARM REV CODE 250: Performed by: HOSPITALIST

## 2022-04-26 PROCEDURE — 36415 COLL VENOUS BLD VENIPUNCTURE: CPT | Performed by: HOSPITALIST

## 2022-04-26 PROCEDURE — 63600175 PHARM REV CODE 636 W HCPCS: Performed by: NURSE PRACTITIONER

## 2022-04-26 PROCEDURE — C9113 INJ PANTOPRAZOLE SODIUM, VIA: HCPCS | Performed by: HOSPITALIST

## 2022-04-26 PROCEDURE — 80048 BASIC METABOLIC PNL TOTAL CA: CPT | Performed by: HOSPITALIST

## 2022-04-26 PROCEDURE — 63600175 PHARM REV CODE 636 W HCPCS: Performed by: HOSPITALIST

## 2022-04-26 PROCEDURE — 85025 COMPLETE CBC W/AUTO DIFF WBC: CPT | Performed by: HOSPITALIST

## 2022-04-26 RX ORDER — HALOPERIDOL 5 MG/ML
5 INJECTION INTRAMUSCULAR EVERY 6 HOURS PRN
Status: DISCONTINUED | OUTPATIENT
Start: 2022-04-26 | End: 2022-04-26

## 2022-04-26 RX ORDER — HALOPERIDOL 5 MG/ML
5 INJECTION INTRAMUSCULAR EVERY 6 HOURS PRN
Status: DISCONTINUED | OUTPATIENT
Start: 2022-04-26 | End: 2022-04-26 | Stop reason: HOSPADM

## 2022-04-26 RX ADMIN — HALOPERIDOL 2 MG: 1 TABLET ORAL at 10:04

## 2022-04-26 RX ADMIN — HALOPERIDOL LACTATE 5 MG: 5 INJECTION, SOLUTION INTRAMUSCULAR at 11:04

## 2022-04-26 RX ADMIN — HYDRALAZINE HYDROCHLORIDE 10 MG: 20 INJECTION, SOLUTION INTRAMUSCULAR; INTRAVENOUS at 07:04

## 2022-04-26 RX ADMIN — ATORVASTATIN CALCIUM 20 MG: 20 TABLET, FILM COATED ORAL at 10:04

## 2022-04-26 RX ADMIN — AMOXICILLIN AND CLAVULANATE POTASSIUM 500 MG: 500; 125 TABLET, FILM COATED ORAL at 10:04

## 2022-04-26 RX ADMIN — PANTOPRAZOLE SODIUM 40 MG: 40 INJECTION, POWDER, FOR SOLUTION INTRAVENOUS at 10:04

## 2022-04-26 NOTE — PLAN OF CARE
Spoke with Malu from [facility]. States just finished with family and they are in agreement with hospice with their services. Family want pt to DC this evening so will arrange DME delivery after DC. Requested CTI to completed and faxed to to their [facility] office.    CTI completed and signed by Dr. Velazquez. Faxed to office as requested    Family to provide transportation home     04/26/22 8663   Post-Acute Status   Post-Acute Authorization Hospice   Hospice Status Set-up Complete/Auth obtained

## 2022-04-26 NOTE — PLAN OF CARE
Spoke with Malu from [facility]. States she is pulling up to daughter's house now for informational visit. Will call me to update me after meeting       04/26/22 0573   Post-Acute Status   Post-Acute Authorization Hospice

## 2022-04-26 NOTE — ASSESSMENT & PLAN NOTE
Chronic, controlled.  Latest blood pressure and vitals reviewed-   Temp:  [98.1 °F (36.7 °C)-98.7 °F (37.1 °C)]   Pulse:  [52-64]   Resp:  [16-24]   BP: ()/(56-99)   SpO2:  [95 %-100 %] .   Home meds for hypertension were reviewed and noted below.   Hypertension Medications             amlodipine (NORVASC) 10 MG tablet Take 10 mg by mouth once daily.    furosemide (LASIX) 20 MG tablet Take 1 tablet (20 mg total) by mouth once daily.    losartan (COZAAR) 25 MG tablet TAKE 1 TABLET(25 MG) BY MOUTH EVERY DAY          While in the hospital, will manage blood pressure as follows; Adjust home antihypertensive regimen as follows- -  hold Lasix for now, continue losartan and amlodipine.    Will utilize p.r.n. blood pressure medication only if patient's blood pressure greater than  180/110 and he develops symptoms such as worsening chest pain or shortness of breath.

## 2022-04-26 NOTE — DISCHARGE INSTRUCTIONS
Discharge Instructions, [facility]    Thank you for choosing [facility] for your medical care. The primary doctor who is taking care of you at the time of your discharge is Arcadio Velazquez MD.     You were admitted to the hospital with Upper GI bleed.     Please note your discharge instructions, including diet/activity restrictions, follow-up appointments, and medication changes.  If you have any questions about your medical issues, prescriptions, or any other questions, please feel free to contact the [facility] Medicine Dept at [phone] and we will help.    If you are previously with Home health, outpatient PT/OT or under a therapy program, you are cleared to return to those programs.    Please direct all long term medication refills and follow up to your primary care provider, Gentry Encinas MD. Thank you again for letting us take care of your health care needs.

## 2022-04-26 NOTE — PLAN OF CARE
BRITTNY called [facility] office to check on referral. Spoke with Kandis- states she is pulling referral now and will have someone meet with pt's daughter. BRITTNY requested to have whoever meets with family to call me after so I can update on plan. Updated her that plans to DC today       04/26/22 1015   Post-Acute Status   Post-Acute Authorization Hospice

## 2022-04-26 NOTE — PLAN OF CARE
Pt's family interested in hospice at home-Lakia selected [facility].   CM sent referral to [facility] via secure chat       04/26/22 3619   Post-Acute Status   Post-Acute Authorization Hospice   Hospice Status Referrals Sent

## 2022-04-26 NOTE — DISCHARGE SUMMARY
[facility] Hospital Medicine  Discharge Summary      Patient Name: Troy Yuan Sr.  MRN: [id number]  Patient Class: IP- Inpatient  Admission Date: 4/22/2022  Hospital Length of Stay: 3 days  Discharge Date and Time:  04/26/2022 5:11 PM  Attending Physician: Arcadio Velazquez MD   Discharging Provider: Arcadio Velazquez MD  Primary Care Provider: Gentry Encinas MD      HPI:   Patient is a 89-year-old male with a history of prostate cancer , CKD 3, dementia who presents today accompanied by his son for hematemesis.  Patient had an admission a few days ago at this hospital for the same.  During that admission he had an EGD which demonstrated mild esophagitis as well as gastric polyps which were biopsied.  He was discharged home on b.i.d. PPI and his Plavix was held.  During that hospital stay he also had urinary retention and hematuria for which Neumann catheter was placed and outpatient Urology follow-up was arranged.  He also had pneumonia and was treated with IV antibiotics and is currently on Augmentin for the same.  Patient's son reports that this morning he complained of some shortness of breath.  This seemed to resolved after patient belched multiple times.  He was doing well the rest of the morning and then had 2 episodes of hematemesis this afternoon.  He had another episode in the emergency department.  No loss of consciousness, no weakness, no lethargy.  Patient is acting like his usual self per his son.  Hemoglobin in the ED is 7.6, it was 8.4 on discharge.  Creatinine is 2.2.  This is improved from his discharge creatinine.  History is provided by his son given patient's dementia.      Procedure(s) (LRB):  EGD (ESOPHAGOGASTRODUODENOSCOPY) (N/A)      Hospital Course:   Patient with recent admission for hematemesis during which EGD demonstrated esophagitis and gastric polyps admitted with repeat hematemesis.  He was placed on GI bleed pathway and hemoglobin closely monitored.  He was placed  on a PPI drip.  He was kept NPO.  His hemoglobin dropped to 6.2 so he was transfused 2 units PRBCs.  GI was consulted  Patient had a Neumann placed during last hospital stay with plan for outpatient Urology follow-up for urinary retention.  Neumann remained in place.  Patient had EGD which showed evidence of some polyps and bleeding mucosa.  Repeat CBC showed stable hemoglobin.  Patient was stopped from PPI drip and transition to oral PPI.  Unfortunately, patient developed worsening delirium while in the hospital.  Upon conversation with the patient's family, they were comfortable with discharging the patient home to hospice care.  Patient was discharged home to hospice.       Goals of Care Treatment Preferences:  Code Status: Full Code      Consults:   Consults (From admission, onward)        Status Ordering Provider     Inpatient consult to Gastroenterology  Once        Provider:  Sandor Sewell MD    Completed NOHEMY QUINONEZ          No new Assessment & Plan notes have been filed under this hospital service since the last note was generated.  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 [D64.9] 07/02/2015 Yes     Chronic    Hypertension [I10] 01/11/2019 Yes    Dementia without behavioral disturbance [F03.90] 12/17/2019 Yes    Anemia due to stage 3 chronic kidney disease treated with erythropoietin [N18.30, D63.1] 02/24/2021 Yes    CKD (chronic kidney disease), stage III [N18.4] 01/22/2020 Yes     Chronic    Urinary retention [R33.9] 12/19/2019 Yes     Chronic    Hyperlipidemia [E78.5] 12/12/2019 Yes      Problems Resolved During this Admission:    Diagnosis Date Noted Date Resolved POA    Anemia of chronic renal failure [N18.9, D63.1] 12/07/2020 04/25/2022 Yes    Urinary retention due to benign  prostatic hyperplasia [N40.1, R33.8] 01/23/2020 04/25/2022 Yes     Chronic       Discharged Condition: stable    Disposition: Hospice/Home    Follow Up:   Follow-up Information     Hugo Strong MD Follow up on 5/31/2022.    Specialty: Gastroenterology  Why: at 1:30 PM  Contact information:  [address]  [address]  [place] [address]  [phone]             Gentry Encinas MD Follow up on 5/2/2022.    Specialty: Family Medicine  Why: at 1 PM for hospital follow up  Contact information:  [address]  [place] [address]  [phone]                       Patient Instructions:      Ambulatory referral/consult to HOME Palliative Care   Standing Status: Future   Referral Priority: Routine Referral Type: Consultation   Requested Specialty: Hospice and Palliative Medicine   Number of Visits Requested: 1     Diet Adult Regular     Notify your health care provider if you experience any of the following:  persistent nausea and vomiting or diarrhea     Notify your health care provider if you experience any of the following:  difficulty breathing or increased cough     Notify your health care provider if you experience any of the following:  increased confusion or weakness     Activity as tolerated       Significant Diagnostic Studies: Labs:   BMP:   Recent Labs   Lab 04/25/22  0508 04/26/22  0655   GLU 87 88    144   K 4.0 4.2   * 111*   CO2 23 15*   BUN 37* 33*   CREATININE 2.2* 2.2*   CALCIUM 8.4* 9.0    and CBC   Recent Labs   Lab 04/24/22  2333 04/26/22  0655   WBC 8.57 11.53   HGB 8.9* 10.7*   HCT 27.5* 34.9*    199       Pending Diagnostic Studies:     Procedure Component Value Units Date/Time    Specimen to Pathology, Surgery Gastrointestinal tract [[id number]] Collected: 04/25/22 1708    Order Status: Sent Lab Status: In process Updated: 04/26/22 0739    Specimen: Tissue          Medications:  Reconciled Home Medications:      Medication List      CONTINUE taking these medications     amLODIPine 10 MG tablet  Commonly known as: NORVASC  Take 10 mg by mouth once daily.     atorvastatin 20 MG tablet  Commonly known as: LIPITOR  Take 1 tablet (20 mg total) by mouth once daily.     calcitRIOL 0.25 MCG Cap  Commonly known as: ROCALTROL  Take 0.25 mcg by mouth once daily.     donepeziL 10 MG tablet  Commonly known as: ARICEPT  Take 1 tablet (10 mg total) by mouth every evening.     epoetin radha 3,000 unit/mL injection  Commonly known as: PROCRIT  Inject 3,000 Units into the skin every 14 (fourteen) days. Per Dr. Hernandez Mckenzie     ferrous sulfate 325 mg (65 mg iron) Tab tablet  Commonly known as: FEOSOL  Take 1 tablet (325 mg total) by mouth 2 (two) times daily.     furosemide 20 MG tablet  Commonly known as: LASIX  Take 1 tablet (20 mg total) by mouth once daily.     losartan 25 MG tablet  Commonly known as: COZAAR  TAKE 1 TABLET(25 MG) BY MOUTH EVERY DAY     melatonin 10 mg Tab  Take 10 mg by mouth every evening.     pantoprazole 40 MG tablet  Commonly known as: PROTONIX  Take 1 tablet (40 mg total) by mouth 2 (two) times daily.        ASK your doctor about these medications    amoxicillin-clavulanate 500-125mg 500-125 mg Tab  Commonly known as: AUGMENTIN  Take 1 tablet (500 mg total) by mouth 2 (two) times daily. for 5 days  Ask about: Should I take this medication?     clopidogreL 75 mg tablet  Commonly known as: PLAVIX  Take 1 tablet (75 mg total) by mouth once daily.     omeprazole 20 MG capsule  Commonly known as: PRILOSEC  Take 1 capsule (20 mg total) by mouth 2 (two) times a day.            Indwelling Lines/Drains at time of discharge:   Lines/Drains/Airways     Drain  Duration                Urethral Catheter 04/20/22 1757 Coude 16 Fr. 5 days                Time spent on the discharge of patient: 38 minutes         Arcadio Velazquez MD  Department of Hospital Medicine  [facility]

## 2022-04-26 NOTE — ASSESSMENT & PLAN NOTE
Patient is chronically on statin.will continue for now. Monitor clinically. Last LDL was   Lab Results   Component Value Date    LDLCALC 100.2 10/03/2021

## 2022-04-26 NOTE — ASSESSMENT & PLAN NOTE
Patient is identified as having Systolic (HFrEF) heart failure that is Chronic. CHF is currently controlled. Latest ECHO performed and demonstrates- Results for orders placed during the hospital encounter of 10/04/21    Echo Saline Bubble? Yes    Interpretation Summary  · The left ventricle is normal in size with mild eccentric hypertrophy and mildly decreased systolic function.  · The estimated ejection fraction is 45%.  · Grade III left ventricular diastolic dysfunction.  · Trivial pericardial effusion. Effusion is fluid.  · Mild mitral regurgitation.  · Normal central venous pressure (3 mmHg).  · Atrial fibrillation not observed.  . Continue ACE/ARB and monitor clinical status closely. Monitor on telemetry. Patient is off CHF pathway.  Monitor strict Is&Os and daily weights.  Place on fluid restriction of 2 L. Continue to stress to patient importance of self efficacy and  on diet for CHF.

## 2022-04-26 NOTE — PROGRESS NOTES
[facility] Hospital Medicine  Progress Note    Patient Name: Troy Yuan Sr.  MRN: [id number]  Patient Class: IP- Inpatient   Admission Date: 4/22/2022  Length of Stay: 2 days  Attending Physician: Arcadio Velazquez MD  Primary Care Provider: Gentry Encinas MD        Subjective:     Principal Problem:Upper GI bleed        HPI:  Patient is a 89-year-old male with a history of prostate cancer , CKD 3, dementia who presents today accompanied by his son for hematemesis.  Patient had an admission a few days ago at this hospital for the same.  During that admission he had an EGD which demonstrated mild esophagitis as well as gastric polyps which were biopsied.  He was discharged home on b.i.d. PPI and his Plavix was held.  During that hospital stay he also had urinary retention and hematuria for which Neumann catheter was placed and outpatient Urology follow-up was arranged.  He also had pneumonia and was treated with IV antibiotics and is currently on Augmentin for the same.  Patient's son reports that this morning he complained of some shortness of breath.  This seemed to resolved after patient belched multiple times.  He was doing well the rest of the morning and then had 2 episodes of hematemesis this afternoon.  He had another episode in the emergency department.  No loss of consciousness, no weakness, no lethargy.  Patient is acting like his usual self per his son.  Hemoglobin in the ED is 7.6, it was 8.4 on discharge.  Creatinine is 2.2.  This is improved from his discharge creatinine.  History is provided by his son given patient's dementia.      Overview/Hospital Course:  Patient with recent admission for hematemesis during which EGD demonstrated esophagitis and gastric polyps admitted with repeat hematemesis.  He was placed on GI bleed pathway and hemoglobin closely monitored.  He was placed on a PPI drip.  He was kept NPO.  His hemoglobin dropped to 6.2 so he was transfused 2 units PRBCs.   GI was consulted  Patient had a Neumann placed during last hospital stay with plan for outpatient Urology follow-up for urinary retention.  Neumann remained in place.      Interval History:  Patient seen and examined.  Overnight, had some episodes of agitation associated with confusion likely sundowning.  Plan of care discussed with the patient and his daughter at bedside this morning.  Patient underwent EGD with results shown below.    Review of Systems   Constitutional:  Positive for fatigue. Negative for chills and fever.   Respiratory:  Negative for cough and shortness of breath.    Cardiovascular:  Negative for chest pain and leg swelling.   Gastrointestinal:  Negative for abdominal pain, nausea and vomiting.   Musculoskeletal:  Negative for back pain.   Neurological:  Negative for weakness.   Psychiatric/Behavioral:  Positive for agitation and confusion. The patient is not nervous/anxious.    All other systems reviewed and are negative.  Objective:     Vital Signs (Most Recent):  Temp: 98.6 °F (37 °C) (04/25/22 1709)  Pulse: 63 (04/25/22 1718)  Resp: 20 (04/25/22 1718)  BP: (!) 170/75 (04/25/22 1718)  SpO2: 100 % (04/25/22 1718)   Vital Signs (24h Range):  Temp:  [98.1 °F (36.7 °C)-98.7 °F (37.1 °C)] 98.6 °F (37 °C)  Pulse:  [52-64] 63  Resp:  [16-24] 20  SpO2:  [95 %-100 %] 100 %  BP: ()/(56-99) 170/75     Weight: 68.3 kg (150 lb 9.2 oz)  Body mass index is 24.3 kg/m².    Intake/Output Summary (Last 24 hours) at 4/25/2022 0059  Last data filed at 4/25/2022 1800  Gross per 24 hour   Intake 120 ml   Output 1500 ml   Net -1380 ml      Physical Exam  Vitals and nursing note reviewed.   Constitutional:       Comments: Elderly  male in no acute distress   Eyes:      Pupils: Pupils are equal, round, and reactive to light.   Neck:      Vascular: No JVD.   Cardiovascular:      Rate and Rhythm: Normal rate and regular rhythm.      Heart sounds: Normal heart sounds.   Pulmonary:      Effort: Pulmonary  "effort is normal.      Breath sounds: Normal breath sounds.   Abdominal:      General: Bowel sounds are normal. There is no distension.      Palpations: Abdomen is soft.      Tenderness: There is no abdominal tenderness.   Musculoskeletal:         General: No deformity.   Lymphadenopathy:      Cervical: No cervical adenopathy.   Skin:     Coloration: Skin is not pale.      Findings: No rash.       Significant Labs: All pertinent labs within the past 24 hours have been reviewed.    Significant Imaging: I have reviewed all pertinent imaging results/findings within the past 24 hours.      Assessment/Plan:      * Upper GI bleed  Patient on GI bleed pathway.  Continue Protonix for GI recommendations.  EGD completed today- GI note; " Two polyps removed.  Polypoid lesion at lesser curvature noted and biopsied as was prominent ampulla.  No obvious bleeding but may have chronic oozing from polypoid lesions.  Will discuss possibility of colonoscopy with family but this would be difficult given his advanced age and frailty.  Will follow"  Discuss with Gastroenterology about potential colonoscopy in follow-up with hemoglobin tomorrow morning.      Chronic combined systolic and diastolic heart failure  Patient is identified as having Systolic (HFrEF) heart failure that is Chronic. CHF is currently controlled. Latest ECHO performed and demonstrates- Results for orders placed during the hospital encounter of 10/04/21    Echo Saline Bubble? Yes    Interpretation Summary  · The left ventricle is normal in size with mild eccentric hypertrophy and mildly decreased systolic function.  · The estimated ejection fraction is 45%.  · Grade III left ventricular diastolic dysfunction.  · Trivial pericardial effusion. Effusion is fluid.  · Mild mitral regurgitation.  · Normal central venous pressure (3 mmHg).  · Atrial fibrillation not observed.  . Continue ACE/ARB and monitor clinical status closely. Monitor on telemetry. Patient is off CHF " "pathway.  Monitor strict Is&Os and daily weights.  Place on fluid restriction of 2 L. Continue to stress to patient importance of self efficacy and  on diet for CHF.           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  Latest blood pressure and vitals reviewed-   Temp:  [98.1 °F (36.7 °C)-98.7 °F (37.1 °C)]   Pulse:  [52-64]   Resp:  [16-24]   BP: ()/(56-99)   SpO2:  [95 %-100 %] .   Home meds for hypertension were reviewed and noted below.   Hypertension Medications             amlodipine (NORVASC) 10 MG tablet Take 10 mg by mouth once daily.    furosemide (LASIX) 20 MG tablet Take 1 tablet (20 mg total) by mouth once daily.    losartan (COZAAR) 25 MG tablet TAKE 1 TABLET(25 MG) BY MOUTH EVERY DAY          While in the hospital, will manage blood pressure as follows; Adjust home antihypertensive regimen as follows- -  hold Lasix for now, continue losartan and amlodipine.    Will utilize p.r.n. blood pressure medication only if patient's blood pressure greater than  180/110 and he develops symptoms such as worsening chest pain or shortness of breath.        Dementia without behavioral disturbance  S Dementia is controlled currently. Continue home dementia meds and non-pharmacologic interventions to prevent delirium (No VS between 11PM-5AM, activity during day, opening blinds, providing glasses/hearing aids, and up in chair during daytime). Use PRN anti-psychotics to prevent behavior of self harm during sundowning, and avoid narcotics and benzos unless absolutely necessary. PRN anti-psychotics prescribed to avoid self harm behaviors.        Anemia due to stage 3 chronic kidney disease treated with erythropoietin  Manage per outpatient Nephrology      CKD (chronic kidney disease), stage III  Creatine stable for now. BMP reviewed- noted Estimated Creatinine Clearance: 18.9 mL/min (A) (based on SCr of 2.2 mg/dL (H)). " according to latest data. Monitor UOP and serial BMP and adjust therapy as needed. Renally dose meds.            Urinary retention  Continue yost  Outpatient urology follow up      Hyperlipidemia   Patient is chronically on statin.will continue for now. Monitor clinically. Last LDL was   Lab Results   Component Value Date    LDLCALC 100.2 10/03/2021              VTE Risk Mitigation (From admission, onward)         Ordered     IP VTE HIGH RISK PATIENT  Once         04/22/22 2031     Place sequential compression device  Until discontinued         04/22/22 2031                Discharge Planning   CHRIS: 4/26/2022     Code Status: Full Code   Is the patient medically ready for discharge?:     Reason for patient still in hospital (select all that apply): Treatment  Discharge Plan A: Home Health                  Arcadio Velazquez MD  Department of Hospital Medicine   [facility]

## 2022-04-26 NOTE — PLAN OF CARE
Pt in bed awake and alert. No c/o pain and respiration is even and unlabored. Pt is confused and non compliance with care.  Problem: Adjustment to Illness (Gastrointestinal Bleeding)  Goal: Optimal Coping with Acute Illness  Outcome: Ongoing, Progressing     Problem: Bleeding (Gastrointestinal Bleeding)  Goal: Hemostasis  Outcome: Ongoing, Progressing     Problem: Adult Inpatient Plan of Care  Goal: Plan of Care Review  Outcome: Ongoing, Progressing  Goal: Patient-Specific Goal (Individualized)  Outcome: Ongoing, Progressing  Goal: Absence of Hospital-Acquired Illness or Injury  Outcome: Ongoing, Progressing  Goal: Optimal Comfort and Wellbeing  Outcome: Ongoing, Progressing  Goal: Readiness for Transition of Care  Outcome: Ongoing, Progressing     Problem: Fluid and Electrolyte Imbalance (Acute Kidney Injury/Impairment)  Goal: Fluid and Electrolyte Balance  Outcome: Ongoing, Progressing     Problem: Oral Intake Inadequate (Acute Kidney Injury/Impairment)  Goal: Optimal Nutrition Intake  Outcome: Ongoing, Progressing     Problem: Renal Function Impairment (Acute Kidney Injury/Impairment)  Goal: Effective Renal Function  Outcome: Ongoing, Progressing     Problem: Skin Injury Risk Increased  Goal: Skin Health and Integrity  Outcome: Ongoing, Progressing     Problem: Fall Injury Risk  Goal: Absence of Fall and Fall-Related Injury  Outcome: Ongoing, Progressing

## 2022-04-26 NOTE — ASSESSMENT & PLAN NOTE
S Dementia is controlled currently. Continue home dementia meds and non-pharmacologic interventions to prevent delirium (No VS between 11PM-5AM, activity during day, opening blinds, providing glasses/hearing aids, and up in chair during daytime). Use PRN anti-psychotics to prevent behavior of self harm during sundowning, and avoid narcotics and benzos unless absolutely necessary. PRN anti-psychotics prescribed to avoid self harm behaviors.

## 2022-04-26 NOTE — PLAN OF CARE
Called [facility] office again ([phone]) to check status of referral/informational visit. Spoke with Jf again. States that she has her nurse Ramakrishna trying to reach pt's daughter but has not been able to get in touch with her. Informed them that pt has had family at bedside all day and suggested for them to call the room. States she will let her nurse know and someone will get back with me.     BRITTNY called pt's daughter, Lakia. Informed her that [facility] has been trying to get in contact with her and to please answer phone when she gets call. Updated her that the hospice visit is the only thing we are waiting on for DC. Verbalized understanding.     CM received call from Ramakrishna (nurse with [facility]) [phone]. States she had tried to contact daughter with no luck and asked me if I wanted her to call room. I updated her that I just spoke with pt's daughter on listed phone number and asked her to call her. Again updated Ramakrishna that plan for DC today       04/26/22 3676   Post-Acute Status   Post-Acute Authorization Hospice

## 2022-04-26 NOTE — PLAN OF CARE
Problem: Adjustment to Illness (Gastrointestinal Bleeding)  Goal: Optimal Coping with Acute Illness  Outcome: Met     Problem: Bleeding (Gastrointestinal Bleeding)  Goal: Hemostasis  Outcome: Met     Problem: Adult Inpatient Plan of Care  Goal: Plan of Care Review  Outcome: Met  Goal: Patient-Specific Goal (Individualized)  Outcome: Met  Goal: Absence of Hospital-Acquired Illness or Injury  Outcome: Met  Goal: Optimal Comfort and Wellbeing  Outcome: Met  Goal: Readiness for Transition of Care  Outcome: Met     Problem: Fluid and Electrolyte Imbalance (Acute Kidney Injury/Impairment)  Goal: Fluid and Electrolyte Balance  Outcome: Met     Problem: Oral Intake Inadequate (Acute Kidney Injury/Impairment)  Goal: Optimal Nutrition Intake  Outcome: Met     Problem: Renal Function Impairment (Acute Kidney Injury/Impairment)  Goal: Effective Renal Function  Outcome: Met     Problem: Skin Injury Risk Increased  Goal: Skin Health and Integrity  Outcome: Met     Problem: Fall Injury Risk  Goal: Absence of Fall and Fall-Related Injury  Outcome: Met   Pt alert and oriented x's 1. Oriented to self. Skin warm and dry to touch with respirations even and unlabored. No s/s of distress discomfort or adverse reactions noted at this time. Observed lying in bed supine with eyes closed. Assessment completed per flow sheet. Administered imodium per MAR. Able to verbalize demands as needed. Incontinent to bowel and bladder with brief in place. 20g to Right forearm and 20g to left AC discontinued. Intact/ was patent with no redness or infiltration noted at site.   Pt and son at bedside received discharge instructions. Verbalized understanding. Was transported via wheelchair by facility transport.

## 2022-04-26 NOTE — ANESTHESIA POSTPROCEDURE EVALUATION
Anesthesia Post Evaluation    Patient: Troy Yuan Sr.    Procedure(s) Performed: Procedure(s) (LRB):  EGD (ESOPHAGOGASTRODUODENOSCOPY) (N/A)    Final Anesthesia Type: general      Patient location during evaluation: PACU  Patient participation: Yes- Able to Participate  Level of consciousness: awake and alert (pt at baseline )  Post-procedure vital signs: reviewed and stable  Pain management: adequate  Airway patency: patent    PONV status at discharge: No PONV  Anesthetic complications: no      Cardiovascular status: hemodynamically stable  Respiratory status: unassisted and room air  Hydration status: euvolemic  Follow-up not needed.          Vitals Value Taken Time   /78 04/25/22 1905   Temp 36.7 °C (98 °F) 04/25/22 1905   Pulse 49 04/25/22 1905   Resp 16 04/25/22 1905   SpO2 96 % 04/25/22 1905         Event Time   Out of Recovery 04/25/2022 17:33:00         Pain/Camacho Score: Pain Rating Prior to Med Admin: 3 (4/25/2022 12:37 AM)  Pain Rating Post Med Admin: 0 (4/25/2022  1:37 AM)  Camacho Score: 10 (4/25/2022  5:18 PM)

## 2022-04-27 ENCOUNTER — TELEPHONE (OUTPATIENT)
Dept: MEDSURG UNIT | Facility: HOSPITAL | Age: 87
End: 2022-04-27
Payer: MEDICARE

## 2022-04-29 ENCOUNTER — TELEPHONE (OUTPATIENT)
Dept: INFUSION THERAPY | Facility: HOSPITAL | Age: 87
End: 2022-04-29

## 2022-05-02 LAB
FINAL PATHOLOGIC DIAGNOSIS: NORMAL
GROSS: NORMAL
Lab: NORMAL
MICROSCOPIC EXAM: NORMAL

## 2022-05-04 NOTE — PHYSICIAN QUERY
PT Name: Troy Yuan Sr.  MR #: [id number]     DOCUMENTATION CLARIFICATION      CDS Helga Villatoro RN, BSN        Contact Information:    [email or web address]         This form is a permanent document in the medical record.     Query Date: May 4, 2022    By submitting this query, we are merely seeking further clarification of documentation.  Please utilize your independent clinical judgment when addressing the question(s) below.     The Medical Record contains the following:    Clinical Information Location in Medical Record   Chief complaint: Hematemesis     89-year-old male with history of prostate cancer and CKD stage 3 was recently seen for hematuria days before with subsequently she a shin of antibiotics who presents today after gross hematemesis found to be anemic    Acute blood loss anemia secondary to GI hemorrhage, hematuria  UTI and acute bacterial pneumonia secondary to unknown organism/aspiration pneumonia  -NPO, ppi drip, trend hemoglobin  -transfuse as required    Impression:              - Tortuous esophagus.   - LA Grade A reflux esophagitis with no bleeding.   - Multiple gastric polyps and polypoid lesion in the body. Biopsied.   - Medium-sized hiatal hernia.   - Erythematous mucosa in the antrum. Biopsied.   - Normal duodenal bulb, first portion of the duodenum and second portion of the duodenum.     FINAL PATHOLOGIC DIAGNOSIS    1. Stomach, antrum and body (biopsy):  Moderate chronic antral and oxyntic gastritis, no activity  No Helicobacter organisms (routine and immunostain)  No dysplasia, no malignancy    2. Stomach, body (biopsy):  Marked reactive/hyperplastic changes (Hyperplastic polyp)  Mild chronic active gastritis  No Helicobacter organisms (routine and immunostain)  No dysplasia, no malignancy     4/18 H&P                     4/19 EGD                  4/19 [facility] report                          Please document your best medical opinion regarding the etiology of      Hematemesis    (Please check all that may apply)       [   ] LA Grade A reflux esophagitis     [   ] Gastric Polyps      [  X ] Mild chronic active gastritis     [   ] Other etiology (please specify):___________________     [  ] Clinically Undetermined             Please document in your progress notes daily for the duration of treatment, until resolved, and include in your discharge summary.

## 2022-05-10 LAB
FINAL PATHOLOGIC DIAGNOSIS: NORMAL
GROSS: NORMAL
Lab: NORMAL

## 2022-05-13 ENCOUNTER — TELEPHONE (OUTPATIENT)
Dept: INFUSION THERAPY | Facility: HOSPITAL | Age: 87
End: 2022-05-13

## 2022-05-13 NOTE — TELEPHONE ENCOUNTER
Attempted to call patient in regards to needed blood work. Unable to leave voicemail patient scheduled for retacrit on 5/16/22.

## 2022-05-16 ENCOUNTER — TELEPHONE (OUTPATIENT)
Dept: INFUSION THERAPY | Facility: HOSPITAL | Age: 87
End: 2022-05-16

## 2022-05-27 ENCOUNTER — TELEPHONE (OUTPATIENT)
Dept: INFUSION THERAPY | Facility: HOSPITAL | Age: 87
End: 2022-05-27

## 2022-05-27 NOTE — TELEPHONE ENCOUNTER
Spoke with patient's daughter and notified that patient's hgb was 10.7 and no need for retacrit on Monday. She voiced understanding.

## 2022-06-12 NOTE — ASSESSMENT & PLAN NOTE
Chronic, uncontrolled.  Patient noted to have hypertensive urgency upon arrival to emergency room likely secondary to hypervolemia.  Nitroglycerin infusion initiated and continued upon admission.    BP Readings from Last 3 Encounters:   12/12/19 (!) 187/81   05/21/19 (!) 158/62   01/13/19 (!) 149/73   .       no

## 2024-03-22 NOTE — PLAN OF CARE
Problem: Physical Therapy Goal  Goal: Physical Therapy Goal  Description  Goals to be met by: 2019     Patient will increase functional independence with mobility by performin). Supine to sit with Modified [place]  2). Sit to supine with Modified [place]  3). Sit to stand transfer with Stand-by Assistance  4). Bed to chair transfer with Stand-by Assistance using Rolling Walker  5). Gait  x  > 150 feet with Stand-by Assistance using Rolling Walker.      Outcome: Ongoing, Progressing      complete oral rehabilitation under general anesthesia